# Patient Record
Sex: MALE | Race: BLACK OR AFRICAN AMERICAN | NOT HISPANIC OR LATINO | URBAN - METROPOLITAN AREA
[De-identification: names, ages, dates, MRNs, and addresses within clinical notes are randomized per-mention and may not be internally consistent; named-entity substitution may affect disease eponyms.]

---

## 2017-07-25 ENCOUNTER — EMERGENCY (EMERGENCY)
Facility: HOSPITAL | Age: 52
LOS: 1 days | Discharge: HOME | End: 2017-07-25
Attending: EMERGENCY MEDICINE | Admitting: EMERGENCY MEDICINE
Payer: COMMERCIAL

## 2017-07-25 VITALS
OXYGEN SATURATION: 100 % | WEIGHT: 235.01 LBS | HEART RATE: 80 BPM | DIASTOLIC BLOOD PRESSURE: 80 MMHG | RESPIRATION RATE: 18 BRPM | SYSTOLIC BLOOD PRESSURE: 141 MMHG | HEIGHT: 74 IN | TEMPERATURE: 97 F

## 2017-07-25 VITALS
DIASTOLIC BLOOD PRESSURE: 84 MMHG | OXYGEN SATURATION: 98 % | RESPIRATION RATE: 16 BRPM | SYSTOLIC BLOOD PRESSURE: 133 MMHG | HEART RATE: 75 BPM | TEMPERATURE: 98 F

## 2017-07-25 PROCEDURE — 99284 EMERGENCY DEPT VISIT MOD MDM: CPT | Mod: 25

## 2017-07-25 PROCEDURE — 70450 CT HEAD/BRAIN W/O DYE: CPT | Mod: 26

## 2017-07-25 PROCEDURE — 70450 CT HEAD/BRAIN W/O DYE: CPT

## 2017-07-25 PROCEDURE — 99284 EMERGENCY DEPT VISIT MOD MDM: CPT
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
## 2017-07-25 NOTE — ED STATDOCS - EYES, MLM
clear bilaterally.  Pupils equal, round, and reactive to light. Swelling over left lateral eye with abrasion. EOMI
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
## 2017-07-25 NOTE — ED ADULT NURSE NOTE - CHPI ED SYMPTOMS NEG
no crying/no decreased eating/drinking/no bruising/no back pain/no laceration/no difficulty bearing weight/no disorientation/no dizziness/no neck tenderness/no fussiness/no loss of consciousness

## 2017-07-25 NOTE — ED STATDOCS - DIAGNOSIS COUNSELING, MDM
conducted a detailed discussion... Patient with left supraorbital ridge lateral contusion, no LOC, neg CT, will d/c home.  Patient given detailed discharge and return instructions and verbalized understanding.  Patient will follow up without fail.  All questions answered.  \
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
## 2020-09-02 PROBLEM — I10 ESSENTIAL (PRIMARY) HYPERTENSION: Chronic | Status: ACTIVE | Noted: 2017-07-25

## 2020-09-02 PROBLEM — K21.9 GASTRO-ESOPHAGEAL REFLUX DISEASE WITHOUT ESOPHAGITIS: Chronic | Status: ACTIVE | Noted: 2017-07-25

## 2020-09-04 ENCOUNTER — OUTPATIENT (OUTPATIENT)
Dept: OUTPATIENT SERVICES | Facility: HOSPITAL | Age: 55
LOS: 1 days | Discharge: HOME | End: 2020-09-04
Payer: COMMERCIAL

## 2020-09-04 VITALS
SYSTOLIC BLOOD PRESSURE: 114 MMHG | RESPIRATION RATE: 18 BRPM | DIASTOLIC BLOOD PRESSURE: 62 MMHG | WEIGHT: 226.41 LBS | OXYGEN SATURATION: 100 % | HEART RATE: 69 BPM | HEIGHT: 74 IN | TEMPERATURE: 98 F

## 2020-09-04 DIAGNOSIS — I10 ESSENTIAL (PRIMARY) HYPERTENSION: ICD-10-CM

## 2020-09-04 DIAGNOSIS — M48.061 SPINAL STENOSIS, LUMBAR REGION WITHOUT NEUROGENIC CLAUDICATION: ICD-10-CM

## 2020-09-04 DIAGNOSIS — Z90.79 ACQUIRED ABSENCE OF OTHER GENITAL ORGAN(S): Chronic | ICD-10-CM

## 2020-09-04 DIAGNOSIS — Z01.818 ENCOUNTER FOR OTHER PREPROCEDURAL EXAMINATION: ICD-10-CM

## 2020-09-04 DIAGNOSIS — D17.9 BENIGN LIPOMATOUS NEOPLASM, UNSPECIFIED: Chronic | ICD-10-CM

## 2020-09-04 DIAGNOSIS — K21.9 GASTRO-ESOPHAGEAL REFLUX DISEASE WITHOUT ESOPHAGITIS: ICD-10-CM

## 2020-09-04 LAB
ANION GAP SERPL CALC-SCNC: 7 MMOL/L — SIGNIFICANT CHANGE UP (ref 5–17)
APTT BLD: 29.4 SEC — SIGNIFICANT CHANGE UP (ref 27.5–35.5)
BASOPHILS # BLD AUTO: 0.01 K/UL — SIGNIFICANT CHANGE UP (ref 0–0.2)
BASOPHILS NFR BLD AUTO: 0.2 % — SIGNIFICANT CHANGE UP (ref 0–2)
BLD GP AB SCN SERPL QL: SIGNIFICANT CHANGE UP
BUN SERPL-MCNC: 30 MG/DL — HIGH (ref 7–23)
CALCIUM SERPL-MCNC: 9 MG/DL — SIGNIFICANT CHANGE UP (ref 8.5–10.1)
CHLORIDE SERPL-SCNC: 103 MMOL/L — SIGNIFICANT CHANGE UP (ref 96–108)
CO2 SERPL-SCNC: 26 MMOL/L — SIGNIFICANT CHANGE UP (ref 22–31)
CREAT SERPL-MCNC: 1.15 MG/DL — SIGNIFICANT CHANGE UP (ref 0.5–1.3)
EOSINOPHIL # BLD AUTO: 0.17 K/UL — SIGNIFICANT CHANGE UP (ref 0–0.5)
EOSINOPHIL NFR BLD AUTO: 2.6 % — SIGNIFICANT CHANGE UP (ref 0–6)
GLUCOSE SERPL-MCNC: 85 MG/DL — SIGNIFICANT CHANGE UP (ref 70–99)
HCT VFR BLD CALC: 34.9 % — LOW (ref 39–50)
HGB BLD-MCNC: 11.9 G/DL — LOW (ref 13–17)
IMM GRANULOCYTES NFR BLD AUTO: 0.2 % — SIGNIFICANT CHANGE UP (ref 0–1.5)
INR BLD: 1.07 RATIO — SIGNIFICANT CHANGE UP (ref 0.88–1.16)
LYMPHOCYTES # BLD AUTO: 1.15 K/UL — SIGNIFICANT CHANGE UP (ref 1–3.3)
LYMPHOCYTES # BLD AUTO: 17.7 % — SIGNIFICANT CHANGE UP (ref 13–44)
MCHC RBC-ENTMCNC: 30.4 PG — SIGNIFICANT CHANGE UP (ref 27–34)
MCHC RBC-ENTMCNC: 34.1 GM/DL — SIGNIFICANT CHANGE UP (ref 32–36)
MCV RBC AUTO: 89.3 FL — SIGNIFICANT CHANGE UP (ref 80–100)
MONOCYTES # BLD AUTO: 0.47 K/UL — SIGNIFICANT CHANGE UP (ref 0–0.9)
MONOCYTES NFR BLD AUTO: 7.2 % — SIGNIFICANT CHANGE UP (ref 2–14)
NEUTROPHILS # BLD AUTO: 4.68 K/UL — SIGNIFICANT CHANGE UP (ref 1.8–7.4)
NEUTROPHILS NFR BLD AUTO: 72.1 % — SIGNIFICANT CHANGE UP (ref 43–77)
NRBC # BLD: 0 /100 WBCS — SIGNIFICANT CHANGE UP (ref 0–0)
PLATELET # BLD AUTO: 245 K/UL — SIGNIFICANT CHANGE UP (ref 150–400)
POTASSIUM SERPL-MCNC: 4.4 MMOL/L — SIGNIFICANT CHANGE UP (ref 3.5–5.3)
POTASSIUM SERPL-SCNC: 4.4 MMOL/L — SIGNIFICANT CHANGE UP (ref 3.5–5.3)
PROTHROM AB SERPL-ACNC: 12.4 SEC — SIGNIFICANT CHANGE UP (ref 10.6–13.6)
RBC # BLD: 3.91 M/UL — LOW (ref 4.2–5.8)
RBC # FLD: 12.4 % — SIGNIFICANT CHANGE UP (ref 10.3–14.5)
SODIUM SERPL-SCNC: 136 MMOL/L — SIGNIFICANT CHANGE UP (ref 135–145)
WBC # BLD: 6.49 K/UL — SIGNIFICANT CHANGE UP (ref 3.8–10.5)
WBC # FLD AUTO: 6.49 K/UL — SIGNIFICANT CHANGE UP (ref 3.8–10.5)

## 2020-09-04 PROCEDURE — 93010 ELECTROCARDIOGRAM REPORT: CPT

## 2020-09-04 RX ORDER — LISINOPRIL 2.5 MG/1
0 TABLET ORAL
Qty: 0 | Refills: 0 | DISCHARGE

## 2020-09-04 NOTE — H&P PST ADULT - NSICDXPASTMEDICALHX_GEN_ALL_CORE_FT
PAST MEDICAL HISTORY:  GERD (gastroesophageal reflux disease)     HTN (hypertension)     Lumbar stenosis     Prostate cancer

## 2020-09-04 NOTE — H&P PST ADULT - NEGATIVE GENERAL GENITOURINARY SYMPTOMS
no flank pain L/no incontinence/no hematuria/no flank pain R/no bladder infections/no nocturia/normal urinary frequency/no renal colic/no dysuria/no urinary hesitancy

## 2020-09-04 NOTE — H&P PST ADULT - NSICDXPROBLEM_GEN_ALL_CORE_FT
PROBLEM DIAGNOSES  Problem: Lumbar stenosis without neurogenic claudication  Assessment and Plan: Pre-op instructions given. Pt verbalized understanding  Chlorhexidine wash instructions given  Pending: M/C + Covid test/results     Problem: Hypertension  Assessment and Plan: ELIJAH precaution - stop bang 3  Pt instructed to take meds    Problem: GERD (gastroesophageal reflux disease)  Assessment and Plan: Pt instructed to take meds

## 2020-09-04 NOTE — H&P PST ADULT - ATTENDING COMMENTS
lumbar stenosis - failure of conservative tx - indicated for lami/fusion - prior radiation -plastics closure as well - r/b/e of the operation discussed and questions answered - well informed and would like to proceed

## 2020-09-04 NOTE — H&P PST ADULT - NEGATIVE MUSCULOSKELETAL SYMPTOMS
no leg pain L/no neck pain/no leg pain R/no myalgia/no stiffness/no arthritis/no muscle cramps/no arthralgia/no arm pain L/no joint swelling/no muscle weakness

## 2020-09-04 NOTE — H&P PST ADULT - NEGATIVE GASTROINTESTINAL SYMPTOMS
no nausea/no melena/no abdominal pain/no hematochezia/no hiccoughs/no vomiting/no steatorrhea/no diarrhea/no constipation/no change in bowel habits

## 2020-09-05 LAB
A1C WITH ESTIMATED AVERAGE GLUCOSE RESULT: 5.8 % — HIGH (ref 4–5.6)
ESTIMATED AVERAGE GLUCOSE: 120 MG/DL — HIGH (ref 68–114)
MRSA PCR RESULT.: DETECTED
S AUREUS DNA NOSE QL NAA+PROBE: DETECTED

## 2020-09-07 ENCOUNTER — OUTPATIENT (OUTPATIENT)
Dept: OUTPATIENT SERVICES | Facility: HOSPITAL | Age: 55
LOS: 1 days | Discharge: HOME | End: 2020-09-07

## 2020-09-07 DIAGNOSIS — D17.9 BENIGN LIPOMATOUS NEOPLASM, UNSPECIFIED: Chronic | ICD-10-CM

## 2020-09-07 DIAGNOSIS — U07.1 COVID-19: ICD-10-CM

## 2020-09-07 DIAGNOSIS — Z90.79 ACQUIRED ABSENCE OF OTHER GENITAL ORGAN(S): Chronic | ICD-10-CM

## 2020-09-07 PROBLEM — M48.061 SPINAL STENOSIS, LUMBAR REGION WITHOUT NEUROGENIC CLAUDICATION: Chronic | Status: ACTIVE | Noted: 2020-09-04

## 2020-09-07 PROBLEM — C61 MALIGNANT NEOPLASM OF PROSTATE: Chronic | Status: ACTIVE | Noted: 2020-09-04

## 2020-09-07 LAB — SARS-COV-2 RNA SPEC QL NAA+PROBE: SIGNIFICANT CHANGE UP

## 2020-09-08 RX ORDER — MUPIROCIN 20 MG/G
1 OINTMENT TOPICAL
Qty: 10 | Refills: 0
Start: 2020-09-08 | End: 2020-09-12

## 2020-09-09 ENCOUNTER — TRANSCRIPTION ENCOUNTER (OUTPATIENT)
Age: 55
End: 2020-09-09

## 2020-09-10 ENCOUNTER — TRANSCRIPTION ENCOUNTER (OUTPATIENT)
Age: 55
End: 2020-09-10

## 2020-09-10 ENCOUNTER — INPATIENT (INPATIENT)
Facility: HOSPITAL | Age: 55
LOS: 4 days | Discharge: HOME HEALTH | End: 2020-09-15
Attending: ORTHOPAEDIC SURGERY | Admitting: ORTHOPAEDIC SURGERY

## 2020-09-10 VITALS
HEIGHT: 74 IN | DIASTOLIC BLOOD PRESSURE: 65 MMHG | RESPIRATION RATE: 18 BRPM | WEIGHT: 229.06 LBS | OXYGEN SATURATION: 98 % | SYSTOLIC BLOOD PRESSURE: 131 MMHG | TEMPERATURE: 99 F | HEART RATE: 73 BPM

## 2020-09-10 DIAGNOSIS — D17.9 BENIGN LIPOMATOUS NEOPLASM, UNSPECIFIED: Chronic | ICD-10-CM

## 2020-09-10 DIAGNOSIS — Z90.79 ACQUIRED ABSENCE OF OTHER GENITAL ORGAN(S): Chronic | ICD-10-CM

## 2020-09-10 LAB
ANION GAP SERPL CALC-SCNC: 8 MMOL/L — SIGNIFICANT CHANGE UP (ref 5–17)
ANISOCYTOSIS BLD QL: SLIGHT — SIGNIFICANT CHANGE UP
BASOPHILS # BLD AUTO: 0 K/UL — SIGNIFICANT CHANGE UP (ref 0–0.2)
BASOPHILS NFR BLD AUTO: 0 % — SIGNIFICANT CHANGE UP (ref 0–2)
BUN SERPL-MCNC: 19 MG/DL — SIGNIFICANT CHANGE UP (ref 7–23)
CALCIUM SERPL-MCNC: 8.7 MG/DL — SIGNIFICANT CHANGE UP (ref 8.5–10.1)
CHLORIDE SERPL-SCNC: 107 MMOL/L — SIGNIFICANT CHANGE UP (ref 96–108)
CO2 SERPL-SCNC: 23 MMOL/L — SIGNIFICANT CHANGE UP (ref 22–31)
CREAT SERPL-MCNC: 1.03 MG/DL — SIGNIFICANT CHANGE UP (ref 0.5–1.3)
EOSINOPHIL # BLD AUTO: 0 K/UL — SIGNIFICANT CHANGE UP (ref 0–0.5)
EOSINOPHIL NFR BLD AUTO: 0 % — SIGNIFICANT CHANGE UP (ref 0–6)
GLUCOSE SERPL-MCNC: 130 MG/DL — HIGH (ref 70–99)
HCT VFR BLD CALC: 31.5 % — LOW (ref 39–50)
HGB BLD-MCNC: 10.7 G/DL — LOW (ref 13–17)
LYMPHOCYTES # BLD AUTO: 0.2 K/UL — LOW (ref 1–3.3)
LYMPHOCYTES # BLD AUTO: 2 % — LOW (ref 13–44)
MACROCYTES BLD QL: SLIGHT — SIGNIFICANT CHANGE UP
MANUAL SMEAR VERIFICATION: SIGNIFICANT CHANGE UP
MCHC RBC-ENTMCNC: 30.7 PG — SIGNIFICANT CHANGE UP (ref 27–34)
MCHC RBC-ENTMCNC: 34 GM/DL — SIGNIFICANT CHANGE UP (ref 32–36)
MCV RBC AUTO: 90.5 FL — SIGNIFICANT CHANGE UP (ref 80–100)
MONOCYTES # BLD AUTO: 0.1 K/UL — SIGNIFICANT CHANGE UP (ref 0–0.9)
MONOCYTES NFR BLD AUTO: 1 % — LOW (ref 2–14)
NEUTROPHILS # BLD AUTO: 9.28 K/UL — HIGH (ref 1.8–7.4)
NEUTROPHILS NFR BLD AUTO: 94 % — HIGH (ref 43–77)
NRBC # BLD: 0 /100 — SIGNIFICANT CHANGE UP (ref 0–0)
NRBC # BLD: SIGNIFICANT CHANGE UP /100 WBCS (ref 0–0)
PLAT MORPH BLD: NORMAL — SIGNIFICANT CHANGE UP
PLATELET # BLD AUTO: 183 K/UL — SIGNIFICANT CHANGE UP (ref 150–400)
PLATELET COUNT - ESTIMATE: NORMAL — SIGNIFICANT CHANGE UP
POTASSIUM SERPL-MCNC: 4.3 MMOL/L — SIGNIFICANT CHANGE UP (ref 3.5–5.3)
POTASSIUM SERPL-SCNC: 4.3 MMOL/L — SIGNIFICANT CHANGE UP (ref 3.5–5.3)
RBC # BLD: 3.48 M/UL — LOW (ref 4.2–5.8)
RBC # FLD: 12.3 % — SIGNIFICANT CHANGE UP (ref 10.3–14.5)
RBC BLD AUTO: SIGNIFICANT CHANGE UP
SODIUM SERPL-SCNC: 138 MMOL/L — SIGNIFICANT CHANGE UP (ref 135–145)
VARIANT LYMPHS # BLD: 3 % — SIGNIFICANT CHANGE UP (ref 0–6)
WBC # BLD: 9.87 K/UL — SIGNIFICANT CHANGE UP (ref 3.8–10.5)
WBC # FLD AUTO: 9.87 K/UL — SIGNIFICANT CHANGE UP (ref 3.8–10.5)

## 2020-09-10 RX ORDER — SODIUM CHLORIDE 9 MG/ML
1000 INJECTION, SOLUTION INTRAVENOUS
Refills: 0 | Status: DISCONTINUED | OUTPATIENT
Start: 2020-09-10 | End: 2020-09-10

## 2020-09-10 RX ORDER — SENNA PLUS 8.6 MG/1
2 TABLET ORAL AT BEDTIME
Refills: 0 | Status: DISCONTINUED | OUTPATIENT
Start: 2020-09-10 | End: 2020-09-15

## 2020-09-10 RX ORDER — HYDROMORPHONE HYDROCHLORIDE 2 MG/ML
0.5 INJECTION INTRAMUSCULAR; INTRAVENOUS; SUBCUTANEOUS
Refills: 0 | Status: DISCONTINUED | OUTPATIENT
Start: 2020-09-10 | End: 2020-09-10

## 2020-09-10 RX ORDER — PANTOPRAZOLE SODIUM 20 MG/1
40 TABLET, DELAYED RELEASE ORAL
Refills: 0 | Status: DISCONTINUED | OUTPATIENT
Start: 2020-09-10 | End: 2020-09-15

## 2020-09-10 RX ORDER — SODIUM CHLORIDE 9 MG/ML
3 INJECTION INTRAMUSCULAR; INTRAVENOUS; SUBCUTANEOUS EVERY 8 HOURS
Refills: 0 | Status: DISCONTINUED | OUTPATIENT
Start: 2020-09-10 | End: 2020-09-10

## 2020-09-10 RX ORDER — OXYCODONE HYDROCHLORIDE 5 MG/1
15 TABLET ORAL EVERY 4 HOURS
Refills: 0 | Status: DISCONTINUED | OUTPATIENT
Start: 2020-09-10 | End: 2020-09-11

## 2020-09-10 RX ORDER — SUCRALFATE 1 G
1 TABLET ORAL
Refills: 0 | Status: DISCONTINUED | OUTPATIENT
Start: 2020-09-10 | End: 2020-09-15

## 2020-09-10 RX ORDER — FOLIC ACID 0.8 MG
1 TABLET ORAL DAILY
Refills: 0 | Status: DISCONTINUED | OUTPATIENT
Start: 2020-09-10 | End: 2020-09-15

## 2020-09-10 RX ORDER — VANCOMYCIN HCL 1 G
1000 VIAL (EA) INTRAVENOUS ONCE
Refills: 0 | Status: COMPLETED | OUTPATIENT
Start: 2020-09-10 | End: 2020-09-10

## 2020-09-10 RX ORDER — LISINOPRIL/HYDROCHLOROTHIAZIDE 10-12.5 MG
1 TABLET ORAL
Qty: 0 | Refills: 0 | DISCHARGE

## 2020-09-10 RX ORDER — ACETAMINOPHEN 500 MG
1000 TABLET ORAL ONCE
Refills: 0 | Status: COMPLETED | OUTPATIENT
Start: 2020-09-10 | End: 2020-09-10

## 2020-09-10 RX ORDER — OXYCODONE HYDROCHLORIDE 5 MG/1
10 TABLET ORAL EVERY 4 HOURS
Refills: 0 | Status: DISCONTINUED | OUTPATIENT
Start: 2020-09-10 | End: 2020-09-11

## 2020-09-10 RX ORDER — ASCORBIC ACID 60 MG
500 TABLET,CHEWABLE ORAL
Refills: 0 | Status: DISCONTINUED | OUTPATIENT
Start: 2020-09-10 | End: 2020-09-15

## 2020-09-10 RX ORDER — GABAPENTIN 400 MG/1
300 CAPSULE ORAL
Refills: 0 | Status: DISCONTINUED | OUTPATIENT
Start: 2020-09-10 | End: 2020-09-15

## 2020-09-10 RX ORDER — SODIUM CHLORIDE 9 MG/ML
1000 INJECTION, SOLUTION INTRAVENOUS
Refills: 0 | Status: DISCONTINUED | OUTPATIENT
Start: 2020-09-10 | End: 2020-09-15

## 2020-09-10 RX ORDER — MORPHINE SULFATE 50 MG/1
3 CAPSULE, EXTENDED RELEASE ORAL EVERY 4 HOURS
Refills: 0 | Status: DISCONTINUED | OUTPATIENT
Start: 2020-09-10 | End: 2020-09-11

## 2020-09-10 RX ADMIN — Medication 1000 MILLIGRAM(S): at 17:15

## 2020-09-10 RX ADMIN — HYDROMORPHONE HYDROCHLORIDE 0.5 MILLIGRAM(S): 2 INJECTION INTRAMUSCULAR; INTRAVENOUS; SUBCUTANEOUS at 17:15

## 2020-09-10 RX ADMIN — OXYCODONE HYDROCHLORIDE 15 MILLIGRAM(S): 5 TABLET ORAL at 19:11

## 2020-09-10 RX ADMIN — HYDROMORPHONE HYDROCHLORIDE 0.5 MILLIGRAM(S): 2 INJECTION INTRAMUSCULAR; INTRAVENOUS; SUBCUTANEOUS at 16:59

## 2020-09-10 RX ADMIN — SENNA PLUS 2 TABLET(S): 8.6 TABLET ORAL at 21:49

## 2020-09-10 RX ADMIN — SODIUM CHLORIDE 75 MILLILITER(S): 9 INJECTION, SOLUTION INTRAVENOUS at 19:15

## 2020-09-10 RX ADMIN — SODIUM CHLORIDE 75 MILLILITER(S): 9 INJECTION, SOLUTION INTRAVENOUS at 17:00

## 2020-09-10 RX ADMIN — OXYCODONE HYDROCHLORIDE 15 MILLIGRAM(S): 5 TABLET ORAL at 20:10

## 2020-09-10 RX ADMIN — Medication 400 MILLIGRAM(S): at 17:00

## 2020-09-10 RX ADMIN — Medication 250 MILLIGRAM(S): at 23:42

## 2020-09-10 NOTE — CONSULT NOTE ADULT - SUBJECTIVE AND OBJECTIVE BOX
TANISHA PADILLA is a 55y Male s/p DECOMPRESSION LAMINECTOMY L2-5 POSTERIOR SPINAL FUSION L3-5  DECOMPRESSION LAMINECTOMY L2-5 POSTERIOR SPINAL FUSION    w/ h/o Lumbar stenosis  Prostate cancer  GERD (gastroesophageal reflux disease)  HTN (hypertension)  No pertinent past medical history    denies any chest pain shortness of breath palpitation dizziness lightheadedness nausea vomiting fever or chills    Lipoma  S/P prostatectomy  No significant past surgical history    FH: type 2 diabetes mellitus    SH: doesnot smoke or drink at this time    No Known Drug Allergies  shellfish (Other)    ascorbic acid 500 milliGRAM(s) Oral two times a day  folic acid 1 milliGRAM(s) Oral daily  gabapentin 300 milliGRAM(s) Oral two times a day  lactated ringers. 1000 milliLiter(s) IV Continuous <Continuous>  morphine  - Injectable 3 milliGRAM(s) IV Push every 4 hours PRN  multivitamin 1 Tablet(s) Oral daily  oxyCODONE    IR 10 milliGRAM(s) Oral every 4 hours PRN  oxyCODONE    IR 15 milliGRAM(s) Oral every 4 hours PRN  pantoprazole    Tablet 40 milliGRAM(s) Oral before breakfast  senna 2 Tablet(s) Oral at bedtime  sucralfate 1 Gram(s) Oral two times a day  vancomycin  IVPB 1000 milliGRAM(s) IV Intermittent once    T(C): 37.1 (09-10-20 @ 18:57), Max: 37.1 (09-10-20 @ 18:57)  HR: 72 (09-10-20 @ 18:57) (67 - 83)  BP: 102/64 (09-10-20 @ 18:57) (99/60 - 131/65)  RR: 17 (09-10-20 @ 18:57) (10 - 18)  SpO2: 99% (09-10-20 @ 18:57) (98% - 100%)  HEENT unremarkable  neck no JVD or bruit  heart normal S1 S2 RRR no gallops or rubs  chest clear to auscultation  abd sof nontender non distended +bs  ext no calf tenderness    A/P   DVT PX  pain control  bowel regimen   wound care as per ortho  GI PX  antiemetics prn  incentive spirometer

## 2020-09-10 NOTE — DISCHARGE NOTE PROVIDER - NSDCFUADDINST_GEN_ALL_CORE_FT
Keep Dressing Clean, Dry and Intact. May shower on POD#5 with Dressing on. Dressing may be removed on POD#7. Please do not scrub, soak, peel or pick at the dressing. No creams, lotions, or oils over dressing. May shower on POD#5 and let water run over dressing, no baths. Pat dry once out of shower.     If dressing is saturated from border to border. Remove dressing and cover with clean dry dressing. Keep Dressing Clean, Dry and Intact.     Monitor and record drain output daily.    No shower while drain is in.    Suture removal in plastic surgeon office 2 weeks post op.     If dressing is saturated from border to border. Remove dressing and cover with clean dry dressing.

## 2020-09-10 NOTE — BRIEF OPERATIVE NOTE - NSICDXBRIEFPOSTOP_GEN_ALL_CORE_FT
POST-OP DIAGNOSIS:  Lumbar radiculitis 10-Sep-2020 15:32:30  John Valdez  Spinal stenosis of lumbar region 10-Sep-2020 15:32:15  John Valdez

## 2020-09-10 NOTE — BRIEF OPERATIVE NOTE - NSICDXBRIEFPROCEDURE_GEN_ALL_CORE_FT
PROCEDURES:  Lumbar laminectomy 10-Sep-2020 15:34:24  John Valdez  Decompression of lumbar spine with fusion of posterior column, by posterior approach 10-Sep-2020 15:34:06  John Valdez

## 2020-09-10 NOTE — PROGRESS NOTE ADULT - SUBJECTIVE AND OBJECTIVE BOX
Post-op Check   POD#0 S/P Lami L2-L5/PSF L3-L5  55yMale Patient seen and examined, Pain controlled  Patient Denies SOB, CP, N/V/D       PE: L-Spine: Dressing C/D/I, AYSHA intact, HV intact, Sensation/motor intact   B/L UE: Skin intact. +ROM shoulder/elbow/wrist/fingers. +ok/thumbsup/fingercross signs.  strength: 5/5.  RP2+ NVI.   B/L LE: Skin intact. +ROM hip/knee/ankle/toes. Ankle Dorsi/plantarflexion: 5/5. Calf: soft, compressible and nontender. DP/PT 2+ NVI                          10.7   9.87  )-----------( 183      ( 10 Sep 2020 17:35 )             31.5       09-10    138  |  107  |  19  ----------------------------<  130<H>  4.3   |  23  |  1.03    Ca    8.7      10 Sep 2020 17:35          A: As above   P: Pain Control       DVT Prophylaxis      Incentive spirometry      Monitor HV/AYSHA Output      Continue Tate until Ambulating       PT WBAT       Isometric exercises      Discharge Planning      All the above discussed and understood by pt       Ortho to F/U

## 2020-09-10 NOTE — DISCHARGE NOTE PROVIDER - CARE PROVIDER_API CALL
Faisal Pierre  ORTHOPAEDIC SURGERY  [address] [place] [address]  Phone: [phone]  Fax: [phone]  Follow Up Time: Faisal Pierre  ORTHOPAEDIC SURGERY  [address] [place] [address]  Phone: [phone]  Fax: [phone]  Follow Up Time:     Raymond Valdes  Plastic Surgery  [address] [place] [id number]  Phone: [phone]  Fax: [phone]  Follow Up Time:

## 2020-09-10 NOTE — DISCHARGE NOTE PROVIDER - PROVIDER TOKENS
PROVIDER:[TOKEN:[[id number]]] PROVIDER:[TOKEN:[[id number]]],PROVIDER:[TOKEN:[[id number]]]

## 2020-09-10 NOTE — DISCHARGE NOTE PROVIDER - NSDCACTIVITY_GEN_ALL_CORE
Stairs allowed/Do not drive or operate machinery/Showering allowed/Walking - Indoors allowed/No heavy lifting/straining/Walking - Outdoors allowed Stairs allowed/Walking - Indoors allowed/Do not drive or operate machinery/No heavy lifting/straining/Walking - Outdoors allowed

## 2020-09-10 NOTE — BRIEF OPERATIVE NOTE - OPERATION/FINDINGS
Lumbar Laminectomy Decompression L2-L5; Posterior Spinal Fusion L3-L5 with complex plastics closure    see op note

## 2020-09-10 NOTE — DISCHARGE NOTE PROVIDER - NSDCFUADDAPPT_GEN_ALL_CORE_FT
Follow up with your surgeon in two weeks. Call for appointment.  If you need more pain medication, call your surgeon's office.  We Recommend a follow up appointment with your primary care physician for repeat blood work (CBC and BMP) for post hospital discharge follow-up care.  Call your surgeon if you have increased redness/pain/drainage or fever. Return to ER for shortness of breath/calf tenderness. Follow up with your surgeon in two weeks. Call for appointment.  Follow up with plastics for drain removal - call for appointment for thursday or friday this week. You can see an associate of Dr. Valdes if he is not in the office.     If you need more pain medication, call your surgeon's office.  We Recommend a follow up appointment with your primary care physician for repeat blood work (CBC and BMP) for post hospital discharge follow-up care.  Call your surgeon if you have increased redness/pain/drainage or fever. Return to ER for shortness of breath/calf tenderness.

## 2020-09-10 NOTE — BRIEF OPERATIVE NOTE - NSICDXBRIEFPREOP_GEN_ALL_CORE_FT
PRE-OP DIAGNOSIS:  Lumbar radiculitis 10-Sep-2020 15:34:48  John Valdez  Spinal stenosis of lumbar region 10-Sep-2020 15:34:48  John Valdez

## 2020-09-10 NOTE — DISCHARGE NOTE PROVIDER - NSDCMRMEDTOKEN_GEN_ALL_CORE_FT
diclofenac potassium 50 mg oral tablet: 1 tab(s) orally 3 times a day, As Needed  gabapentin 300 mg oral tablet: 1  orally 2 times a day  mupirocin 2% topical ointment: Apply topically to affected area 2 times a day MDD:2 nasally  oxybutynin 10 mg/24 hr oral tablet, extended release: 1 tab(s) orally once a day  Protonix 40 mg oral delayed release tablet: 1 tab(s) orally once a day  sucralfate 1 g oral tablet: 1  orally 2 times a day acetaminophen 325 mg oral tablet: 2 tab(s) orally every 6 hours, As needed, Temp greater or equal to 38C (100.4F)  ascorbic acid 500 mg oral tablet: 1 tab(s) orally 2 times a day  gabapentin 300 mg oral tablet: 1  orally 2 times a day  Keflex 500 mg oral capsule: 1 cap(s) orally every 6 hours MDD:4  lisinopril 20 mg oral tablet: 1 tab(s) orally once a day  MiraLax oral powder for reconstitution: 1packet  Multiple Vitamins oral tablet: 1 tab(s) orally once a day  oxybutynin 10 mg/24 hr oral tablet, extended release: 1 tab(s) orally once a day  oxyCODONE 15 mg oral tablet: 1 tab(s) orally every 4 hours, As needed, pain 6-10 MDD:6  Probiotic Formula oral capsule: 1 cap(s) orally once a day  Protonix 40 mg oral delayed release tablet: 1 tab(s) orally once a day  senna oral tablet: 2 tab(s) orally once a day (at bedtime)  sucralfate 1 g oral tablet: 1  orally 2 times a day

## 2020-09-11 ENCOUNTER — TRANSCRIPTION ENCOUNTER (OUTPATIENT)
Age: 55
End: 2020-09-11

## 2020-09-11 LAB
ANION GAP SERPL CALC-SCNC: 8 MMOL/L — SIGNIFICANT CHANGE UP (ref 5–17)
BASOPHILS # BLD AUTO: 0 K/UL — SIGNIFICANT CHANGE UP (ref 0–0.2)
BASOPHILS NFR BLD AUTO: 0 % — SIGNIFICANT CHANGE UP (ref 0–2)
BUN SERPL-MCNC: 18 MG/DL — SIGNIFICANT CHANGE UP (ref 7–23)
CALCIUM SERPL-MCNC: 8.1 MG/DL — LOW (ref 8.5–10.1)
CHLORIDE SERPL-SCNC: 103 MMOL/L — SIGNIFICANT CHANGE UP (ref 96–108)
CO2 SERPL-SCNC: 26 MMOL/L — SIGNIFICANT CHANGE UP (ref 22–31)
CREAT SERPL-MCNC: 0.97 MG/DL — SIGNIFICANT CHANGE UP (ref 0.5–1.3)
EOSINOPHIL # BLD AUTO: 0 K/UL — SIGNIFICANT CHANGE UP (ref 0–0.5)
EOSINOPHIL NFR BLD AUTO: 0 % — SIGNIFICANT CHANGE UP (ref 0–6)
GLUCOSE SERPL-MCNC: 115 MG/DL — HIGH (ref 70–99)
HCT VFR BLD CALC: 27.2 % — LOW (ref 39–50)
HGB BLD-MCNC: 9.1 G/DL — LOW (ref 13–17)
IMM GRANULOCYTES NFR BLD AUTO: 0.5 % — SIGNIFICANT CHANGE UP (ref 0–1.5)
LYMPHOCYTES # BLD AUTO: 0.73 K/UL — LOW (ref 1–3.3)
LYMPHOCYTES # BLD AUTO: 6.6 % — LOW (ref 13–44)
MCHC RBC-ENTMCNC: 30.4 PG — SIGNIFICANT CHANGE UP (ref 27–34)
MCHC RBC-ENTMCNC: 33.5 GM/DL — SIGNIFICANT CHANGE UP (ref 32–36)
MCV RBC AUTO: 91 FL — SIGNIFICANT CHANGE UP (ref 80–100)
MONOCYTES # BLD AUTO: 0.76 K/UL — SIGNIFICANT CHANGE UP (ref 0–0.9)
MONOCYTES NFR BLD AUTO: 6.8 % — SIGNIFICANT CHANGE UP (ref 2–14)
NEUTROPHILS # BLD AUTO: 9.55 K/UL — HIGH (ref 1.8–7.4)
NEUTROPHILS NFR BLD AUTO: 86.1 % — HIGH (ref 43–77)
NRBC # BLD: 0 /100 WBCS — SIGNIFICANT CHANGE UP (ref 0–0)
PLATELET # BLD AUTO: 184 K/UL — SIGNIFICANT CHANGE UP (ref 150–400)
POTASSIUM SERPL-MCNC: 4.4 MMOL/L — SIGNIFICANT CHANGE UP (ref 3.5–5.3)
POTASSIUM SERPL-SCNC: 4.4 MMOL/L — SIGNIFICANT CHANGE UP (ref 3.5–5.3)
RBC # BLD: 2.99 M/UL — LOW (ref 4.2–5.8)
RBC # FLD: 12.3 % — SIGNIFICANT CHANGE UP (ref 10.3–14.5)
SODIUM SERPL-SCNC: 137 MMOL/L — SIGNIFICANT CHANGE UP (ref 135–145)
WBC # BLD: 11.1 K/UL — HIGH (ref 3.8–10.5)
WBC # FLD AUTO: 11.1 K/UL — HIGH (ref 3.8–10.5)

## 2020-09-11 RX ORDER — OXYCODONE HYDROCHLORIDE 5 MG/1
15 TABLET ORAL EVERY 4 HOURS
Refills: 0 | Status: DISCONTINUED | OUTPATIENT
Start: 2020-09-11 | End: 2020-09-15

## 2020-09-11 RX ORDER — BENZOCAINE AND MENTHOL 5; 1 G/100ML; G/100ML
1 LIQUID ORAL
Refills: 0 | Status: DISCONTINUED | OUTPATIENT
Start: 2020-09-11 | End: 2020-09-15

## 2020-09-11 RX ORDER — OXYBUTYNIN CHLORIDE 5 MG
5 TABLET ORAL
Refills: 0 | Status: DISCONTINUED | OUTPATIENT
Start: 2020-09-11 | End: 2020-09-15

## 2020-09-11 RX ORDER — MORPHINE SULFATE 50 MG/1
3 CAPSULE, EXTENDED RELEASE ORAL EVERY 4 HOURS
Refills: 0 | Status: DISCONTINUED | OUTPATIENT
Start: 2020-09-11 | End: 2020-09-12

## 2020-09-11 RX ORDER — SIMETHICONE 80 MG/1
80 TABLET, CHEWABLE ORAL ONCE
Refills: 0 | Status: COMPLETED | OUTPATIENT
Start: 2020-09-11 | End: 2020-09-11

## 2020-09-11 RX ORDER — ONDANSETRON 8 MG/1
4 TABLET, FILM COATED ORAL EVERY 6 HOURS
Refills: 0 | Status: DISCONTINUED | OUTPATIENT
Start: 2020-09-11 | End: 2020-09-15

## 2020-09-11 RX ORDER — LISINOPRIL 2.5 MG/1
20 TABLET ORAL DAILY
Refills: 0 | Status: DISCONTINUED | OUTPATIENT
Start: 2020-09-11 | End: 2020-09-14

## 2020-09-11 RX ORDER — OXYCODONE HYDROCHLORIDE 5 MG/1
10 TABLET ORAL EVERY 4 HOURS
Refills: 0 | Status: DISCONTINUED | OUTPATIENT
Start: 2020-09-11 | End: 2020-09-15

## 2020-09-11 RX ORDER — SIMETHICONE 80 MG/1
80 TABLET, CHEWABLE ORAL EVERY 6 HOURS
Refills: 0 | Status: DISCONTINUED | OUTPATIENT
Start: 2020-09-11 | End: 2020-09-15

## 2020-09-11 RX ORDER — ACETAMINOPHEN 500 MG
1000 TABLET ORAL ONCE
Refills: 0 | Status: COMPLETED | OUTPATIENT
Start: 2020-09-11 | End: 2020-09-11

## 2020-09-11 RX ORDER — INFLUENZA VIRUS VACCINE 15; 15; 15; 15 UG/.5ML; UG/.5ML; UG/.5ML; UG/.5ML
0.5 SUSPENSION INTRAMUSCULAR ONCE
Refills: 0 | Status: DISCONTINUED | OUTPATIENT
Start: 2020-09-11 | End: 2020-09-15

## 2020-09-11 RX ORDER — VANCOMYCIN HCL 1 G
1000 VIAL (EA) INTRAVENOUS EVERY 12 HOURS
Refills: 0 | Status: DISCONTINUED | OUTPATIENT
Start: 2020-09-11 | End: 2020-09-11

## 2020-09-11 RX ORDER — VANCOMYCIN HCL 1 G
1250 VIAL (EA) INTRAVENOUS EVERY 12 HOURS
Refills: 0 | Status: DISCONTINUED | OUTPATIENT
Start: 2020-09-11 | End: 2020-09-15

## 2020-09-11 RX ADMIN — SENNA PLUS 2 TABLET(S): 8.6 TABLET ORAL at 21:51

## 2020-09-11 RX ADMIN — OXYCODONE HYDROCHLORIDE 10 MILLIGRAM(S): 5 TABLET ORAL at 10:00

## 2020-09-11 RX ADMIN — OXYCODONE HYDROCHLORIDE 15 MILLIGRAM(S): 5 TABLET ORAL at 20:53

## 2020-09-11 RX ADMIN — Medication 166.67 MILLIGRAM(S): at 12:09

## 2020-09-11 RX ADMIN — GABAPENTIN 300 MILLIGRAM(S): 400 CAPSULE ORAL at 18:31

## 2020-09-11 RX ADMIN — SODIUM CHLORIDE 75 MILLILITER(S): 9 INJECTION, SOLUTION INTRAVENOUS at 05:47

## 2020-09-11 RX ADMIN — PANTOPRAZOLE SODIUM 40 MILLIGRAM(S): 20 TABLET, DELAYED RELEASE ORAL at 05:50

## 2020-09-11 RX ADMIN — OXYCODONE HYDROCHLORIDE 10 MILLIGRAM(S): 5 TABLET ORAL at 00:26

## 2020-09-11 RX ADMIN — Medication 1 MILLIGRAM(S): at 12:09

## 2020-09-11 RX ADMIN — Medication 1 TABLET(S): at 12:09

## 2020-09-11 RX ADMIN — MORPHINE SULFATE 3 MILLIGRAM(S): 50 CAPSULE, EXTENDED RELEASE ORAL at 15:51

## 2020-09-11 RX ADMIN — MORPHINE SULFATE 3 MILLIGRAM(S): 50 CAPSULE, EXTENDED RELEASE ORAL at 05:48

## 2020-09-11 RX ADMIN — GABAPENTIN 300 MILLIGRAM(S): 400 CAPSULE ORAL at 05:47

## 2020-09-11 RX ADMIN — Medication 400 MILLIGRAM(S): at 21:54

## 2020-09-11 RX ADMIN — SIMETHICONE 80 MILLIGRAM(S): 80 TABLET, CHEWABLE ORAL at 05:47

## 2020-09-11 RX ADMIN — Medication 1000 MILLIGRAM(S): at 22:30

## 2020-09-11 RX ADMIN — Medication 5 MILLIGRAM(S): at 18:30

## 2020-09-11 RX ADMIN — OXYCODONE HYDROCHLORIDE 10 MILLIGRAM(S): 5 TABLET ORAL at 01:25

## 2020-09-11 RX ADMIN — OXYCODONE HYDROCHLORIDE 10 MILLIGRAM(S): 5 TABLET ORAL at 09:03

## 2020-09-11 RX ADMIN — Medication 500 MILLIGRAM(S): at 18:31

## 2020-09-11 RX ADMIN — Medication 1 GRAM(S): at 18:30

## 2020-09-11 RX ADMIN — Medication 1 GRAM(S): at 05:47

## 2020-09-11 RX ADMIN — SIMETHICONE 80 MILLIGRAM(S): 80 TABLET, CHEWABLE ORAL at 12:10

## 2020-09-11 RX ADMIN — MORPHINE SULFATE 3 MILLIGRAM(S): 50 CAPSULE, EXTENDED RELEASE ORAL at 16:05

## 2020-09-11 RX ADMIN — MORPHINE SULFATE 3 MILLIGRAM(S): 50 CAPSULE, EXTENDED RELEASE ORAL at 06:03

## 2020-09-11 RX ADMIN — SIMETHICONE 80 MILLIGRAM(S): 80 TABLET, CHEWABLE ORAL at 18:31

## 2020-09-11 RX ADMIN — Medication 500 MILLIGRAM(S): at 05:47

## 2020-09-11 RX ADMIN — OXYCODONE HYDROCHLORIDE 15 MILLIGRAM(S): 5 TABLET ORAL at 13:51

## 2020-09-11 RX ADMIN — OXYCODONE HYDROCHLORIDE 15 MILLIGRAM(S): 5 TABLET ORAL at 14:30

## 2020-09-11 NOTE — PHYSICAL THERAPY INITIAL EVALUATION ADULT - GENERAL OBSERVATIONS, REHAB EVAL
Pt was seen in supine c lumbar  dressing and Angel Funk and Hemovac drainage c/d/i, and mohr catheter donned, alert and Ox4. Pt was medicated by RNAsher prior to PT session. Pt was instructed spinal precaution prior to P.T. intervention and pt had verbal understanding.

## 2020-09-11 NOTE — PHYSICAL THERAPY INITIAL EVALUATION ADULT - BALANCE TRAINING, PT EVAL
Pt will increase static/dynamic sitting balance to good and static/dynamic standing balance, c a tall rolling walker,  to good to perform all functional mobility without LOB, by 2weeks.

## 2020-09-11 NOTE — PHYSICAL THERAPY INITIAL EVALUATION ADULT - TRANSFER TRAINING, PT EVAL
Pt will independently perform sit to stand to sit transfers without LOB using  tall rolling walker by 2-3 days

## 2020-09-11 NOTE — PHYSICAL THERAPY INITIAL EVALUATION ADULT - GAIT DEVIATIONS NOTED, PT EVAL
decreased step length/decreased mya/decreased velocity of limb motion/decreased stride length/decreased weight-shifting ability/increased time in double stance

## 2020-09-11 NOTE — PROGRESS NOTE ADULT - SUBJECTIVE AND OBJECTIVE BOX
55yMale s/p Lami L2-5, PSF L3-5. Pt seen and examined in NAD. Pain controlled. Pt is c/o gas pains. Pt denies any new complaints. Pt denies CP/SOB/N/V/D/numbness/tingling/bowel or bladder dysfunction. (+)Tate    PE:   Abdomen: Distended (+)BS in all 4 quadrants  Spine: Dressing c/d/i +HV +AYSHA   B/L UE: Skin intact. +ROM shoulder/elbow/wrist/fingers. +ok/thumbsup/fingercross signs.  strength: 5/5.  RP2+ NVI.   B/L LE: Skin intact. +ROM hip/knee/ankle/toes. Ankle Dorsi/plantarflexion: 5/5. Calf: soft, compressible and nontender. DP/PT 2+ NVI.                           9.1    11.10 )-----------( 184      ( 11 Sep 2020 07:26 )             27.2       09-11    137  |  103  |  18  ----------------------------<  115<H>  4.4   |  26  |  0.97    Ca    8.1<L>      11 Sep 2020 07:26          A/P: 55yMale s/p Lami L2-5, PSF L3-5 POD#1  D/C Tate, voiding trial  Monitor & record Drain outputs every 8 hours  Pain control Prn  PT: WBAT - spinal precautions   DVT ppx: SCDs and ambulation  Wound care, Isometric exercises, incentive spirometry   Discharge: planning Home once drains are out  All the above discussed and understood by pt

## 2020-09-11 NOTE — PROGRESS NOTE ADULT - SUBJECTIVE AND OBJECTIVE BOX
TANISHA PADILLA is a 55y Male s/p DECOMPRESSION LAMINECTOMY L2-5 POSTERIOR SPINAL FUSION L3-5  DECOMPRESSION LAMINECTOMY L2-5 POSTERIOR SPINAL FUSION by Dr. Pierre on 9/10/20.   patient tolerated surgery well; patient has minimal pain     ROS: no pulmonary, cardiovascular, gastrointestinal, urological or neurological symptoms.     PHYS: T(C): 37.1 (09-11-20 @ 05:16), Max: 37.1 (09-10-20 @ 18:57)  HR: 77 (09-11-20 @ 05:16) (67 - 87)  BP: 119/70 (09-11-20 @ 05:16) (95/56 - 131/65)  RR: 18 (09-11-20 @ 05:16) (10 - 18)  SpO2: 99% (09-11-20 @ 05:16) (98% - 100%)  vss; lungs, clear; heart, reg rhythm, no murmurs, rubs or gallops;   abd, soft, non tender, normal bowel sounds, no calf tenderness or edema                         10.7   9.87  )-----------( 183      ( 10 Sep 2020 17:35 )             31.5   09-10    138  |  107  |  19  ----------------------------<  130<H>  4.3   |  23  |  1.03    Ca    8.7      10 Sep 2020 17:35    Assessment and Plan: lumbar radiculopathy status post lumbar laminectomy L2-5  PSF L3-5; Hypertension; Gastroesophageal reflux disease; postop anemia (due to acute blood loss); pre diabetes mellitus (A1c=5.8); history of prostate cancer; pain control; deep vein thrombophlebitis prophylaxis; physical therapy; bowel regimen; nutrition support; follow up labs; will follow.

## 2020-09-11 NOTE — PHYSICAL THERAPY INITIAL EVALUATION ADULT - ADDITIONAL COMMENTS
There are 2 steps, w/o rail. at the entry of the house and no steps to negotiate at home. Wife will be available to assist pt in post -op care upon discharge home.

## 2020-09-11 NOTE — PHYSICAL THERAPY INITIAL EVALUATION ADULT - PHYSICAL ASSIST/NONPHYSICAL ASSIST: SIT/SUPINE, REHAB EVAL
1 person assist/nonverbal cues (demo/gestures)/verbal cues/spinal precaution and proper body mechanics

## 2020-09-11 NOTE — DISCHARGE NOTE NURSING/CASE MANAGEMENT/SOCIAL WORK - PATIENT PORTAL LINK FT
You can access the FollowMyHealth Patient Portal offered by [facility] by registering at the following website: [email or web address]. By joining [facility]’s FollowMyHealth portal, you will also be able to view your health information using other applications (apps) compatible with our system.

## 2020-09-11 NOTE — PHYSICAL THERAPY INITIAL EVALUATION ADULT - CRITERIA FOR SKILLED THERAPEUTIC INTERVENTIONS
predicted duration of therapy intervention/impairments found/rehab potential/anticipated discharge recommendation/functional limitations in following categories/therapy frequency/anticipated equipment needs at discharge/risk reduction/prevention

## 2020-09-12 LAB
ANION GAP SERPL CALC-SCNC: 5 MMOL/L — SIGNIFICANT CHANGE UP (ref 5–17)
BUN SERPL-MCNC: 14 MG/DL — SIGNIFICANT CHANGE UP (ref 7–23)
CALCIUM SERPL-MCNC: 8.3 MG/DL — LOW (ref 8.5–10.1)
CHLORIDE SERPL-SCNC: 102 MMOL/L — SIGNIFICANT CHANGE UP (ref 96–108)
CO2 SERPL-SCNC: 29 MMOL/L — SIGNIFICANT CHANGE UP (ref 22–31)
CREAT SERPL-MCNC: 1.11 MG/DL — SIGNIFICANT CHANGE UP (ref 0.5–1.3)
GLUCOSE SERPL-MCNC: 101 MG/DL — HIGH (ref 70–99)
HCT VFR BLD CALC: 27.6 % — LOW (ref 39–50)
HGB BLD-MCNC: 9.3 G/DL — LOW (ref 13–17)
MCHC RBC-ENTMCNC: 30.6 PG — SIGNIFICANT CHANGE UP (ref 27–34)
MCHC RBC-ENTMCNC: 33.7 GM/DL — SIGNIFICANT CHANGE UP (ref 32–36)
MCV RBC AUTO: 90.8 FL — SIGNIFICANT CHANGE UP (ref 80–100)
NRBC # BLD: 0 /100 WBCS — SIGNIFICANT CHANGE UP (ref 0–0)
PLATELET # BLD AUTO: 189 K/UL — SIGNIFICANT CHANGE UP (ref 150–400)
POTASSIUM SERPL-MCNC: 3.9 MMOL/L — SIGNIFICANT CHANGE UP (ref 3.5–5.3)
POTASSIUM SERPL-SCNC: 3.9 MMOL/L — SIGNIFICANT CHANGE UP (ref 3.5–5.3)
RBC # BLD: 3.04 M/UL — LOW (ref 4.2–5.8)
RBC # FLD: 12.2 % — SIGNIFICANT CHANGE UP (ref 10.3–14.5)
SODIUM SERPL-SCNC: 136 MMOL/L — SIGNIFICANT CHANGE UP (ref 135–145)
WBC # BLD: 10.18 K/UL — SIGNIFICANT CHANGE UP (ref 3.8–10.5)
WBC # FLD AUTO: 10.18 K/UL — SIGNIFICANT CHANGE UP (ref 3.8–10.5)

## 2020-09-12 RX ORDER — SODIUM CHLORIDE 9 MG/ML
1000 INJECTION INTRAMUSCULAR; INTRAVENOUS; SUBCUTANEOUS ONCE
Refills: 0 | Status: COMPLETED | OUTPATIENT
Start: 2020-09-12 | End: 2020-09-12

## 2020-09-12 RX ORDER — ACETAMINOPHEN 500 MG
1000 TABLET ORAL ONCE
Refills: 0 | Status: COMPLETED | OUTPATIENT
Start: 2020-09-12 | End: 2020-09-12

## 2020-09-12 RX ORDER — MAGNESIUM HYDROXIDE 400 MG/1
30 TABLET, CHEWABLE ORAL DAILY
Refills: 0 | Status: DISCONTINUED | OUTPATIENT
Start: 2020-09-12 | End: 2020-09-15

## 2020-09-12 RX ADMIN — OXYCODONE HYDROCHLORIDE 15 MILLIGRAM(S): 5 TABLET ORAL at 08:26

## 2020-09-12 RX ADMIN — SODIUM CHLORIDE 75 MILLILITER(S): 9 INJECTION, SOLUTION INTRAVENOUS at 23:42

## 2020-09-12 RX ADMIN — Medication 1 MILLIGRAM(S): at 12:30

## 2020-09-12 RX ADMIN — SENNA PLUS 2 TABLET(S): 8.6 TABLET ORAL at 21:26

## 2020-09-12 RX ADMIN — Medication 1 TABLET(S): at 12:30

## 2020-09-12 RX ADMIN — OXYCODONE HYDROCHLORIDE 15 MILLIGRAM(S): 5 TABLET ORAL at 14:17

## 2020-09-12 RX ADMIN — Medication 5 MILLIGRAM(S): at 06:00

## 2020-09-12 RX ADMIN — OXYCODONE HYDROCHLORIDE 15 MILLIGRAM(S): 5 TABLET ORAL at 04:00

## 2020-09-12 RX ADMIN — Medication 500 MILLIGRAM(S): at 18:55

## 2020-09-12 RX ADMIN — Medication 500 MILLIGRAM(S): at 06:00

## 2020-09-12 RX ADMIN — Medication 166.67 MILLIGRAM(S): at 00:00

## 2020-09-12 RX ADMIN — OXYCODONE HYDROCHLORIDE 15 MILLIGRAM(S): 5 TABLET ORAL at 05:00

## 2020-09-12 RX ADMIN — SIMETHICONE 80 MILLIGRAM(S): 80 TABLET, CHEWABLE ORAL at 20:32

## 2020-09-12 RX ADMIN — PANTOPRAZOLE SODIUM 40 MILLIGRAM(S): 20 TABLET, DELAYED RELEASE ORAL at 07:00

## 2020-09-12 RX ADMIN — OXYCODONE HYDROCHLORIDE 15 MILLIGRAM(S): 5 TABLET ORAL at 15:06

## 2020-09-12 RX ADMIN — Medication 5 MILLIGRAM(S): at 18:55

## 2020-09-12 RX ADMIN — SODIUM CHLORIDE 1000 MILLILITER(S): 9 INJECTION INTRAMUSCULAR; INTRAVENOUS; SUBCUTANEOUS at 21:26

## 2020-09-12 RX ADMIN — Medication 1000 MILLIGRAM(S): at 20:30

## 2020-09-12 RX ADMIN — OXYCODONE HYDROCHLORIDE 15 MILLIGRAM(S): 5 TABLET ORAL at 09:23

## 2020-09-12 RX ADMIN — GABAPENTIN 300 MILLIGRAM(S): 400 CAPSULE ORAL at 18:56

## 2020-09-12 RX ADMIN — Medication 1 GRAM(S): at 18:55

## 2020-09-12 RX ADMIN — Medication 1 GRAM(S): at 06:00

## 2020-09-12 RX ADMIN — Medication 166.67 MILLIGRAM(S): at 23:42

## 2020-09-12 RX ADMIN — Medication 10 MILLIGRAM(S): at 20:33

## 2020-09-12 RX ADMIN — Medication 400 MILLIGRAM(S): at 20:03

## 2020-09-12 RX ADMIN — LISINOPRIL 20 MILLIGRAM(S): 2.5 TABLET ORAL at 06:00

## 2020-09-12 RX ADMIN — Medication 166.67 MILLIGRAM(S): at 12:30

## 2020-09-12 RX ADMIN — GABAPENTIN 300 MILLIGRAM(S): 400 CAPSULE ORAL at 06:00

## 2020-09-12 NOTE — PROGRESS NOTE ADULT - SUBJECTIVE AND OBJECTIVE BOX
POD#2 S/P Lami L2-L5/PSF L3-L5  55yMale Patient seen and examined, Pain controlled  Patient Denies SOB, CP, N/V/D   Patient seen and examined by Plastics this AM    PE: L-Spine: Dressing C/D/I, HV intact 145/345, AYSHA intact 60/115, Sensation/motor intact  B/L UE: Skin intact. +ROM shoulder/elbow/wrist/fingers. +ok/thumbsup/fingercross signs.  strength: 5/5.  RP2+ NVI.   B/L LE: Skin intact. +ROM hip/knee/ankle/toes. Ankle Dorsi/plantarflexion: 5/5. Calf: soft, compressible and nontender. DP/PT 2+ NVI                          9.3    10.18 )-----------( 189      ( 12 Sep 2020 10:49 )             27.6       09-12    136  |  102  |  14  ----------------------------<  101<H>  3.9   |  29  |  1.11    Ca    8.3<L>      12 Sep 2020 10:49          A: As above   P: Pain Control       DVT Prophylaxis      Incentive spirometry      Monitor Drain Output      Continue care as per Plastics       PT WBAT      Isometric exercises      Discharge Planning      All the above discussed and understood by pt       Ortho to F/U

## 2020-09-12 NOTE — PROGRESS NOTE ADULT - SUBJECTIVE AND OBJECTIVE BOX
TANISHA PADILLA is a 55y Male s/p DECOMPRESSION LAMINECTOMY L2-5 POSTERIOR SPINAL FUSION L3-5    DECOMPRESSION LAMINECTOMY L2-5 POSTERIOR SPINAL FUSION        denies any chest pain shortness of breath palpitation dizziness lightheadedness nausea vomiting fever or chills    T(C): 37.6 (09-12-20 @ 17:55), Max: 37.6 (09-12-20 @ 17:55)  HR: 93 (09-12-20 @ 19:25) (92 - 99)  BP: 93/56 (09-12-20 @ 19:25) (87/56 - 124/61)  RR: 16 (09-12-20 @ 19:25) (16 - 18)  SpO2: 98% (09-12-20 @ 19:25) (94% - 99%)  no jvd/bruit  s1 s2 rrr  cta  s/nt/nd  no calf tend                        9.3    10.18 )-----------( 189      ( 12 Sep 2020 10:49 )             27.6   09-12    136  |  102  |  14  ----------------------------<  101<H>  3.9   |  29  |  1.11    Ca    8.3<L>      12 Sep 2020 10:49        cont dvt px  pain control  bowel regimen  antiemetics  incentive spirometer

## 2020-09-13 LAB
ANION GAP SERPL CALC-SCNC: 5 MMOL/L — SIGNIFICANT CHANGE UP (ref 5–17)
BASOPHILS # BLD AUTO: 0.01 K/UL — SIGNIFICANT CHANGE UP (ref 0–0.2)
BASOPHILS NFR BLD AUTO: 0.1 % — SIGNIFICANT CHANGE UP (ref 0–2)
BUN SERPL-MCNC: 19 MG/DL — SIGNIFICANT CHANGE UP (ref 7–23)
CALCIUM SERPL-MCNC: 8.3 MG/DL — LOW (ref 8.5–10.1)
CHLORIDE SERPL-SCNC: 103 MMOL/L — SIGNIFICANT CHANGE UP (ref 96–108)
CO2 SERPL-SCNC: 29 MMOL/L — SIGNIFICANT CHANGE UP (ref 22–31)
CREAT SERPL-MCNC: 1.61 MG/DL — HIGH (ref 0.5–1.3)
EOSINOPHIL # BLD AUTO: 0.08 K/UL — SIGNIFICANT CHANGE UP (ref 0–0.5)
EOSINOPHIL NFR BLD AUTO: 1 % — SIGNIFICANT CHANGE UP (ref 0–6)
GLUCOSE SERPL-MCNC: 113 MG/DL — HIGH (ref 70–99)
HCT VFR BLD CALC: 22.5 % — LOW (ref 39–50)
HCT VFR BLD CALC: 22.8 % — LOW (ref 39–50)
HGB BLD-MCNC: 7.9 G/DL — LOW (ref 13–17)
HGB BLD-MCNC: 7.9 G/DL — LOW (ref 13–17)
IMM GRANULOCYTES NFR BLD AUTO: 0.8 % — SIGNIFICANT CHANGE UP (ref 0–1.5)
LYMPHOCYTES # BLD AUTO: 0.78 K/UL — LOW (ref 1–3.3)
LYMPHOCYTES # BLD AUTO: 9.9 % — LOW (ref 13–44)
MCHC RBC-ENTMCNC: 30.5 PG — SIGNIFICANT CHANGE UP (ref 27–34)
MCHC RBC-ENTMCNC: 34.6 GM/DL — SIGNIFICANT CHANGE UP (ref 32–36)
MCV RBC AUTO: 88 FL — SIGNIFICANT CHANGE UP (ref 80–100)
MONOCYTES # BLD AUTO: 0.79 K/UL — SIGNIFICANT CHANGE UP (ref 0–0.9)
MONOCYTES NFR BLD AUTO: 10 % — SIGNIFICANT CHANGE UP (ref 2–14)
NEUTROPHILS # BLD AUTO: 6.19 K/UL — SIGNIFICANT CHANGE UP (ref 1.8–7.4)
NEUTROPHILS NFR BLD AUTO: 78.2 % — HIGH (ref 43–77)
NRBC # BLD: 0 /100 WBCS — SIGNIFICANT CHANGE UP (ref 0–0)
PLATELET # BLD AUTO: 153 K/UL — SIGNIFICANT CHANGE UP (ref 150–400)
POTASSIUM SERPL-MCNC: 3.9 MMOL/L — SIGNIFICANT CHANGE UP (ref 3.5–5.3)
POTASSIUM SERPL-SCNC: 3.9 MMOL/L — SIGNIFICANT CHANGE UP (ref 3.5–5.3)
RBC # BLD: 2.59 M/UL — LOW (ref 4.2–5.8)
RBC # FLD: 12.1 % — SIGNIFICANT CHANGE UP (ref 10.3–14.5)
SODIUM SERPL-SCNC: 137 MMOL/L — SIGNIFICANT CHANGE UP (ref 135–145)
VANCOMYCIN TROUGH SERPL-MCNC: 12.6 UG/ML — SIGNIFICANT CHANGE UP (ref 10–20)
WBC # BLD: 7.91 K/UL — SIGNIFICANT CHANGE UP (ref 3.8–10.5)
WBC # FLD AUTO: 7.91 K/UL — SIGNIFICANT CHANGE UP (ref 3.8–10.5)

## 2020-09-13 RX ORDER — ACETAMINOPHEN 500 MG
1000 TABLET ORAL ONCE
Refills: 0 | Status: COMPLETED | OUTPATIENT
Start: 2020-09-13 | End: 2020-09-13

## 2020-09-13 RX ORDER — ACETAMINOPHEN 500 MG
650 TABLET ORAL EVERY 6 HOURS
Refills: 0 | Status: DISCONTINUED | OUTPATIENT
Start: 2020-09-13 | End: 2020-09-15

## 2020-09-13 RX ORDER — POLYETHYLENE GLYCOL 3350 17 G/17G
17 POWDER, FOR SOLUTION ORAL DAILY
Refills: 0 | Status: DISCONTINUED | OUTPATIENT
Start: 2020-09-13 | End: 2020-09-15

## 2020-09-13 RX ORDER — LACTOBACILLUS ACIDOPHILUS 100MM CELL
1 CAPSULE ORAL
Refills: 0 | Status: DISCONTINUED | OUTPATIENT
Start: 2020-09-13 | End: 2020-09-15

## 2020-09-13 RX ADMIN — Medication 1 GRAM(S): at 18:29

## 2020-09-13 RX ADMIN — Medication 1 TABLET(S): at 13:29

## 2020-09-13 RX ADMIN — Medication 1 TABLET(S): at 18:29

## 2020-09-13 RX ADMIN — GABAPENTIN 300 MILLIGRAM(S): 400 CAPSULE ORAL at 06:22

## 2020-09-13 RX ADMIN — Medication 5 MILLIGRAM(S): at 18:29

## 2020-09-13 RX ADMIN — OXYCODONE HYDROCHLORIDE 15 MILLIGRAM(S): 5 TABLET ORAL at 09:07

## 2020-09-13 RX ADMIN — PANTOPRAZOLE SODIUM 40 MILLIGRAM(S): 20 TABLET, DELAYED RELEASE ORAL at 06:22

## 2020-09-13 RX ADMIN — Medication 400 MILLIGRAM(S): at 03:53

## 2020-09-13 RX ADMIN — OXYCODONE HYDROCHLORIDE 15 MILLIGRAM(S): 5 TABLET ORAL at 17:27

## 2020-09-13 RX ADMIN — OXYCODONE HYDROCHLORIDE 15 MILLIGRAM(S): 5 TABLET ORAL at 22:21

## 2020-09-13 RX ADMIN — Medication 1 GRAM(S): at 06:22

## 2020-09-13 RX ADMIN — Medication 500 MILLIGRAM(S): at 18:28

## 2020-09-13 RX ADMIN — SIMETHICONE 80 MILLIGRAM(S): 80 TABLET, CHEWABLE ORAL at 13:29

## 2020-09-13 RX ADMIN — Medication 166.67 MILLIGRAM(S): at 13:29

## 2020-09-13 RX ADMIN — SODIUM CHLORIDE 75 MILLILITER(S): 9 INJECTION, SOLUTION INTRAVENOUS at 08:08

## 2020-09-13 RX ADMIN — Medication 1000 MILLIGRAM(S): at 04:20

## 2020-09-13 RX ADMIN — Medication 500 MILLIGRAM(S): at 06:22

## 2020-09-13 RX ADMIN — MAGNESIUM HYDROXIDE 30 MILLILITER(S): 400 TABLET, CHEWABLE ORAL at 18:30

## 2020-09-13 RX ADMIN — OXYCODONE HYDROCHLORIDE 15 MILLIGRAM(S): 5 TABLET ORAL at 21:21

## 2020-09-13 RX ADMIN — SIMETHICONE 80 MILLIGRAM(S): 80 TABLET, CHEWABLE ORAL at 06:22

## 2020-09-13 RX ADMIN — SENNA PLUS 2 TABLET(S): 8.6 TABLET ORAL at 21:20

## 2020-09-13 RX ADMIN — POLYETHYLENE GLYCOL 3350 17 GRAM(S): 17 POWDER, FOR SOLUTION ORAL at 13:29

## 2020-09-13 RX ADMIN — Medication 1 MILLIGRAM(S): at 13:29

## 2020-09-13 RX ADMIN — Medication 650 MILLIGRAM(S): at 13:33

## 2020-09-13 RX ADMIN — SIMETHICONE 80 MILLIGRAM(S): 80 TABLET, CHEWABLE ORAL at 23:57

## 2020-09-13 RX ADMIN — Medication 650 MILLIGRAM(S): at 20:31

## 2020-09-13 RX ADMIN — Medication 5 MILLIGRAM(S): at 06:22

## 2020-09-13 RX ADMIN — Medication 650 MILLIGRAM(S): at 21:30

## 2020-09-13 RX ADMIN — SODIUM CHLORIDE 75 MILLILITER(S): 9 INJECTION, SOLUTION INTRAVENOUS at 06:25

## 2020-09-13 RX ADMIN — GABAPENTIN 300 MILLIGRAM(S): 400 CAPSULE ORAL at 18:28

## 2020-09-13 RX ADMIN — Medication 650 MILLIGRAM(S): at 14:33

## 2020-09-13 RX ADMIN — OXYCODONE HYDROCHLORIDE 15 MILLIGRAM(S): 5 TABLET ORAL at 13:28

## 2020-09-13 RX ADMIN — Medication 10 MILLIGRAM(S): at 23:57

## 2020-09-13 RX ADMIN — OXYCODONE HYDROCHLORIDE 15 MILLIGRAM(S): 5 TABLET ORAL at 14:28

## 2020-09-13 RX ADMIN — OXYCODONE HYDROCHLORIDE 15 MILLIGRAM(S): 5 TABLET ORAL at 08:07

## 2020-09-13 NOTE — PROGRESS NOTE ADULT - SUBJECTIVE AND OBJECTIVE BOX
55yMale s/p cLamiL3-5/PSF L3-5 POD#3.  Pt seen and examined in NAD. Pain moderately controlled but limited narcotics secondary to low blood pressures. Pt denies any new complaints. Pt denies CP/SOB/N/V/D/numbness/tingling/bowel or bladder dysfunction. +flatus. No BM yet. Has h/o anemia - gets infusion/injections and follows with heme/onc.     HV: 30/90  AYSHA: 15/135    PE:   Neuro: AAOX3  Abd: DIstended. Tympanic. No guarding .  Spine: Dressing stained sanguinous.  +HV +AYSHA with serosanguinous drainage. No fluctuance or collections.    B/L UE: Skin intact. +ROM shoulder/elbow/wrist/fingers. +ok/thumbsup/fingercross signs.  strength: 5/5.  RP2+ NVI.   B/L LE: Skin intact. +ROM hip/knee/ankle/toes. Ankle Dorsi/plantarflexion: 5/5. Calf: soft, compressible and nontender. DP/PT 2+ NVI.                             7.9    x     )-----------( x        ( 13 Sep 2020 09:27 )             22.5       09-13    137  |  103  |  19  ----------------------------<  113<H>  3.9   |  29  |  1.61<H>    Ca    8.3<L>      13 Sep 2020 06:22          A/P: 55yMale s/p LamiL3-5/PSF L3-5 POD#3.  Dressing changes per plastics.  Monitor drain outputs- vanco while drains indwelling   Pain control with oxy 15mg   PT: WBAT - spinal precautions   DVT ppx: SCDs   F/u BM: simethicone, MOM/Miralax. DUlcolax HS.   Acute on chronic anemia - symptomatic with acute kidney injury. Pt consented for blood transfusion. Will transfuse 2 units PRBC's today.    Wound care, Isometric exercises, incentive spirometry   Medical consult appreciated  Discharge: planning pending drain output.   All the above discussed and understood by pt

## 2020-09-13 NOTE — PROGRESS NOTE ADULT - SUBJECTIVE AND OBJECTIVE BOX
TANISHA PADILLA is a 55y Male s/p DECOMPRESSION LAMINECTOMY L2-5 POSTERIOR SPINAL FUSION L3-5  DECOMPRESSION LAMINECTOMY L2-5 POSTERIOR SPINAL FUSION by Dr. Pierre on 9/10/20.  still has pain; hemoglobin low; needs transfusion.    ROS: no pulmonary, cardiovascular, gastrointestinal or urological symptoms     PHYS: T(C): 37.3 (09-13-20 @ 11:40), Max: 37.6 (09-12-20 @ 17:55)  HR: 92 (09-13-20 @ 11:40) (84 - 99)  BP: 118/64 (09-13-20 @ 11:40) (79/44 - 132/68)  RR: 18 (09-13-20 @ 11:40) (16 - 18)  SpO2: 96% (09-13-20 @ 11:40) (94% - 99%)  vss; lungs, clear; heart, reg rhythm, no murmurs, rubs or gallops;   abd, soft, non tender, normal bowel sounds, no calf tenderness or edema                         7.9    x     )-----------( x        ( 13 Sep 2020 09:27 )             22.5     Assessment and Plan: lumbar radiculopathy status post lumbar laminectomy L2-5, PSF L3-5; Hypertension; Gastroesophageal reflux disease; postop anemia (due to acute blood loss); pre diabetes mellitus; pain control; deep vein thrombophlebitis prophylaxis; physical therapy; bowel regimen; nutrition support; follow up labs; will follow.

## 2020-09-14 LAB
ANION GAP SERPL CALC-SCNC: 6 MMOL/L — SIGNIFICANT CHANGE UP (ref 5–17)
BUN SERPL-MCNC: 21 MG/DL — SIGNIFICANT CHANGE UP (ref 7–23)
CALCIUM SERPL-MCNC: 8.6 MG/DL — SIGNIFICANT CHANGE UP (ref 8.5–10.1)
CHLORIDE SERPL-SCNC: 98 MMOL/L — SIGNIFICANT CHANGE UP (ref 96–108)
CO2 SERPL-SCNC: 29 MMOL/L — SIGNIFICANT CHANGE UP (ref 22–31)
CREAT SERPL-MCNC: 1.54 MG/DL — HIGH (ref 0.5–1.3)
GLUCOSE SERPL-MCNC: 109 MG/DL — HIGH (ref 70–99)
HCT VFR BLD CALC: 29.4 % — LOW (ref 39–50)
HGB BLD-MCNC: 10 G/DL — LOW (ref 13–17)
MCHC RBC-ENTMCNC: 29.7 PG — SIGNIFICANT CHANGE UP (ref 27–34)
MCHC RBC-ENTMCNC: 34 GM/DL — SIGNIFICANT CHANGE UP (ref 32–36)
MCV RBC AUTO: 87.2 FL — SIGNIFICANT CHANGE UP (ref 80–100)
NRBC # BLD: 0 /100 WBCS — SIGNIFICANT CHANGE UP (ref 0–0)
PLATELET # BLD AUTO: 214 K/UL — SIGNIFICANT CHANGE UP (ref 150–400)
POTASSIUM SERPL-MCNC: 3.4 MMOL/L — LOW (ref 3.5–5.3)
POTASSIUM SERPL-SCNC: 3.4 MMOL/L — LOW (ref 3.5–5.3)
RBC # BLD: 3.37 M/UL — LOW (ref 4.2–5.8)
RBC # FLD: 14.4 % — SIGNIFICANT CHANGE UP (ref 10.3–14.5)
SODIUM SERPL-SCNC: 133 MMOL/L — LOW (ref 135–145)
WBC # BLD: 9.09 K/UL — SIGNIFICANT CHANGE UP (ref 3.8–10.5)
WBC # FLD AUTO: 9.09 K/UL — SIGNIFICANT CHANGE UP (ref 3.8–10.5)

## 2020-09-14 RX ORDER — HYDROMORPHONE HYDROCHLORIDE 2 MG/ML
0.5 INJECTION INTRAMUSCULAR; INTRAVENOUS; SUBCUTANEOUS
Refills: 0 | Status: DISCONTINUED | OUTPATIENT
Start: 2020-09-14 | End: 2020-09-15

## 2020-09-14 RX ORDER — POTASSIUM CHLORIDE 20 MEQ
40 PACKET (EA) ORAL ONCE
Refills: 0 | Status: COMPLETED | OUTPATIENT
Start: 2020-09-14 | End: 2020-09-14

## 2020-09-14 RX ORDER — FLUTICASONE PROPIONATE 50 MCG
1 SPRAY, SUSPENSION NASAL
Refills: 0 | Status: DISCONTINUED | OUTPATIENT
Start: 2020-09-14 | End: 2020-09-15

## 2020-09-14 RX ORDER — ACETAMINOPHEN 500 MG
1000 TABLET ORAL ONCE
Refills: 0 | Status: COMPLETED | OUTPATIENT
Start: 2020-09-14 | End: 2020-09-14

## 2020-09-14 RX ORDER — LACTULOSE 10 G/15ML
10 SOLUTION ORAL
Refills: 0 | Status: DISCONTINUED | OUTPATIENT
Start: 2020-09-14 | End: 2020-09-15

## 2020-09-14 RX ORDER — LISINOPRIL 2.5 MG/1
20 TABLET ORAL DAILY
Refills: 0 | Status: DISCONTINUED | OUTPATIENT
Start: 2020-09-14 | End: 2020-09-15

## 2020-09-14 RX ADMIN — OXYCODONE HYDROCHLORIDE 15 MILLIGRAM(S): 5 TABLET ORAL at 15:01

## 2020-09-14 RX ADMIN — HYDROMORPHONE HYDROCHLORIDE 0.5 MILLIGRAM(S): 2 INJECTION INTRAMUSCULAR; INTRAVENOUS; SUBCUTANEOUS at 19:20

## 2020-09-14 RX ADMIN — Medication 1000 MILLIGRAM(S): at 09:55

## 2020-09-14 RX ADMIN — OXYCODONE HYDROCHLORIDE 15 MILLIGRAM(S): 5 TABLET ORAL at 15:54

## 2020-09-14 RX ADMIN — OXYCODONE HYDROCHLORIDE 15 MILLIGRAM(S): 5 TABLET ORAL at 07:07

## 2020-09-14 RX ADMIN — Medication 166.67 MILLIGRAM(S): at 11:12

## 2020-09-14 RX ADMIN — MAGNESIUM HYDROXIDE 30 MILLILITER(S): 400 TABLET, CHEWABLE ORAL at 11:11

## 2020-09-14 RX ADMIN — Medication 5 MILLIGRAM(S): at 05:48

## 2020-09-14 RX ADMIN — SENNA PLUS 2 TABLET(S): 8.6 TABLET ORAL at 22:00

## 2020-09-14 RX ADMIN — Medication 400 MILLIGRAM(S): at 09:25

## 2020-09-14 RX ADMIN — Medication 166.67 MILLIGRAM(S): at 00:42

## 2020-09-14 RX ADMIN — OXYCODONE HYDROCHLORIDE 15 MILLIGRAM(S): 5 TABLET ORAL at 02:53

## 2020-09-14 RX ADMIN — Medication 1 GRAM(S): at 05:49

## 2020-09-14 RX ADMIN — HYDROMORPHONE HYDROCHLORIDE 0.5 MILLIGRAM(S): 2 INJECTION INTRAMUSCULAR; INTRAVENOUS; SUBCUTANEOUS at 19:06

## 2020-09-14 RX ADMIN — OXYCODONE HYDROCHLORIDE 15 MILLIGRAM(S): 5 TABLET ORAL at 02:23

## 2020-09-14 RX ADMIN — Medication 650 MILLIGRAM(S): at 17:46

## 2020-09-14 RX ADMIN — Medication 400 MILLIGRAM(S): at 20:15

## 2020-09-14 RX ADMIN — OXYCODONE HYDROCHLORIDE 15 MILLIGRAM(S): 5 TABLET ORAL at 06:23

## 2020-09-14 RX ADMIN — Medication 40 MILLIEQUIVALENT(S): at 15:01

## 2020-09-14 RX ADMIN — Medication 1000 MILLIGRAM(S): at 20:45

## 2020-09-14 RX ADMIN — LISINOPRIL 20 MILLIGRAM(S): 2.5 TABLET ORAL at 05:48

## 2020-09-14 RX ADMIN — Medication 1 TABLET(S): at 10:57

## 2020-09-14 RX ADMIN — Medication 500 MILLIGRAM(S): at 05:47

## 2020-09-14 RX ADMIN — SODIUM CHLORIDE 75 MILLILITER(S): 9 INJECTION, SOLUTION INTRAVENOUS at 11:13

## 2020-09-14 RX ADMIN — OXYCODONE HYDROCHLORIDE 15 MILLIGRAM(S): 5 TABLET ORAL at 22:00

## 2020-09-14 RX ADMIN — Medication 1 TABLET(S): at 05:47

## 2020-09-14 RX ADMIN — OXYCODONE HYDROCHLORIDE 15 MILLIGRAM(S): 5 TABLET ORAL at 10:57

## 2020-09-14 RX ADMIN — Medication 1 ENEMA: at 15:01

## 2020-09-14 RX ADMIN — OXYCODONE HYDROCHLORIDE 15 MILLIGRAM(S): 5 TABLET ORAL at 11:45

## 2020-09-14 RX ADMIN — POLYETHYLENE GLYCOL 3350 17 GRAM(S): 17 POWDER, FOR SOLUTION ORAL at 10:56

## 2020-09-14 RX ADMIN — OXYCODONE HYDROCHLORIDE 15 MILLIGRAM(S): 5 TABLET ORAL at 23:00

## 2020-09-14 RX ADMIN — Medication 10 MILLIGRAM(S): at 20:14

## 2020-09-14 RX ADMIN — LACTULOSE 10 GRAM(S): 10 SOLUTION ORAL at 21:59

## 2020-09-14 RX ADMIN — Medication 5 MILLIGRAM(S): at 22:00

## 2020-09-14 RX ADMIN — Medication 1 SPRAY(S): at 22:00

## 2020-09-14 RX ADMIN — GABAPENTIN 300 MILLIGRAM(S): 400 CAPSULE ORAL at 05:48

## 2020-09-14 RX ADMIN — PANTOPRAZOLE SODIUM 40 MILLIGRAM(S): 20 TABLET, DELAYED RELEASE ORAL at 07:36

## 2020-09-14 RX ADMIN — Medication 1 MILLIGRAM(S): at 10:57

## 2020-09-14 RX ADMIN — GABAPENTIN 300 MILLIGRAM(S): 400 CAPSULE ORAL at 17:11

## 2020-09-14 NOTE — PROGRESS NOTE ADULT - SUBJECTIVE AND OBJECTIVE BOX
55yMale s/p [sx]+. POD #  Pt seen and examined in NAD.   Pain controlled.  Pt denies any new complaints.   Pt denies CP/SOB/N/V/D/numbness/tingling/bowel or bladder dysfunction.     Vital Signs Last 24 Hrs  T(C): 37.2 (14 Sep 2020 05:49), Max: 38.8 (13 Sep 2020 13:36)  T(F): 98.9 (14 Sep 2020 05:49), Max: 101.8 (13 Sep 2020 13:36)  HR: 98 (14 Sep 2020 05:49) (91 - 98)  BP: 119/59 (14 Sep 2020 05:49) (111/73 - 127/65)  BP(mean): --  RR: 18 (14 Sep 2020 05:49) (18 - 18)  SpO2: 95% (14 Sep 2020 05:49) (95% - 99%)    PE:   General: A&Oxs, NAD  Spine: Dressing c/d/i +HV +AYSHA   B/L UE: Skin intact. +ROM shoulder/elbow/wrist/fingers. +ok/thumbsup/fingercross signs.  strength: 5/5.  RP2+ NVI.   B/L LE: Skin intact. +ROM hip/knee/ankle/toes. Ankle Dorsi/plantarflexion: 5/5. Calf: soft, compressible and nontender. DP/PT 2+ NVI.                             10.0   9.09  )-----------( 214      ( 14 Sep 2020 06:35 )             29.4       09-14    133<L>  |  98  |  21  ----------------------------<  109<H>  3.4<L>   |  29  |  1.54<H>    Ca    8.6      14 Sep 2020 06:35          A/P: 55yMale s/p [sx] POD #  Wound care  Pain controlled  PT: WBAT: Spinal precautions  Isometric exercises  DVT ppx: SCDs  Incentive spirometry counseled   Discharge: planning   All the above discussed and understood by pt    55yMale s/p laminectomy/PSF L3-5 POD#4  Pt seen and examined in NAD.   Pain controlled.  Pt denies any new complaints.   Pt denies CP/SOB/N/V/D/numbness/tingling/bowel or bladder dysfunction.   +AYSHA +HV    Vital Signs Last 24 Hrs  T(C): 37.2 (14 Sep 2020 05:49), Max: 38.8 (13 Sep 2020 13:36)  T(F): 98.9 (14 Sep 2020 05:49), Max: 101.8 (13 Sep 2020 13:36)  HR: 98 (14 Sep 2020 05:49) (91 - 98)  BP: 119/59 (14 Sep 2020 05:49) (111/73 - 127/65)  BP(mean): --  RR: 18 (14 Sep 2020 05:49) (18 - 18)  SpO2: 95% (14 Sep 2020 05:49) (95% - 99%)    PE:   General: A&Ox3, NAD  Spine: New Aquacell Dressing CDI. Incision well approximated, healing well. +HV 60/60 +AYSHA 105/270  B/L UE: Skin intact. +ROM shoulder/elbow/wrist/fingers. +ok/thumbsup/fingercross signs.  strength: 5/5.  RP2+ NVI.   B/L LE: Skin intact. +ROM hip/knee/ankle/toes. Ankle Dorsi/plantarflexion: 5/5. Calf: soft, compressible and nontender. DP/PT 2+ NVI.                             10.0   9.09  )-----------( 214      ( 14 Sep 2020 06:35 )             29.4       09-14    133<L>  |  98  |  21  ----------------------------<  109<H>  3.4<L>   |  29  |  1.54<H>    Ca    8.6      14 Sep 2020 06:35          A/P: 55yMale s/p laminectomy/PSF L3-5 POD#4  Wound care: New Aquacell dressing placed  Monitor drains  ABX while drains are in  DC HV today as per Dr. Pierre  Pain controlled with OXY 15  Anemia improved with Transfusion  Cr. improved from yesterday  PT: WBAT: Spinal precautions  Isometric exercises  DVT ppx: SCDs  Incentive spirometry counseled   Discharge: planning when AYSHA drain removed  All the above discussed and understood by pt   Plan discussed with Dr. Pierre   55yMale s/p laminectomy/PSF L3-5 POD#4  Pt seen and examined in NAD.   Pain controlled.  Pt denies any new complaints.   Pt denies CP/SOB/N/V/D/numbness/tingling/bowel or bladder dysfunction.   +AYSHA +flatus no BM    Vital Signs Last 24 Hrs  T(C): 37.2 (14 Sep 2020 05:49), Max: 38.8 (13 Sep 2020 13:36)  T(F): 98.9 (14 Sep 2020 05:49), Max: 101.8 (13 Sep 2020 13:36)  HR: 98 (14 Sep 2020 05:49) (91 - 98)  BP: 119/59 (14 Sep 2020 05:49) (111/73 - 127/65)  BP(mean): --  RR: 18 (14 Sep 2020 05:49) (18 - 18)  SpO2: 95% (14 Sep 2020 05:49) (95% - 99%)    PE:   General: A&Ox3, NAD  Spine: New Aquacell Dressing CDI. Incision well approximated, healing well.  +AYSHA 105/270  B/L UE: Skin intact. +ROM shoulder/elbow/wrist/fingers. +ok/thumbsup/fingercross signs.  strength: 5/5.  RP2+ NVI.   B/L LE: Skin intact. +ROM hip/knee/ankle/toes. Ankle Dorsi/plantarflexion: 5/5. Calf: soft, compressible and nontender. DP/PT 2+ NVI.                             10.0   9.09  )-----------( 214      ( 14 Sep 2020 06:35 )             29.4       09-14    133<L>  |  98  |  21  ----------------------------<  109<H>  3.4<L>   |  29  |  1.54<H>    Ca    8.6      14 Sep 2020 06:35          A/P: 55yMale s/p laminectomy/PSF L3-5 POD#4  Wound care: New Aquacell dressing placed  Monitor drains  ABX while drains are in  HV DC'd as per Dr. Pierre  Pain controlled with OXY 15  Anemia improved with Transfusion  Cr. improved from yesterday  Bowel regiment  PT: WBAT: Spinal precautions  Isometric exercises  DVT ppx: SCDs  Incentive spirometry counseled   Discharge: planning when AYSHA drain removed  All the above discussed and understood by pt   Plan discussed with Dr. Pierre

## 2020-09-14 NOTE — PROGRESS NOTE ADULT - SUBJECTIVE AND OBJECTIVE BOX
TANISHA PADILLA is a 55y Male s/p DECOMPRESSION LAMINECTOMY L2-5 POSTERIOR SPINAL FUSION L3-5  DECOMPRESSION LAMINECTOMY L2-5 POSTERIOR SPINAL FUSION  by Dr. Pierre on 9/10/20. patient still complains of pain, although it is slowly getting better.    ROS: no pulmonary, cardiovascular, gastrointestinal, urological or neurological symptoms.     PHYS: T(C): 37.2 (09-14-20 @ 05:49), Max: 38.8 (09-13-20 @ 13:36)  HR: 98 (09-14-20 @ 05:49) (85 - 98)  BP: 119/59 (09-14-20 @ 05:49) (97/54 - 127/65)  RR: 18 (09-14-20 @ 05:49) (18 - 18)  SpO2: 95% (09-14-20 @ 05:49) (95% - 99%)  vss; lungs, clear; heart, reg rhythm, no murmurs, rubs or gallops;   abd, soft, non tender, normal bowel sounds, no calf tenderness or edema                         10.0   9.09  )-----------( 214      ( 14 Sep 2020 06:35 )             29.4   09-13    137  |  103  |  19  ----------------------------<  113<H>  3.9   |  29  |  1.61<H>    Ca    8.3<L>      13 Sep 2020 06:22      Assessment and Plan: lumbar radiculopathy, status post lumbar laminectomy L2-5, PSF L3-5; Hypertension; Gastroesophageal reflux disease; postop anemia (due to acute blood loss); pre diabetes mellitus; history of prostate cancer; pain control; deep vein thrombophlebitis prophylaxis; physical therapy; bowel regimen; nutrition support; follow up labs; will follow.

## 2020-09-15 VITALS
OXYGEN SATURATION: 100 % | SYSTOLIC BLOOD PRESSURE: 122 MMHG | TEMPERATURE: 98 F | RESPIRATION RATE: 18 BRPM | DIASTOLIC BLOOD PRESSURE: 63 MMHG | HEART RATE: 89 BPM

## 2020-09-15 LAB
ANION GAP SERPL CALC-SCNC: 6 MMOL/L — SIGNIFICANT CHANGE UP (ref 5–17)
BUN SERPL-MCNC: 18 MG/DL — SIGNIFICANT CHANGE UP (ref 7–23)
CALCIUM SERPL-MCNC: 8.7 MG/DL — SIGNIFICANT CHANGE UP (ref 8.5–10.1)
CHLORIDE SERPL-SCNC: 101 MMOL/L — SIGNIFICANT CHANGE UP (ref 96–108)
CO2 SERPL-SCNC: 28 MMOL/L — SIGNIFICANT CHANGE UP (ref 22–31)
CREAT SERPL-MCNC: 1.48 MG/DL — HIGH (ref 0.5–1.3)
GLUCOSE SERPL-MCNC: 98 MG/DL — SIGNIFICANT CHANGE UP (ref 70–99)
POTASSIUM SERPL-MCNC: 4.4 MMOL/L — SIGNIFICANT CHANGE UP (ref 3.5–5.3)
POTASSIUM SERPL-SCNC: 4.4 MMOL/L — SIGNIFICANT CHANGE UP (ref 3.5–5.3)
SODIUM SERPL-SCNC: 135 MMOL/L — SIGNIFICANT CHANGE UP (ref 135–145)

## 2020-09-15 RX ORDER — CEPHALEXIN 500 MG
1 CAPSULE ORAL
Qty: 20 | Refills: 0
Start: 2020-09-15 | End: 2020-09-19

## 2020-09-15 RX ORDER — OXYCODONE HYDROCHLORIDE 5 MG/1
1 TABLET ORAL
Qty: 30 | Refills: 0
Start: 2020-09-15 | End: 2020-09-19

## 2020-09-15 RX ORDER — ACETAMINOPHEN 500 MG
2 TABLET ORAL
Qty: 0 | Refills: 0 | DISCHARGE
Start: 2020-09-15

## 2020-09-15 RX ORDER — DICLOFENAC SODIUM 75 MG/1
1 TABLET, DELAYED RELEASE ORAL
Qty: 0 | Refills: 0 | DISCHARGE

## 2020-09-15 RX ORDER — SENNA PLUS 8.6 MG/1
2 TABLET ORAL
Qty: 0 | Refills: 0 | DISCHARGE
Start: 2020-09-15

## 2020-09-15 RX ORDER — ACETAMINOPHEN 500 MG
1000 TABLET ORAL ONCE
Refills: 0 | Status: COMPLETED | OUTPATIENT
Start: 2020-09-15 | End: 2020-09-15

## 2020-09-15 RX ORDER — ASCORBIC ACID 60 MG
1 TABLET,CHEWABLE ORAL
Qty: 0 | Refills: 0 | DISCHARGE
Start: 2020-09-15

## 2020-09-15 RX ADMIN — Medication 400 MILLIGRAM(S): at 09:28

## 2020-09-15 RX ADMIN — Medication 1 ENEMA: at 11:45

## 2020-09-15 RX ADMIN — Medication 1 TABLET(S): at 12:27

## 2020-09-15 RX ADMIN — Medication 166.67 MILLIGRAM(S): at 00:19

## 2020-09-15 RX ADMIN — Medication 1 TABLET(S): at 06:35

## 2020-09-15 RX ADMIN — GABAPENTIN 300 MILLIGRAM(S): 400 CAPSULE ORAL at 06:35

## 2020-09-15 RX ADMIN — Medication 1 GRAM(S): at 06:38

## 2020-09-15 RX ADMIN — OXYCODONE HYDROCHLORIDE 10 MILLIGRAM(S): 5 TABLET ORAL at 14:00

## 2020-09-15 RX ADMIN — LACTULOSE 10 GRAM(S): 10 SOLUTION ORAL at 06:38

## 2020-09-15 RX ADMIN — Medication 500 MILLIGRAM(S): at 06:35

## 2020-09-15 RX ADMIN — OXYCODONE HYDROCHLORIDE 10 MILLIGRAM(S): 5 TABLET ORAL at 13:10

## 2020-09-15 RX ADMIN — POLYETHYLENE GLYCOL 3350 17 GRAM(S): 17 POWDER, FOR SOLUTION ORAL at 12:30

## 2020-09-15 RX ADMIN — Medication 5 MILLIGRAM(S): at 06:35

## 2020-09-15 RX ADMIN — OXYCODONE HYDROCHLORIDE 15 MILLIGRAM(S): 5 TABLET ORAL at 03:57

## 2020-09-15 RX ADMIN — Medication 1000 MILLIGRAM(S): at 09:45

## 2020-09-15 RX ADMIN — Medication 1 MILLIGRAM(S): at 12:27

## 2020-09-15 RX ADMIN — Medication 1 SPRAY(S): at 06:39

## 2020-09-15 RX ADMIN — PANTOPRAZOLE SODIUM 40 MILLIGRAM(S): 20 TABLET, DELAYED RELEASE ORAL at 06:35

## 2020-09-15 RX ADMIN — OXYCODONE HYDROCHLORIDE 15 MILLIGRAM(S): 5 TABLET ORAL at 02:57

## 2020-09-15 RX ADMIN — Medication 166.67 MILLIGRAM(S): at 12:27

## 2020-09-15 NOTE — PROGRESS NOTE ADULT - SUBJECTIVE AND OBJECTIVE BOX
TANISHA PADILLA is a 55y Male s/p DECOMPRESSION LAMINECTOMY L2-5 POSTERIOR SPINAL FUSION L3-5  DECOMPRESSION LAMINECTOMY L2-5 POSTERIOR SPINAL FUSION by Dr. Pierre on 8/10/20. patient is improved. patient is medically stable.    ROS: no pulmonary, cardiovascular, gastrointestinal, urological or neurological symptoms.     PHYS: T(C): 37.6 (09-15-20 @ 05:00), Max: 38 (09-14-20 @ 17:36)  HR: 94 (09-15-20 @ 05:00) (79 - 97)  BP: 105/64 (09-15-20 @ 05:00) (91/48 - 111/58)  RR: 20 (09-15-20 @ 05:00) (18 - 20)  SpO2: 98% (09-15-20 @ 05:00) (94% - 99%)  vss; lungs, clear, heart, reg rhythm, abd, soft non tender, no calf tenderness                         10.0   9.09  )-----------( 214      ( 14 Sep 2020 06:35 )             29.4   09-14    133<L>  |  98  |  21  ----------------------------<  109<H>  3.4<L>   |  29  |  1.54<H>    Ca    8.6      14 Sep 2020 06:35    Assessment and Plan: lumbar radiculopathy, status post lumbar laminectomy L2-5 and PSF L3-5; Hypertension; Gastroesophageal reflux disease; postop anemia (due to acute blood loss); pre diabetes mellitus; history of prostate cancer; pain control; deep vein thrombophlebitis prophylaxis; physical therapy; bowel regimen; nutrition support; follow up labs; will follow. patient is medically stable.

## 2020-09-15 NOTE — PROGRESS NOTE ADULT - SUBJECTIVE AND OBJECTIVE BOX
55yMale s/p laminectomy L2-5/PSF L3-5 POD#5. Pt seen and examined in NAD. Pain controlled. Pt denies any new complaints. Pt denies CP/SOB/N/V/D/numbness/tingling/bowel or bladder dysfunction.   AYSHA: 50/205      PE:   Neuro: AAOX3  Spine: Dressing c/d/i   +AYSHA with serous drainage. Incjsion: Sutures C/D/I. No fluctuance or drainage.   B/L UE: Skin intact. +ROM shoulder/elbow/wrist/fingers. +ok/thumbsup/fingercross signs.  strength: 5/5.  RP2+ NVI.   B/L LE: Skin intact. +ROM hip/knee/ankle/toes. Ankle Dorsi/plantarflexion: 5/5. Calf: soft, compressible and nontender. DP/PT 2+ NVI.                             10.0   9.09  )-----------( 214      ( 14 Sep 2020 06:35 )             29.4       09-15    135  |  101  |  18  ----------------------------<  98  4.4   |  28  |  1.48<H>    Ca    8.7      15 Sep 2020 06:40          A/P: 55yMale s/p laminectomy L2-5/PSF L3-5 POD#5.   Dressing changed - new aquacell dressing applied   Pain controlled with oxycodone 15mg   PT: WBAT - spinal precautions   DVT ppx: SCDs   Monitor and record AYSHA drain output. Per D/W Dr. Valdes, pt can be DC'd home with AYSHA dressing and follow up in the office later this week for removal. This was explained to the pt.   Wound care, Isometric exercises, incentive spirometry   Medical follow up appreciated  Discharge: planning home today  Pt seen in am with DR Faisal Pierre   All the above discussed and understood by pt

## 2020-09-30 DIAGNOSIS — I95.9 HYPOTENSION, UNSPECIFIED: ICD-10-CM

## 2020-09-30 DIAGNOSIS — Z92.3 PERSONAL HISTORY OF IRRADIATION: ICD-10-CM

## 2020-09-30 DIAGNOSIS — R73.03 PREDIABETES: ICD-10-CM

## 2020-09-30 DIAGNOSIS — Z91.013 ALLERGY TO SEAFOOD: ICD-10-CM

## 2020-09-30 DIAGNOSIS — I10 ESSENTIAL (PRIMARY) HYPERTENSION: ICD-10-CM

## 2020-09-30 DIAGNOSIS — M47.26 OTHER SPONDYLOSIS WITH RADICULOPATHY, LUMBAR REGION: ICD-10-CM

## 2020-09-30 DIAGNOSIS — R26.0 ATAXIC GAIT: ICD-10-CM

## 2020-09-30 DIAGNOSIS — N17.9 ACUTE KIDNEY FAILURE, UNSPECIFIED: ICD-10-CM

## 2020-09-30 DIAGNOSIS — M48.061 SPINAL STENOSIS, LUMBAR REGION WITHOUT NEUROGENIC CLAUDICATION: ICD-10-CM

## 2020-09-30 DIAGNOSIS — M43.16 SPONDYLOLISTHESIS, LUMBAR REGION: ICD-10-CM

## 2020-09-30 DIAGNOSIS — K21.9 GASTRO-ESOPHAGEAL REFLUX DISEASE WITHOUT ESOPHAGITIS: ICD-10-CM

## 2020-09-30 DIAGNOSIS — Z85.46 PERSONAL HISTORY OF MALIGNANT NEOPLASM OF PROSTATE: ICD-10-CM

## 2020-09-30 DIAGNOSIS — D62 ACUTE POSTHEMORRHAGIC ANEMIA: ICD-10-CM

## 2020-12-29 NOTE — H&P PST ADULT - PRO PAIN LIFE ADAPT
Initiate Treatment: Wash face with dandruff shampoo Detail Level: Simple Continue Regimen: 1% hydrocortisone cream inability or reluctance to perform ADLs/decreased activity level

## 2021-01-25 ENCOUNTER — APPOINTMENT (OUTPATIENT)
Dept: DISASTER EMERGENCY | Facility: CLINIC | Age: 56
End: 2021-01-25

## 2021-01-26 LAB — SARS-COV-2 N GENE NPH QL NAA+PROBE: NOT DETECTED

## 2021-05-06 ENCOUNTER — APPOINTMENT (OUTPATIENT)
Dept: DISASTER EMERGENCY | Facility: CLINIC | Age: 56
End: 2021-05-06

## 2021-05-07 LAB — SARS-COV-2 N GENE NPH QL NAA+PROBE: NOT DETECTED

## 2021-09-22 ENCOUNTER — APPOINTMENT (OUTPATIENT)
Dept: DISASTER EMERGENCY | Facility: CLINIC | Age: 56
End: 2021-09-22

## 2021-09-23 LAB — SARS-COV-2 N GENE NPH QL NAA+PROBE: NOT DETECTED

## 2022-01-18 ENCOUNTER — APPOINTMENT (OUTPATIENT)
Dept: CARDIOLOGY | Facility: CLINIC | Age: 57
End: 2022-01-18
Payer: COMMERCIAL

## 2022-01-18 ENCOUNTER — NON-APPOINTMENT (OUTPATIENT)
Age: 57
End: 2022-01-18

## 2022-01-18 VITALS
WEIGHT: 211 LBS | RESPIRATION RATE: 16 BRPM | HEART RATE: 85 BPM | BODY MASS INDEX: 27.08 KG/M2 | HEIGHT: 74 IN | OXYGEN SATURATION: 98 % | DIASTOLIC BLOOD PRESSURE: 48 MMHG | SYSTOLIC BLOOD PRESSURE: 96 MMHG

## 2022-01-18 DIAGNOSIS — Z83.3 FAMILY HISTORY OF DIABETES MELLITUS: ICD-10-CM

## 2022-01-18 DIAGNOSIS — R42 DIZZINESS AND GIDDINESS: ICD-10-CM

## 2022-01-18 DIAGNOSIS — D64.9 ANEMIA, UNSPECIFIED: ICD-10-CM

## 2022-01-18 DIAGNOSIS — R53.83 OTHER FATIGUE: ICD-10-CM

## 2022-01-18 DIAGNOSIS — Z86.79 PERSONAL HISTORY OF OTHER DISEASES OF THE CIRCULATORY SYSTEM: ICD-10-CM

## 2022-01-18 DIAGNOSIS — Z85.46 PERSONAL HISTORY OF MALIGNANT NEOPLASM OF PROSTATE: ICD-10-CM

## 2022-01-18 DIAGNOSIS — N18.32 CHRONIC KIDNEY DISEASE, STAGE 3B: ICD-10-CM

## 2022-01-18 DIAGNOSIS — Z72.3 LACK OF PHYSICAL EXERCISE: ICD-10-CM

## 2022-01-18 DIAGNOSIS — Z01.818 ENCOUNTER FOR OTHER PREPROCEDURAL EXAMINATION: ICD-10-CM

## 2022-01-18 DIAGNOSIS — Z87.19 PERSONAL HISTORY OF OTHER DISEASES OF THE DIGESTIVE SYSTEM: ICD-10-CM

## 2022-01-18 DIAGNOSIS — Z86.39 PERSONAL HISTORY OF OTHER ENDOCRINE, NUTRITIONAL AND METABOLIC DISEASE: ICD-10-CM

## 2022-01-18 DIAGNOSIS — Z82.49 FAMILY HISTORY OF ISCHEMIC HEART DISEASE AND OTHER DISEASES OF THE CIRCULATORY SYSTEM: ICD-10-CM

## 2022-01-18 PROCEDURE — 93000 ELECTROCARDIOGRAM COMPLETE: CPT | Mod: NC

## 2022-01-18 PROCEDURE — 99204 OFFICE O/P NEW MOD 45 MIN: CPT

## 2022-01-18 RX ORDER — MONTELUKAST SODIUM 10 MG/1
10 TABLET, FILM COATED ORAL DAILY
Refills: 0 | Status: ACTIVE | COMMUNITY

## 2022-01-18 RX ORDER — OXYBUTYNIN CHLORIDE 10 MG/1
10 TABLET, EXTENDED RELEASE ORAL
Refills: 0 | Status: ACTIVE | COMMUNITY

## 2022-01-18 RX ORDER — FLUTICASONE PROPIONATE 50 UG/1
50 SPRAY, METERED NASAL TWICE DAILY
Refills: 0 | Status: ACTIVE | COMMUNITY

## 2022-01-18 RX ORDER — PANTOPRAZOLE 40 MG/1
40 TABLET, DELAYED RELEASE ORAL
Qty: 60 | Refills: 5 | Status: ACTIVE | COMMUNITY

## 2022-01-18 RX ORDER — SUCRALFATE 1 G/1
1 TABLET ORAL 3 TIMES DAILY
Refills: 0 | Status: ACTIVE | COMMUNITY

## 2022-01-18 RX ORDER — GABAPENTIN 100 MG/1
100 CAPSULE ORAL 3 TIMES DAILY
Refills: 0 | Status: ACTIVE | COMMUNITY

## 2022-01-18 RX ORDER — LISINOPRIL AND HYDROCHLOROTHIAZIDE TABLETS 20; 25 MG/1; MG/1
20-25 TABLET ORAL DAILY
Refills: 0 | Status: ACTIVE | COMMUNITY

## 2022-01-18 RX ORDER — FLUTICASONE FUROATE AND VILANTEROL TRIFENATATE 100; 25 UG/1; UG/1
100-25 POWDER RESPIRATORY (INHALATION) DAILY
Refills: 0 | Status: ACTIVE | COMMUNITY

## 2022-01-18 NOTE — HISTORY OF PRESENT ILLNESS
[FreeTextEntry1] : PAtient is a 55yo M with HTN, HLD, CKD, prostate cancer (s/p surgery, radiation and Lupron) here for cardiac evaluation and pre-op assessment prior to endoscopy. Limited chronically by back pain, has spinal fusion surgery 9/2020. States has relux in chest. Will get some tightness in chest as well, feels like burning in upper chest and in throat. No exertional CP. Denies dyspnea. Has been dizzy past few weeks, no syncope. Had to stop at work due to symptoms. Has come and gone since, worse when walks. Energy level has dropped as well. Has gotten 2 iron infusions with mild benefit in symptoms. No PND/orthopnea/palps/edema/claudication. Anemia chronic but worse recently. No blood in urine/stool. \par \par Works as  for liquor distributor. Involves significant lifting as well. Has 3  kids, has been  twice. \par \par ROS: GI negative, all others negative

## 2022-01-18 NOTE — DISCUSSION/SUMMARY
[FreeTextEntry1] : PAtient is a 55yo M with HTN, HLD, CKD, anemia, prostate cancer (s/p surgery, radiation and Lupron) here family h/o premature CAD (multiple uncles  in 40s) for cardiac evaluation and pre-op assessment prior to endoscopy\par -ECG unremarkable other than low voltage\par -BP controlled\par -Recent symptoms likely related to his anemia which is being evaluated by GI. \par \par \par 1. CV stable at low risk for low risk procedure (EGD)\par 2. Will arrange echo to evaluate above symptoms/ECG but can be done after his procedures\par 3. Continue current antihypertensives, blood pressure is well controlled \par 4. Will see him in follow up in a few months, consider further CV evaluation then due to risk factors and depending on results of anemia work up. Has been fairly chronic but progressive recently. \par 5. Regular PMD follow up\par

## 2022-01-18 NOTE — PHYSICAL EXAM
[Soft] : abdomen soft [Non Tender] : non-tender [Normal Gait] : normal gait [Normal] : no edema, no cyanosis, no clubbing, no varicosities [Moves all extremities] : moves all extremities [Alert and Oriented] : alert and oriented

## 2022-01-18 NOTE — ASSESSMENT
[FreeTextEntry1] : ECG: SR, no significant ST-T abnormalities and normal intervals , low voltage \par \par HgB 7.6\par Creat 2.23

## 2022-02-21 ENCOUNTER — APPOINTMENT (OUTPATIENT)
Dept: CARDIOLOGY | Facility: CLINIC | Age: 57
End: 2022-02-21
Payer: COMMERCIAL

## 2022-02-21 PROCEDURE — 93306 TTE W/DOPPLER COMPLETE: CPT

## 2022-03-14 ENCOUNTER — APPOINTMENT (OUTPATIENT)
Dept: GASTROENTEROLOGY | Facility: CLINIC | Age: 57
End: 2022-03-14

## 2022-04-11 ENCOUNTER — APPOINTMENT (OUTPATIENT)
Dept: PAIN MANAGEMENT | Facility: CLINIC | Age: 57
End: 2022-04-11
Payer: COMMERCIAL

## 2022-04-11 PROCEDURE — 64483 NJX AA&/STRD TFRM EPI L/S 1: CPT | Mod: RT

## 2022-04-11 NOTE — PROCEDURE
[FreeTextEntry3] : Date of Service: 04/11/2022 \par \par Account: [id number]\par \par Patient: TANISHA PADILLA \par \par YOB: 1965\par \par Age: 57 year\par   \par \par Surgeon:                               Ginny Leal M.D.\par \par Assistant:                                 None.\par  \par Pre-Operative Diagnosis:     Lumbosacral Radiculitis (M54.17)                \par  \par Post Operative Diagnosis:    Lumbosacral Radiculitis (M54.17)                \par  \par Procedure:                              Right L4-5 transforaminal epidural steroid injection\par \par                                                   Left L4-5 transforaminal epidural steroid injection under fluoroscopic guidance.\par \par Anesthesia:                             MAC\par \par \par This procedure was carried out using fluoroscopic guidance.  The risks and benefits of the procedure were discussed extensively with the patient.  The consent of the patient was obtained and the following procedure was performed. The patient was placed in the prone position on the fluoroscopic table and the lumbar area was prepped and draped in a sterile fashion.\par \par The left L4-5 neural foramen was then identified on left oblique  "ovidio dog" anatomical view at the 6 o' clock position using fluoroscopic guidance, and the area was marked. The overlying skin and subcutaneous structures were anesthetized using sterile technique with 1% Lidocaine.  A 22 gauge spinal needle was directed toward the inferior (6 o'clock) position of the pedicle, which formed the roof of the identified foramen.  Once in the epidural space, after negative aspiration for heme and CSF, 1cc of Omnipaque contrast was injected to confirm epidural location and assess filling defects and rule out intravascular needle placement.\par \par The following contrast flow observed: no intravascular or intrathecal flow pattern was noted.  No blood or CSF was aspirated. Omnipaque spread medially in epidural space. \par \par After this, an injectate of 3 cc preservative free normal saline plus 40 mg of Kenalog was injected in the epidural space.\par \par The right L4-5 neural foramen was then identified on right oblique "ovidio dog" anatomical view at the 6 o' clock position using fluoroscopic guidance, and the area was marked. The overlying skin and subcutaneous structures were anesthetized using sterile technique with 1% Lidocaine.  A 22 gauge spinal needle was directed toward the inferior (6 o'clock) position of the pedicle, which formed the roof of the identified foramen.  Once in the epidural space, after negative aspiration for heme and CSF, 1cc of Omnipaque contrast was injected to confirm epidural location and assess filling defects and rule out intravascular needle placement.\par \par The following contrast flow observed: no intravascular or intrathecal flow pattern was noted.  No blood or CSF was aspirated. Omnipaque spread medially in epidural space. \par \par After this, an injectate of 3 cc preservative free normal saline plus 40 mg of Kenalog was injected in the epidural space.\par \par The needle was subsequently removed.  Vital signs remained normal.  Pulse oximeter was used throughout the procedure and the patient's pulse and oxygen saturation remained within normal limits.  The patient tolerated the procedure well.  There were no complications.  The patient was instructed to apply ice over the injection sites for twenty minutes every two hours for the next 24 to 48 hours.  The patient was also instructed to contact me immediately if there were any problems.\par \par Ginny Leal M.D.\par

## 2022-04-25 ENCOUNTER — APPOINTMENT (OUTPATIENT)
Dept: CARDIOLOGY | Facility: CLINIC | Age: 57
End: 2022-04-25

## 2022-04-25 ENCOUNTER — APPOINTMENT (OUTPATIENT)
Dept: PAIN MANAGEMENT | Facility: CLINIC | Age: 57
End: 2022-04-25
Payer: COMMERCIAL

## 2022-04-25 VITALS — HEIGHT: 74 IN | WEIGHT: 211 LBS | BODY MASS INDEX: 27.08 KG/M2

## 2022-04-25 PROCEDURE — 99213 OFFICE O/P EST LOW 20 MIN: CPT

## 2022-04-25 NOTE — HISTORY OF PRESENT ILLNESS
[Lower back] : lower back [3] : 3 [0] : 0 [Dull/Aching] : dull/aching [Tightness] : tightness [Occasional] : occasional [Meds] : meds [Injection therapy] : injection therapy [FreeTextEntry1] : 04/25/2022: follow up today after b/l L4/5 TFESI on 3/11/22 he reports an overall 80% improvement. \par \par 3/28/22: follow up today. Has pain in the left hip and starting to get pain in the right hip. Pain in the left lateral leg as\par well.\par \par 10/18/21- F/u after left hip injection 9/27. Had 90% relief from hip injection. Had surgery with Dr. Pierre L3/4 L4/5 L5/S1 (2 years ago)\par \par 9/1/21: follow up today. Patient feels better. Would like repeat hip injection. Will give TPI to neck.\par \par 5/4/21- Patient feeling better after surgery. Still has left hip pain. 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 The last hip injection helped\par 60% so he would benefit from repeat hip injection.\par \par 2/9/21: follow up today after left hip injection on 1/29/21 pt reports near complete relief of his pain following. the\par pinching pain is now gone.\par \par 1/20/21- Patient had surgery in back in September. Doing better. Would like TPI in neck. also Dr. Pierre recommended right hip injection due to pain and arthritis in hip.\par \par 9/8/20 - follow up today after LESI 8/17/20. Patient had no relief and is now going for surgery on 9/10.\par \par 3/9/20 - Patient presents for FUV after L5-S1 LESI on 2/24/20. Reports 80% improvement.\par \par 2/3/20 - Patient complains of lower back pain that radiates down right and left leg. Patient had a LESI L5-S1 \par \par 11/11/19 with relief. Patient has been indicated for surgery by Dr. Pierre. Cannot proceed at this time given financial considerations. Still undergoing Lupron therapy. [] : no [FreeTextEntry9] : Stetching  [de-identified] : Fast sudden movements

## 2022-04-25 NOTE — PHYSICAL EXAM
[] : motor exam is non-focal throughout both lower extremities [TWNoteComboBox7] : forward flexion 75 degrees [de-identified] : extension 20 degrees

## 2022-04-25 NOTE — ASSESSMENT
[FreeTextEntry1] : After discussing various treatment options with the patient including but not limited to oral medications, physical therapy, exercise, modalities as well as interventional spinal injections, we have decided with the following plan:\par I personally reviewed the MRI/CT scan images and agree with the radiologist's report. The radiological findings were discussed with the patient.\par The risks, benefits, contents and alternatives to injection were explained in full to the patient. Risks outlined include but are not limited to infection,sepsis, bleeding, post-dural puncture headache, nerve damage, temporary increase in pain, syncopal episode, failure to resolve symptoms, allergic reaction, symptom recurrence, and elevation of blood sugar in diabetics. Cortisone may cause immunosuppression. Patient understands the risks. All questions were answered. After discussion of options, patient requested an injection. Information regarding the injection was given to the patient. Which medications to stop prior to the injection was explained to the patient as well.\par Follow up in 1-2 weeks post injection for re-evaluation. \par Continue Home exercises, stretching, activity modification, physical therapy, and conservative care.\par Patient is presenting with acute/sub-acute radicular pain with impairment in ADLs and functionality. The pain has not responded to conservative care including nsaid therapy and/or physical therapy. There is no bleeding tendency, unstable medical condition, or systemic infection.\par \par Patient is presenting with acute/sub-acute radicular pain with impairment in ADLs and functionality.  The pain has not responded to  conservative care including nsaid therapy and/or physical therapy.  There is no bleeding tendency, unstable medical condition, or systemic infection.

## 2022-04-25 NOTE — DISCUSSION/SUMMARY
[FreeTextEntry1] : PAtient is a 57yo M with HTN, HLD, CKD, anemia, prostate cancer (s/p surgery, radiation and Lupron) here family h/o premature CAD (multiple uncles  in 40s) for cardiac evaluation and pre-op assessment prior to endoscopy\par -ECG unremarkable other than low voltage\par -BP controlled\par -Recent symptoms likely related to his anemia which is being evaluated by GI. \par \par \par 1. CV stable at low risk for low risk procedure (EGD)\par 2. Will arrange echo to evaluate above symptoms/ECG but can be done after his procedures\par 3. Continue current antihypertensives, blood pressure is well controlled \par 4. Will see him in follow up in a few months, consider further CV evaluation then due to risk factors and depending on results of anemia work up. Has been fairly chronic but progressive recently. \par 5. Regular PMD follow up\par

## 2022-05-27 ENCOUNTER — APPOINTMENT (OUTPATIENT)
Dept: CARDIOLOGY | Facility: CLINIC | Age: 57
End: 2022-05-27

## 2022-05-31 ENCOUNTER — FORM ENCOUNTER (OUTPATIENT)
Age: 57
End: 2022-05-31

## 2022-06-08 ENCOUNTER — APPOINTMENT (OUTPATIENT)
Dept: ORTHOPEDIC SURGERY | Facility: CLINIC | Age: 57
End: 2022-06-08
Payer: COMMERCIAL

## 2022-06-08 PROCEDURE — 20552 NJX 1/MLT TRIGGER POINT 1/2: CPT

## 2022-06-08 PROCEDURE — 99214 OFFICE O/P EST MOD 30 MIN: CPT | Mod: 25

## 2022-06-08 PROCEDURE — J3490M: CUSTOM

## 2022-06-08 PROCEDURE — 72050 X-RAY EXAM NECK SPINE 4/5VWS: CPT

## 2022-06-08 RX ORDER — TRAMADOL HYDROCHLORIDE AND ACETAMINOPHEN 37.5; 325 MG/1; MG/1
37.5-325 TABLET, FILM COATED ORAL
Qty: 120 | Refills: 0 | Status: ACTIVE | COMMUNITY
Start: 2022-06-08 | End: 1900-01-01

## 2022-06-08 NOTE — DISCUSSION/SUMMARY
[de-identified] : Discussed the chronic opioid use. Reliant on medication at this point. They were referred to an outside pain management. A list of referrals outside the practice were given today. Tramadol was renewed today. He is unable to take NSAIDs due to the CKD. \par \par TPI to the right paracervical area given - tolerated well.\par indicated for MRi of the C spine - fu to review the MRi \par

## 2022-06-08 NOTE — HISTORY OF PRESENT ILLNESS
[Lower back] : lower back [Gradual] : gradual [Sudden] : sudden [7] : 7 [Burning] : burning [Shooting] : shooting [Stabbing] : stabbing [Intermittent] : intermittent [Bending forward] : bending forward [Extending back] : extending back [Exercising] : exercising [de-identified] : 4/19/21: Here to follow up. Plan at last visit was to continue PT and remain OOW.\par 6/21/21: Here for f/u. Plan at last "We discussed case. He is still disabled. We will refill all meds. Pain to neck\par progressing, good relief with triggers in past. 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 \par Pain medications were refilled" He is doing ok, he has returned to light duty. Taking pain meds as needed.\par 9/20/21: Plan last visit "TPI to left paracervical region with ken1/lido2/mar2 tolerated well-PT rx- back to work-fu 6-8\par weeks" still having tightness on the left side. medication does help, and is taking them without any issues. seeing \par yamile for L hip injection. working.\par XR 2V LSL implants in good position\par AP PELVIS BL hip OA\par 12/20/21: Here to follow up. Plan at last visit was "taking ultracet during the day when needed and hydromorphone at\par night - no adrs, no signs of misuse or abuse - will follow through with L hip IA inj with DR. Leal - continue PT/HEP -\par working - fu in 6 weeks - consider referral to joint surgeon - will monitor LS for adjacent changes" Overall pain at times.\par No changes.\par 3/16/22: Here for f/up. Plan at last "WIll continue with HEP and stretching. Refill meds with caution to use." recently\par diagnosed with anemia- Currently being worked up by his hematologist. His lower back is doing well today, but having\par increasing cervical pain today.\par \par 6/8/22: Here for fu; plan last visit was, "Refilled cyclobenzaprine today. Takes ultracet and hydromorphone prn. TPI today, tolerated well." Overall having worse pain to the right side of his neck that radiates underneath his armpit. The back still hurts, and he needs to wear the back brace and take the tramadol and hydromorphone as needed. He is limited to medications due to his CKD and cannot take NSAIDs. Was shooting down the arm to top - hands working okay \par \par xrays today\par c-spine 4v - spondylosis  [] : Post Surgical Visit: no [FreeTextEntry1] : L spine  [de-identified] : xrays mris  [de-identified] : None  [TWNoteComboBox1] : 60%

## 2022-06-08 NOTE — PROCEDURE
[Trigger point 1-2 muscle groups] : trigger point 1-2 muscle groups [Right] : of the right [Cervical paraspinal muscle] : cervical paraspinal muscle [Pain] : pain [Inflammation] : inflammation [Alcohol] : alcohol [Betadine] : betadine [Ethyl Chloride sprayed topically] : ethyl chloride sprayed topically [Sterile technique used] : sterile technique used [___ cc    1%] : Lidocaine ~Vcc of 1%  [___ cc    0.25%] : Bupivacaine (Marcaine) ~Vcc of 0.25%  [___ cc    40mg] : Triamcinolone (Kenalog) ~Vcc of 40 mg  [] : Patient tolerated procedure well [Call if redness, pain or fever occur] : call if redness, pain or fever occur [Apply ice for 15min out of every hour for the next 12-24 hours as tolerated] : apply ice for 15 minutes out of every hour for the next 12-24 hours as tolerated [Risks, benefits, alternatives discussed / Verbal consent obtained] : the risks benefits, and alternatives have been discussed, and verbal consent was obtained

## 2022-06-15 ENCOUNTER — APPOINTMENT (OUTPATIENT)
Dept: PAIN MANAGEMENT | Facility: CLINIC | Age: 57
End: 2022-06-15

## 2022-06-23 ENCOUNTER — FORM ENCOUNTER (OUTPATIENT)
Age: 57
End: 2022-06-23

## 2022-06-24 ENCOUNTER — APPOINTMENT (OUTPATIENT)
Dept: MRI IMAGING | Facility: CLINIC | Age: 57
End: 2022-06-24
Payer: COMMERCIAL

## 2022-06-24 PROCEDURE — 72141 MRI NECK SPINE W/O DYE: CPT

## 2022-06-26 NOTE — PHYSICAL THERAPY INITIAL EVALUATION ADULT - GAIT TRAINING, PT EVAL
No
Pt will independently ambulate 200 feet with tall rolling walker without loss of balance, by 2 weeks.

## 2022-07-06 ENCOUNTER — APPOINTMENT (OUTPATIENT)
Dept: ORTHOPEDIC SURGERY | Facility: CLINIC | Age: 57
End: 2022-07-06

## 2022-07-06 PROCEDURE — 72100 X-RAY EXAM L-S SPINE 2/3 VWS: CPT

## 2022-07-06 PROCEDURE — 99214 OFFICE O/P EST MOD 30 MIN: CPT

## 2022-07-06 PROCEDURE — 99215 OFFICE O/P EST HI 40 MIN: CPT

## 2022-07-06 PROCEDURE — 72170 X-RAY EXAM OF PELVIS: CPT

## 2022-07-06 NOTE — PROCEDURE
[Sterile technique used] : sterile technique used [] : Patient tolerated procedure well [Call if redness, pain or fever occur] : call if redness, pain or fever occur [Apply ice for 15min out of every hour for the next 12-24 hours as tolerated] : apply ice for 15 minutes out of every hour for the next 12-24 hours as tolerated [Risks, benefits, alternatives discussed / Verbal consent obtained] : the risks benefits, and alternatives have been discussed, and verbal consent was obtained

## 2022-07-07 NOTE — PHYSICAL EXAM
[Flexion] : flexion [Extension] : extension [] : positive Spurling [Left Radial Forearm] : left radial forearm [Left Ulnar Forearm] : left ulnar forearm

## 2022-07-07 NOTE — DISCUSSION/SUMMARY
[de-identified] : two separate issues:\par \par in the cervical spine reviewed the MRI with him - has central spinal cord stenosis and NEuroforminal stenosis at C6-7 and neuroforaminal narrowing at C5-6 \par \par The patient has tried conservative treatment for this condition including time/activity modification/various medications/therapy/injections without significant relief.  Do not suspect that further conservative treatment will lead to a resolution of symptoms.  Patient remains with persistent activity limited symptoms and is therefore indicated for surgical intervention \par \par indicated for acdf C5-7 in order to decompress and stabilize the spine \par \par I have indicated the patient for an Anterior Cervical Discectomy & Fusion.\par \par We've discussed the surgery details including instrumentation and grafting options (local, allograft, ICBG, and biologics) as well as potential for complications including but not limited to pain, scar, loss of function, permenant injury, death, need for additional surgery, need for blood transfuion, prolonged hospitilization, prolonged recovery, lack of recovery, blood clots, pulmonary embolism, heart attack, stroke, or  infection. There is also a possibility for hardware complication such as malposition of hardware,hardware loosening, pullout, failure or fracture of bone, adjacent segmen tdisease, pseudarthrosis, and need for future surgery. Finally, we discussed potential for injury to nerves, spinal cord or blood vessels, paralysis, blindness, need for transfusion, general anesthesia, allergic reaction, prolonged intubation,myocardial infarction, stroke, deep venous thrombosis, pulmonary embolus, and death. The patient understands these things and all questions are answered tohis/her satisfaction.  The surgery may need to be paused or staged or not completed due to intraoperative events or at the surgeon's discretion.  Any injury that occurs may be permenate.  \par \par Spinal fluid leak may occur and may require prolonged time in the hospital or further surgical procedures \par \par Patient has been instructed to stop all Aspirin and NSAIDs 10 days prior to surgery date. For Coumadin and other blood thinners, the patient is referred to the medical doctor\par \par We discussed we will use neuromonitioring for the surgery in order to possibly migitate risks of injury. \par \par The procedure does not come with a guarantee of success or of satisfaction on the patient's behalf.\par \par At the surgeon's discretion he may call for assistance during the surgery or in the perioperative period \par \par  ----------------------------------------------------------------------------------------------\par \par in the lumbar spine - the xrays show progressive adjacent segment disease and he may be a good candidate for a extension of the fusion up to L2 - preferaly via a lateral approach - however would rec he address the neck and the hips prior to further lumbar surgery

## 2022-07-07 NOTE — HISTORY OF PRESENT ILLNESS
[Lower back] : lower back [Gradual] : gradual [Sudden] : sudden [7] : 7 [Burning] : burning [Shooting] : shooting [Stabbing] : stabbing [Intermittent] : intermittent [Bending forward] : bending forward [Extending back] : extending back [Exercising] : exercising [de-identified] : 4/19/21: Here to follow up. Plan at last visit was to continue PT and remain OOW.\par 6/21/21: Here for f/u. Plan at last "We discussed case. He is still disabled. We will refill all meds. Pain to neck\par progressing, good relief with triggers in past. 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 \par Pain medications were refilled" He is doing ok, he has returned to light duty. Taking pain meds as needed.\par 9/20/21: Plan last visit "TPI to left paracervical region with ken1/lido2/mar2 tolerated well-PT rx- back to work-fu 6-8\par weeks" still having tightness on the left side. medication does help, and is taking them without any issues. seeing \par yamile for L hip injection. working.\par XR 2V LSL implants in good position\par AP PELVIS BL hip OA\par 12/20/21: Here to follow up. Plan at last visit was "taking ultracet during the day when needed and hydromorphone at\par night - no adrs, no signs of misuse or abuse - will follow through with L hip IA inj with DR. Leal - continue PT/HEP -\par working - fu in 6 weeks - consider referral to joint surgeon - will monitor LS for adjacent changes" Overall pain at times.\par No changes.\par 3/16/22: Here for f/up. Plan at last "WIll continue with HEP and stretching. Refill meds with caution to use." recently\par diagnosed with anemia- Currently being worked up by his hematologist. His lower back is doing well today, but having\par increasing cervical pain today.\par \par 6/8/22: Here for fu; plan last visit was, "Refilled cyclobenzaprine today. Takes ultracet and hydromorphone prn. TPI today, tolerated well." Overall having worse pain to the right side of his neck that radiates underneath his armpit. The back still hurts, and he needs to wear the back brace and take the tramadol and hydromorphone as needed. He is limited to medications due to his CKD and cannot take NSAIDs. Was shooting down the arm to top - hands working okay \par \par xrays today\par c-spine 4v - spondylosis \par \par 7/6/22: Here for fu - plan at last "Discussed the chronic opioid use. Reliant on medication at this point. They were referred to an outside pain management. A list of referrals outside the practice were given today. Tramadol was renewed today. He is unable to take NSAIDs due to the CKD. \par \par TPI to the right paracervical area given - tolerated well.\par indicated for MRi of the C spine - fu to review the MRi " - overall the pain remains in the neck and the lower back pain \par \par The lower back pain has gotten worse and into buttock bilataerlly \par \par neck feels like constantly tight in the back of the neck - having headaches - ROM is limited - shooting into the shoulders and down the arms \par \par MRi C spine - Suboptimal study secondary to patient motion.\par Mild degenerative disc disease from C4-5 through C6-7.\par Disc herniation at C5-6 without spinal cord or nerve root compression.\par Disc herniation and buckling of the ligamentum flavum at C6-7 with moderate compression of the spinal cord.\par \par xrays today:\par l spine - implants in good position - there is progressive deg change at the level above the fusion with kyphotic angulation of the L2-3 segment \par AP PELVIS - B hip OA  [] : Post Surgical Visit: no [FreeTextEntry1] : L spine  [de-identified] : xrays mris  [de-identified] : None  [TWNoteComboBox1] : 60%

## 2022-07-09 ENCOUNTER — APPOINTMENT (OUTPATIENT)
Dept: ORTHOPEDIC SURGERY | Facility: CLINIC | Age: 57
End: 2022-07-09

## 2022-07-09 VITALS — BODY MASS INDEX: 27.08 KG/M2 | WEIGHT: 211 LBS | HEIGHT: 74 IN

## 2022-07-09 PROCEDURE — 99214 OFFICE O/P EST MOD 30 MIN: CPT | Mod: 25

## 2022-07-09 PROCEDURE — 96372 THER/PROPH/DIAG INJ SC/IM: CPT

## 2022-07-09 NOTE — PROCEDURE
[Sterile technique used] : sterile technique used [Therapeutic Injection] : therapeutic injection [Pain] : pain [Alcohol] : alcohol [Betadine] : betadine [Ethyl Chloride sprayed topically] : ethyl chloride sprayed topically [___ cc    1%] : Lidocaine ~Vcc of 1%  [___ cc    30mg] : Ketorolac (Toradol) ~Vcc of 30 mg

## 2022-07-09 NOTE — PHYSICAL EXAM
[Left Radial Forearm] : left radial forearm [Left Ulnar Forearm] : left ulnar forearm [Flexion] : flexion [Extension] : extension [] : motor exam is 5/5 throughout both lower extremities with normal tone

## 2022-07-11 ENCOUNTER — APPOINTMENT (OUTPATIENT)
Dept: MRI IMAGING | Facility: CLINIC | Age: 57
End: 2022-07-11

## 2022-07-13 ENCOUNTER — APPOINTMENT (OUTPATIENT)
Dept: ORTHOPEDIC SURGERY | Facility: CLINIC | Age: 57
End: 2022-07-13

## 2022-07-17 ENCOUNTER — FORM ENCOUNTER (OUTPATIENT)
Age: 57
End: 2022-07-17

## 2022-07-18 ENCOUNTER — APPOINTMENT (OUTPATIENT)
Dept: MRI IMAGING | Facility: CLINIC | Age: 57
End: 2022-07-18

## 2022-07-18 PROCEDURE — 72148 MRI LUMBAR SPINE W/O DYE: CPT

## 2022-07-18 NOTE — DISCUSSION/SUMMARY
[de-identified] : \par in the lumbar spine - the xrays show progressive adjacent segment disease and he may be a good candidate for a extension of the fusion up to L2 - preferaly via a lateral approach - however would rec he address the neck and the hips prior to further lumbar surgery\par \par worsening pain in the lumbar - indicated for lumbar MRi \par \par therapuetic injection today with toradol/lido \par \par fu to review the MRi of the L spine \par \par for clarification:\par Patient has severe pain in the neck - it hurts when he tries to move it - due to this he cannot do PT - he has compression of the nerves and the spinal cord in the neck and further movement makes it hurt more - recommend surgical decompression not further attempts at physical therapy

## 2022-07-18 NOTE — HISTORY OF PRESENT ILLNESS
[Lower back] : lower back [Gradual] : gradual [Sudden] : sudden [7] : 7 [Burning] : burning [Shooting] : shooting [Stabbing] : stabbing [Intermittent] : intermittent [Bending forward] : bending forward [Extending back] : extending back [Exercising] : exercising [de-identified] : 4/19/21: Here to follow up. Plan at last visit was to continue PT and remain OOW.\par 6/21/21: Here for f/u. Plan at last "We discussed case. He is still disabled. We will refill all meds. Pain to neck\par progressing, good relief with triggers in past. 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 \par Pain medications were refilled" He is doing ok, he has returned to light duty. Taking pain meds as needed.\par 9/20/21: Plan last visit "TPI to left paracervical region with ken1/lido2/mar2 tolerated well-PT rx- back to work-fu 6-8\par weeks" still having tightness on the left side. medication does help, and is taking them without any issues. seeing \par yamile for L hip injection. working.\par XR 2V LSL implants in good position\par AP PELVIS BL hip OA\par 12/20/21: Here to follow up. Plan at last visit was "taking ultracet during the day when needed and hydromorphone at\par night - no adrs, no signs of misuse or abuse - will follow through with L hip IA inj with DR. Leal - continue PT/HEP -\par working - fu in 6 weeks - consider referral to joint surgeon - will monitor LS for adjacent changes" Overall pain at times.\par No changes.\par 3/16/22: Here for f/up. Plan at last "WIll continue with HEP and stretching. Refill meds with caution to use." recently\par diagnosed with anemia- Currently being worked up by his hematologist. His lower back is doing well today, but having\par increasing cervical pain today.\par \par 6/8/22: Here for fu; plan last visit was, "Refilled cyclobenzaprine today. Takes ultracet and hydromorphone prn. TPI today, tolerated well." Overall having worse pain to the right side of his neck that radiates underneath his armpit. The back still hurts, and he needs to wear the back brace and take the tramadol and hydromorphone as needed. He is limited to medications due to his CKD and cannot take NSAIDs. Was shooting down the arm to top - hands working okay \par \par xrays today\par c-spine 4v - spondylosis \par \par 7/6/22: Here for fu - plan at last "Discussed the chronic opioid use. Reliant on medication at this point. They were referred to an outside pain management. A list of referrals outside the practice were given today. Tramadol was renewed today. He is unable to take NSAIDs due to the CKD. \par \par TPI to the right paracervical area given - tolerated well.\par indicated for MRi of the C spine - fu to review the MRi " - overall the pain remains in the neck and the lower back pain \par \par The lower back pain has gotten worse and into buttock bilataerlly \par \par neck feels like constantly tight in the back of the neck - having headaches - ROM is limited - shooting into the shoulders and down the arms \par \par MRi C spine - Suboptimal study secondary to patient motion.\par Mild degenerative disc disease from C4-5 through C6-7.\par Disc herniation at C5-6 without spinal cord or nerve root compression.\par Disc herniation and buckling of the ligamentum flavum at C6-7 with moderate compression of the spinal cord.\par \par xrays today:\par l spine - implants in good position - there is progressive deg change at the level above the fusion with kyphotic angulation of the L2-3 segment \par AP PELVIS - B hip OA \par \par 7/9/22: discussed surgery 3 days ago. In increased pain. Having new groin pain. Denies further injury. Still working through pain. Continues HEP. Does not feel medication is helping. [] : Post Surgical Visit: no [FreeTextEntry1] : L spine  [FreeTextEntry5] : fu for back pain \par states he saw dr guerin for his back  [de-identified] : xrays mris  [de-identified] : None  [TWNoteComboBox1] : 60%

## 2022-07-19 ENCOUNTER — OUTPATIENT (OUTPATIENT)
Dept: OUTPATIENT SERVICES | Facility: HOSPITAL | Age: 57
LOS: 1 days | Discharge: HOME | End: 2022-07-19

## 2022-07-19 ENCOUNTER — APPOINTMENT (OUTPATIENT)
Dept: PAIN MANAGEMENT | Facility: CLINIC | Age: 57
End: 2022-07-19
Payer: COMMERCIAL

## 2022-07-19 VITALS
HEIGHT: 74 IN | OXYGEN SATURATION: 98 % | WEIGHT: 214.07 LBS | DIASTOLIC BLOOD PRESSURE: 71 MMHG | TEMPERATURE: 97 F | RESPIRATION RATE: 17 BRPM | SYSTOLIC BLOOD PRESSURE: 121 MMHG | HEART RATE: 85 BPM

## 2022-07-19 VITALS — BODY MASS INDEX: 27.08 KG/M2 | WEIGHT: 211 LBS | HEIGHT: 74 IN

## 2022-07-19 DIAGNOSIS — Z01.818 ENCOUNTER FOR OTHER PREPROCEDURAL EXAMINATION: ICD-10-CM

## 2022-07-19 DIAGNOSIS — M47.812 SPONDYLOSIS WITHOUT MYELOPATHY OR RADICULOPATHY, CERVICAL REGION: ICD-10-CM

## 2022-07-19 DIAGNOSIS — Z98.890 OTHER SPECIFIED POSTPROCEDURAL STATES: Chronic | ICD-10-CM

## 2022-07-19 DIAGNOSIS — D17.9 BENIGN LIPOMATOUS NEOPLASM, UNSPECIFIED: Chronic | ICD-10-CM

## 2022-07-19 DIAGNOSIS — Z90.79 ACQUIRED ABSENCE OF OTHER GENITAL ORGAN(S): Chronic | ICD-10-CM

## 2022-07-19 DIAGNOSIS — I10 ESSENTIAL (PRIMARY) HYPERTENSION: ICD-10-CM

## 2022-07-19 LAB
A1C WITH ESTIMATED AVERAGE GLUCOSE RESULT: 5.4 % — SIGNIFICANT CHANGE UP (ref 4–5.6)
ALBUMIN SERPL ELPH-MCNC: 3.6 G/DL — SIGNIFICANT CHANGE UP (ref 3.3–5)
ALP SERPL-CCNC: 56 U/L — SIGNIFICANT CHANGE UP (ref 40–120)
ALT FLD-CCNC: 20 U/L — SIGNIFICANT CHANGE UP (ref 12–78)
ANION GAP SERPL CALC-SCNC: 7 MMOL/L — SIGNIFICANT CHANGE UP (ref 5–17)
APTT BLD: 30.9 SEC — SIGNIFICANT CHANGE UP (ref 27.5–35.5)
AST SERPL-CCNC: 15 U/L — SIGNIFICANT CHANGE UP (ref 15–37)
BASOPHILS # BLD AUTO: 0.02 K/UL — SIGNIFICANT CHANGE UP (ref 0–0.2)
BASOPHILS NFR BLD AUTO: 0.3 % — SIGNIFICANT CHANGE UP (ref 0–2)
BILIRUB SERPL-MCNC: 0.3 MG/DL — SIGNIFICANT CHANGE UP (ref 0.2–1.2)
BLD GP AB SCN SERPL QL: SIGNIFICANT CHANGE UP
BUN SERPL-MCNC: 27 MG/DL — HIGH (ref 7–23)
CALCIUM SERPL-MCNC: 9.1 MG/DL — SIGNIFICANT CHANGE UP (ref 8.5–10.1)
CHLORIDE SERPL-SCNC: 107 MMOL/L — SIGNIFICANT CHANGE UP (ref 96–108)
CO2 SERPL-SCNC: 27 MMOL/L — SIGNIFICANT CHANGE UP (ref 22–31)
CREAT SERPL-MCNC: 1.61 MG/DL — HIGH (ref 0.5–1.3)
EGFR: 50 ML/MIN/1.73M2 — LOW
EOSINOPHIL # BLD AUTO: 1.5 K/UL — HIGH (ref 0–0.5)
EOSINOPHIL NFR BLD AUTO: 21.6 % — HIGH (ref 0–6)
ESTIMATED AVERAGE GLUCOSE: 108 MG/DL — SIGNIFICANT CHANGE UP (ref 68–114)
GLUCOSE SERPL-MCNC: 92 MG/DL — SIGNIFICANT CHANGE UP (ref 70–99)
HCT VFR BLD CALC: 38.7 % — LOW (ref 39–50)
HGB BLD-MCNC: 13.3 G/DL — SIGNIFICANT CHANGE UP (ref 13–17)
IMM GRANULOCYTES NFR BLD AUTO: 0.3 % — SIGNIFICANT CHANGE UP (ref 0–1.5)
INR BLD: 1.03 RATIO — SIGNIFICANT CHANGE UP (ref 0.88–1.16)
LYMPHOCYTES # BLD AUTO: 1.46 K/UL — SIGNIFICANT CHANGE UP (ref 1–3.3)
LYMPHOCYTES # BLD AUTO: 21 % — SIGNIFICANT CHANGE UP (ref 13–44)
MCHC RBC-ENTMCNC: 30.8 PG — SIGNIFICANT CHANGE UP (ref 27–34)
MCHC RBC-ENTMCNC: 34.4 G/DL — SIGNIFICANT CHANGE UP (ref 32–36)
MCV RBC AUTO: 89.6 FL — SIGNIFICANT CHANGE UP (ref 80–100)
MONOCYTES # BLD AUTO: 0.54 K/UL — SIGNIFICANT CHANGE UP (ref 0–0.9)
MONOCYTES NFR BLD AUTO: 7.8 % — SIGNIFICANT CHANGE UP (ref 2–14)
NEUTROPHILS # BLD AUTO: 3.41 K/UL — SIGNIFICANT CHANGE UP (ref 1.8–7.4)
NEUTROPHILS NFR BLD AUTO: 49 % — SIGNIFICANT CHANGE UP (ref 43–77)
NRBC # BLD: 0 /100 WBCS — SIGNIFICANT CHANGE UP (ref 0–0)
PLATELET # BLD AUTO: 274 K/UL — SIGNIFICANT CHANGE UP (ref 150–400)
POTASSIUM SERPL-MCNC: 4.2 MMOL/L — SIGNIFICANT CHANGE UP (ref 3.5–5.3)
POTASSIUM SERPL-SCNC: 4.2 MMOL/L — SIGNIFICANT CHANGE UP (ref 3.5–5.3)
PROT SERPL-MCNC: 7.3 GM/DL — SIGNIFICANT CHANGE UP (ref 6–8.3)
PROTHROM AB SERPL-ACNC: 12.4 SEC — SIGNIFICANT CHANGE UP (ref 10.5–13.4)
RBC # BLD: 4.32 M/UL — SIGNIFICANT CHANGE UP (ref 4.2–5.8)
RBC # FLD: 13 % — SIGNIFICANT CHANGE UP (ref 10.3–14.5)
SODIUM SERPL-SCNC: 141 MMOL/L — SIGNIFICANT CHANGE UP (ref 135–145)
WBC # BLD: 6.95 K/UL — SIGNIFICANT CHANGE UP (ref 3.8–10.5)
WBC # FLD AUTO: 6.95 K/UL — SIGNIFICANT CHANGE UP (ref 3.8–10.5)

## 2022-07-19 PROCEDURE — 93010 ELECTROCARDIOGRAM REPORT: CPT

## 2022-07-19 PROCEDURE — 20553 NJX 1/MLT TRIGGER POINTS 3/>: CPT

## 2022-07-19 PROCEDURE — 99214 OFFICE O/P EST MOD 30 MIN: CPT | Mod: 25

## 2022-07-19 PROCEDURE — J3490M: CUSTOM

## 2022-07-19 RX ORDER — POLYETHYLENE GLYCOL 3350 17 G/17G
1 POWDER, FOR SOLUTION ORAL
Qty: 0 | Refills: 0 | DISCHARGE

## 2022-07-19 RX ORDER — SODIUM CHLORIDE 9 MG/ML
3 INJECTION INTRAMUSCULAR; INTRAVENOUS; SUBCUTANEOUS EVERY 8 HOURS
Refills: 0 | Status: DISCONTINUED | OUTPATIENT
Start: 2022-08-09 | End: 2022-08-09

## 2022-07-19 NOTE — PHYSICAL EXAM
[] : no lumbar paraspinal tenderness [TWNoteComboBox7] : forward flexion 75 degrees [de-identified] : extension 20 degrees

## 2022-07-19 NOTE — H&P PST ADULT - ATTENDING COMMENTS
cervical stenosis - indicated for acdf - r/b/e of the procedure discussed and questions answered - he is well informed and would like to proceed

## 2022-07-19 NOTE — ASSESSMENT
[FreeTextEntry1] : After discussing various treatment options with the patient including but not limited to oral medications, physical therapy, exercise, modalities as well as interventional spinal injections, we have decided with the following plan:\par I personally reviewed the MRI/CT scan images and agree with the radiologist's report. The radiological findings were discussed with the patient.\par The risks, benefits, contents and alternatives to injection were explained in full to the patient. Risks outlined include but are not limited to infection,sepsis, bleeding, post-dural puncture headache, nerve damage, temporary increase in pain, syncopal episode, failure to resolve symptoms, allergic reaction, symptom recurrence, and elevation of blood sugar in diabetics. Cortisone may cause immunosuppression. Patient understands the risks. All questions were answered. After discussion of options, patient requested an injection. Information regarding the injection was given to the patient. Which medications to stop prior to the injection was explained to the patient as well.\par Follow up in 1-2 weeks post injection for re-evaluation. \par Continue Home exercises, stretching, activity modification, physical therapy, and conservative care.\par Patient is presenting with acute/sub-acute radicular pain with impairment in ADLs and functionality. The pain has not responded to conservative care including nsaid therapy and/or physical therapy. There is no bleeding tendency, unstable medical condition, or systemic infection.\par \par Patient is presenting with acute/sub-acute radicular pain with impairment in ADLs and functionality.  The pain has not responded to  conservative care including nsaid therapy and/or physical therapy.  There is no bleeding tendency, unstable medical condition, or systemic infection. \par \par The risks, benefits, contents and alternatives to injection were explained in full to the patient.  Risks outlined include but are not limited to infection, sepsis, bleeding, scarring, skin discoloration, temporary increase in pain, syncopal episode, failure to resolve symptoms, allergic reaction, flare reaction, permanent white skin discoloration, symptom recurrence, and elevation of blood sugar in diabetics.  Patient understood the risks.  All questions were answered.  After discussion of options, patient requested an injection.  Oral informed consent was obtained and sterile prep was done of the injection site.  Sterile technique was used to introduce the mixture. The mixture consisted of 3 cc 1% lidocaine, 3cc 0.25% marcaine, and 10mg of kenalog.  Patient tolerated the procedure well.  Patient advised to ice the injection site this evening.  Signs and symptoms of infection reviewed and patient advised to call immediately for redness, fevers, and/or chills.\par

## 2022-07-19 NOTE — HISTORY OF PRESENT ILLNESS
[Lower back] : lower back [7] : 7 [Dull/Aching] : dull/aching [Tightness] : tightness [Intermittent] : intermittent [Meds] : meds [Injection therapy] : injection therapy [FreeTextEntry1] : 07/19/2022: follow up today.  Has been having worsening pain in low back.  Would like TPI today. \par Will be having neck surgery on August 4\par \par 04/25/2022: follow up today after b/l L4/5 TFESI on 3/11/22 he reports an overall 80% improvement. \par \par 3/28/22: follow up today. Has pain in the left hip and starting to get pain in the right hip. Pain in the left lateral leg as\par well.\par \par 10/18/21- F/u after left hip injection 9/27. Had 90% relief from hip injection. Had surgery with Dr. Pierre L3/4 L4/5 L5/S1 (2 years ago)\par \par 9/1/21: follow up today. Patient feels better. Would like repeat hip injection. Will give TPI to neck.\par \par 5/4/21- Patient feeling better after surgery. Still has left hip pain. 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 The last hip injection helped\par 60% so he would benefit from repeat hip injection.\par \par 2/9/21: follow up today after left hip injection on 1/29/21 pt reports near complete relief of his pain following. the\par pinching pain is now gone.\par \par 1/20/21- Patient had surgery in back in September. Doing better. Would like TPI in neck. also Dr. Pierre recommended right hip injection due to pain and arthritis in hip.\par \par 9/8/20 - follow up today after LESI 8/17/20. Patient had no relief and is now going for surgery on 9/10.\par \par 3/9/20 - Patient presents for FUV after L5-S1 LESI on 2/24/20. Reports 80% improvement.\par \par 2/3/20 - Patient complains of lower back pain that radiates down right and left leg. Patient had a LESI L5-S1 \par \par 11/11/19 with relief. Patient has been indicated for surgery by Dr. Pierre. Cannot proceed at this time given financial considerations. Still undergoing Lupron therapy. [] : no [FreeTextEntry7] : both sides, buttocks and hamstrings  [FreeTextEntry9] : Stretching  [de-identified] : Fast sudden movements, arching back forward, Breathing

## 2022-07-19 NOTE — H&P PST ADULT - NEGATIVE GENERAL GENITOURINARY SYMPTOMS
no hematuria/no renal colic/no flank pain L/no flank pain R/no bladder infections/no incontinence/no dysuria/no urinary hesitancy/normal urinary frequency/no nocturia no hematuria/no dysuria

## 2022-07-19 NOTE — H&P PST ADULT - NEGATIVE GASTROINTESTINAL SYMPTOMS
no nausea/no vomiting/no diarrhea/no constipation/no change in bowel habits/no abdominal pain/no melena/no hematochezia/no steatorrhea/no hiccoughs

## 2022-07-19 NOTE — H&P PST ADULT - HISTORY OF PRESENT ILLNESS
..........54yo male with medical h/o HTN, GERD, nd Lumbar stenosis and presents tody for PST for Decompression Laminectomy L2-5, Posterior Spinal Fusion L3-5 scheduled for 9/10/2020 57M pmh prostate CA (s/p sx/RT/lupron), HTN, GERD, c/o neck pain radiating to shoulders and head found to have spondylosis here for PsT for scheduled anterior cervical discectomy fusion  This patient denies any fever, cough, sob, flu like symptoms or travel outside of the [place] in the past 30 days

## 2022-07-19 NOTE — H&P PST ADULT - PROBLEM SELECTOR PLAN 1
Anterior cervical discectomy fusin  labs - cbc,pt/ptt,bmp,t&s,nose cx,ekg  M/C required  preop 3 day hibiclens instruction reviewed and given .instructed on if  nose cx positive use mupuricin 5 days and checklist given  take routine meds DOS with sips of water. avoid NSAID and OTC supplements. verbalized understanding  information on proper nutrition , increase protein and better food choices provided in packet   Anesthesiologist to review PST labs, EKG, required clearances and optimization for surgery.

## 2022-07-19 NOTE — H&P PST ADULT - ASSESSMENT
57M pmh prostate CA (s/p sx/RT/lupron), HTN, GERD, c/o neck pain radiating to shoulders and head found to have spondylosis here for PsT for scheduled anterior cervical discectomy fusion  CAPRINI SCORE    AGE RELATED RISK FACTORS                                                       MOBILITY RELATED FACTORS  [ x] Age 41-60 years                                            (1 Point)                  [ ] Bed rest                                                        (1 Point)  [ ] Age: 61-74 years                                           (2 Points)                [ ] Plaster cast                                                   (2 Points)  [ ] Age= 75 years                                              (3 Points)                 [ ] Bed bound for more than 72 hours                   (2 Points)    DISEASE RELATED RISK FACTORS                                               GENDER SPECIFIC FACTORS  [x ] Edema in the lower extremities                       (1 Point)                  [ ] Pregnancy                                                     (1 Point)  [ ] Varicose veins                                               (1 Point)                  [ ] Post-partum < 6 weeks                                   (1 Point)             [ x] BMI > 25 Kg/m2                                            (1 Point)                  [ ] Hormonal therapy  or oral contraception            (1 Point)                 [ ] Sepsis (in the previous month)                        (1 Point)                  [ ] History of pregnancy complications  [ ] Pneumonia or serious lung disease                                               [ ] Unexplained or recurrent                       (1 Point)           (in the previous month)                               (1 Point)  [ ] Abnormal pulmonary function test                     (1 Point)                 SURGERY RELATED RISK FACTORS  [ ] Acute myocardial infarction                              (1 Point)                 [ ]  Section                                            (1 Point)  [ ] Congestive heart failure (in the previous month)  (1 Point)                 [ ] Minor surgery                                                 (1 Point)   [ ] Inflammatory bowel disease                             (1 Point)                 [ ] Arthroscopic surgery                                        (2 Points)  [ ] Central venous access                                    (2 Points)                [x ] General surgery lasting more than 45 minutes   (2 Points)       [ ] Stroke (in the previous month)                          (5 Points)               [ ] Elective arthroplasty                                        (5 Points)                                                                                                                                               HEMATOLOGY RELATED FACTORS                                                 TRAUMA RELATED RISK FACTORS  [ ] Prior episodes of VTE                                     (3 Points)                 [ ] Fracture of the hip, pelvis, or leg                       (5 Points)  [ ] Positive family history for VTE                         (3 Points)                 [ ] Acute spinal cord injury (in the previous month)  (5 Points)  [ ] Prothrombin 94093 A                                      (3 Points)                 [ ] Paralysis  (less than 1 month)                          (5 Points)  [ ] Factor V Leiden                                             (3 Points)                 [ ] Multiple Trauma within 1 month                         (5 Points)  [ ] Lupus anticoagulants                                     (3 Points)                                                           [ ] Anticardiolipin antibodies                                (3 Points)                                                       [ ] High homocysteine in the blood                      (3 Points)                                             [ ] Other congenital or acquired thrombophilia       (3 Points)                                                [ ] Heparin induced thrombocytopenia                  (3 Points)                                          Total Score [   5       ]

## 2022-07-20 LAB
MRSA PCR RESULT.: SIGNIFICANT CHANGE UP
S AUREUS DNA NOSE QL NAA+PROBE: SIGNIFICANT CHANGE UP

## 2022-07-24 ENCOUNTER — FORM ENCOUNTER (OUTPATIENT)
Age: 57
End: 2022-07-24

## 2022-07-27 ENCOUNTER — APPOINTMENT (OUTPATIENT)
Dept: ORTHOPEDIC SURGERY | Facility: CLINIC | Age: 57
End: 2022-07-27

## 2022-07-27 PROCEDURE — 99072 ADDL SUPL MATRL&STAF TM PHE: CPT

## 2022-07-27 PROCEDURE — 99214 OFFICE O/P EST MOD 30 MIN: CPT

## 2022-07-27 NOTE — HISTORY OF PRESENT ILLNESS
[Lower back] : lower back [Gradual] : gradual [Sudden] : sudden [7] : 7 [Burning] : burning [Shooting] : shooting [Stabbing] : stabbing [Intermittent] : intermittent [Bending forward] : bending forward [Extending back] : extending back [Exercising] : exercising [de-identified] : 4/19/21: Here to follow up. Plan at last visit was to continue PT and remain OOW.\par 6/21/21: Here for f/u. Plan at last "We discussed case. He is still disabled. We will refill all meds. Pain to neck\par progressing, good relief with triggers in past. 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 \par Pain medications were refilled" He is doing ok, he has returned to light duty. Taking pain meds as needed.\par 9/20/21: Plan last visit "TPI to left paracervical region with ken1/lido2/mar2 tolerated well-PT rx- back to work-fu 6-8\par weeks" still having tightness on the left side. medication does help, and is taking them without any issues. seeing \par yamile for L hip injection. working.\par XR 2V LSL implants in good position\par AP PELVIS BL hip OA\par 12/20/21: Here to follow up. Plan at last visit was "taking ultracet during the day when needed and hydromorphone at\par night - no adrs, no signs of misuse or abuse - will follow through with L hip IA inj with DR. Leal - continue PT/HEP -\par working - fu in 6 weeks - consider referral to joint surgeon - will monitor LS for adjacent changes" Overall pain at times.\par No changes.\par 3/16/22: Here for f/up. Plan at last "WIll continue with HEP and stretching. Refill meds with caution to use." recently\par diagnosed with anemia- Currently being worked up by his hematologist. His lower back is doing well today, but having\par increasing cervical pain today.\par \par 6/8/22: Here for fu; plan last visit was, "Refilled cyclobenzaprine today. Takes ultracet and hydromorphone prn. TPI today, tolerated well." Overall having worse pain to the right side of his neck that radiates underneath his armpit. The back still hurts, and he needs to wear the back brace and take the tramadol and hydromorphone as needed. He is limited to medications due to his CKD and cannot take NSAIDs. Was shooting down the arm to top - hands working okay \par \par xrays today\par c-spine 4v - spondylosis \par \par 7/6/22: Here for fu - plan at last "Discussed the chronic opioid use. Reliant on medication at this point. They were referred to an outside pain management. A list of referrals outside the practice were given today. Tramadol was renewed today. He is unable to take NSAIDs due to the CKD. \par \par TPI to the right paracervical area given - tolerated well.\par indicated for MRi of the C spine - fu to review the MRi " - overall the pain remains in the neck and the lower back pain \par \par The lower back pain has gotten worse and into buttock bilataerlly \par \par neck feels like constantly tight in the back of the neck - having headaches - ROM is limited - shooting into the shoulders and down the arms \par \par MRi C spine - Suboptimal study secondary to patient motion.\par Mild degenerative disc disease from C4-5 through C6-7.\par Disc herniation at C5-6 without spinal cord or nerve root compression.\par Disc herniation and buckling of the ligamentum flavum at C6-7 with moderate compression of the spinal cord.\par \par xrays today:\par l spine - implants in good position - there is progressive deg change at the level above the fusion with kyphotic angulation of the L2-3 segment \par AP PELVIS - B hip OA \par \par 7/9/22: discussed surgery 3 days ago. In increased pain. Having new groin pain. Denies further injury. Still working through pain. Continues HEP. Does not feel medication is helping.\par \par 07/27/2022 pt is here to review mri results - pain continues in the neck and in the lower back\par \par the lower back pain is bad as well \par \par MRi L spine - Postoperative study following decompression of the spinal canal from L2-3 through L4-5 and fusion without \par stenosis or nerve root compression at the operated levels.\par Multilevel lumbar degenerative disc disease.\par Adjacent segment disease at L1-2 with development of significant central stenosis as detailed above.\par Central and right-sided disc herniation at T11-12 that contacts but does not compress the conus medullaris and \par is unchanged in the preoperative MRI studies.\par Central left-sided disc herniation as well as endplate osteophyte at L5-S1 without stenosis, nerve root \par compression or change from the preoperative MRI.\par \par Is set up for neck surgery in the next couple of weeks  [] : Post Surgical Visit: no [FreeTextEntry1] : L spine  [FreeTextEntry5] : fu for back pain \par states he saw dr guerin for his back  [de-identified] : xrays mris  [de-identified] : None  [TWNoteComboBox1] : 60%

## 2022-07-27 NOTE — PROCEDURE
[Therapeutic Injection] : therapeutic injection [Pain] : pain [Alcohol] : alcohol [Betadine] : betadine [Ethyl Chloride sprayed topically] : ethyl chloride sprayed topically [Sterile technique used] : sterile technique used [___ cc    1%] : Lidocaine ~Vcc of 1%  [___ cc    30mg] : Ketorolac (Toradol) ~Vcc of 30 mg

## 2022-07-27 NOTE — DISCUSSION/SUMMARY
[de-identified] : tow issues today:\par in the lumbar spine - the xrays show progressive adjacent segment disease and he may be a good candidate for a extension of the fusion up to L2 - preferaly via a lateral approach - however would rec he address the neck and the hips prior to further lumbar surgery\par \par the lumbar MRi shows severe adjacent segment stenosis at L1-2 - likely going to need further surgery for this \par \par He is not able to return to work like this - he was working and hurt the lower back at work (he did have a prior injury that he had recovered from and was back working in full capacity until he hurt his back again on 7/2/22)\par \par The neck he is set up for surgery - we discussed this and he is well informed and going to proceed\par \par I have d/w the patient for an Anterior Cervical Discectomy & Fusion.\par \par We've discussed the surgery details including instrumentation and grafting options (local, allograft, ICBG, and biologics) as well as potential for complications including but not limited to pain, scar, loss of function, permenant injury, death, need for additional surgery, need for blood transfuion, prolonged hospitilization, prolonged recovery, lack of recovery, blood clots, pulmonary embolism, heart attack, stroke, or  infection. There is also a possibility for hardware complication such as malposition of hardware,hardware loosening, pullout, failure or fracture of bone, adjacent segmen tdisease, pseudarthrosis, and need for future surgery. Finally, we discussed potential for injury to nerves, spinal cord or blood vessels, paralysis, blindness, need for transfusion, general anesthesia, allergic reaction, prolonged intubation,myocardial infarction, stroke, deep venous thrombosis, pulmonary embolus, and death. The patient understands these things and all questions are answered tohis/her satisfaction.  The surgery may need to be paused or staged or not completed due to intraoperative events or at the surgeon's discretion.  Any injury that occurs may be permenate.  \par \par Spinal fluid leak may occur and may require prolonged time in the hospital or further surgical procedures \par \par Patient has been instructed to stop all Aspirin and NSAIDs 10 days prior to surgery date. For Coumadin and other blood thinners, the patient is referred to the medical doctor\par \par We discussed we will use neuromonitioring for the surgery in order to possibly migitate risks of injury. \par \par The procedure does not come with a guarantee of success or of satisfaction on the patient's behalf.\par \par At the surgeon's discretion he may call for assistance during the surgery or in the perioperative period \par \par he is well informed and ready to proceed

## 2022-07-28 ENCOUNTER — FORM ENCOUNTER (OUTPATIENT)
Age: 57
End: 2022-07-28

## 2022-07-29 RX ORDER — HYDROMORPHONE HYDROCHLORIDE 4 MG/1
4 TABLET ORAL 3 TIMES DAILY
Qty: 85 | Refills: 0 | Status: ACTIVE | COMMUNITY
Start: 2022-06-01 | End: 1900-01-01

## 2022-08-01 ENCOUNTER — APPOINTMENT (OUTPATIENT)
Dept: CARDIOLOGY | Facility: CLINIC | Age: 57
End: 2022-08-01

## 2022-08-08 ENCOUNTER — TRANSCRIPTION ENCOUNTER (OUTPATIENT)
Age: 57
End: 2022-08-08

## 2022-08-09 ENCOUNTER — APPOINTMENT (OUTPATIENT)
Dept: ORTHOPEDIC SURGERY | Facility: HOSPITAL | Age: 57
End: 2022-08-09
Payer: COMMERCIAL

## 2022-08-09 ENCOUNTER — INPATIENT (INPATIENT)
Facility: HOSPITAL | Age: 57
LOS: 0 days | Discharge: HOME | End: 2022-08-10
Attending: ORTHOPAEDIC SURGERY | Admitting: ORTHOPAEDIC SURGERY

## 2022-08-09 VITALS
SYSTOLIC BLOOD PRESSURE: 103 MMHG | RESPIRATION RATE: 18 BRPM | WEIGHT: 199.96 LBS | TEMPERATURE: 99 F | OXYGEN SATURATION: 100 % | HEART RATE: 60 BPM | DIASTOLIC BLOOD PRESSURE: 71 MMHG | HEIGHT: 74 IN

## 2022-08-09 DIAGNOSIS — Z98.890 OTHER SPECIFIED POSTPROCEDURAL STATES: Chronic | ICD-10-CM

## 2022-08-09 DIAGNOSIS — D17.9 BENIGN LIPOMATOUS NEOPLASM, UNSPECIFIED: Chronic | ICD-10-CM

## 2022-08-09 DIAGNOSIS — Z90.79 ACQUIRED ABSENCE OF OTHER GENITAL ORGAN(S): Chronic | ICD-10-CM

## 2022-08-09 LAB
ANION GAP SERPL CALC-SCNC: 6 MMOL/L — SIGNIFICANT CHANGE UP (ref 5–17)
BASOPHILS # BLD AUTO: 0 K/UL — SIGNIFICANT CHANGE UP (ref 0–0.2)
BASOPHILS NFR BLD AUTO: 0 % — SIGNIFICANT CHANGE UP (ref 0–2)
BUN SERPL-MCNC: 38 MG/DL — HIGH (ref 7–23)
CALCIUM SERPL-MCNC: 7.9 MG/DL — LOW (ref 8.5–10.1)
CHLORIDE SERPL-SCNC: 105 MMOL/L — SIGNIFICANT CHANGE UP (ref 96–108)
CO2 SERPL-SCNC: 25 MMOL/L — SIGNIFICANT CHANGE UP (ref 22–31)
CREAT SERPL-MCNC: 2.51 MG/DL — HIGH (ref 0.5–1.3)
EGFR: 29 ML/MIN/1.73M2 — LOW
EOSINOPHIL # BLD AUTO: 0.02 K/UL — SIGNIFICANT CHANGE UP (ref 0–0.5)
EOSINOPHIL NFR BLD AUTO: 0.3 % — SIGNIFICANT CHANGE UP (ref 0–6)
GLUCOSE SERPL-MCNC: 229 MG/DL — HIGH (ref 70–99)
HCT VFR BLD CALC: 31.8 % — LOW (ref 39–50)
HGB BLD-MCNC: 11.6 G/DL — LOW (ref 13–17)
IMM GRANULOCYTES NFR BLD AUTO: 0.5 % — SIGNIFICANT CHANGE UP (ref 0–1.5)
LYMPHOCYTES # BLD AUTO: 0.52 K/UL — LOW (ref 1–3.3)
LYMPHOCYTES # BLD AUTO: 7.9 % — LOW (ref 13–44)
MCHC RBC-ENTMCNC: 31 PG — SIGNIFICANT CHANGE UP (ref 27–34)
MCHC RBC-ENTMCNC: 36.5 G/DL — HIGH (ref 32–36)
MCV RBC AUTO: 85 FL — SIGNIFICANT CHANGE UP (ref 80–100)
MONOCYTES # BLD AUTO: 0.17 K/UL — SIGNIFICANT CHANGE UP (ref 0–0.9)
MONOCYTES NFR BLD AUTO: 2.6 % — SIGNIFICANT CHANGE UP (ref 2–14)
NEUTROPHILS # BLD AUTO: 5.85 K/UL — SIGNIFICANT CHANGE UP (ref 1.8–7.4)
NEUTROPHILS NFR BLD AUTO: 88.7 % — HIGH (ref 43–77)
NRBC # BLD: 0 /100 WBCS — SIGNIFICANT CHANGE UP (ref 0–0)
PLATELET # BLD AUTO: 202 K/UL — SIGNIFICANT CHANGE UP (ref 150–400)
POTASSIUM SERPL-MCNC: 3.7 MMOL/L — SIGNIFICANT CHANGE UP (ref 3.5–5.3)
POTASSIUM SERPL-SCNC: 3.7 MMOL/L — SIGNIFICANT CHANGE UP (ref 3.5–5.3)
RBC # BLD: 3.74 M/UL — LOW (ref 4.2–5.8)
RBC # FLD: 11.9 % — SIGNIFICANT CHANGE UP (ref 10.3–14.5)
SARS-COV-2 N GENE NPH QL NAA+PROBE: NOT DETECTED
SODIUM SERPL-SCNC: 136 MMOL/L — SIGNIFICANT CHANGE UP (ref 135–145)
WBC # BLD: 6.59 K/UL — SIGNIFICANT CHANGE UP (ref 3.8–10.5)
WBC # FLD AUTO: 6.59 K/UL — SIGNIFICANT CHANGE UP (ref 3.8–10.5)

## 2022-08-09 PROCEDURE — 20936 SP BONE AGRFT LOCAL ADD-ON: CPT | Mod: AS

## 2022-08-09 PROCEDURE — 22552 ARTHRD ANT NTRBD CERVICAL EA: CPT

## 2022-08-09 PROCEDURE — 22853 INSJ BIOMECHANICAL DEVICE: CPT | Mod: 59

## 2022-08-09 PROCEDURE — 20930 SP BONE ALGRFT MORSEL ADD-ON: CPT

## 2022-08-09 PROCEDURE — 22845 INSERT SPINE FIXATION DEVICE: CPT | Mod: AS,59

## 2022-08-09 PROCEDURE — 20930 SP BONE ALGRFT MORSEL ADD-ON: CPT | Mod: AS

## 2022-08-09 PROCEDURE — 22853 INSJ BIOMECHANICAL DEVICE: CPT | Mod: AS

## 2022-08-09 PROCEDURE — 22845 INSERT SPINE FIXATION DEVICE: CPT | Mod: 59

## 2022-08-09 PROCEDURE — 20936 SP BONE AGRFT LOCAL ADD-ON: CPT

## 2022-08-09 PROCEDURE — 22552 ARTHRD ANT NTRBD CERVICAL EA: CPT | Mod: AS

## 2022-08-09 PROCEDURE — 22551 ARTHRD ANT NTRBDY CERVICAL: CPT | Mod: AS

## 2022-08-09 PROCEDURE — 22551 ARTHRD ANT NTRBDY CERVICAL: CPT

## 2022-08-09 DEVICE — SURGIFLO MATRIX WITH THROMBIN KIT: Type: IMPLANTABLE DEVICE | Status: FUNCTIONAL

## 2022-08-09 DEVICE — SCREW SELF DRILL VAR 4.15MM: Type: IMPLANTABLE DEVICE | Status: FUNCTIONAL

## 2022-08-09 DEVICE — PLATE ARCHON 2 LVL 40MM: Type: IMPLANTABLE DEVICE | Status: FUNCTIONAL

## 2022-08-09 DEVICE — GRAFT BONE INFUSE KIT MED: Type: IMPLANTABLE DEVICE | Status: FUNCTIONAL

## 2022-08-09 DEVICE — IMPLANTABLE DEVICE: Type: IMPLANTABLE DEVICE | Status: FUNCTIONAL

## 2022-08-09 RX ORDER — ACETAMINOPHEN 500 MG
1000 TABLET ORAL ONCE
Refills: 0 | Status: COMPLETED | OUTPATIENT
Start: 2022-08-09

## 2022-08-09 RX ORDER — HYDROMORPHONE HYDROCHLORIDE 2 MG/ML
0 INJECTION INTRAMUSCULAR; INTRAVENOUS; SUBCUTANEOUS
Qty: 0 | Refills: 0 | DISCHARGE

## 2022-08-09 RX ORDER — BENZOCAINE 10 %
1 GEL (GRAM) MUCOUS MEMBRANE EVERY 4 HOURS
Refills: 0 | Status: DISCONTINUED | OUTPATIENT
Start: 2022-08-09 | End: 2022-08-10

## 2022-08-09 RX ORDER — DIPHENHYDRAMINE HCL 50 MG
12.5 CAPSULE ORAL EVERY 4 HOURS
Refills: 0 | Status: DISCONTINUED | OUTPATIENT
Start: 2022-08-09 | End: 2022-08-10

## 2022-08-09 RX ORDER — POLYETHYLENE GLYCOL 3350 17 G/17G
17 POWDER, FOR SOLUTION ORAL
Refills: 0 | Status: DISCONTINUED | OUTPATIENT
Start: 2022-08-09 | End: 2022-08-10

## 2022-08-09 RX ORDER — ASCORBIC ACID 60 MG
500 TABLET,CHEWABLE ORAL DAILY
Refills: 0 | Status: DISCONTINUED | OUTPATIENT
Start: 2022-08-09 | End: 2022-08-10

## 2022-08-09 RX ORDER — SENNA PLUS 8.6 MG/1
2 TABLET ORAL AT BEDTIME
Refills: 0 | Status: DISCONTINUED | OUTPATIENT
Start: 2022-08-09 | End: 2022-08-10

## 2022-08-09 RX ORDER — OXYCODONE HYDROCHLORIDE 5 MG/1
10 TABLET ORAL EVERY 4 HOURS
Refills: 0 | Status: DISCONTINUED | OUTPATIENT
Start: 2022-08-09 | End: 2022-08-09

## 2022-08-09 RX ORDER — SUCRALFATE 1 G
1 TABLET ORAL
Refills: 0 | Status: DISCONTINUED | OUTPATIENT
Start: 2022-08-09 | End: 2022-08-10

## 2022-08-09 RX ORDER — CYCLOBENZAPRINE HYDROCHLORIDE 10 MG/1
10 TABLET, FILM COATED ORAL EVERY 8 HOURS
Refills: 0 | Status: DISCONTINUED | OUTPATIENT
Start: 2022-08-09 | End: 2022-08-10

## 2022-08-09 RX ORDER — ONDANSETRON 8 MG/1
4 TABLET, FILM COATED ORAL EVERY 6 HOURS
Refills: 0 | Status: DISCONTINUED | OUTPATIENT
Start: 2022-08-09 | End: 2022-08-10

## 2022-08-09 RX ORDER — GABAPENTIN 400 MG/1
1 CAPSULE ORAL
Qty: 0 | Refills: 0 | DISCHARGE

## 2022-08-09 RX ORDER — PANTOPRAZOLE SODIUM 20 MG/1
40 TABLET, DELAYED RELEASE ORAL
Refills: 0 | Status: DISCONTINUED | OUTPATIENT
Start: 2022-08-09 | End: 2022-08-10

## 2022-08-09 RX ORDER — SODIUM CHLORIDE 9 MG/ML
1000 INJECTION, SOLUTION INTRAVENOUS
Refills: 0 | Status: DISCONTINUED | OUTPATIENT
Start: 2022-08-09 | End: 2022-08-09

## 2022-08-09 RX ORDER — TRAMADOL HYDROCHLORIDE 50 MG/1
75 TABLET ORAL EVERY 6 HOURS
Refills: 0 | Status: DISCONTINUED | OUTPATIENT
Start: 2022-08-09 | End: 2022-08-10

## 2022-08-09 RX ORDER — TRAMADOL HYDROCHLORIDE 50 MG/1
50 TABLET ORAL EVERY 6 HOURS
Refills: 0 | Status: DISCONTINUED | OUTPATIENT
Start: 2022-08-09 | End: 2022-08-10

## 2022-08-09 RX ORDER — OXYBUTYNIN CHLORIDE 5 MG
5 TABLET ORAL
Refills: 0 | Status: DISCONTINUED | OUTPATIENT
Start: 2022-08-10 | End: 2022-08-10

## 2022-08-09 RX ORDER — CEFAZOLIN SODIUM 1 G
2000 VIAL (EA) INJECTION EVERY 8 HOURS
Refills: 0 | Status: DISCONTINUED | OUTPATIENT
Start: 2022-08-09 | End: 2022-08-10

## 2022-08-09 RX ORDER — HYDROMORPHONE HYDROCHLORIDE 2 MG/ML
0.5 INJECTION INTRAMUSCULAR; INTRAVENOUS; SUBCUTANEOUS
Refills: 0 | Status: DISCONTINUED | OUTPATIENT
Start: 2022-08-09 | End: 2022-08-09

## 2022-08-09 RX ORDER — ACETAMINOPHEN 500 MG
975 TABLET ORAL EVERY 8 HOURS
Refills: 0 | Status: DISCONTINUED | OUTPATIENT
Start: 2022-08-09 | End: 2022-08-10

## 2022-08-09 RX ORDER — OXYCODONE HYDROCHLORIDE 5 MG/1
15 TABLET ORAL EVERY 4 HOURS
Refills: 0 | Status: DISCONTINUED | OUTPATIENT
Start: 2022-08-09 | End: 2022-08-09

## 2022-08-09 RX ORDER — SODIUM CHLORIDE 9 MG/ML
1000 INJECTION, SOLUTION INTRAVENOUS
Refills: 0 | Status: DISCONTINUED | OUTPATIENT
Start: 2022-08-09 | End: 2022-08-10

## 2022-08-09 RX ORDER — SODIUM CHLORIDE 9 MG/ML
500 INJECTION, SOLUTION INTRAVENOUS ONCE
Refills: 0 | Status: COMPLETED | OUTPATIENT
Start: 2022-08-09 | End: 2022-08-09

## 2022-08-09 RX ORDER — LISINOPRIL 2.5 MG/1
20 TABLET ORAL DAILY
Refills: 0 | Status: DISCONTINUED | OUTPATIENT
Start: 2022-08-10 | End: 2022-08-10

## 2022-08-09 RX ORDER — ACETAMINOPHEN 500 MG
1000 TABLET ORAL ONCE
Refills: 0 | Status: COMPLETED | OUTPATIENT
Start: 2022-08-09 | End: 2022-08-09

## 2022-08-09 RX ADMIN — Medication 1000 MILLIGRAM(S): at 23:00

## 2022-08-09 RX ADMIN — TRAMADOL HYDROCHLORIDE 50 MILLIGRAM(S): 50 TABLET ORAL at 23:31

## 2022-08-09 RX ADMIN — SODIUM CHLORIDE 100 MILLILITER(S): 9 INJECTION, SOLUTION INTRAVENOUS at 21:48

## 2022-08-09 RX ADMIN — SODIUM CHLORIDE 3 MILLILITER(S): 9 INJECTION INTRAMUSCULAR; INTRAVENOUS; SUBCUTANEOUS at 12:16

## 2022-08-09 RX ADMIN — Medication 100 MILLIGRAM(S): at 21:49

## 2022-08-09 RX ADMIN — Medication 1 SPRAY(S): at 21:49

## 2022-08-09 RX ADMIN — SENNA PLUS 2 TABLET(S): 8.6 TABLET ORAL at 21:48

## 2022-08-09 RX ADMIN — HYDROMORPHONE HYDROCHLORIDE 0.5 MILLIGRAM(S): 2 INJECTION INTRAMUSCULAR; INTRAVENOUS; SUBCUTANEOUS at 17:34

## 2022-08-09 RX ADMIN — HYDROMORPHONE HYDROCHLORIDE 0.5 MILLIGRAM(S): 2 INJECTION INTRAMUSCULAR; INTRAVENOUS; SUBCUTANEOUS at 17:57

## 2022-08-09 RX ADMIN — Medication 30 MILLILITER(S): at 21:53

## 2022-08-09 RX ADMIN — SODIUM CHLORIDE 500 MILLILITER(S): 9 INJECTION, SOLUTION INTRAVENOUS at 21:48

## 2022-08-09 RX ADMIN — SODIUM CHLORIDE 75 MILLILITER(S): 9 INJECTION, SOLUTION INTRAVENOUS at 17:35

## 2022-08-09 RX ADMIN — Medication 400 MILLIGRAM(S): at 22:31

## 2022-08-09 RX ADMIN — CYCLOBENZAPRINE HYDROCHLORIDE 10 MILLIGRAM(S): 10 TABLET, FILM COATED ORAL at 21:48

## 2022-08-09 RX ADMIN — Medication 1 GRAM(S): at 22:30

## 2022-08-09 RX ADMIN — TRAMADOL HYDROCHLORIDE 50 MILLIGRAM(S): 50 TABLET ORAL at 22:31

## 2022-08-09 NOTE — CONSULT NOTE ADULT - SUBJECTIVE AND OBJECTIVE BOX
TANISHA PADILLA is a 57y Male s/p ANTERIOR CERVICAL DISCECTOMY FUSION C5-7 WITH IMAGING      w/ h/o No pertinent past medical history    HTN (hypertension)    GERD (gastroesophageal reflux disease)    Prostate cancer    Lumbar stenosis      denies any chest pain shortness of breath palpitation dizziness lightheadedness nausea vomiting fever or chills    No significant past surgical history    S/P prostatectomy    Lipoma    History of lumbar laminectomy for spinal cord decompression      FH: type 2 diabetes mellitus      SH: doesnot smoke or drink at this time    No Known Drug Allergies  shellfish (Other)    acetaminophen     Tablet .. 975 milliGRAM(s) Oral every 8 hours  acetaminophen   IVPB .. 1000 milliGRAM(s) IV Intermittent once  aluminum hydroxide/magnesium hydroxide/simethicone Suspension 30 milliLiter(s) Oral every 6 hours PRN  ascorbic acid 500 milliGRAM(s) Oral daily  benzocaine 20% Spray 1 Spray(s) Topical every 4 hours PRN  ceFAZolin   IVPB 2000 milliGRAM(s) IV Intermittent every 8 hours  cyclobenzaprine 10 milliGRAM(s) Oral every 8 hours PRN  diphenhydrAMINE Injectable 12.5 milliGRAM(s) IV Push every 4 hours PRN  lactated ringers. 1000 milliLiter(s) IV Continuous <Continuous>  multivitamin 1 Tablet(s) Oral daily  ondansetron Injectable 4 milliGRAM(s) IV Push every 6 hours PRN  pantoprazole    Tablet 40 milliGRAM(s) Oral before breakfast  polyethylene glycol 3350 17 Gram(s) Oral two times a day  senna 2 Tablet(s) Oral at bedtime  sucralfate 1 Gram(s) Oral two times a day  traMADol 50 milliGRAM(s) Oral every 6 hours PRN  traMADol 75 milliGRAM(s) Oral every 6 hours PRN    T(C): 36.7 (08-09-22 @ 21:22), Max: 37.1 (08-09-22 @ 12:10)  HR: 81 (08-09-22 @ 21:22) (60 - 92)  BP: 111/73 (08-09-22 @ 21:22) (103/71 - 120/65)  RR: 18 (08-09-22 @ 21:22) (14 - 19)  SpO2: 99% (08-09-22 @ 21:22) (98% - 100%)  HEENT unremarkable  neck no JVD or bruit  heart normal S1 S2 RRR no gallops or rubs  chest clear to auscultation  abd sof nontender non distended +bs  ext no calf tenderness    A/P   DVT PX  pain control  bowel regimen   wound care as per ortho  GI PX  antiemetics prn  incentive spirometer

## 2022-08-09 NOTE — ASU PREOP CHECKLIST - SKIN PREP
Head, normocephalic, atraumatic, Face, Face within normal limits, Ears, External ears within normal limits, Nose/Nasopharynx, External nose  normal appearance, nares patent, no nasal discharge, Mouth and Throat, Oral cavity appearance normal, Breath odor normal, Lips, Appearance normal
done

## 2022-08-09 NOTE — PHYSICAL THERAPY INITIAL EVALUATION ADULT - CRITERIA FOR SKILLED THERAPEUTIC INTERVENTIONS
impairments found/functional limitations in following categories/risk reduction/prevention/rehab potential/therapy frequency/predicted duration of therapy intervention/anticipated equipment needs at discharge/anticipated discharge recommendation normal

## 2022-08-09 NOTE — BRIEF OPERATIVE NOTE - NSICDXBRIEFPROCEDURE_GEN_ALL_CORE_FT
PROCEDURES:  Anterior cervical discectomy and fusion (ACDF) at 2 levels 09-Aug-2022 17:10:29  Miriam Snow

## 2022-08-09 NOTE — PHYSICAL THERAPY INITIAL EVALUATION ADULT - ADDITIONAL COMMENTS
Lives in a  with back entrance consisting of 1 step to enter c no HR and then 2 steps with a vertical bar to grab . Pt has electric bed and recliner

## 2022-08-09 NOTE — PHYSICAL THERAPY INITIAL EVALUATION ADULT - GENERAL OBSERVATIONS, REHAB EVAL
Patient received supine NAD. A x O 4. Able to follow 2 step commands. +Neck collar+AYSHA drain c/o urinary incontinence , uses pull ups

## 2022-08-10 ENCOUNTER — TRANSCRIPTION ENCOUNTER (OUTPATIENT)
Age: 57
End: 2022-08-10

## 2022-08-10 VITALS
SYSTOLIC BLOOD PRESSURE: 113 MMHG | TEMPERATURE: 98 F | RESPIRATION RATE: 18 BRPM | OXYGEN SATURATION: 100 % | DIASTOLIC BLOOD PRESSURE: 78 MMHG | HEART RATE: 85 BPM

## 2022-08-10 LAB
ANION GAP SERPL CALC-SCNC: 6 MMOL/L — SIGNIFICANT CHANGE UP (ref 5–17)
BASOPHILS # BLD AUTO: 0.02 K/UL — SIGNIFICANT CHANGE UP (ref 0–0.2)
BASOPHILS NFR BLD AUTO: 0.2 % — SIGNIFICANT CHANGE UP (ref 0–2)
BUN SERPL-MCNC: 29 MG/DL — HIGH (ref 7–23)
CALCIUM SERPL-MCNC: 8.5 MG/DL — SIGNIFICANT CHANGE UP (ref 8.5–10.1)
CHLORIDE SERPL-SCNC: 101 MMOL/L — SIGNIFICANT CHANGE UP (ref 96–108)
CO2 SERPL-SCNC: 27 MMOL/L — SIGNIFICANT CHANGE UP (ref 22–31)
CREAT SERPL-MCNC: 1.79 MG/DL — HIGH (ref 0.5–1.3)
EGFR: 44 ML/MIN/1.73M2 — LOW
EOSINOPHIL # BLD AUTO: 0.03 K/UL — SIGNIFICANT CHANGE UP (ref 0–0.5)
EOSINOPHIL NFR BLD AUTO: 0.3 % — SIGNIFICANT CHANGE UP (ref 0–6)
GLUCOSE SERPL-MCNC: 101 MG/DL — HIGH (ref 70–99)
HCT VFR BLD CALC: 35.3 % — LOW (ref 39–50)
HGB BLD-MCNC: 12.6 G/DL — LOW (ref 13–17)
IMM GRANULOCYTES NFR BLD AUTO: 0.3 % — SIGNIFICANT CHANGE UP (ref 0–1.5)
LYMPHOCYTES # BLD AUTO: 1.12 K/UL — SIGNIFICANT CHANGE UP (ref 1–3.3)
LYMPHOCYTES # BLD AUTO: 9.7 % — LOW (ref 13–44)
MCHC RBC-ENTMCNC: 31 PG — SIGNIFICANT CHANGE UP (ref 27–34)
MCHC RBC-ENTMCNC: 35.7 G/DL — SIGNIFICANT CHANGE UP (ref 32–36)
MCV RBC AUTO: 86.7 FL — SIGNIFICANT CHANGE UP (ref 80–100)
MONOCYTES # BLD AUTO: 1.01 K/UL — HIGH (ref 0–0.9)
MONOCYTES NFR BLD AUTO: 8.8 % — SIGNIFICANT CHANGE UP (ref 2–14)
NEUTROPHILS # BLD AUTO: 9.29 K/UL — HIGH (ref 1.8–7.4)
NEUTROPHILS NFR BLD AUTO: 80.7 % — HIGH (ref 43–77)
NRBC # BLD: 0 /100 WBCS — SIGNIFICANT CHANGE UP (ref 0–0)
PLATELET # BLD AUTO: 240 K/UL — SIGNIFICANT CHANGE UP (ref 150–400)
POTASSIUM SERPL-MCNC: 3.6 MMOL/L — SIGNIFICANT CHANGE UP (ref 3.5–5.3)
POTASSIUM SERPL-SCNC: 3.6 MMOL/L — SIGNIFICANT CHANGE UP (ref 3.5–5.3)
RBC # BLD: 4.07 M/UL — LOW (ref 4.2–5.8)
RBC # FLD: 11.9 % — SIGNIFICANT CHANGE UP (ref 10.3–14.5)
SODIUM SERPL-SCNC: 134 MMOL/L — LOW (ref 135–145)
WBC # BLD: 11.51 K/UL — HIGH (ref 3.8–10.5)
WBC # FLD AUTO: 11.51 K/UL — HIGH (ref 3.8–10.5)

## 2022-08-10 RX ORDER — NALOXONE HYDROCHLORIDE 4 MG/.1ML
4 SPRAY NASAL
Qty: 1 | Refills: 0
Start: 2022-08-10 | End: 2022-08-10

## 2022-08-10 RX ORDER — NALOXEGOL OXALATE 12.5 MG/1
1 TABLET, FILM COATED ORAL
Qty: 10 | Refills: 0
Start: 2022-08-10 | End: 2022-08-19

## 2022-08-10 RX ORDER — SENNA PLUS 8.6 MG/1
2 TABLET ORAL
Qty: 0 | Refills: 0 | DISCHARGE
Start: 2022-08-10

## 2022-08-10 RX ORDER — ACETAMINOPHEN 500 MG
3 TABLET ORAL
Qty: 0 | Refills: 0 | DISCHARGE
Start: 2022-08-10

## 2022-08-10 RX ORDER — NALOXEGOL OXALATE 12.5 MG/1
25 TABLET, FILM COATED ORAL DAILY
Refills: 0 | Status: DISCONTINUED | OUTPATIENT
Start: 2022-08-10 | End: 2022-08-10

## 2022-08-10 RX ORDER — CYCLOBENZAPRINE HYDROCHLORIDE 10 MG/1
1 TABLET, FILM COATED ORAL
Qty: 21 | Refills: 0
Start: 2022-08-10 | End: 2022-08-16

## 2022-08-10 RX ORDER — TRAMADOL HYDROCHLORIDE 50 MG/1
1 TABLET ORAL
Qty: 40 | Refills: 0
Start: 2022-08-10 | End: 2022-08-14

## 2022-08-10 RX ADMIN — Medication 1 TABLET(S): at 11:55

## 2022-08-10 RX ADMIN — NALOXEGOL OXALATE 25 MILLIGRAM(S): 12.5 TABLET, FILM COATED ORAL at 14:57

## 2022-08-10 RX ADMIN — Medication 975 MILLIGRAM(S): at 07:17

## 2022-08-10 RX ADMIN — Medication 1 GRAM(S): at 17:40

## 2022-08-10 RX ADMIN — TRAMADOL HYDROCHLORIDE 75 MILLIGRAM(S): 50 TABLET ORAL at 05:39

## 2022-08-10 RX ADMIN — Medication 975 MILLIGRAM(S): at 06:14

## 2022-08-10 RX ADMIN — Medication 5 MILLIGRAM(S): at 06:59

## 2022-08-10 RX ADMIN — TRAMADOL HYDROCHLORIDE 75 MILLIGRAM(S): 50 TABLET ORAL at 11:55

## 2022-08-10 RX ADMIN — POLYETHYLENE GLYCOL 3350 17 GRAM(S): 17 POWDER, FOR SOLUTION ORAL at 17:35

## 2022-08-10 RX ADMIN — Medication 975 MILLIGRAM(S): at 16:20

## 2022-08-10 RX ADMIN — CYCLOBENZAPRINE HYDROCHLORIDE 10 MILLIGRAM(S): 10 TABLET, FILM COATED ORAL at 15:21

## 2022-08-10 RX ADMIN — Medication 5 MILLIGRAM(S): at 17:40

## 2022-08-10 RX ADMIN — Medication 500 MILLIGRAM(S): at 11:55

## 2022-08-10 RX ADMIN — Medication 100 MILLIGRAM(S): at 06:15

## 2022-08-10 RX ADMIN — Medication 975 MILLIGRAM(S): at 15:21

## 2022-08-10 RX ADMIN — POLYETHYLENE GLYCOL 3350 17 GRAM(S): 17 POWDER, FOR SOLUTION ORAL at 06:17

## 2022-08-10 RX ADMIN — TRAMADOL HYDROCHLORIDE 75 MILLIGRAM(S): 50 TABLET ORAL at 12:54

## 2022-08-10 RX ADMIN — LISINOPRIL 20 MILLIGRAM(S): 2.5 TABLET ORAL at 06:15

## 2022-08-10 RX ADMIN — Medication 1 GRAM(S): at 06:21

## 2022-08-10 RX ADMIN — TRAMADOL HYDROCHLORIDE 75 MILLIGRAM(S): 50 TABLET ORAL at 04:39

## 2022-08-10 RX ADMIN — PANTOPRAZOLE SODIUM 40 MILLIGRAM(S): 20 TABLET, DELAYED RELEASE ORAL at 06:15

## 2022-08-10 NOTE — DISCHARGE NOTE PROVIDER - NSDCFUADDINST_GEN_ALL_CORE_FT
No bending/lifting/twisting/pulling/pushing/carrying or driving. No blood thinners (not limited to but including) aspirin, motrin, alleve, naproxen, etc.  Cervical collar at all times: may remove briefly for comfort  May shower 5 days post op.  No direct water pressure over incision.  Change dressing daily as needed with a dry clean bandage.   Stitches to be removed at office follow up.

## 2022-08-10 NOTE — DISCHARGE NOTE NURSING/CASE MANAGEMENT/SOCIAL WORK - PATIENT PORTAL LINK FT
You can access the FollowMyHealth Patient Portal offered by [facility] by registering at the following website: [email or web address]. By joining [facility]’s FollowMyHealth portal, you will also be able to view your health information using other applications (apps) compatible with our system.

## 2022-08-10 NOTE — PROGRESS NOTE ADULT - SUBJECTIVE AND OBJECTIVE BOX
57yMale s/p ACDF C5-7 POD#0  Pt seen and examined in NAD.   Pain controlled.  Pt denies any new complaints.   Pt denies CP/SOB/N/V/D/numbness/tingling/bowel or bladder dysfunction.   +AYSHA    Vital Signs Last 24 Hrs  T(C): 36.5 (09 Aug 2022 18:33), Max: 37.1 (09 Aug 2022 12:10)  T(F): 97.7 (09 Aug 2022 18:33), Max: 98.7 (09 Aug 2022 12:10)  HR: 78 (09 Aug 2022 18:33) (60 - 89)  BP: 112/73 (09 Aug 2022 18:33) (103/71 - 120/65)  BP(mean): --  RR: 18 (09 Aug 2022 18:33) (14 - 19)  SpO2: 99% (09 Aug 2022 18:33) (99% - 100%)    Parameters below as of 09 Aug 2022 18:33  Patient On (Oxygen Delivery Method): room air        PE:   General: A&Ox3, NAD  Neck: Dressing c/d/i +AYSHA   B/L UE: Skin intact. +ROM shoulder/elbow/wrist/fingers. +ok/thumbsup/fingercross signs.  strength: 5/5.  RP2+ NVI.   B/L LE: Skin intact. +ROM hip/knee/ankle/toes. Ankle Dorsi/plantarflexion: 5/5. Calf: soft, compressible and nontender. DP/PT 2+ NVI.                             11.6   6.59  )-----------( 202      ( 09 Aug 2022 17:30 )             31.8       08-09    136  |  105  |  38<H>  ----------------------------<  229<H>  3.7   |  25  |  2.51<H>    Ca    7.9<L>      09 Aug 2022 17:30          A/P: 57yMale s/p ACDF C5-7 POD#0  Monitor drain  ABX while drain is in  Wound care  Pain controlled  PT: WBAT: Spinal precautions  Isometric exercises  DVT ppx: SCDs  Incentive spirometry counseled   Discharge: planning home p drain  All the above discussed and understood by pt   
57yMale s/p ACDF C5-7 POD#1. Pt seen and examined in NAD. Pain controlled. Pt denies any new complaints. Pt denies CP/SOB/N/V/D/numbness/tingling/bowel or bladder dysfunction. Tolerating soft diet. Reports recent hospitalization for constipation secondary to narcotics.   AYSHA 10/20    PE:   Neuro: AAOX3  Neck: Aspen collar in place. Dressing c/d/i +AYSHA with serous drainage. Incision: steri strips C/D/I. +soft swelling at lateral incision. No crepitus.   B/L UE: Skin intact. +ROM shoulder/elbow/wrist/fingers. +ok/thumbsup/fingercross signs.  strength: 5/5.  RP2+ NVI.   B/L LE: Skin intact. +ROM hip/knee/ankle/toes. Ankle Dorsi/plantarflexion: 5/5. Calf: soft, compressible and nontender. DP/PT 2+ NVI.                             12.6   11.51 )-----------( 240      ( 10 Aug 2022 05:35 )             35.3       08-10    134<L>  |  101  |  29<H>  ----------------------------<  101<H>  3.6   |  27  |  1.79<H>    Ca    8.5      10 Aug 2022 05:35          A/P: 57yMale s/p ACDF C5-7 POD#1.  AYSHA drain DC'd. Dressing changed. New dry clean dressing applied.   Pain controlled with tramadol.  H/O opiate induced constipation: movantik added  PT: WBAT - spinal precautions   DVT ppx: SCDs   Wound care, Isometric exercises, incentive spirometry   Medical consult appreciated  Discharge: planning for home today  All the above discussed and understood by pt   D/W Dr Pierre
TANISHA PADILLA is a 57y Male s/p ANTERIOR CERVICAL DISCECTOMY FUSION C5-7 WITH IMAGING        denies any chest pain shortness of breath palpitation dizziness lightheadedness nausea vomiting fever or chills    T(C): 36.9 (08-10-22 @ 16:32), Max: 37.1 (08-10-22 @ 05:30)  HR: 85 (08-10-22 @ 16:32) (78 - 95)  BP: 113/78 (08-10-22 @ 16:32) (111/73 - 135/77)  RR: 18 (08-10-22 @ 16:32) (16 - 19)  SpO2: 100% (08-10-22 @ 16:32) (98% - 100%)  no jvd/bruit  s1 s2 rrr  cta  s/nt/nd  no calf tend                        12.6   11.51 )-----------( 240      ( 10 Aug 2022 05:35 )             35.3   08-10    134<L>  |  101  |  29<H>  ----------------------------<  101<H>  3.6   |  27  |  1.79<H>    Ca    8.5      10 Aug 2022 05:35        cont dvt px  pain control  bowel regimen  antiemetics  incentive spirometer

## 2022-08-10 NOTE — DISCHARGE NOTE PROVIDER - CARE PROVIDER_API CALL
Faisal Pierre)  Orthopaedic Surgery  [address] [place] [address]  Phone: [phone]  Fax: [phone]  Follow Up Time:

## 2022-08-10 NOTE — DISCHARGE NOTE PROVIDER - NSDCCPTREATMENT_GEN_ALL_CORE_FT
PRINCIPAL PROCEDURE  Procedure: Anterior cervical discectomy and fusion (ACDF) at 2 levels  Findings and Treatment: C5-7

## 2022-08-10 NOTE — DISCHARGE NOTE PROVIDER - NSDCFUADDAPPT_GEN_ALL_CORE_FT
Follow up with your surgeon in two weeks. Call for appointment.  If you need more pain medication, call your surgeon's office. For medication refills or authorizations, please call [phone]  We recommend that you call and schedule a follow up appointment with your primary care physician for repeat blood work (CBC and BMP) for post hospital discharge follow-up care 2-4 weeks after your surgery.   Call your surgeon if you have increased redness/pain/drainage or fever. Return to ER for shortness of breath/calf tenderness.

## 2022-08-10 NOTE — DISCHARGE NOTE PROVIDER - NSDCMRMEDTOKEN_GEN_ALL_CORE_FT
acetaminophen 325 mg oral tablet: 3 tab(s) orally every 8 hours  ascorbic acid 500 mg oral tablet: 1 tab(s) orally 2 times a day  cyclobenzaprine 10 mg oral tablet: 1 tab(s) orally every 8 hours MDD:3  lisinopril 20 mg oral tablet: 1 tab(s) orally once a day  Multiple Vitamins oral tablet: 1 tab(s) orally once a day  naloxegol 25 mg oral tablet: 1 tab(s) orally once a day to prevent opiate induced constipation MDD:1  Narcan 4 mg/0.1 mL nasal spray: 4 milligram(s) intranasally once, repeat as necessary.   As needed. For suspected opiate overdose   Follow instructions on packet MDD:0.2 ml  oxybutynin 10 mg/24 hr oral tablet, extended release: 1 tab(s) orally once a day  Probiotic Formula oral capsule: 1 cap(s) orally once a day  Protonix 40 mg oral delayed release tablet: 1 tab(s) orally once a day  senna leaf extract oral tablet: 2 tab(s) orally once a day (at bedtime)  sucralfate 1 g oral tablet: 1  orally 2 times a day  traMADol 50 mg oral tablet: 1-2 tab(s) orally every 4 hours, As Needed for pain. May take 1/2 tab for lesser pain. MDD:8  DO NOT TAKE WITH OTHER NARCOTICS (i.e. hydromorphone)

## 2022-08-10 NOTE — DISCHARGE NOTE NURSING/CASE MANAGEMENT/SOCIAL WORK - NSDCPEFALRISK_GEN_ALL_CORE
For information on Fall & Injury Prevention, visit: [email or web address] OR  [email or web address] OR  https://www.cdc.gov/steadi/patient.html

## 2022-08-10 NOTE — DISCHARGE NOTE PROVIDER - HOSPITAL COURSE
57yMale with history of cervical radiculopathy presenting for ACDF C5-7  by Dr. Faisal Pierre on  8/10/2022. Risk and benefits of surgery were explained to the patient. The patient understood and agreed to proceed with surgery. Patient underwent the procedure with no intraoperative complications. Pt was brought in stable condition to the PACU. Once stable in PACU, pt was brought to the floor. During hospital stay pt was followed by Medicine,  during this admission. Pt had an uneventful hospital course. Pt is stable for discharge to home on POD# 1

## 2022-08-15 DIAGNOSIS — M54.12 RADICULOPATHY, CERVICAL REGION: ICD-10-CM

## 2022-08-15 DIAGNOSIS — Z90.79 ACQUIRED ABSENCE OF OTHER GENITAL ORGAN(S): ICD-10-CM

## 2022-08-15 DIAGNOSIS — Z91.013 ALLERGY TO SEAFOOD: ICD-10-CM

## 2022-08-15 DIAGNOSIS — Z85.46 PERSONAL HISTORY OF MALIGNANT NEOPLASM OF PROSTATE: ICD-10-CM

## 2022-08-15 DIAGNOSIS — M48.02 SPINAL STENOSIS, CERVICAL REGION: ICD-10-CM

## 2022-08-15 DIAGNOSIS — K21.9 GASTRO-ESOPHAGEAL REFLUX DISEASE WITHOUT ESOPHAGITIS: ICD-10-CM

## 2022-08-15 DIAGNOSIS — I10 ESSENTIAL (PRIMARY) HYPERTENSION: ICD-10-CM

## 2022-08-15 RX ORDER — TRAMADOL HYDROCHLORIDE 50 MG/1
50 TABLET, COATED ORAL
Qty: 40 | Refills: 0 | Status: ACTIVE | COMMUNITY
Start: 2022-08-15 | End: 1900-01-01

## 2022-08-22 ENCOUNTER — APPOINTMENT (OUTPATIENT)
Dept: ORTHOPEDIC SURGERY | Facility: CLINIC | Age: 57
End: 2022-08-22

## 2022-08-22 VITALS — WEIGHT: 211 LBS | BODY MASS INDEX: 27.08 KG/M2 | HEIGHT: 74 IN

## 2022-08-22 PROCEDURE — 72040 X-RAY EXAM NECK SPINE 2-3 VW: CPT

## 2022-08-22 PROCEDURE — 99024 POSTOP FOLLOW-UP VISIT: CPT

## 2022-08-22 NOTE — PHYSICAL EXAM
[Left Radial Forearm] : left radial forearm [Left Ulnar Forearm] : left ulnar forearm [] : sutures removed

## 2022-09-06 ENCOUNTER — APPOINTMENT (OUTPATIENT)
Dept: PAIN MANAGEMENT | Facility: CLINIC | Age: 57
End: 2022-09-06

## 2022-09-06 VITALS — HEIGHT: 74 IN | BODY MASS INDEX: 27.08 KG/M2 | WEIGHT: 211 LBS

## 2022-09-06 PROCEDURE — 20552 NJX 1/MLT TRIGGER POINT 1/2: CPT

## 2022-09-06 PROCEDURE — 99214 OFFICE O/P EST MOD 30 MIN: CPT | Mod: 25

## 2022-09-06 PROCEDURE — 99072 ADDL SUPL MATRL&STAF TM PHE: CPT

## 2022-09-06 NOTE — HISTORY OF PRESENT ILLNESS
[Lower back] : lower back [7] : 7 [Dull/Aching] : dull/aching [Tightness] : tightness [Meds] : meds [Injection therapy] : injection therapy [Work related] : work related [6] : 6 [Radiating] : radiating [Constant] : constant [Household chores] : household chores [Walking] : walking [Bending forward] : bending forward [FreeTextEntry1] : 09/06/2022: follow up today.  Has been having pain in low back.  Would like TPI.  Saw Dr. Pierre for low back pain that radiates to right hip.  He recommends fusion of L1-L2.  \par \par 07/19/2022: follow up today.  Has been having worsening pain in low back.  Would like TPI today. \par Will be having neck surgery on August 4\par \par 04/25/2022: follow up today after b/l L4/5 TFESI on 3/11/22 he reports an overall 80% improvement. \par \par 3/28/22: follow up today. Has pain in the left hip and starting to get pain in the right hip. Pain in the left lateral leg as\par well.\par \par 10/18/21- F/u after left hip injection 9/27. Had 90% relief from hip injection. Had surgery with Dr. Pierre L3/4 L4/5 L5/S1 (2 years ago)\par \par 9/1/21: follow up today. Patient feels better. Would like repeat hip injection. Will give TPI to neck.\par \par 5/4/21- Patient feeling better after surgery. Still has left hip pain. 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 The last hip injection helped\par 60% so he would benefit from repeat hip injection.\par \par 2/9/21: follow up today after left hip injection on 1/29/21 pt reports near complete relief of his pain following. the\par pinching pain is now gone.\par \par 1/20/21- Patient had surgery in back in September. Doing better. Would like TPI in neck. also Dr. Pierre recommended right hip injection due to pain and arthritis in hip.\par \par 9/8/20 - follow up today after LESI 8/17/20. Patient had no relief and is now going for surgery on 9/10.\par \par 3/9/20 - Patient presents for FUV after L5-S1 LESI on 2/24/20. Reports 80% improvement.\par \par 2/3/20 - Patient complains of lower back pain that radiates down right and left leg. Patient had a LESI L5-S1 \par \par 11/11/19 with relief. Patient has been indicated for surgery by Dr. Pierre. Cannot proceed at this time given financial considerations. Still undergoing Lupron therapy. [] : no [FreeTextEntry3] : 07/02/22 [FreeTextEntry6] : numbness [FreeTextEntry7] : both sides, buttocks and hamstrings  [FreeTextEntry9] : Stretching

## 2022-09-06 NOTE — PHYSICAL EXAM
[] : no lumbar paraspinal tenderness [TWNoteComboBox7] : forward flexion 75 degrees [de-identified] : extension 20 degrees

## 2022-09-28 ENCOUNTER — APPOINTMENT (OUTPATIENT)
Dept: ORTHOPEDIC SURGERY | Facility: CLINIC | Age: 57
End: 2022-09-28

## 2022-09-28 PROCEDURE — 99024 POSTOP FOLLOW-UP VISIT: CPT

## 2022-09-28 PROCEDURE — 72040 X-RAY EXAM NECK SPINE 2-3 VW: CPT

## 2022-09-28 RX ORDER — TRAMADOL HYDROCHLORIDE 50 MG/1
50 TABLET, COATED ORAL 3 TIMES DAILY
Qty: 90 | Refills: 0 | Status: ACTIVE | COMMUNITY
Start: 2022-08-22 | End: 1900-01-01

## 2022-10-04 ENCOUNTER — LABORATORY RESULT (OUTPATIENT)
Age: 57
End: 2022-10-04

## 2022-10-06 ENCOUNTER — APPOINTMENT (OUTPATIENT)
Age: 57
End: 2022-10-06

## 2022-10-06 PROCEDURE — 62323 NJX INTERLAMINAR LMBR/SAC: CPT

## 2022-10-18 ENCOUNTER — APPOINTMENT (OUTPATIENT)
Dept: PAIN MANAGEMENT | Facility: CLINIC | Age: 57
End: 2022-10-18

## 2022-10-18 VITALS — WEIGHT: 211 LBS | HEIGHT: 74 IN | BODY MASS INDEX: 27.08 KG/M2

## 2022-10-18 PROCEDURE — J3490M: CUSTOM

## 2022-10-18 PROCEDURE — 20553 NJX 1/MLT TRIGGER POINTS 3/>: CPT

## 2022-10-18 PROCEDURE — 99072 ADDL SUPL MATRL&STAF TM PHE: CPT

## 2022-10-18 PROCEDURE — 99214 OFFICE O/P EST MOD 30 MIN: CPT | Mod: 25

## 2022-10-18 NOTE — HISTORY OF PRESENT ILLNESS
[Lower back] : lower back [Work related] : work related [7] : 7 [6] : 6 [Dull/Aching] : dull/aching [Radiating] : radiating [Tightness] : tightness [Constant] : constant [Meds] : meds [Walking] : walking [Bending forward] : bending forward [Sharp] : sharp [Sleep] : sleep [Rest] : rest [Injection therapy] : injection therapy [Disabled] : Work status: disabled [FreeTextEntry1] : 10/18/2022: follow up today for caudal on 10/6.  Had 60% relief from injection for 3 days.  pain has returned.  Dr. Pierre recommends extension of fusion L1-L2.  \par \par 09/06/2022: follow up today.  Has been having pain in low back.  Would like TPI.  Saw Dr. Pierre for low back pain that radiates to right hip.  He recommends fusion of L1-L2.  \par \par 07/19/2022: follow up today.  Has been having worsening pain in low back.  Would like TPI today. \par Will be having neck surgery on August 4\par \par 04/25/2022: follow up today after b/l L4/5 TFESI on 3/11/22 he reports an overall 80% improvement. \par \par 3/28/22: follow up today. Has pain in the left hip and starting to get pain in the right hip. Pain in the left lateral leg as\par well.\par \par 10/18/21- F/u after left hip injection 9/27. Had 90% relief from hip injection. Had surgery with Dr. Pierre L3/4 L4/5 L5/S1 (2 years ago)\par \par 9/1/21: follow up today. Patient feels better. Would like repeat hip injection. Will give TPI to neck.\par \par 5/4/21- Patient feeling better after surgery. Still has left hip pain. 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 The last hip injection helped\par 60% so he would benefit from repeat hip injection.\par \par 2/9/21: follow up today after left hip injection on 1/29/21 pt reports near complete relief of his pain following. the\par pinching pain is now gone.\par \par 1/20/21- Patient had surgery in back in September. Doing better. Would like TPI in neck. also Dr. Pierre recommended right hip injection due to pain and arthritis in hip.\par \par 9/8/20 - follow up today after LESI 8/17/20. Patient had no relief and is now going for surgery on 9/10.\par \par 3/9/20 - Patient presents for FUV after L5-S1 LESI on 2/24/20. Reports 80% improvement.\par \par 2/3/20 - Patient complains of lower back pain that radiates down right and left leg. Patient had a LESI L5-S1 \par \par 11/11/19 with relief. Patient has been indicated for surgery by Dr. Pierre. Cannot proceed at this time given financial considerations. Still undergoing Lupron therapy. [] : no [FreeTextEntry3] : 07/02/22 [FreeTextEntry6] : numbness [FreeTextEntry7] : both sides, buttocks and hamstrings  [FreeTextEntry9] : Stretching

## 2022-10-18 NOTE — PHYSICAL EXAM
[] : no lumbar paraspinal tenderness [TWNoteComboBox7] : forward flexion 75 degrees [de-identified] : extension 20 degrees

## 2022-10-26 ENCOUNTER — APPOINTMENT (OUTPATIENT)
Dept: ORTHOPEDIC SURGERY | Facility: CLINIC | Age: 57
End: 2022-10-26

## 2022-10-26 PROCEDURE — 99024 POSTOP FOLLOW-UP VISIT: CPT

## 2022-10-26 PROCEDURE — 99072 ADDL SUPL MATRL&STAF TM PHE: CPT

## 2022-10-26 PROCEDURE — 99215 OFFICE O/P EST HI 40 MIN: CPT

## 2022-10-26 PROCEDURE — 72040 X-RAY EXAM NECK SPINE 2-3 VW: CPT

## 2022-10-26 NOTE — DISCUSSION/SUMMARY
[de-identified] : the neck is healing up at this point \par xrays looks good \par scar massage \par will switch to hydrocodone \par \par no heavy lifting \par fu for this in 3 months \par \par \par

## 2022-10-26 NOTE — PHYSICAL EXAM
[Flexion] : flexion [Extension] : extension [Bending to left] : bending to left [Bending to right] : bending to right [4___] : left hamstring 4[unfilled]/5 [5___] : right dorsiflexors 5[unfilled]/5 [] : positive straight leg raise [Left lower extremity below knee] : left lower extremity below knee [Left lower extremity above knee] : left lower extremity above knee [Right lower extremity below knee] : right lower extremity below knee [Right lower extremity above knee] : right lower extremity above knee

## 2022-10-26 NOTE — HISTORY OF PRESENT ILLNESS
[Neck] : neck [2] : 2 [1] : 2 [Localized] : localized [Tightness] : tightness [Intermittent] : intermittent [Household chores] : household chores [Rest] : rest [Walking] : walking [] : yes [de-identified] : 10/26/22: here for fu - the neck is healing up a bit - swallowing better - neck is stiff - out of medication\par \par xrays today:\par C spine - implants in good positions  [de-identified] : nothing

## 2022-11-02 ENCOUNTER — APPOINTMENT (OUTPATIENT)
Dept: ORTHOPEDIC SURGERY | Facility: CLINIC | Age: 57
End: 2022-11-02

## 2022-11-11 ENCOUNTER — APPOINTMENT (OUTPATIENT)
Dept: ORTHOPEDIC SURGERY | Facility: CLINIC | Age: 57
End: 2022-11-11

## 2022-11-11 PROCEDURE — 99214 OFFICE O/P EST MOD 30 MIN: CPT | Mod: ACP

## 2022-11-11 PROCEDURE — 99072 ADDL SUPL MATRL&STAF TM PHE: CPT | Mod: ACP

## 2022-11-11 RX ORDER — PREDNISONE 20 MG/1
20 TABLET ORAL
Qty: 18 | Refills: 0 | Status: ACTIVE | COMMUNITY
Start: 2022-11-11 | End: 1900-01-01

## 2022-11-11 NOTE — ASSESSMENT
[FreeTextEntry1] : Pt provided Prednisone taper pack x 9 days.\par RTO in 1 week for f/u care.\par Referred for repeat MRI of the cervical spine due to recent progressive symptoms. \par

## 2022-11-11 NOTE — DISCUSSION/SUMMARY
[de-identified] : \par \par Dr. Pierre note below: \par \par 10/26/2022: reviewed the case and the treatment optoins - has severe stenosis at L1-2 as an adjacent segment problem above the prior L3-5 fusion also with localized kyphosis at L2-3 above the prior fusion - this represents severe adjacent segment disease \par \par we discussed the options in detail - ultimately he is indicated for extension of the fusion up to T10 (can stop at L1 due to it being an adjacent segment with a high rate of early failure)\par \par Indicated the patient for laminecotomy and/or fusion up to T10 and with a revision of the prior hardware from L3-5 - so in total screws from T10 to L5 with rods - needs laminectomy at L1-2 which will be a revision extension of the older laminectomy \par \par has tried extensive conservative tx including time/activity modification/medicatoins/lesi/cane - don’t see much hope for continued conservative tx \par \par We've discussed the surgery details including instrumentation and grafting options (local, allograft, ICBG, and biologics) as well as potential for complications including but not limited to pain, scar and infection. There is also a possibility for hardware complication such as malposition of hardware,hardware loosening, pullout, failure or fracture of bone, adjacent segment disease,pseudarthrosis, and need for future surgery. We discussed potential for injury to nerves, spinal cord or blood vessels, paralysis, blindness, need for transfusion, general anesthesia, allergic reaction, prolonged intubation,myocardial infarction, stroke, deep venous thrombosis, pulmonary embolus, and death.  Spinal fluid leak may occur and may require prolonged time in the hospital and also further surgical procedures including drain placement.  The patient understands these things and all questions are answered to his/her satisfaction.\par \par Patient has been instructed to stop all  aspirin and NSAIDs 10 days prior to surgery date. For Coumadin and other blood thinners, the patient is referred to the medical doctor\par \par The patient will need a medical clearance and pre-admission testing prior to surgery\par We will use neuromonitoring in order to keep things as safe as possilble.\par The procedure does not come with a guarantee of success or of satisfaction on the patient's behalf \par At the surgeon's discretion he may call for assistance during the surgery or in the perioperative period \par \par Given the revision nature of this operation and the prior wound difficultlies of persistent seroma would indicated for plastics closure \par given the complexity of the revision operation would co-scrub this with another fellowship trained spinal surgery in order to get through the operatoin as efficiently and safely as possible as it is a larger operation and the evidence shows co-surgery is safer in these situations \par \par No change in disability \par \par medication he has \par \par \par \par \par \par

## 2022-11-11 NOTE — PHYSICAL EXAM
[Flexion] : flexion [Extension] : extension [Bending to left] : bending to left [Bending to right] : bending to right [4___] : left hamstring 4[unfilled]/5 [5___] : right dorsiflexors 5[unfilled]/5 [Left lower extremity below knee] : left lower extremity below knee [Left lower extremity above knee] : left lower extremity above knee [Right lower extremity below knee] : right lower extremity below knee [Right lower extremity above knee] : right lower extremity above knee [] : no lumbar paraspinal tenderness

## 2022-11-11 NOTE — IMAGING
[de-identified] : Cervical exam shows mildly limited right and left lateral motion.\par +Right Spurling Sign is noted.\par Hopkins signs are negative symmetrically. \par DTRs are wnls symmetrically.\par Strength is 5/5 symmetrically.\par Sensation is currently normal to the upper extremiities.\par Gait, including heel , toe and tandem gait is normal.

## 2022-11-11 NOTE — HISTORY OF PRESENT ILLNESS
[Lower back] : lower back [Work related] : work related [Gradual] : gradual [Sudden] : sudden [8] : 8 [7] : 7 [Burning] : burning [Shooting] : shooting [Stabbing] : stabbing [Constant] : constant [Work] : work [Rest] : rest [Sitting] : sitting [Walking] : walking [Exercising] : exercising [Not working due to injury] : Work status: not working due to injury [de-identified] : WC DOI 7/2/22\par \par \par 11/11/2022: Pt here with complaint of 9 days right upper extremity radiculitis. Pt denies recent hx of trauma.\par Pt states over the past 9 days he has noted progressive right arm pain to the region of the right hand. Pain seems to be alleviated with arm elevation.  There is no hx of associated weakness.\par Pt had recent C5-6-7 ACDF performed by Dr. Pierre this past August.\par Pt denies associated gait disturbance or bb dysfunction. \par \par Dr. Pierre note below:\par 10/26/2022: 58 yo M here for low back pain follow up related to workers comp injury. 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 Pain worsened on specific drop off where he had to walk a long distance to unload the cases. \par Had H/o back pain / surgery prior to injury but pain was stable/ minimal in the months leading up to the injury. Has been OOW since DOI.\par At this point pain is worsening with associated N/T in both lower extremities. Notes weakness in anterior/ posterior thighs. Ambulating with cane now due to the low back. Was ambulating without pain prior to DOI.  [] : Post Surgical Visit: no [FreeTextEntry3] : 07/02/2022 [FreeTextEntry5] : Patient complains of lower back pain since 10/26/22 Pt states pain goes into his  [de-identified] : none

## 2022-11-14 ENCOUNTER — FORM ENCOUNTER (OUTPATIENT)
Age: 57
End: 2022-11-14

## 2022-11-15 ENCOUNTER — APPOINTMENT (OUTPATIENT)
Dept: MRI IMAGING | Facility: CLINIC | Age: 57
End: 2022-11-15
Payer: COMMERCIAL

## 2022-11-15 PROCEDURE — 99072 ADDL SUPL MATRL&STAF TM PHE: CPT

## 2022-11-15 PROCEDURE — 72141 MRI NECK SPINE W/O DYE: CPT

## 2022-11-23 ENCOUNTER — APPOINTMENT (OUTPATIENT)
Dept: ORTHOPEDIC SURGERY | Facility: CLINIC | Age: 57
End: 2022-11-23

## 2022-11-23 PROCEDURE — 99072 ADDL SUPL MATRL&STAF TM PHE: CPT

## 2022-11-23 PROCEDURE — 99214 OFFICE O/P EST MOD 30 MIN: CPT

## 2022-11-29 ENCOUNTER — FORM ENCOUNTER (OUTPATIENT)
Age: 57
End: 2022-11-29

## 2022-12-07 ENCOUNTER — APPOINTMENT (OUTPATIENT)
Dept: ORTHOPEDIC SURGERY | Facility: CLINIC | Age: 57
End: 2022-12-07
Payer: COMMERCIAL

## 2022-12-07 PROCEDURE — 72040 X-RAY EXAM NECK SPINE 2-3 VW: CPT

## 2022-12-07 PROCEDURE — 20552 NJX 1/MLT TRIGGER POINT 1/2: CPT

## 2022-12-07 PROCEDURE — 99214 OFFICE O/P EST MOD 30 MIN: CPT | Mod: 25

## 2022-12-07 PROCEDURE — J3490M: CUSTOM

## 2022-12-07 NOTE — PROCEDURE
[Trigger point 1-2 muscle groups] : trigger point 1-2 muscle groups [Right] : of the right [Cervical paraspinal muscle] : cervical paraspinal muscle [Pain] : pain [Alcohol] : alcohol [Betadine] : betadine [Ethyl Chloride sprayed topically] : ethyl chloride sprayed topically [___ cc    1%] : Lidocaine ~Vcc of 1%  [___ cc    0.25%] : Bupivacaine (Marcaine) ~Vcc of 0.25%  [___ cc    10mg] : Triamcinolone (Kenalog) ~Vcc of 10 mg

## 2022-12-14 NOTE — HISTORY OF PRESENT ILLNESS
[Lower back] : lower back [Work related] : work related [Gradual] : gradual [Work] : work [Sitting] : sitting [Walking] : walking [Not working due to injury] : Work status: not working due to injury [Sudden] : sudden [8] : 8 [7] : 7 [Burning] : burning [Shooting] : shooting [Stabbing] : stabbing [Constant] : constant [Rest] : rest [Exercising] : exercising [de-identified] :  DOI 7/2/22\par \par 56 yo M here for low back pain follow up related to workers comp injury. 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 Pain worsened on specific drop off where he had to walk a long distance to unload the cases. \par Had H/o back pain / surgery prior to injury but pain was stable/ minimal in the months leading up to the injury. Has been OOW since DOI.\par At this point pain is worsening with associated N/T in both lower extremities. Notes weakness in anterior/ posterior thighs. Ambulating with cane now due to the low back. Was ambulating without pain prior to DOI.  [] : Post Surgical Visit: no [FreeTextEntry3] : 07/02/2022 [de-identified] : none

## 2022-12-14 NOTE — PHYSICAL EXAM
[Left Radial Forearm] : left radial forearm [Left Ulnar Forearm] : left ulnar forearm [Flexion] : flexion [Extension] : extension [Bending to left] : bending to left [Bending to right] : bending to right [4___] : left hamstring 4[unfilled]/5 [5___] : right dorsiflexors 5[unfilled]/5 [] : negative straight leg raise

## 2022-12-14 NOTE — DISCUSSION/SUMMARY
[de-identified] : 100 % disabled \par \par Toradol injection today - tolerated well\par \par Reviewed case. Based on recent Xray and MRI findings he may be a good candidate for a extension of the fusion due to progressive adjacent segment disease.

## 2022-12-14 NOTE — HISTORY OF PRESENT ILLNESS
[Bending forward] : bending forward [Extending back] : extending back [Work related] : work related [Gradual] : gradual [Sudden] : sudden [8] : 8 [7] : 7 [Burning] : burning [Shooting] : shooting [Stabbing] : stabbing [Constant] : constant [Rest] : rest [Exercising] : exercising [Lower back] : lower back [Neck] : neck [TWNoteComboBox1] : 0% [de-identified] :  DOI 7/2/22\par \par 56 yo M here for low back pain follow up related to workers comp injury. 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 Pain worsened on specific drop off where he had to walk a long distance to unload the cases. \par Had H/o back pain / surgery prior to injury but pain was stable/ minimal in the months leading up to the injury. Has been OOW since DOI.\par At this point pain is worsening with associated N/T in both lower extremities. Notes weakness in anterior/ posterior thighs. Ambulating with cane now due to the low back. Was ambulating without pain prior to DOI.  [] : Post Surgical Visit: no [de-identified] : none

## 2022-12-14 NOTE — HISTORY OF PRESENT ILLNESS
[Bending forward] : bending forward [Extending back] : extending back [Work related] : work related [Gradual] : gradual [Sudden] : sudden [8] : 8 [7] : 7 [Burning] : burning [Shooting] : shooting [Stabbing] : stabbing [Constant] : constant [Rest] : rest [Exercising] : exercising [Lower back] : lower back [Neck] : neck [TWNoteComboBox1] : 0% [de-identified] :  DOI 7/2/22\par \par 58 yo M here for low back pain follow up related to workers comp injury. 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 Pain worsened on specific drop off where he had to walk a long distance to unload the cases. \par Had H/o back pain / surgery prior to injury but pain was stable/ minimal in the months leading up to the injury. Has been OOW since DOI.\par At this point pain is worsening with associated N/T in both lower extremities. Notes weakness in anterior/ posterior thighs. Ambulating with cane now due to the low back. Was ambulating without pain prior to DOI.  [] : Post Surgical Visit: no [de-identified] : none

## 2022-12-14 NOTE — DISCUSSION/SUMMARY
[de-identified] : reviewed the case and the treatment optoins - has severe stenosis at L1-2 as an adjacent segment problem above the prior L3-5 fusion also with localized kyphosis at L2-3 above the prior fusion - this represents severe adjacent segment disease \par \par we discussed the options in detail - ultimately he is indicated for extension of the fusion up to T10 (can stop at L1 due to it being an adjacent segment with a high rate of early failure)\par \par Indicated the patient for laminecotomy and/or fusion up to T10 and with a revision of the prior hardware from L3-5 - so in total screws from T10 to L5 with rods - needs laminectomy at L1-2 which will be a revision extension of the older laminectomy \par \par has tried extensive conservative tx including time/activity modification/medicatoins/lesi/cane - don’t see much hope for continued conservative tx \par \par We've discussed the surgery details including instrumentation and grafting options (local, allograft, ICBG, and biologics) as well as potential for complications including but not limited to pain, scar and infection. There is also a possibility for hardware complication such as malposition of hardware,hardware loosening, pullout, failure or fracture of bone, adjacent segment disease,pseudarthrosis, and need for future surgery. We discussed potential for injury to nerves, spinal cord or blood vessels, paralysis, blindness, need for transfusion, general anesthesia, allergic reaction, prolonged intubation,myocardial infarction, stroke, deep venous thrombosis, pulmonary embolus, and death.  Spinal fluid leak may occur and may require prolonged time in the hospital and also further surgical procedures including drain placement.  The patient understands these things and all questions are answered to his/her satisfaction.\par \par Patient has been instructed to stop all  aspirin and NSAIDs 10 days prior to surgery date. For Coumadin and other blood thinners, the patient is referred to the medical doctor\par \par The patient will need a medical clearance and pre-admission testing prior to surgery\par We will use neuromonitoring in order to keep things as safe as possilble.\par The procedure does not come with a guarantee of success or of satisfaction on the patient's behalf \par At the surgeon's discretion he may call for assistance during the surgery or in the perioperative period \par \par Given the revision nature of this operation and the prior wound difficultlies of persistent seroma would indicated for plastics closure \par given the complexity of the revision operation would co-scrub this with another fellowship trained spinal surgery in order to get through the operatoin as efficiently and safely as possible as it is a larger operation and the evidence shows co-surgery is safer in these situations \par \par No change in disability \par \par medication he has \par \par \par \par \par \par

## 2022-12-14 NOTE — DISCUSSION/SUMMARY
[de-identified] : post op Cervical fusion\par no heavy lifting\par indicated for stim \par careful swalloing \par cont with brace full time for 1 more week then wean out of it\par fu 4 weeks for the neck\par tramadol refilled \par wound care discusse\par \par regarding his work status: I do not expect he is going to be able to return to work after this \par (he is pending further on his back as well - has already had a multiple level back fusion and is pending more)

## 2022-12-14 NOTE — HISTORY OF PRESENT ILLNESS
[Lower back] : lower back [Gradual] : gradual [Sudden] : sudden [7] : 7 [Burning] : burning [Shooting] : shooting [Stabbing] : stabbing [Intermittent] : intermittent [Bending forward] : bending forward [Extending back] : extending back [Exercising] : exercising [de-identified] : 8/22/22: Here for fu - now about 2 weeks post op acdf \par \par neck swelling/swallowing getting better slowly \par \par using collar \par \par has a WC case for his back as well and is pending further surgery for that - it is bothering him quite a bit \par \par xrays today:\par C spine - implants in good position  [] : Post Surgical Visit: no [FreeTextEntry1] : L spine  [FreeTextEntry5] : fu for back pain \par states he saw dr guerin for his back  [de-identified] : xrays mris  [de-identified] : None  [TWNoteComboBox1] : 60%

## 2022-12-14 NOTE — DISCUSSION/SUMMARY
[de-identified] : 100 % disabled \par \par Toradol injection today - tolerated well\par \par Reviewed case. Based on recent Xray and MRI findings he may be a good candidate for a extension of the fusion due to progressive adjacent segment disease.

## 2022-12-20 NOTE — HISTORY OF PRESENT ILLNESS
[Lower back] : lower back [Work related] : work related [Gradual] : gradual [Sudden] : sudden [8] : 8 [7] : 7 [Burning] : burning [Shooting] : shooting [Stabbing] : stabbing [Constant] : constant [Work] : work [Rest] : rest [Sitting] : sitting [Walking] : walking [Exercising] : exercising [Not working due to injury] : Work status: not working due to injury [de-identified] :  DOI 7/2/22\par \par \par 11/11/2022: Pt here with complaint of 9 days right upper extremity radiculitis. Pt denies recent hx of trauma.\par Pt states over the past 9 days he has noted progressive right arm pain to the region of the right hand. Pain seems to be alleviated with arm elevation. There is no hx of associated weakness.\par Pt had recent C5-6-7 ACDF performed by Dr. Pierre this past August.\par Pt denies associated gait disturbance or bb dysfunction. \par \par 11/23/22: here for fu of the neck and the lower back 2/2 the WC case from 7/2 and for review of the cervical MRI - overall doing better in regards to the neck with the steroids having some numbness in the right arm - the pain in the back and legs remains  the worst - using cane - medication necessary to move around \par \par MRi C spine -\par post op acdf C5-7  [] : Post Surgical Visit: no [FreeTextEntry3] : 07/02/2022 [de-identified] : none

## 2022-12-20 NOTE — DISCUSSION/SUMMARY
[de-identified] : reviewed the case and the xrays and case \par having nerve irritation after the cervical surgery \par TPI tolerated well \par fu 2 months

## 2022-12-20 NOTE — PHYSICAL EXAM
[Flexion] : flexion [Extension] : extension [Bending to left] : bending to left [Bending to right] : bending to right [4___] : left hamstring 4[unfilled]/5 [5___] : right dorsiflexors 5[unfilled]/5 [Left lower extremity below knee] : left lower extremity below knee [Left lower extremity above knee] : left lower extremity above knee [Right lower extremity below knee] : right lower extremity below knee [Right lower extremity above knee] : right lower extremity above knee [] : intact skin [de-identified] : paresthesias/numbness into the right arm

## 2022-12-20 NOTE — DISCUSSION/SUMMARY
[de-identified] : reviewed the MRi of the C spine - no indication for revision of the C spine \par \par pending lumbar revision surgery\par not able to rtw in any capacity \par medications refilled \par fu 6 weeks

## 2022-12-20 NOTE — HISTORY OF PRESENT ILLNESS
[Neck] : neck [Gradual] : gradual [7] : 7 [6] : 6 [Localized] : localized [Radiating] : radiating [Tightness] : tightness [Tingling] : tingling [Constant] : constant [Household chores] : household chores [Rest] : rest [Lying in bed] : lying in bed [de-identified] : 12/07/22 here for a follow up on the cervical spine,having tingling sensation on the right hand \par \par tingling down the right arm and into right arm \par \par neck pain\par \par xrays today:\par Cspine -healing acdf  [] : no [FreeTextEntry7] : right arm  [de-identified] : nothing

## 2022-12-21 ENCOUNTER — APPOINTMENT (OUTPATIENT)
Dept: PAIN MANAGEMENT | Facility: CLINIC | Age: 57
End: 2022-12-21

## 2022-12-21 VITALS — WEIGHT: 211 LBS | HEIGHT: 74 IN | BODY MASS INDEX: 27.08 KG/M2

## 2022-12-21 PROCEDURE — 99214 OFFICE O/P EST MOD 30 MIN: CPT

## 2022-12-21 PROCEDURE — 99072 ADDL SUPL MATRL&STAF TM PHE: CPT

## 2022-12-21 NOTE — HISTORY OF PRESENT ILLNESS
[Lower back] : lower back [Work related] : work related [Dull/Aching] : dull/aching [Radiating] : radiating [Sharp] : sharp [Tightness] : tightness [Constant] : constant [Sleep] : sleep [Rest] : rest [Meds] : meds [Injection therapy] : injection therapy [Walking] : walking [Bending forward] : bending forward [Disabled] : Work status: disabled [8] : 8 [7] : 7 [] : no [FreeTextEntry3] : 07/02/22 [FreeTextEntry6] : numbness [FreeTextEntry7] : both sides, buttocks and hamstrings  [FreeTextEntry9] : Stretching  [FreeTextEntry1] : 12/21/22- follow up today.  Pain is in low back and radiates down both legs.  Injections help but wear off quick. 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  Epidurals not giving him the duration of relief we need. having numbness in the lateral thighs. now left worse than right. \par \par 10/18/2022: follow up today for caudal on 10/6/22.  Had 60% relief from injection for 3 days.  pain has returned.  Dr. Pierre recommends extension of fusion L1-L2.  \par \par 09/06/2022: follow up today.  Has been having pain in low back.  Would like TPI.  Saw Dr. Pierre for low back pain that radiates to right hip.  He recommends fusion of L1-L2.  \par \par 07/19/2022: follow up today.  Has been having worsening pain in low back.  Would like TPI today. \par Will be having neck surgery on August 4\par \par 04/25/2022: follow up today after b/l L4/5 TFESI on 3/11/22 he reports an overall 80% improvement. \par \par 3/28/22: follow up today. Has pain in the left hip and starting to get pain in the right hip. Pain in the left lateral leg as\par well.\par \par 10/18/21- F/u after left hip injection 9/27. Had 90% relief from hip injection. Had surgery with Dr. Pierre L3/4 L4/5 L5/S1 (2 years ago)\par \par 9/1/21: follow up today. Patient feels better. Would like repeat hip injection. Will give TPI to neck.\par \par 5/4/21- Patient feeling better after surgery. Still has left hip pain. 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 The last hip injection helped\par 60% so he would benefit from repeat hip injection.\par \par 2/9/21: follow up today after left hip injection on 1/29/21 pt reports near complete relief of his pain following. the\par pinching pain is now gone.\par \par 1/20/21- Patient had surgery in back in September. Doing better. Would like TPI in neck. also Dr. Pierre recommended right hip injection due to pain and arthritis in hip.\par \par 9/8/20 - follow up today after LESI 8/17/20. Patient had no relief and is now going for surgery on 9/10.\par \par 3/9/20 - Patient presents for FUV after L5-S1 LESI on 2/24/20. Reports 80% improvement.\par \par 2/3/20 - Patient complains of lower back pain that radiates down right and left leg. Patient had a LESI L5-S1 \par \par 11/11/19 with relief. Patient has been indicated for surgery by Dr. Pierre. Cannot proceed at this time given financial considerations. Still undergoing Lupron therapy.

## 2022-12-21 NOTE — ASSESSMENT
[FreeTextEntry1] : After discussing various treatment options with the patient including but not limited to oral medications, physical therapy, exercise, modalities as well as interventional spinal injections, we have decided with the following plan:\par \par 1. I would recommend a continuation of neuropathic medication as patient presents with signs of nerve irritation. (ie burning, paresthesias etc) Goals of therapy would be to improve pain and overall QOL. Side effects reviewed with patient. Patient will call or stop medication if given side effects occur. \par \par 2. consider SCS trial.

## 2023-01-04 ENCOUNTER — APPOINTMENT (OUTPATIENT)
Dept: ORTHOPEDIC SURGERY | Facility: CLINIC | Age: 58
End: 2023-01-04
Payer: OTHER MISCELLANEOUS

## 2023-01-04 PROCEDURE — 99214 OFFICE O/P EST MOD 30 MIN: CPT | Mod: 25

## 2023-01-04 PROCEDURE — 96372 THER/PROPH/DIAG INJ SC/IM: CPT

## 2023-01-04 PROCEDURE — 99072 ADDL SUPL MATRL&STAF TM PHE: CPT

## 2023-01-04 PROCEDURE — 72170 X-RAY EXAM OF PELVIS: CPT

## 2023-01-04 PROCEDURE — 72110 X-RAY EXAM L-2 SPINE 4/>VWS: CPT

## 2023-01-04 NOTE — PHYSICAL EXAM
[Flexion] : flexion [Extension] : extension [Bending to left] : bending to left [Bending to right] : bending to right [4___] : left hamstring 4[unfilled]/5 [5___] : right dorsiflexors 5[unfilled]/5 [Left lower extremity below knee] : left lower extremity below knee [Left lower extremity above knee] : left lower extremity above knee [Right lower extremity below knee] : right lower extremity below knee [Right lower extremity above knee] : right lower extremity above knee [] : ambulation with cane

## 2023-01-04 NOTE — HISTORY OF PRESENT ILLNESS
[Lower back] : lower back [Work related] : work related [Gradual] : gradual [Localized] : localized [Radiating] : radiating [Sharp] : sharp [Tingling] : tingling [Constant] : constant [Household chores] : household chores [Work] : work [Rest] : rest [Meds] : meds [Standing] : standing [Walking] : walking [Stairs] : stairs [Lying in bed] : lying in bed [Not working due to injury] : Work status: not working due to injury [Tightness] : tightness [Neck] : neck [Sudden] : sudden [8] : 8 [7] : 7 [Burning] : burning [Shooting] : shooting [Stabbing] : stabbing [Sitting] : sitting [Exercising] : exercising [de-identified] :  DOI 7/2/22\par \par \par 11/11/2022: Pt here with complaint of 9 days right upper extremity radiculitis. Pt denies recent hx of trauma.\par Pt states over the past 9 days he has noted progressive right arm pain to the region of the right hand. Pain seems to be alleviated with arm elevation. There is no hx of associated weakness.\par Pt had recent C5-6-7 ACDF performed by Dr. Pierre this past August.\par Pt denies associated gait disturbance or bb dysfunction. \par \par 11/23/22: here for fu of the neck and the lower back 2/2 the WC case from 7/2 and for review of the cervical MRI - overall doing better in regards to the neck with the steroids having some numbness in the right arm - the pain in the back and legs remains  the worst - using cane - medication necessary to move around \par \par MRi C spine -\par post op acdf C5-7 \par \par 1/4/23: Here for fu on the low back - continued severe pain to the low back; radiating into the buttocks with tingling sensation into both legs; There is difficulty sleeping. He has been taking the hydrocodone for the pain which controls some of his symptoms. He also takes gabapentin - he saw pain management and is considering a SCS. \par \par xrays today:\par L spine - progressive kyphosis at L1-2 and L2-3 with lumbar kyphosis - old surgery at L3-5 is healed and fused - hardware in good position \par AP PELVIS - B hip severe hip OA  [] : Post Surgical Visit: no [FreeTextEntry3] : 07/02/2022 [FreeTextEntry7] : right arm  [de-identified] : none

## 2023-01-04 NOTE — DISCUSSION/SUMMARY
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 above a prior surgery - this delay in treatment leading to progressive kyphosis developing \par This has been very symptomatic for him since the 7/2/22 DOI \par he needs more surgery to correct this \par I would not rec an SCS at this point given he has clear anatomic findings that should get better with surgery\par \par remains unable to go to work in this condition\par \par don’t see much hope for continued conservative care in terms of fixing this- this delay on behalf of workers comp in providing authorization leads unfortunately to continued loss of function - risk of progressive kyphosis and continued deterioration is discussed \par \par medication sent \par toradol injection today tolerated well \par \par needs surgery sooner rather than lateral \par cont with cane

## 2023-01-08 ENCOUNTER — FORM ENCOUNTER (OUTPATIENT)
Age: 58
End: 2023-01-08

## 2023-01-17 LAB — SARS-COV-2 N GENE NPH QL NAA+PROBE: NOT DETECTED

## 2023-01-18 ENCOUNTER — APPOINTMENT (OUTPATIENT)
Dept: PAIN MANAGEMENT | Facility: CLINIC | Age: 58
End: 2023-01-18

## 2023-01-18 NOTE — H&P PST ADULT - FUNCTIONAL SCREEN CURRENT LEVEL: BATHING, MLM
Spoke with patient and he was in the dentist chair and said he will call back to schedule a [facility] [place] a week prior to his Medicare Wellness Exam.   0 = independent

## 2023-01-19 ENCOUNTER — APPOINTMENT (OUTPATIENT)
Age: 58
End: 2023-01-19
Payer: OTHER MISCELLANEOUS

## 2023-01-19 PROCEDURE — 62323 NJX INTERLAMINAR LMBR/SAC: CPT

## 2023-01-31 ENCOUNTER — EMERGENCY (EMERGENCY)
Facility: HOSPITAL | Age: 58
LOS: 1 days | Discharge: HOME | End: 2023-01-31
Attending: EMERGENCY MEDICINE
Payer: COMMERCIAL

## 2023-01-31 VITALS
DIASTOLIC BLOOD PRESSURE: 84 MMHG | TEMPERATURE: 98 F | OXYGEN SATURATION: 97 % | WEIGHT: 214.95 LBS | HEART RATE: 101 BPM | RESPIRATION RATE: 20 BRPM | SYSTOLIC BLOOD PRESSURE: 126 MMHG

## 2023-01-31 LAB
ALBUMIN SERPL ELPH-MCNC: 4.2 G/DL — SIGNIFICANT CHANGE UP (ref 3.3–5.2)
ALP SERPL-CCNC: 61 U/L — SIGNIFICANT CHANGE UP (ref 40–120)
ALT FLD-CCNC: 21 U/L — SIGNIFICANT CHANGE UP
ANION GAP SERPL CALC-SCNC: 13 MMOL/L — SIGNIFICANT CHANGE UP (ref 5–17)
AST SERPL-CCNC: 23 U/L — SIGNIFICANT CHANGE UP
BASOPHILS # BLD AUTO: 0.01 K/UL — SIGNIFICANT CHANGE UP (ref 0–0.2)
BASOPHILS NFR BLD AUTO: 0.1 % — SIGNIFICANT CHANGE UP (ref 0–2)
BILIRUB SERPL-MCNC: 0.8 MG/DL — SIGNIFICANT CHANGE UP (ref 0.4–2)
BUN SERPL-MCNC: 27.9 MG/DL — HIGH (ref 8–20)
CALCIUM SERPL-MCNC: 9.3 MG/DL — SIGNIFICANT CHANGE UP (ref 8.4–10.5)
CHLORIDE SERPL-SCNC: 102 MMOL/L — SIGNIFICANT CHANGE UP (ref 96–108)
CO2 SERPL-SCNC: 25 MMOL/L — SIGNIFICANT CHANGE UP (ref 22–29)
CREAT SERPL-MCNC: 1.55 MG/DL — HIGH (ref 0.5–1.3)
EGFR: 52 ML/MIN/1.73M2 — LOW
EOSINOPHIL # BLD AUTO: 0.03 K/UL — SIGNIFICANT CHANGE UP (ref 0–0.5)
EOSINOPHIL NFR BLD AUTO: 0.3 % — SIGNIFICANT CHANGE UP (ref 0–6)
GLUCOSE SERPL-MCNC: 120 MG/DL — HIGH (ref 70–99)
HCT VFR BLD CALC: 41.3 % — SIGNIFICANT CHANGE UP (ref 39–50)
HGB BLD-MCNC: 14.1 G/DL — SIGNIFICANT CHANGE UP (ref 13–17)
IMM GRANULOCYTES NFR BLD AUTO: 0.4 % — SIGNIFICANT CHANGE UP (ref 0–0.9)
LIDOCAIN IGE QN: 32 U/L — SIGNIFICANT CHANGE UP (ref 22–51)
LYMPHOCYTES # BLD AUTO: 0.99 K/UL — LOW (ref 1–3.3)
LYMPHOCYTES # BLD AUTO: 9.4 % — LOW (ref 13–44)
MCHC RBC-ENTMCNC: 30.7 PG — SIGNIFICANT CHANGE UP (ref 27–34)
MCHC RBC-ENTMCNC: 34.1 GM/DL — SIGNIFICANT CHANGE UP (ref 32–36)
MCV RBC AUTO: 89.8 FL — SIGNIFICANT CHANGE UP (ref 80–100)
MONOCYTES # BLD AUTO: 0.62 K/UL — SIGNIFICANT CHANGE UP (ref 0–0.9)
MONOCYTES NFR BLD AUTO: 5.9 % — SIGNIFICANT CHANGE UP (ref 2–14)
NEUTROPHILS # BLD AUTO: 8.89 K/UL — HIGH (ref 1.8–7.4)
NEUTROPHILS NFR BLD AUTO: 83.9 % — HIGH (ref 43–77)
PLATELET # BLD AUTO: 265 K/UL — SIGNIFICANT CHANGE UP (ref 150–400)
POTASSIUM SERPL-MCNC: 4.3 MMOL/L — SIGNIFICANT CHANGE UP (ref 3.5–5.3)
POTASSIUM SERPL-SCNC: 4.3 MMOL/L — SIGNIFICANT CHANGE UP (ref 3.5–5.3)
PROT SERPL-MCNC: 7.1 G/DL — SIGNIFICANT CHANGE UP (ref 6.6–8.7)
RBC # BLD: 4.6 M/UL — SIGNIFICANT CHANGE UP (ref 4.2–5.8)
RBC # FLD: 12.4 % — SIGNIFICANT CHANGE UP (ref 10.3–14.5)
SODIUM SERPL-SCNC: 140 MMOL/L — SIGNIFICANT CHANGE UP (ref 135–145)
WBC # BLD: 10.58 K/UL — HIGH (ref 3.8–10.5)
WBC # FLD AUTO: 10.58 K/UL — HIGH (ref 3.8–10.5)

## 2023-01-31 PROCEDURE — 99284 EMERGENCY DEPT VISIT MOD MDM: CPT

## 2023-01-31 RX ORDER — METOCLOPRAMIDE HCL 10 MG
10 TABLET ORAL ONCE
Refills: 0 | Status: COMPLETED | OUTPATIENT
Start: 2023-01-31 | End: 2023-01-31

## 2023-01-31 RX ORDER — ACETAMINOPHEN 500 MG
1000 TABLET ORAL ONCE
Refills: 0 | Status: COMPLETED | OUTPATIENT
Start: 2023-01-31 | End: 2023-01-31

## 2023-01-31 RX ORDER — FAMOTIDINE 10 MG/ML
20 INJECTION INTRAVENOUS ONCE
Refills: 0 | Status: COMPLETED | OUTPATIENT
Start: 2023-01-31 | End: 2023-01-31

## 2023-01-31 RX ORDER — SODIUM CHLORIDE 9 MG/ML
1000 INJECTION INTRAMUSCULAR; INTRAVENOUS; SUBCUTANEOUS ONCE
Refills: 0 | Status: COMPLETED | OUTPATIENT
Start: 2023-01-31 | End: 2023-01-31

## 2023-01-31 RX ADMIN — Medication 10 MILLIGRAM(S): at 22:13

## 2023-01-31 RX ADMIN — SODIUM CHLORIDE 1000 MILLILITER(S): 9 INJECTION INTRAMUSCULAR; INTRAVENOUS; SUBCUTANEOUS at 22:13

## 2023-01-31 RX ADMIN — Medication 400 MILLIGRAM(S): at 22:13

## 2023-01-31 RX ADMIN — Medication 1000 MILLIGRAM(S): at 23:13

## 2023-01-31 RX ADMIN — FAMOTIDINE 20 MILLIGRAM(S): 10 INJECTION INTRAVENOUS at 22:13

## 2023-01-31 NOTE — ED ADULT NURSE NOTE - CHIEF COMPLAINT QUOTE
pt recently discharged on Sunday from [facility] for epigastric pain and n/v . pt states he has been unable to have a BM or pass gas since sunday when discharged from the hospital. pt walking in with generalized weakness with bag full of vomit.

## 2023-01-31 NOTE — ED STATDOCS - NSFOLLOWUPINSTRUCTIONS_ED_ALL_ED_FT
Please take medications as prescribed  Please follow up with primary care doctor and gastroenterology in 2-3 days  Return to ER for any new or worsening symptoms    Nausea / Vomiting    Nausea is the feeling that you have to vomit. As nausea gets worse, it can lead to vomiting. Vomiting puts you at an increased risk for dehydration. Older adults and people with other diseases or a weak immune system are at higher risk for dehydration. Drink clear fluids in small but frequent amounts as tolerated. Eat bland, easy-to-digest foods in small amounts as tolerated.    SEEK IMMEDIATE MEDICAL CARE IF YOU HAVE ANY OF THE FOLLOWING SYMPTOMS: fever, inability to keep sufficient fluids down, black or bloody vomitus, black or bloody stools, lightheadedness/dizziness, chest pain, severe headache, rash, shortness of breath, cold or clammy skin, confusion, pain with urination, or any signs of dehydration.

## 2023-01-31 NOTE — ED ADULT TRIAGE NOTE - CHIEF COMPLAINT QUOTE
pt recently discharged on Sunday from [facility] for epigastric pain and n/v . pt states he has been unable to have a BM or pass gas since sunday when discharged from the hospital. pt walking in with generalized weakness with bag full of vomit.

## 2023-01-31 NOTE — ED STATDOCS - CLINICAL SUMMARY MEDICAL DECISION MAKING FREE TEXT BOX
58 y/o male on chronic opioids with N/V, generalized abdominal discomfort with negative CT at OSH 2 days ago with similar symptoms, non-toxic, non-tender not significantly distended, will obtain labs, treat symptoms, will withhold from imaging at this time given recent imaging and similar symptoms, reassess 56 y/o male on chronic opioids with N/V, generalized abdominal discomfort with negative CT at OSH 2 days ago with similar symptoms, non-toxic, non-tender not significantly distended, will obtain labs, treat symptoms, will withhold from imaging at this time given recent imaging and similar symptoms, reassess    [place] ERNST : Labs unremarkable, elev cr chronic pr chart review. Pt has copy of all labs/CT from [facility] with him, CT abdomen no acute findings. Abdomen soft, non-tender, non-distended. Passing flatus but last BM a few days ago states is not unusual for him. Pt is feeling much better after medications and  is tolerating PO and wants to go home. Will dc , advised to f/u with GI

## 2023-01-31 NOTE — ED STATDOCS - PATIENT PORTAL LINK FT
You can access the FollowMyHealth Patient Portal offered by [facility] by registering at the following website: [email or web address]. By joining [employer]’s FollowMyHealth portal, you will also be able to view your health information using other applications (apps) compatible with our system.

## 2023-01-31 NOTE — ED STATDOCS - PHYSICAL EXAMINATION
Gen: non toxic, mild discomfort   HEENT: Mucous membranes moist, pink conjunctivae, EOMI  CV: RRR, nl s1/s2.  Resp: CTAB, normal rate and effort  GI: Abdomen soft, NT, ND. No rebound, no guarding,  : No CVAT  Neuro: A&O x 3, moving all 4 extremities  MSK: No spine or joint tenderness to palpation  Skin: No rashes. intact and perfused.

## 2023-01-31 NOTE — ED STATDOCS - NSICDXPASTSURGICALHX_GEN_ALL_CORE_FT
PAST SURGICAL HISTORY:  History of lumbar laminectomy for spinal cord decompression     Lipoma neck and left arm    S/P prostatectomy with radiation

## 2023-01-31 NOTE — ED STATDOCS - ATTENDING APP SHARED VISIT CONTRIBUTION OF CARE
Alice: I performed a face to face bedside interview with patient regarding history of present illness, review of symptoms and past medical history. I completed an independent physical exam and ordered tests/medications as needed.  I have discussed patient's plan of care with advanced care provider. The advanced care provider assisted in  executing the discussed plan. I was available for any questions or issues that may have arose during the execution of the plan of care.

## 2023-01-31 NOTE — ED STATDOCS - OBJECTIVE STATEMENT
58 y/o male with PMHx of prostates CA in remission, GERD, HTN, chornic back pain with multiple surgeries on chronic opioids c/o 6 days of vomiting, generalized abdominal discomfort,  was seen at [facility] on 1/29 for same with negative CT, pt has results and unremarkable labs, notes  this has happened multiple times in the past, denies fevers, urinary symptoms, THC or other drugs, no abdominal surgeries, no other complaints.

## 2023-01-31 NOTE — ED ADULT NURSE NOTE - OBJECTIVE STATEMENT
AxOx4. Patient c/o epigastric pain accompanied with N/V. Patient states he was recently seen at [facility] for the same thing. Patient states he is unable to have a BM or pass gas since  last Sunday since discharged from the hospital. Abd soft, non tender, non distended.  IV placed, labs sent, medicated as per orders.

## 2023-02-01 ENCOUNTER — APPOINTMENT (OUTPATIENT)
Dept: ORTHOPEDIC SURGERY | Facility: CLINIC | Age: 58
End: 2023-02-01
Payer: OTHER MISCELLANEOUS

## 2023-02-01 PROCEDURE — 96375 TX/PRO/DX INJ NEW DRUG ADDON: CPT

## 2023-02-01 PROCEDURE — 85025 COMPLETE CBC W/AUTO DIFF WBC: CPT

## 2023-02-01 PROCEDURE — 99214 OFFICE O/P EST MOD 30 MIN: CPT

## 2023-02-01 PROCEDURE — 83690 ASSAY OF LIPASE: CPT

## 2023-02-01 PROCEDURE — 96374 THER/PROPH/DIAG INJ IV PUSH: CPT

## 2023-02-01 PROCEDURE — 80053 COMPREHEN METABOLIC PANEL: CPT

## 2023-02-01 PROCEDURE — 96361 HYDRATE IV INFUSION ADD-ON: CPT

## 2023-02-01 PROCEDURE — 99072 ADDL SUPL MATRL&STAF TM PHE: CPT

## 2023-02-01 PROCEDURE — 99284 EMERGENCY DEPT VISIT MOD MDM: CPT | Mod: 25

## 2023-02-01 PROCEDURE — 36415 COLL VENOUS BLD VENIPUNCTURE: CPT

## 2023-02-01 RX ORDER — POLYETHYLENE GLYCOL 3350 17 G/17G
17 POWDER, FOR SOLUTION ORAL ONCE
Refills: 0 | Status: COMPLETED | OUTPATIENT
Start: 2023-02-01 | End: 2023-02-01

## 2023-02-01 RX ORDER — METOCLOPRAMIDE HCL 10 MG
1 TABLET ORAL
Qty: 6 | Refills: 0
Start: 2023-02-01

## 2023-02-01 RX ORDER — ONDANSETRON 8 MG/1
4 TABLET, FILM COATED ORAL ONCE
Refills: 0 | Status: COMPLETED | OUTPATIENT
Start: 2023-02-01 | End: 2023-02-01

## 2023-02-01 RX ORDER — ACETAMINOPHEN AND CODEINE 300; 30 MG/1; MG/1
300-30 TABLET ORAL
Qty: 60 | Refills: 0 | Status: ACTIVE | COMMUNITY
Start: 2023-02-01 | End: 1900-01-01

## 2023-02-01 RX ORDER — POLYETHYLENE GLYCOL 3350 17 G/17G
17 POWDER, FOR SOLUTION ORAL
Qty: 119 | Refills: 0
Start: 2023-02-01 | End: 2023-02-07

## 2023-02-01 RX ADMIN — ONDANSETRON 4 MILLIGRAM(S): 8 TABLET, FILM COATED ORAL at 01:28

## 2023-02-01 RX ADMIN — POLYETHYLENE GLYCOL 3350 17 GRAM(S): 17 POWDER, FOR SOLUTION ORAL at 01:28

## 2023-02-01 NOTE — HISTORY OF PRESENT ILLNESS
[Neck] : neck [Lower back] : lower back [Work related] : work related [Gradual] : gradual [Sudden] : sudden [8] : 8 [7] : 7 [Burning] : burning [Localized] : localized [Radiating] : radiating [Sharp] : sharp [Shooting] : shooting [Stabbing] : stabbing [Tightness] : tightness [Tingling] : tingling [Constant] : constant [Household chores] : household chores [Work] : work [Rest] : rest [Meds] : meds [Sitting] : sitting [Standing] : standing [Walking] : walking [Exercising] : exercising [Stairs] : stairs [Lying in bed] : lying in bed [Not working due to injury] : Work status: not working due to injury [de-identified] :  DOI 7/2/22\par \par \par 11/11/2022: Pt here with complaint of 9 days right upper extremity radiculitis. Pt denies recent hx of trauma.\par Pt states over the past 9 days he has noted progressive right arm pain to the region of the right hand. Pain seems to be alleviated with arm elevation. There is no hx of associated weakness.\par Pt had recent C5-6-7 ACDF performed by Dr. Pierre this past August.\par Pt denies associated gait disturbance or bb dysfunction. \par \par 11/23/22: here for fu of the neck and the lower back 2/2 the WC case from 7/2 and for review of the cervical MRI - overall doing better in regards to the neck with the steroids having some numbness in the right arm - the pain in the back and legs remains  the worst - using cane - medication necessary to move around \par \par MRi C spine -\par post op acdf C5-7 \par \par 1/4/23: Here for fu on the low back - continued severe pain to the low back; radiating into the buttocks with tingling sensation into both legs; There is difficulty sleeping. He has been taking the hydrocodone for the pain which controls some of his symptoms. He also takes gabapentin - he saw pain management and is considering a SCS. \par \par xrays today:\par L spine - progressive kyphosis at L1-2 and L2-3 with lumbar kyphosis - old surgery at L3-5 is healed and fused - hardware in good position \par AP PELVIS - B hip severe hip OA \par \par 2/1/23: here for fu - plan at last was requesting surgery for the lumbar - he was in hosptial twice recently for abdominal pain - his surgery not approved at this point \par \par had temp relief with [facility]  [] : Post Surgical Visit: no [FreeTextEntry3] : 07/02/2022 [FreeTextEntry5] : Pt was recently hospitalized for his stomach pain due to his medication, pt would like to discuss surgery auth today   [FreeTextEntry7] : right arm  [de-identified] : none

## 2023-02-01 NOTE — PHYSICAL EXAM
[Flexion] : flexion [Extension] : extension [Bending to left] : bending to left [Bending to right] : bending to right [4___] : left hamstring 4[unfilled]/5 [5___] : right dorsiflexors 5[unfilled]/5 [Left lower extremity below knee] : left lower extremity below knee [Left lower extremity above knee] : left lower extremity above knee [Right lower extremity below knee] : right lower extremity below knee [Right lower extremity above knee] : right lower extremity above knee [] : ambulation with cane [FreeTextEntry3] : cant stand up straight at this point

## 2023-02-01 NOTE — DISCUSSION/SUMMARY
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 above a prior surgery - this delay in treatment leading to progressive kyphosis developing \par This has been very symptomatic for him since the 7/2/22 DOI \par he needs surgery to correct this \par I would not rec an SCS at this point given he has clear anatomic findings that should get better with surgery\par \par remains unable to go to work in this condition\par \par don’t see much hope for continued conservative care in terms of fixing this- this delay on behalf of workers comp in providing authorization leads unfortunately to continued loss of function - risk of progressive kyphosis and continued deterioration is discussed \par \par medication sent - will try some something else because the medicatoin was bothering his stomach \par \par continues to need surgery \par cont with cane

## 2023-02-03 ENCOUNTER — EMERGENCY (EMERGENCY)
Facility: HOSPITAL | Age: 58
LOS: 1 days | Discharge: HOME | End: 2023-02-03
Attending: STUDENT IN AN ORGANIZED HEALTH CARE EDUCATION/TRAINING PROGRAM
Payer: COMMERCIAL

## 2023-02-03 VITALS
TEMPERATURE: 100 F | SYSTOLIC BLOOD PRESSURE: 166 MMHG | WEIGHT: 179.9 LBS | RESPIRATION RATE: 18 BRPM | OXYGEN SATURATION: 98 % | HEART RATE: 92 BPM | DIASTOLIC BLOOD PRESSURE: 91 MMHG

## 2023-02-03 VITALS
RESPIRATION RATE: 18 BRPM | OXYGEN SATURATION: 98 % | HEART RATE: 97 BPM | DIASTOLIC BLOOD PRESSURE: 94 MMHG | TEMPERATURE: 98 F | SYSTOLIC BLOOD PRESSURE: 156 MMHG

## 2023-02-03 DIAGNOSIS — Z98.890 OTHER SPECIFIED POSTPROCEDURAL STATES: Chronic | ICD-10-CM

## 2023-02-03 DIAGNOSIS — Z90.79 ACQUIRED ABSENCE OF OTHER GENITAL ORGAN(S): Chronic | ICD-10-CM

## 2023-02-03 DIAGNOSIS — D17.9 BENIGN LIPOMATOUS NEOPLASM, UNSPECIFIED: Chronic | ICD-10-CM

## 2023-02-03 LAB
ALBUMIN SERPL ELPH-MCNC: 4.6 G/DL — SIGNIFICANT CHANGE UP (ref 3.3–5.2)
ALP SERPL-CCNC: 59 U/L — SIGNIFICANT CHANGE UP (ref 40–120)
ALT FLD-CCNC: 21 U/L — SIGNIFICANT CHANGE UP
ANION GAP SERPL CALC-SCNC: 14 MMOL/L — SIGNIFICANT CHANGE UP (ref 5–17)
APTT BLD: 26.7 SEC — LOW (ref 27.5–35.5)
AST SERPL-CCNC: 18 U/L — SIGNIFICANT CHANGE UP
BASOPHILS # BLD AUTO: 0.01 K/UL — SIGNIFICANT CHANGE UP (ref 0–0.2)
BASOPHILS NFR BLD AUTO: 0.1 % — SIGNIFICANT CHANGE UP (ref 0–2)
BILIRUB SERPL-MCNC: 1.1 MG/DL — SIGNIFICANT CHANGE UP (ref 0.4–2)
BLD GP AB SCN SERPL QL: SIGNIFICANT CHANGE UP
BUN SERPL-MCNC: 23.1 MG/DL — HIGH (ref 8–20)
CALCIUM SERPL-MCNC: 9.4 MG/DL — SIGNIFICANT CHANGE UP (ref 8.4–10.5)
CHLORIDE SERPL-SCNC: 99 MMOL/L — SIGNIFICANT CHANGE UP (ref 96–108)
CO2 SERPL-SCNC: 23 MMOL/L — SIGNIFICANT CHANGE UP (ref 22–29)
CREAT SERPL-MCNC: 1.42 MG/DL — HIGH (ref 0.5–1.3)
EGFR: 58 ML/MIN/1.73M2 — LOW
EOSINOPHIL # BLD AUTO: 0.08 K/UL — SIGNIFICANT CHANGE UP (ref 0–0.5)
EOSINOPHIL NFR BLD AUTO: 0.9 % — SIGNIFICANT CHANGE UP (ref 0–6)
GLUCOSE SERPL-MCNC: 117 MG/DL — HIGH (ref 70–99)
HCT VFR BLD CALC: 40 % — SIGNIFICANT CHANGE UP (ref 39–50)
HGB BLD-MCNC: 14.1 G/DL — SIGNIFICANT CHANGE UP (ref 13–17)
IMM GRANULOCYTES NFR BLD AUTO: 0.3 % — SIGNIFICANT CHANGE UP (ref 0–0.9)
INR BLD: 1.2 RATIO — HIGH (ref 0.88–1.16)
LACTATE BLDV-MCNC: 1.1 MMOL/L — SIGNIFICANT CHANGE UP (ref 0.5–2)
LIDOCAIN IGE QN: 32 U/L — SIGNIFICANT CHANGE UP (ref 22–51)
LYMPHOCYTES # BLD AUTO: 1.11 K/UL — SIGNIFICANT CHANGE UP (ref 1–3.3)
LYMPHOCYTES # BLD AUTO: 12.8 % — LOW (ref 13–44)
MCHC RBC-ENTMCNC: 30.3 PG — SIGNIFICANT CHANGE UP (ref 27–34)
MCHC RBC-ENTMCNC: 35.3 GM/DL — SIGNIFICANT CHANGE UP (ref 32–36)
MCV RBC AUTO: 86 FL — SIGNIFICANT CHANGE UP (ref 80–100)
MONOCYTES # BLD AUTO: 0.62 K/UL — SIGNIFICANT CHANGE UP (ref 0–0.9)
MONOCYTES NFR BLD AUTO: 7.2 % — SIGNIFICANT CHANGE UP (ref 2–14)
NEUTROPHILS # BLD AUTO: 6.8 K/UL — SIGNIFICANT CHANGE UP (ref 1.8–7.4)
NEUTROPHILS NFR BLD AUTO: 78.7 % — HIGH (ref 43–77)
PLATELET # BLD AUTO: 279 K/UL — SIGNIFICANT CHANGE UP (ref 150–400)
POTASSIUM SERPL-MCNC: 3.4 MMOL/L — LOW (ref 3.5–5.3)
POTASSIUM SERPL-SCNC: 3.4 MMOL/L — LOW (ref 3.5–5.3)
PROT SERPL-MCNC: 7.3 G/DL — SIGNIFICANT CHANGE UP (ref 6.6–8.7)
PROTHROM AB SERPL-ACNC: 13.9 SEC — HIGH (ref 10.5–13.4)
RBC # BLD: 4.65 M/UL — SIGNIFICANT CHANGE UP (ref 4.2–5.8)
RBC # FLD: 12.1 % — SIGNIFICANT CHANGE UP (ref 10.3–14.5)
SARS-COV-2 RNA SPEC QL NAA+PROBE: SIGNIFICANT CHANGE UP
SODIUM SERPL-SCNC: 136 MMOL/L — SIGNIFICANT CHANGE UP (ref 135–145)
TROPONIN T SERPL-MCNC: 0.03 NG/ML — SIGNIFICANT CHANGE UP (ref 0–0.06)
WBC # BLD: 8.65 K/UL — SIGNIFICANT CHANGE UP (ref 3.8–10.5)
WBC # FLD AUTO: 8.65 K/UL — SIGNIFICANT CHANGE UP (ref 3.8–10.5)

## 2023-02-03 PROCEDURE — 93010 ELECTROCARDIOGRAM REPORT: CPT

## 2023-02-03 PROCEDURE — 99284 EMERGENCY DEPT VISIT MOD MDM: CPT

## 2023-02-03 RX ORDER — ONDANSETRON 8 MG/1
4 TABLET, FILM COATED ORAL ONCE
Refills: 0 | Status: COMPLETED | OUTPATIENT
Start: 2023-02-03 | End: 2023-02-03

## 2023-02-03 RX ORDER — FAMOTIDINE 10 MG/ML
20 INJECTION INTRAVENOUS ONCE
Refills: 0 | Status: COMPLETED | OUTPATIENT
Start: 2023-02-03 | End: 2023-02-03

## 2023-02-03 RX ORDER — SODIUM CHLORIDE 9 MG/ML
1000 INJECTION INTRAMUSCULAR; INTRAVENOUS; SUBCUTANEOUS ONCE
Refills: 0 | Status: COMPLETED | OUTPATIENT
Start: 2023-02-03 | End: 2023-02-03

## 2023-02-03 RX ORDER — DIATRIZOATE MEGLUMINE 180 MG/ML
30 INJECTION, SOLUTION INTRAVESICAL ONCE
Refills: 0 | Status: COMPLETED | OUTPATIENT
Start: 2023-02-03 | End: 2023-02-03

## 2023-02-03 RX ADMIN — Medication 30 MILLILITER(S): at 21:26

## 2023-02-03 RX ADMIN — DIATRIZOATE MEGLUMINE 30 MILLILITER(S): 180 INJECTION, SOLUTION INTRAVESICAL at 21:25

## 2023-02-03 RX ADMIN — FAMOTIDINE 20 MILLIGRAM(S): 10 INJECTION INTRAVENOUS at 21:26

## 2023-02-03 RX ADMIN — ONDANSETRON 4 MILLIGRAM(S): 8 TABLET, FILM COATED ORAL at 21:25

## 2023-02-03 RX ADMIN — SODIUM CHLORIDE 1000 MILLILITER(S): 9 INJECTION INTRAMUSCULAR; INTRAVENOUS; SUBCUTANEOUS at 21:26

## 2023-02-03 NOTE — ED PROVIDER NOTE - NS ED MD DISPO DISCHARGE CCDA
PRE-OP DIAGNOSIS:  Appendicitis, acute 14-Aug-2021 19:33:54  Charlene Cortés  
Patient/Caregiver provided printed discharge information.

## 2023-02-03 NOTE — ED ADULT TRIAGE NOTE - CHIEF COMPLAINT QUOTE
Pt presents to ED c/o abdominal, N/V and conscription. States that he has been seen for this recently, and that the pain has not gotten worse. Reports last BM 2 days ago, active emesis noted in triage. C/o epigastric pain, PMHx GERD. Endorses that he has been feeling weak and lightheaded.

## 2023-02-03 NOTE — ED PROVIDER NOTE - OBJECTIVE STATEMENT
56 yo M HTN, GERD, prostate CA s/p RT p/w 2 days of epigastric abdominal pain, constant. No exacerbating or alleviating factors. +nausea and emesis (all day). Endorsing constipation x 3 days, not passing gas. No prior abdominal surgeries. No fevers. endorsing dark urine. no testicular pain or swelling. Has seen GI doctor at [facility] in the past, last endoscopy 1 year ago, reportedly normal. Endorsing lightheadedness. No tobacco, alcohol or illicit drug use    Takes Tylenol with codeine for back pain

## 2023-02-03 NOTE — ED PROVIDER NOTE - NS ED ROS FT
REVIEW OF SYSTEMS:  General:  no fever, no chills  HEENT: no headache, no vision changes  Cardiac: no chest pain, no palpitations  Respiratory: no cough, no shortness of breath  Gastrointestinal: + abdominal pain, + nausea, + vomiting, no diarrhea, no melena, no hematochezia, +constipation  Genitourinary: no hematuria, no dysuria, no urinary frequency, no urinary hesitancy, +dark urine  Neuro: no focal weakness, no numbness/tingling of the extremities, no decreased sensation  -Faviola Garcia MD Attending Physician

## 2023-02-03 NOTE — ED ADULT NURSE NOTE - OBJECTIVE STATEMENT
pt presents to ED due to epigastric pain with N/V and constipation x 3 days. pt with similar complaint here 3 days ago and similar complaints when presenting to [facility] 1/29. PIV placed, labs obtained. pt pending CT. Sleeping in stretcher at this time.

## 2023-02-03 NOTE — ED PROVIDER NOTE - NSFOLLOWUPINSTRUCTIONS_ED_ALL_ED_FT
Colace was sent to your pharmacy     Please take all medications as prescribed     Return to the ER for worsening abdominal pain, nausea, vomiting, fevers, if you are persistently without bowel movements or flatus, urinary symptoms or if you get worse in any way    Please followup with GI doctor, we will help you arrange. Someone will call you. If you do not hear from someone, please call the number provided to you below to make an appointment. You may need an endoscopy/colonoscopy     Please discuss all incidental findings that were discussed with you with your primary doctor who you should follow up with in 24-48 hours, or sooner if symptoms persist. You were given copies of all results.       Abdominal Pain    WHAT YOU NEED TO KNOW:    Abdominal pain can be dull, achy, or sharp. You may have pain in one area of your abdomen, or in your entire abdomen. Your pain may be caused by a condition such as constipation, food sensitivity or poisoning, infection, or a blockage. Abdominal pain can also be from a hernia, appendicitis, or an ulcer. Liver, gallbladder, or kidney conditions can also cause abdominal pain. The cause of your abdominal pain may not be known.  Abdominal Organs         DISCHARGE INSTRUCTIONS:    Call your local emergency number (911 in the [place]) if:   •You have chest pain or shortness of breath.          Return to the emergency department if:   •You have pulsing pain in your upper abdomen or lower back that suddenly becomes constant.      •Your pain is in the right lower abdominal area and worsens with movement.      •You have a fever over 100.4°F (38°C) or shaking chills.      •You are vomiting and cannot keep food or liquids down.      •Your pain does not improve or gets worse over the next 8 to 12 hours.      •You see blood in your vomit or bowel movements, or they look black and tarry.      •Your skin or the whites of your eyes turn yellow.      •You are a woman and have a large amount of vaginal bleeding that is not your monthly period.      Call your doctor if:   •You have pain in your lower back.      •You are a man and have pain in your testicles.      •You have pain when you urinate.      •You have questions or concerns about your condition or care.      Medicines: You may need any of the following:  •Medicines may be given to calm your stomach or prevent vomiting.      •Prescription pain medicine may be given. Ask your healthcare provider how to take this medicine safely. Some prescription pain medicines contain acetaminophen. Do not take other medicines that contain acetaminophen without talking to your healthcare provider. Too much acetaminophen may cause liver damage. Prescription pain medicine may cause constipation. Ask your healthcare provider how to prevent or treat constipation.       •Take your medicine as directed. Contact your healthcare provider if you think your medicine is not helping or if you have side effects. Tell your provider if you are allergic to any medicine. Keep a list of the medicines, vitamins, and herbs you take. Include the amounts, and when and why you take them. Bring the list or the pill bottles to follow-up visits. Carry your medicine list with you in case of an emergency.      Manage or prevent abdominal pain:   •Apply heat on your abdomen for 20 to 30 minutes every 2 hours for as many days as directed. Heat helps decrease pain and muscle spasms.      •Make changes to the foods you eat, if needed. Do not eat foods that cause abdominal pain or other symptoms. Eat small meals more often. The following changes may also help:?Eat more high-fiber foods if you are constipated. High-fiber foods include fruits, vegetables, whole-grain foods, and legumes such as garcia beans.             ?Do not eat foods that cause gas if you have bloating. Examples include broccoli, cabbage, beans, and carbonated drinks.      ?Do not eat foods or drinks that contain sorbitol or fructose if you have diarrhea and bloating. Some examples are fruit juices, candy, jelly, and sugar-free gum.      ?Do not eat high-fat foods. Examples include fried foods, cheeseburgers, hot dogs, and desserts.      •Make changes to the liquids you drink, if needed. Do not drink liquids that cause pain or make it worse, such as orange juice. Drink liquids throughout the day to stay hydrated. The following changes may also help:?Drink more liquids to prevent dehydration from diarrhea or vomiting. Ask your healthcare provider how much liquid to drink each day and which liquids are best for you.      ?Limit or do not have caffeine. Caffeine may make symptoms such as heartburn or nausea worse.      ?Limit or do not drink alcohol. Alcohol can make your abdominal pain worse. Ask your healthcare provider if it is okay for you to drink alcohol. Also ask how much is okay for you to drink. A drink of alcohol is 12 ounces of beer, ½ ounce of liquor, or 5 ounces of wine.      •Keep a diary of your abdominal pain. A diary may help your healthcare provider learn what is causing your pain. Include when the pain happens, how long it lasts, and what the pain feels like. Write down any other symptoms you have with abdominal pain. Also write down what you eat, and any symptoms you have after you eat.      •Manage stress. Stress may cause abdominal pain. Your healthcare provider may recommend relaxation techniques and deep breathing exercises to help decrease your stress. Your healthcare provider may recommend you talk to someone about your stress or anxiety, such as a counselor or a friend. Get plenty of sleep. Exercise regularly.   FAMILY WALKING FOR EXERCISE           •Do not smoke. Nicotine and other chemicals in cigarettes can damage your esophagus and stomach. Ask your healthcare provider for information if you currently smoke and need help to quit. E-cigarettes or smokeless tobacco still contain nicotine. Talk to your healthcare provider before you use these products.      Follow up with your doctor as directed: Write down your questions so you remember to ask them during your visits.

## 2023-02-03 NOTE — ED PROVIDER NOTE - CARE PROVIDER_API CALL
Elly Thorne)  Gastroenterology; Internal Medicine  [address] [place] [id number]  Phone: [phone]  Fax: [phone]  Follow Up Time: Routine

## 2023-02-03 NOTE — ED PROVIDER NOTE - CLINICAL SUMMARY MEDICAL DECISION MAKING FREE TEXT BOX
hx and physical as noted. possible gastroenteritis/gerd/PUD/colitis, also consider pancreatitis vs biliary colic. however given no BM/not passing gas, n/v/diffuse abdominal pain, will eval for sbo. labs, symptom control, CT, dispo and further interventions pending. hx and physical as noted. possible gastroenteritis/gerd/PUD/colitis, also consider pancreatitis vs biliary colic. however given no BM/not passing gas, n/v/diffuse abdominal pain, will eval for sbo. labs, symptom control, CT, dispo and further interventions pending.    CT without acute pathology, all incidental findings from labs/imaging discused with patient who espresses understanding asnd need for pcp followup. patient tolerated PO, abdomen soft, nontender. will d/c with colace (po contrast also stimulates bowel motility) GI followup and return precautions

## 2023-02-03 NOTE — ED PROVIDER NOTE - PHYSICAL EXAMINATION
PHYSICAL EXAM:   General: appears uncomfrotable  HEENT: NC/AT, airway patent  Cardiovascular: regular rate and rhythm, + S1/S2, no murmurs, rubs, gallops appreciated  Respiratory: clear to auscultation bilaterally, good aeration bilaterally, nonlabored respirations  Abdominal: soft, diffuse tenderness to palpation worse in epigastric region, nondistended, no rebound, guarding or rigidity  Back: no costovertebral tenderness,   Neuro: Alert and oriented x3. Moving all extremities.   Psychiatric: appropriate mood and affect.   Skin: appropriate color, warm, dry.   -Faviola Garcia MD Attending Physician

## 2023-02-03 NOTE — ED PROVIDER NOTE - PROGRESS NOTE DETAILS
CT without acute pathology, all incidental findings from labs/imaging discused with patient who espresses understanding asnd need for pcp followup. patient tolerated PO, abdomen soft, nontender. will d/c with colace (po contrast also stimulates bowel motility) GI followup and return precautions

## 2023-02-04 ENCOUNTER — INPATIENT (INPATIENT)
Facility: HOSPITAL | Age: 58
LOS: 1 days | Discharge: HOME | DRG: 384 | End: 2023-02-06
Attending: HOSPITALIST
Payer: COMMERCIAL

## 2023-02-04 VITALS
SYSTOLIC BLOOD PRESSURE: 155 MMHG | RESPIRATION RATE: 18 BRPM | HEIGHT: 74 IN | TEMPERATURE: 99 F | DIASTOLIC BLOOD PRESSURE: 90 MMHG | OXYGEN SATURATION: 97 % | HEART RATE: 89 BPM | WEIGHT: 199.96 LBS

## 2023-02-04 DIAGNOSIS — Z98.890 OTHER SPECIFIED POSTPROCEDURAL STATES: Chronic | ICD-10-CM

## 2023-02-04 DIAGNOSIS — D17.9 BENIGN LIPOMATOUS NEOPLASM, UNSPECIFIED: Chronic | ICD-10-CM

## 2023-02-04 DIAGNOSIS — Z90.79 ACQUIRED ABSENCE OF OTHER GENITAL ORGAN(S): Chronic | ICD-10-CM

## 2023-02-04 LAB
ALBUMIN SERPL ELPH-MCNC: 4.5 G/DL — SIGNIFICANT CHANGE UP (ref 3.3–5.2)
ALP SERPL-CCNC: 57 U/L — SIGNIFICANT CHANGE UP (ref 40–120)
ALT FLD-CCNC: 20 U/L — SIGNIFICANT CHANGE UP
ANION GAP SERPL CALC-SCNC: 15 MMOL/L — SIGNIFICANT CHANGE UP (ref 5–17)
APPEARANCE UR: CLEAR — SIGNIFICANT CHANGE UP
APTT BLD: 26.5 SEC — LOW (ref 27.5–35.5)
AST SERPL-CCNC: 19 U/L — SIGNIFICANT CHANGE UP
BACTERIA # UR AUTO: ABNORMAL
BASOPHILS # BLD AUTO: 0.02 K/UL — SIGNIFICANT CHANGE UP (ref 0–0.2)
BASOPHILS NFR BLD AUTO: 0.2 % — SIGNIFICANT CHANGE UP (ref 0–2)
BILIRUB SERPL-MCNC: 1 MG/DL — SIGNIFICANT CHANGE UP (ref 0.4–2)
BILIRUB UR-MCNC: NEGATIVE — SIGNIFICANT CHANGE UP
BUN SERPL-MCNC: 20.8 MG/DL — HIGH (ref 8–20)
CALCIUM SERPL-MCNC: 9.2 MG/DL — SIGNIFICANT CHANGE UP (ref 8.4–10.5)
CHLORIDE SERPL-SCNC: 100 MMOL/L — SIGNIFICANT CHANGE UP (ref 96–108)
CO2 SERPL-SCNC: 22 MMOL/L — SIGNIFICANT CHANGE UP (ref 22–29)
COLOR SPEC: YELLOW — SIGNIFICANT CHANGE UP
COMMENT - URINE: SIGNIFICANT CHANGE UP
CREAT SERPL-MCNC: 1.41 MG/DL — HIGH (ref 0.5–1.3)
DIFF PNL FLD: ABNORMAL
EGFR: 58 ML/MIN/1.73M2 — LOW
EOSINOPHIL # BLD AUTO: 0.09 K/UL — SIGNIFICANT CHANGE UP (ref 0–0.5)
EOSINOPHIL NFR BLD AUTO: 0.8 % — SIGNIFICANT CHANGE UP (ref 0–6)
EPI CELLS # UR: SIGNIFICANT CHANGE UP
GLUCOSE SERPL-MCNC: 104 MG/DL — HIGH (ref 70–99)
GLUCOSE UR QL: NEGATIVE MG/DL — SIGNIFICANT CHANGE UP
HCT VFR BLD CALC: 39.3 % — SIGNIFICANT CHANGE UP (ref 39–50)
HGB BLD-MCNC: 14 G/DL — SIGNIFICANT CHANGE UP (ref 13–17)
HYALINE CASTS # UR AUTO: ABNORMAL /LPF
IMM GRANULOCYTES NFR BLD AUTO: 0.4 % — SIGNIFICANT CHANGE UP (ref 0–0.9)
INR BLD: 1.22 RATIO — HIGH (ref 0.88–1.16)
KETONES UR-MCNC: ABNORMAL
LACTATE BLDV-MCNC: 1.4 MMOL/L — SIGNIFICANT CHANGE UP (ref 0.5–2)
LEUKOCYTE ESTERASE UR-ACNC: ABNORMAL
LIDOCAIN IGE QN: 37 U/L — SIGNIFICANT CHANGE UP (ref 22–51)
LYMPHOCYTES # BLD AUTO: 1.24 K/UL — SIGNIFICANT CHANGE UP (ref 1–3.3)
LYMPHOCYTES # BLD AUTO: 11.7 % — LOW (ref 13–44)
MCHC RBC-ENTMCNC: 30.8 PG — SIGNIFICANT CHANGE UP (ref 27–34)
MCHC RBC-ENTMCNC: 35.6 GM/DL — SIGNIFICANT CHANGE UP (ref 32–36)
MCV RBC AUTO: 86.6 FL — SIGNIFICANT CHANGE UP (ref 80–100)
MONOCYTES # BLD AUTO: 0.79 K/UL — SIGNIFICANT CHANGE UP (ref 0–0.9)
MONOCYTES NFR BLD AUTO: 7.4 % — SIGNIFICANT CHANGE UP (ref 2–14)
NEUTROPHILS # BLD AUTO: 8.43 K/UL — HIGH (ref 1.8–7.4)
NEUTROPHILS NFR BLD AUTO: 79.5 % — HIGH (ref 43–77)
NITRITE UR-MCNC: NEGATIVE — SIGNIFICANT CHANGE UP
PH UR: 6 — SIGNIFICANT CHANGE UP (ref 5–8)
PLATELET # BLD AUTO: 282 K/UL — SIGNIFICANT CHANGE UP (ref 150–400)
POTASSIUM SERPL-MCNC: 3.6 MMOL/L — SIGNIFICANT CHANGE UP (ref 3.5–5.3)
POTASSIUM SERPL-SCNC: 3.6 MMOL/L — SIGNIFICANT CHANGE UP (ref 3.5–5.3)
PROT SERPL-MCNC: 6.8 G/DL — SIGNIFICANT CHANGE UP (ref 6.6–8.7)
PROT UR-MCNC: 30 MG/DL
PROTHROM AB SERPL-ACNC: 14.2 SEC — HIGH (ref 10.5–13.4)
RBC # BLD: 4.54 M/UL — SIGNIFICANT CHANGE UP (ref 4.2–5.8)
RBC # FLD: 12 % — SIGNIFICANT CHANGE UP (ref 10.3–14.5)
RBC CASTS # UR COMP ASSIST: ABNORMAL /HPF (ref 0–4)
SODIUM SERPL-SCNC: 137 MMOL/L — SIGNIFICANT CHANGE UP (ref 135–145)
SP GR SPEC: 1.02 — SIGNIFICANT CHANGE UP (ref 1.01–1.02)
UROBILINOGEN FLD QL: NEGATIVE MG/DL — SIGNIFICANT CHANGE UP
WBC # BLD: 10.61 K/UL — HIGH (ref 3.8–10.5)
WBC # FLD AUTO: 10.61 K/UL — HIGH (ref 3.8–10.5)
WBC UR QL: SIGNIFICANT CHANGE UP /HPF (ref 0–5)

## 2023-02-04 PROCEDURE — 85610 PROTHROMBIN TIME: CPT

## 2023-02-04 PROCEDURE — 86850 RBC ANTIBODY SCREEN: CPT

## 2023-02-04 PROCEDURE — 83605 ASSAY OF LACTIC ACID: CPT

## 2023-02-04 PROCEDURE — 84484 ASSAY OF TROPONIN QUANT: CPT

## 2023-02-04 PROCEDURE — 96361 HYDRATE IV INFUSION ADD-ON: CPT

## 2023-02-04 PROCEDURE — 87086 URINE CULTURE/COLONY COUNT: CPT

## 2023-02-04 PROCEDURE — 81001 URINALYSIS AUTO W/SCOPE: CPT

## 2023-02-04 PROCEDURE — 83690 ASSAY OF LIPASE: CPT

## 2023-02-04 PROCEDURE — 86901 BLOOD TYPING SEROLOGIC RH(D): CPT

## 2023-02-04 PROCEDURE — 99285 EMERGENCY DEPT VISIT HI MDM: CPT | Mod: 25

## 2023-02-04 PROCEDURE — 96375 TX/PRO/DX INJ NEW DRUG ADDON: CPT

## 2023-02-04 PROCEDURE — 85025 COMPLETE CBC W/AUTO DIFF WBC: CPT

## 2023-02-04 PROCEDURE — U0005: CPT

## 2023-02-04 PROCEDURE — 99285 EMERGENCY DEPT VISIT HI MDM: CPT

## 2023-02-04 PROCEDURE — 85730 THROMBOPLASTIN TIME PARTIAL: CPT

## 2023-02-04 PROCEDURE — 36415 COLL VENOUS BLD VENIPUNCTURE: CPT

## 2023-02-04 PROCEDURE — 80053 COMPREHEN METABOLIC PANEL: CPT

## 2023-02-04 PROCEDURE — 74177 CT ABD & PELVIS W/CONTRAST: CPT | Mod: MA

## 2023-02-04 PROCEDURE — 93005 ELECTROCARDIOGRAM TRACING: CPT

## 2023-02-04 PROCEDURE — 96374 THER/PROPH/DIAG INJ IV PUSH: CPT | Mod: XU

## 2023-02-04 PROCEDURE — 74177 CT ABD & PELVIS W/CONTRAST: CPT | Mod: 26,MA

## 2023-02-04 PROCEDURE — U0003: CPT

## 2023-02-04 PROCEDURE — 86900 BLOOD TYPING SEROLOGIC ABO: CPT

## 2023-02-04 RX ORDER — DOCUSATE SODIUM 100 MG
1 CAPSULE ORAL
Qty: 5 | Refills: 0
Start: 2023-02-04 | End: 2023-02-08

## 2023-02-04 RX ORDER — SODIUM CHLORIDE 9 MG/ML
2000 INJECTION INTRAMUSCULAR; INTRAVENOUS; SUBCUTANEOUS ONCE
Refills: 0 | Status: COMPLETED | OUTPATIENT
Start: 2023-02-04 | End: 2023-02-04

## 2023-02-04 RX ORDER — KETOROLAC TROMETHAMINE 30 MG/ML
30 SYRINGE (ML) INJECTION ONCE
Refills: 0 | Status: DISCONTINUED | OUTPATIENT
Start: 2023-02-04 | End: 2023-02-04

## 2023-02-04 RX ORDER — METOCLOPRAMIDE HCL 10 MG
10 TABLET ORAL ONCE
Refills: 0 | Status: COMPLETED | OUTPATIENT
Start: 2023-02-04 | End: 2023-02-04

## 2023-02-04 RX ORDER — ACETAMINOPHEN 500 MG
1000 TABLET ORAL ONCE
Refills: 0 | Status: COMPLETED | OUTPATIENT
Start: 2023-02-04 | End: 2023-02-04

## 2023-02-04 RX ADMIN — Medication 30 MILLIGRAM(S): at 18:04

## 2023-02-04 RX ADMIN — Medication 30 MILLIGRAM(S): at 17:34

## 2023-02-04 RX ADMIN — Medication 10 MILLIGRAM(S): at 17:34

## 2023-02-04 RX ADMIN — Medication 400 MILLIGRAM(S): at 00:49

## 2023-02-04 RX ADMIN — Medication 1 MILLIGRAM(S): at 17:33

## 2023-02-04 RX ADMIN — SODIUM CHLORIDE 2000 MILLILITER(S): 9 INJECTION INTRAMUSCULAR; INTRAVENOUS; SUBCUTANEOUS at 17:35

## 2023-02-04 NOTE — ED PROVIDER NOTE - NS ED ROS FT
No fever/chills   No photophobia/eye pain/changes in visio,   No ear pain/sore throat/dysphagia   No chest pain/palpitations  No SOB/cough/wheeze/stridor   + abdominal pain, + N/V no Diarrhea  No dysuria/frequency/discharge   No neck/back pain,   No rash  No changes in neurological status/function.

## 2023-02-04 NOTE — ED ADULT NURSE NOTE - NSICDXPASTSURGICALHX_GEN_ALL_CORE_FT
PAST SURGICAL HISTORY:  History of lumbar laminectomy for spinal cord decompression     Lipoma neck and left arm    S/P prostatectomy with radiation    
27

## 2023-02-04 NOTE — ED PROVIDER NOTE - CLINICAL SUMMARY MEDICAL DECISION MAKING FREE TEXT BOX
labs reviewed.  CT from last night reviewed.  Pt still having nausea with drinking.  will put in obs overnight for iv hydration and gi meds. case d/w Ted Rahman.

## 2023-02-04 NOTE — ED ADULT TRIAGE NOTE - CHIEF COMPLAINT QUOTE
pt states he is vomiting up blood & white stuff, was discharge this morning for the same complaint  A&Ox3, resp wm;, holding abd

## 2023-02-04 NOTE — ED ADULT NURSE NOTE - OBJECTIVE STATEMENT
pt comes to ED for abd pain, nausea vomiting and fatigue, seen here yesterday with negative workup. pt reports he hasn't slept all night. pt is AOX3, afebrile, denies marijuana use. pt with no abd tenderness, no urinary complaints.

## 2023-02-04 NOTE — ED ADULT NURSE REASSESSMENT NOTE - NS ED NURSE REASSESS COMMENT FT1
pt complaining of continued abdominal pain, to be medicated as ordered. pt complaining of continued abdominal pain to CT transport. pt had to be awoken for pain med administration. NAD noted. pending urine/CT results.

## 2023-02-04 NOTE — ED PROVIDER NOTE - OBJECTIVE STATEMENT
Pt is a 58 yo M co abd pain.  PMHx significant for prostate CA in remission, htn, gerd. Pt states that for the past 1 wk he has had upper abd pain associated with n/v. Pt states that the pain is severe and nonradiating. Pt was seen here last night and given meds and had labs and CT.  Pt states that after going home the pain, n/v started again. Pt reports seeing small flecks of blood in the vomitus. no black stools. no fever/chills. no cp. no sob. no other complaints.

## 2023-02-05 ENCOUNTER — TRANSCRIPTION ENCOUNTER (OUTPATIENT)
Age: 58
End: 2023-02-05

## 2023-02-05 DIAGNOSIS — K27.9 PEPTIC ULCER, SITE UNSPECIFIED, UNSPECIFIED AS ACUTE OR CHRONIC, WITHOUT HEMORRHAGE OR PERFORATION: ICD-10-CM

## 2023-02-05 LAB
ALBUMIN SERPL ELPH-MCNC: 3.7 G/DL — SIGNIFICANT CHANGE UP (ref 3.3–5.2)
ALP SERPL-CCNC: 47 U/L — SIGNIFICANT CHANGE UP (ref 40–120)
ALT FLD-CCNC: 14 U/L — SIGNIFICANT CHANGE UP
ANION GAP SERPL CALC-SCNC: 10 MMOL/L — SIGNIFICANT CHANGE UP (ref 5–17)
AST SERPL-CCNC: 13 U/L — SIGNIFICANT CHANGE UP
BASOPHILS # BLD AUTO: 0.01 K/UL — SIGNIFICANT CHANGE UP (ref 0–0.2)
BASOPHILS NFR BLD AUTO: 0.2 % — SIGNIFICANT CHANGE UP (ref 0–2)
BILIRUB SERPL-MCNC: 1 MG/DL — SIGNIFICANT CHANGE UP (ref 0.4–2)
BUN SERPL-MCNC: 17.9 MG/DL — SIGNIFICANT CHANGE UP (ref 8–20)
CALCIUM SERPL-MCNC: 8.6 MG/DL — SIGNIFICANT CHANGE UP (ref 8.4–10.5)
CHLORIDE SERPL-SCNC: 102 MMOL/L — SIGNIFICANT CHANGE UP (ref 96–108)
CO2 SERPL-SCNC: 25 MMOL/L — SIGNIFICANT CHANGE UP (ref 22–29)
CREAT SERPL-MCNC: 1.21 MG/DL — SIGNIFICANT CHANGE UP (ref 0.5–1.3)
CULTURE RESULTS: SIGNIFICANT CHANGE UP
EGFR: 70 ML/MIN/1.73M2 — SIGNIFICANT CHANGE UP
EOSINOPHIL # BLD AUTO: 0.13 K/UL — SIGNIFICANT CHANGE UP (ref 0–0.5)
EOSINOPHIL NFR BLD AUTO: 2.3 % — SIGNIFICANT CHANGE UP (ref 0–6)
GLUCOSE SERPL-MCNC: 93 MG/DL — SIGNIFICANT CHANGE UP (ref 70–99)
HCT VFR BLD CALC: 35 % — LOW (ref 39–50)
HCT VFR BLD CALC: 36.8 % — LOW (ref 39–50)
HGB BLD-MCNC: 12 G/DL — LOW (ref 13–17)
HGB BLD-MCNC: 12.9 G/DL — LOW (ref 13–17)
IMM GRANULOCYTES NFR BLD AUTO: 0.3 % — SIGNIFICANT CHANGE UP (ref 0–0.9)
LYMPHOCYTES # BLD AUTO: 1.09 K/UL — SIGNIFICANT CHANGE UP (ref 1–3.3)
LYMPHOCYTES # BLD AUTO: 19 % — SIGNIFICANT CHANGE UP (ref 13–44)
MCHC RBC-ENTMCNC: 30.2 PG — SIGNIFICANT CHANGE UP (ref 27–34)
MCHC RBC-ENTMCNC: 30.9 PG — SIGNIFICANT CHANGE UP (ref 27–34)
MCHC RBC-ENTMCNC: 34.3 GM/DL — SIGNIFICANT CHANGE UP (ref 32–36)
MCHC RBC-ENTMCNC: 35.1 GM/DL — SIGNIFICANT CHANGE UP (ref 32–36)
MCV RBC AUTO: 88 FL — SIGNIFICANT CHANGE UP (ref 80–100)
MCV RBC AUTO: 88.2 FL — SIGNIFICANT CHANGE UP (ref 80–100)
MONOCYTES # BLD AUTO: 0.57 K/UL — SIGNIFICANT CHANGE UP (ref 0–0.9)
MONOCYTES NFR BLD AUTO: 9.9 % — SIGNIFICANT CHANGE UP (ref 2–14)
NEUTROPHILS # BLD AUTO: 3.91 K/UL — SIGNIFICANT CHANGE UP (ref 1.8–7.4)
NEUTROPHILS NFR BLD AUTO: 68.3 % — SIGNIFICANT CHANGE UP (ref 43–77)
OB PNL STL: NEGATIVE — SIGNIFICANT CHANGE UP
PLATELET # BLD AUTO: 205 K/UL — SIGNIFICANT CHANGE UP (ref 150–400)
PLATELET # BLD AUTO: 221 K/UL — SIGNIFICANT CHANGE UP (ref 150–400)
POTASSIUM SERPL-MCNC: 3.4 MMOL/L — LOW (ref 3.5–5.3)
POTASSIUM SERPL-SCNC: 3.4 MMOL/L — LOW (ref 3.5–5.3)
PROT SERPL-MCNC: 5.8 G/DL — LOW (ref 6.6–8.7)
RBC # BLD: 3.97 M/UL — LOW (ref 4.2–5.8)
RBC # BLD: 4.18 M/UL — LOW (ref 4.2–5.8)
RBC # FLD: 12 % — SIGNIFICANT CHANGE UP (ref 10.3–14.5)
RBC # FLD: 12.1 % — SIGNIFICANT CHANGE UP (ref 10.3–14.5)
SARS-COV-2 RNA SPEC QL NAA+PROBE: SIGNIFICANT CHANGE UP
SODIUM SERPL-SCNC: 137 MMOL/L — SIGNIFICANT CHANGE UP (ref 135–145)
SPECIMEN SOURCE: SIGNIFICANT CHANGE UP
TROPONIN T SERPL-MCNC: 0.04 NG/ML — SIGNIFICANT CHANGE UP (ref 0–0.06)
TROPONIN T SERPL-MCNC: 0.04 NG/ML — SIGNIFICANT CHANGE UP (ref 0–0.06)
WBC # BLD: 5.73 K/UL — SIGNIFICANT CHANGE UP (ref 3.8–10.5)
WBC # BLD: 7.37 K/UL — SIGNIFICANT CHANGE UP (ref 3.8–10.5)
WBC # FLD AUTO: 5.73 K/UL — SIGNIFICANT CHANGE UP (ref 3.8–10.5)
WBC # FLD AUTO: 7.37 K/UL — SIGNIFICANT CHANGE UP (ref 3.8–10.5)

## 2023-02-05 PROCEDURE — 99223 1ST HOSP IP/OBS HIGH 75: CPT

## 2023-02-05 PROCEDURE — 93010 ELECTROCARDIOGRAM REPORT: CPT | Mod: 76

## 2023-02-05 PROCEDURE — 99236 HOSP IP/OBS SAME DATE HI 85: CPT

## 2023-02-05 PROCEDURE — 71045 X-RAY EXAM CHEST 1 VIEW: CPT | Mod: 26

## 2023-02-05 RX ORDER — OXYBUTYNIN CHLORIDE 5 MG
10 TABLET ORAL DAILY
Refills: 0 | Status: DISCONTINUED | OUTPATIENT
Start: 2023-02-05 | End: 2023-02-05

## 2023-02-05 RX ORDER — LACTULOSE 10 G/15ML
20 SOLUTION ORAL THREE TIMES A DAY
Refills: 0 | Status: DISCONTINUED | OUTPATIENT
Start: 2023-02-05 | End: 2023-02-06

## 2023-02-05 RX ORDER — SUCRALFATE 1 G
1 TABLET ORAL EVERY 6 HOURS
Refills: 0 | Status: DISCONTINUED | OUTPATIENT
Start: 2023-02-05 | End: 2023-02-06

## 2023-02-05 RX ORDER — SUCRALFATE 1 G
1 TABLET ORAL
Qty: 0 | Refills: 0 | DISCHARGE

## 2023-02-05 RX ORDER — PANTOPRAZOLE SODIUM 20 MG/1
40 TABLET, DELAYED RELEASE ORAL
Refills: 0 | Status: DISCONTINUED | OUTPATIENT
Start: 2023-02-05 | End: 2023-02-05

## 2023-02-05 RX ORDER — SUCRALFATE 1 G
10 TABLET ORAL EVERY 6 HOURS
Refills: 0 | Status: DISCONTINUED | OUTPATIENT
Start: 2023-02-05 | End: 2023-02-05

## 2023-02-05 RX ORDER — POLYETHYLENE GLYCOL 3350 17 G/17G
17 POWDER, FOR SOLUTION ORAL
Refills: 0 | Status: DISCONTINUED | OUTPATIENT
Start: 2023-02-05 | End: 2023-02-06

## 2023-02-05 RX ORDER — L.ACIDOPH/B.ANIMALIS/B.LONGUM 15B CELL
1 CAPSULE ORAL
Qty: 0 | Refills: 0 | DISCHARGE

## 2023-02-05 RX ORDER — HEPARIN SODIUM 5000 [USP'U]/ML
5000 INJECTION INTRAVENOUS; SUBCUTANEOUS EVERY 12 HOURS
Refills: 0 | Status: DISCONTINUED | OUTPATIENT
Start: 2023-02-05 | End: 2023-02-06

## 2023-02-05 RX ORDER — SUCRALFATE 1 G
1 TABLET ORAL ONCE
Refills: 0 | Status: COMPLETED | OUTPATIENT
Start: 2023-02-05 | End: 2023-02-05

## 2023-02-05 RX ORDER — POLYETHYLENE GLYCOL 3350 17 G/17G
17 POWDER, FOR SOLUTION ORAL DAILY
Refills: 0 | Status: DISCONTINUED | OUTPATIENT
Start: 2023-02-05 | End: 2023-02-05

## 2023-02-05 RX ORDER — LANOLIN ALCOHOL/MO/W.PET/CERES
3 CREAM (GRAM) TOPICAL AT BEDTIME
Refills: 0 | Status: DISCONTINUED | OUTPATIENT
Start: 2023-02-05 | End: 2023-02-06

## 2023-02-05 RX ORDER — SODIUM CHLORIDE 9 MG/ML
1000 INJECTION INTRAMUSCULAR; INTRAVENOUS; SUBCUTANEOUS
Refills: 0 | Status: DISCONTINUED | OUTPATIENT
Start: 2023-02-05 | End: 2023-02-06

## 2023-02-05 RX ORDER — PANTOPRAZOLE SODIUM 20 MG/1
40 TABLET, DELAYED RELEASE ORAL
Refills: 0 | Status: DISCONTINUED | OUTPATIENT
Start: 2023-02-05 | End: 2023-02-06

## 2023-02-05 RX ORDER — ACETAMINOPHEN 500 MG
650 TABLET ORAL EVERY 6 HOURS
Refills: 0 | Status: DISCONTINUED | OUTPATIENT
Start: 2023-02-05 | End: 2023-02-06

## 2023-02-05 RX ORDER — FAMOTIDINE 10 MG/ML
20 INJECTION INTRAVENOUS DAILY
Refills: 0 | Status: DISCONTINUED | OUTPATIENT
Start: 2023-02-05 | End: 2023-02-06

## 2023-02-05 RX ORDER — SENNA PLUS 8.6 MG/1
2 TABLET ORAL AT BEDTIME
Refills: 0 | Status: DISCONTINUED | OUTPATIENT
Start: 2023-02-05 | End: 2023-02-06

## 2023-02-05 RX ORDER — LISINOPRIL 2.5 MG/1
20 TABLET ORAL DAILY
Refills: 0 | Status: DISCONTINUED | OUTPATIENT
Start: 2023-02-05 | End: 2023-02-06

## 2023-02-05 RX ORDER — ONDANSETRON 8 MG/1
4 TABLET, FILM COATED ORAL EVERY 6 HOURS
Refills: 0 | Status: DISCONTINUED | OUTPATIENT
Start: 2023-02-05 | End: 2023-02-06

## 2023-02-05 RX ORDER — OXYBUTYNIN CHLORIDE 5 MG
10 TABLET ORAL DAILY
Refills: 0 | Status: DISCONTINUED | OUTPATIENT
Start: 2023-02-05 | End: 2023-02-06

## 2023-02-05 RX ADMIN — HEPARIN SODIUM 5000 UNIT(S): 5000 INJECTION INTRAVENOUS; SUBCUTANEOUS at 17:20

## 2023-02-05 RX ADMIN — FAMOTIDINE 20 MILLIGRAM(S): 10 INJECTION INTRAVENOUS at 08:52

## 2023-02-05 RX ADMIN — Medication 10 MILLIGRAM(S): at 11:40

## 2023-02-05 RX ADMIN — LACTULOSE 20 GRAM(S): 10 SOLUTION ORAL at 22:38

## 2023-02-05 RX ADMIN — LACTULOSE 20 GRAM(S): 10 SOLUTION ORAL at 14:44

## 2023-02-05 RX ADMIN — LISINOPRIL 20 MILLIGRAM(S): 2.5 TABLET ORAL at 05:30

## 2023-02-05 RX ADMIN — Medication 1 GRAM(S): at 12:18

## 2023-02-05 RX ADMIN — Medication 1 GRAM(S): at 22:38

## 2023-02-05 RX ADMIN — PANTOPRAZOLE SODIUM 40 MILLIGRAM(S): 20 TABLET, DELAYED RELEASE ORAL at 05:30

## 2023-02-05 RX ADMIN — Medication 1 GRAM(S): at 00:42

## 2023-02-05 RX ADMIN — POLYETHYLENE GLYCOL 3350 17 GRAM(S): 17 POWDER, FOR SOLUTION ORAL at 17:20

## 2023-02-05 RX ADMIN — Medication 3 MILLIGRAM(S): at 22:55

## 2023-02-05 RX ADMIN — Medication 1 GRAM(S): at 17:20

## 2023-02-05 RX ADMIN — Medication 1 MILLIGRAM(S): at 03:12

## 2023-02-05 RX ADMIN — SODIUM CHLORIDE 150 MILLILITER(S): 9 INJECTION INTRAMUSCULAR; INTRAVENOUS; SUBCUTANEOUS at 03:12

## 2023-02-05 RX ADMIN — ONDANSETRON 4 MILLIGRAM(S): 8 TABLET, FILM COATED ORAL at 08:52

## 2023-02-05 RX ADMIN — SODIUM CHLORIDE 150 MILLILITER(S): 9 INJECTION INTRAMUSCULAR; INTRAVENOUS; SUBCUTANEOUS at 11:39

## 2023-02-05 RX ADMIN — PANTOPRAZOLE SODIUM 40 MILLIGRAM(S): 20 TABLET, DELAYED RELEASE ORAL at 17:20

## 2023-02-05 NOTE — ED ADULT NURSE REASSESSMENT NOTE - GENITOURINARY ASSESSMENT
1127: Handoff Report from Operating Room to PACU    Report received from Beatriz Shanks CRNA and Enedina Amaro RN regarding Flori Koo. Surgeon(s):  MD Asad House MD  And Procedure(s) (LRB):  LAPAROSCOPIC SIGMOID COLECTOMY AND INTRAOPERATIVE FLEXIBLE SIGMOIDOSCOPY, CYSTOSCOPY BILATERAL URETERAL STENT INSERTION   (N/A)  CYSTOSCOPY URETERAL STENT INSERTION (Bilateral)  confirmed   with allergies, drains and dressings discussed. Anesthesia type, drugs, patient history, complications, estimated blood loss, vital signs, intake and output, and last pain medication, lines, reversal medications and temperature were reviewed. 1200: report given to Santosh Aguiar RN to assume primary care of patient.
- - -
- - -

## 2023-02-05 NOTE — ED CDU PROVIDER INITIAL DAY NOTE - ATTENDING APP SHARED VISIT CONTRIBUTION OF CARE
Pt with recurrent n/v. second trip to ER. labs reviewed.  Pt still nauseated. will put in obs for iv hydration and antiemetics.

## 2023-02-05 NOTE — ED CDU PROVIDER DISPOSITION NOTE - ATTENDING CONTRIBUTION TO CARE
56yo M pmhx of prostate CA in remission, HTN, GERD presented to ED with persistent epigastric pain and nausea.  more persistent over past few weeks.  ct with no acute pathology.  seen by GI who will admit to have endoscopy.

## 2023-02-05 NOTE — ED CDU PROVIDER DISPOSITION NOTE - CLINICAL COURSE
58yo M pmhx of prostate CA in remission, HTN, GERD presented to ED with persistent epigastric pain and nausea since last Sunday. Pt reports he has been dealing with this same issue for >1 yr now, however, over the last wk the pain has become more persistent. Pt was recently seen here for similar symptoms. CT without acute pathology at that time. Pt placed into obs for IV hydration and control of nausea. H&H stable 12.9 after initial decrease from 14->12. guaiac negative. Evaluated by GI this AM who made medication regimen changes and recommend admission for EGD tomorrow.

## 2023-02-05 NOTE — ED CDU PROVIDER INITIAL DAY NOTE - OBJECTIVE STATEMENT
58 yo male with pmhx of prostate CA in remission, HTN, GERD presents with persistent epigastric pain and nausea since last Sunday. Pt reports he has been dealing with this same issue for >1 yr now, however, over the last wk the pain has become more persistent. States that the pain sometimes radiates up to the chest when he vomits. Denies CP at rest or with exertion, only associated after vomiting. States that the abd pain gets worse 3-4 hrs after eating and is usually assoc with nausea. Reports he has seen a GI specialist for this 1 yr ago that's assoc with [facility], had endoscopy performed at that time and states he was told he had "stomach polyps." Pt also endorsing constipation x3 days, last BM was 3 days ago and states it was "loose." Denies fever, chills, body aches, dizziness, LOC, vision changes, CP, palpitations, SOB, MERAZ, dysuria, hematuria, paresthesias in the extremities, rashes. Denies ETOH use, marijuana use, or illicit drug use. 58 yo male with pmhx of prostate CA in remission, HTN, GERD presents with persistent epigastric pain and nausea since last Sunday. Pt reports he has been dealing with this same issue for >1 yr now, however, over the last wk the pain has become more persistent. States that the pain sometimes radiates up to the chest when he vomits. Denies CP at rest or with exertion, only associated after vomiting. States that the abd pain gets worse 3-4 hrs after eating and is usually assoc with nausea. Reports he has seen a GI specialist for this 1 yr ago that's assoc with [facility], had endoscopy performed at that time and states he was told he had "stomach polyps." Pt also endorsing constipation x3 days, last BM was 3 days ago and states it was "loose." Denies fever, chills, body aches, dizziness, LOC, vision changes, CP, palpitations, SOB, MERAZ, dysuria, hematuria, paresthesias in the extremities, rashes. Denies ETOH use, marijuana use, or illicit drug use. Pt was recently seen here for similar symptoms. CT without acute pathology at that time. Pt placed into obs for IV hydration and control of nausea.

## 2023-02-05 NOTE — ED CDU PROVIDER INITIAL DAY NOTE - PHYSICAL EXAMINATION
Gen: No acute distress, non toxic. Appears uncomfortable   HEENT: Mucous membranes moist, pink conjunctivae, EOMI. PERRL.   CV: RRR, nl s1/s2.  Resp: CTAB, normal rate and effort. No wheezes, rhonchi or crackles.   GI: Abdomen soft, nondistended. +minimally tender in the epigastric area. no rebound or guarding. no ecchymosis.   : No CVAT b/l.   Neuro: A&O x4, MAEx4. 5/5 str ext x 4. Sensation intact, symmetric throughout. No FND's. Gait intact. CN 2-12 intact.   MSK: No spine or joint ttp. FROM UE and LE b/l.   Skin: No rashes. intact and perfused. cap refill <2sec  Vascular: Radial and dorsalis pedal pulses 2+ b/l. No LE edema.

## 2023-02-05 NOTE — CONSULT NOTE ADULT - SUBJECTIVE AND OBJECTIVE BOX
HISTORY OF PRESENT ILLNESS: This is a 57y old man with a past medical history significant for GERD, HTN and prostate cancer with spinal surgery on recent opioids who presents with GERD, epigastric pain and constipation. He was seen in the ED 2 other times for the same problems. He reports long term daily PPI use. He denies nsaids. His last EGD was 1.5 years prior and he was told he had gastric polyps and gastritis He feels the pantoprazole is no longer working for him. He received codeine for his back pain last Wednesday and took half a tablet, since then he reports constipation. Prior to that he reports loose stool. He reports flatus CT notable for moderate stool burden.    ROS: A 14-point review of systems was completed and was otherwise negative save what was reported in the HPI.    PAST MEDICAL/SURGICAL HISTORY:  HTN (hypertension)    GERD (gastroesophageal reflux disease)    Prostate cancer    Lumbar stenosis    S/P prostatectomy  with radiation    Lipoma  neck and left arm    History of lumbar laminectomy for spinal cord decompression      SOCIAL HISTORY:  - TOBACCO: Denies  - ALCOHOL: Denies  - ILLICIT DRUG USE: Denies    FAMILY HISTORY:  No known history of gastrointestinal or liver disease;  FH: type 2 diabetes mellitus  mother -         HOME MEDICATIONS:  acetaminophen 325 mg oral tablet: 3 tab(s) orally every 8 hours (10 Aug 2022 11:34)  ascorbic acid 500 mg oral tablet: 1 tab(s) orally 2 times a day (09 Aug 2022 12:27)  lisinopril 20 mg oral tablet: 1 tab(s) orally once a day (09 Aug 2022 12:27)  Multiple Vitamins oral tablet: 1 tab(s) orally once a day (09 Aug 2022 12:27)  oxybutynin 10 mg/24 hr oral tablet, extended release: 1 tab(s) orally once a day (09 Aug 2022 12:27)  Probiotic Formula oral capsule: 1 cap(s) orally once a day (09 Aug 2022 12:27)  Protonix 40 mg oral delayed release tablet: 1 tab(s) orally once a day (09 Aug 2022 12:27)  senna leaf extract oral tablet: 2 tab(s) orally once a day (at bedtime) (10 Aug 2022 11:34)  sucralfate 1 g oral tablet: 1  orally 2 times a day (09 Aug 2022 12:27)    INPATIENT MEDICATIONS:  MEDICATIONS  (STANDING):  famotidine    Tablet 20 milliGRAM(s) Oral daily  lisinopril 20 milliGRAM(s) Oral daily  oxybutynin XL 10 milliGRAM(s) Oral daily  pantoprazole    Tablet 40 milliGRAM(s) Oral before breakfast  polyethylene glycol 3350 17 Gram(s) Oral daily  senna 2 Tablet(s) Oral at bedtime  sodium chloride 0.9%. 1000 milliLiter(s) (150 mL/Hr) IV Continuous <Continuous>    MEDICATIONS  (PRN):  ondansetron Injectable 4 milliGRAM(s) IV Push every 6 hours PRN Nausea and/or Vomiting    ALLERGIES:  No Known Drug Allergies  shellfish (Other)    T(C): 37.1 (23 @ 08:18), Max: 37.1 (23 @ 08:18)  HR: 77 (23 @ 08:18) (77 - 100)  BP: 123/73 (23 @ 08:18) (108/69 - 155/90)  RR: 18 (23 @ 08:18) (17 - 20)  SpO2: 98% (23 @ 08:18) (96% - 99%)      PHYSICAL EXAM:  Constitutional: in no apparent distress  Eyes: Sclerae anicteric, conjunctivae normal  ENMT: Mucus membranes moist, no oropharyngeal thrush noted  Respiratory: Breathing nonlabored; clear to auscultation  Cardiovascular: Regular rate and rhythm  Gastrointestinal: Soft, nontender, nondistended, normoactive bowel sounds; no rebound tenderness or involuntary guarding  Extremities: No clubbing, cyanosis or edema  Neurological: Alert and oriented to person, place and time;   Skin: No jaundice  Musculoskeletal: No significant peripheral atrophy  Psychiatric: Affect and mood appropriate      LABS:             12.9   7.37  )-----------( 221      (  @ 07:00 )             36.8                12.0   5.73  )-----------( 205      (  @ 04:32 )             35.0                14.0   10.61 )-----------( 282      (  @ 17:29 )             39.3                14.1   8.65  )-----------( 279      (  @ 21:28 )             40.0       PT/INR - ( 2023 17:29 )   PT: 14.2 sec;   INR: 1.22 ratio         PTT - ( 2023 17:29 )  PTT:26.5 sec      137  |  102  |  17.9  ----------------------------<  93  3.4<L>   |  25.0  |  1.21    Ca    8.6      2023 04:32    TPro  5.8<L>  /  Alb  3.7  /  TBili  1.0  /  DBili  x   /  AST  13  /  ALT  14  /  AlkPhos  47  -05    LIVER FUNCTIONS - ( 2023 04:32 )  Alb: 3.7 g/dL / Pro: 5.8 g/dL / ALK PHOS: 47 U/L / ALT: 14 U/L / AST: 13 U/L / GGT: x             Lipase, Serum: 37 U/L (23 @ 17:29)  Lipase, Serum: 32 U/L (23 @ 21:28)    Urinalysis Basic - ( 2023 00:20 )    Color: Yellow / Appearance: Clear / S.020 / pH: x  Gluc: x / Ketone: Moderate  / Bili: Negative / Urobili: Negative mg/dL   Blood: x / Protein: 30 mg/dL / Nitrite: Negative   Leuk Esterase: Trace / RBC: 25-50 /HPF / WBC 0-2 /HPF   Sq Epi: x / Non Sq Epi: Occasional / Bacteria: Occasional        Culture - Urine (collected 2023 00:20)  Source: Clean Catch Clean Catch (Midstream)  Final Report (2023 02:55):    <10,000 CFU/mL Normal Urogenital Mirella      IMAGING: I personally reviewed the CTAP and I agree with the radiologist's interpretation as described below: constipation    HISTORY OF PRESENT ILLNESS: This is a 57y old man with a past medical history significant for GERD, HTN and prostate cancer with spinal surgery on recent opioids who presents with GERD, epigastric pain and constipation. He was seen in the ED 2 other times for the same problems. He reports long term daily PPI use. He denies nsaids. His last EGD was 1.5 years prior and he was told he had gastric polyps and gastritis He feels the pantoprazole is no longer working for him. He received codeine for his back pain last Wednesday and took half a tablet, since then he reports constipation. Prior to that he reports loose stool. He reports flatus CT notable for moderate stool burden.  He also reports urinary retention     ROS: A 14-point review of systems was completed and was otherwise negative save what was reported in the HPI.    PAST MEDICAL/SURGICAL HISTORY:  HTN (hypertension)    GERD (gastroesophageal reflux disease)    Prostate cancer    Lumbar stenosis    S/P prostatectomy  with radiation    Lipoma  neck and left arm    History of lumbar laminectomy for spinal cord decompression      SOCIAL HISTORY:  - TOBACCO: Denies  - ALCOHOL: Denies  - ILLICIT DRUG USE: Denies    FAMILY HISTORY:  No known history of gastrointestinal or liver disease;  FH: type 2 diabetes mellitus  mother -         HOME MEDICATIONS:  acetaminophen 325 mg oral tablet: 3 tab(s) orally every 8 hours (10 Aug 2022 11:34)  ascorbic acid 500 mg oral tablet: 1 tab(s) orally 2 times a day (09 Aug 2022 12:27)  lisinopril 20 mg oral tablet: 1 tab(s) orally once a day (09 Aug 2022 12:27)  Multiple Vitamins oral tablet: 1 tab(s) orally once a day (09 Aug 2022 12:27)  oxybutynin 10 mg/24 hr oral tablet, extended release: 1 tab(s) orally once a day (09 Aug 2022 12:27)  Probiotic Formula oral capsule: 1 cap(s) orally once a day (09 Aug 2022 12:27)  Protonix 40 mg oral delayed release tablet: 1 tab(s) orally once a day (09 Aug 2022 12:27)  senna leaf extract oral tablet: 2 tab(s) orally once a day (at bedtime) (10 Aug 2022 11:34)  sucralfate 1 g oral tablet: 1  orally 2 times a day (09 Aug 2022 12:27)    INPATIENT MEDICATIONS:  MEDICATIONS  (STANDING):  famotidine    Tablet 20 milliGRAM(s) Oral daily  lisinopril 20 milliGRAM(s) Oral daily  oxybutynin XL 10 milliGRAM(s) Oral daily  pantoprazole    Tablet 40 milliGRAM(s) Oral before breakfast  polyethylene glycol 3350 17 Gram(s) Oral daily  senna 2 Tablet(s) Oral at bedtime  sodium chloride 0.9%. 1000 milliLiter(s) (150 mL/Hr) IV Continuous <Continuous>    MEDICATIONS  (PRN):  ondansetron Injectable 4 milliGRAM(s) IV Push every 6 hours PRN Nausea and/or Vomiting    ALLERGIES:  No Known Drug Allergies  shellfish (Other)    T(C): 37.1 (23 @ 08:18), Max: 37.1 (23 @ 08:18)  HR: 77 (23 @ 08:18) (77 - 100)  BP: 123/73 (23 @ 08:18) (108/69 - 155/90)  RR: 18 (23 @ 08:18) (17 - 20)  SpO2: 98% (23 @ 08:18) (96% - 99%)      PHYSICAL EXAM:  Constitutional: in no apparent distress  Eyes: Sclerae anicteric, conjunctivae normal  ENMT: Mucus membranes moist, no oropharyngeal thrush noted  Respiratory: Breathing nonlabored; clear to auscultation  Cardiovascular: Regular rate and rhythm  Gastrointestinal: Soft, nontender, nondistended, normoactive bowel sounds; no rebound tenderness or involuntary guarding  Extremities: No clubbing, cyanosis or edema  Neurological: Alert and oriented to person, place and time;   Skin: No jaundice  Musculoskeletal: No significant peripheral atrophy  Psychiatric: Affect and mood appropriate      LABS:             12.9   7.37  )-----------( 221      (  @ 07:00 )             36.8                12.0   5.73  )-----------( 205      (  @ 04:32 )             35.0                14.0   10.61 )-----------( 282      (  @ 17:29 )             39.3                14.1   8.65  )-----------( 279      ( 02-03 @ 21:28 )             40.0       PT/INR - ( 2023 17:29 )   PT: 14.2 sec;   INR: 1.22 ratio         PTT - ( 2023 17:29 )  PTT:26.5 sec      137  |  102  |  17.9  ----------------------------<  93  3.4<L>   |  25.0  |  1.21    Ca    8.6      2023 04:32    TPro  5.8<L>  /  Alb  3.7  /  TBili  1.0  /  DBili  x   /  AST  13  /  ALT  14  /  AlkPhos  47  05    LIVER FUNCTIONS - ( 2023 04:32 )  Alb: 3.7 g/dL / Pro: 5.8 g/dL / ALK PHOS: 47 U/L / ALT: 14 U/L / AST: 13 U/L / GGT: x             Lipase, Serum: 37 U/L (23 @ 17:29)  Lipase, Serum: 32 U/L (23 @ 21:28)    Urinalysis Basic - ( 2023 00:20 )    Color: Yellow / Appearance: Clear / S.020 / pH: x  Gluc: x / Ketone: Moderate  / Bili: Negative / Urobili: Negative mg/dL   Blood: x / Protein: 30 mg/dL / Nitrite: Negative   Leuk Esterase: Trace / RBC: 25-50 /HPF / WBC 0-2 /HPF   Sq Epi: x / Non Sq Epi: Occasional / Bacteria: Occasional        Culture - Urine (collected 2023 00:20)  Source: Clean Catch Clean Catch (Midstream)  Final Report (2023 02:55):    <10,000 CFU/mL Normal Urogenital Mirella      IMAGING: I personally reviewed the CTAP and I agree with the radiologist's interpretation as described below: constipation

## 2023-02-05 NOTE — H&P ADULT - ASSESSMENT
57 yr old male with hypertension GERD, history of prostate cancer s/p surgery, Lupron and radiation, multiple spinal surgeries presents with progressively worsening nausea, vomiting and epigastric discomfort for 1 week. Labs and imaging noted. FOBT negative. CT abdomen done on prior ED visit revealed, No acute bowel inflammatory change or obstruction. Too small to characterize hepatic hypodensities. Incompletely characterize right hepatic lobe hypodensity measuring up to 1.6 cm in the right hepatic lobe.    1. GERD, dyspepsia:  Clear liquid diet today  GI eval noted  continue IV PPI and Carafate today  monitor labs  IVF for hydration  plan for EGD in am.    2. Hypertension:  Continue Lisinopril.    3. History of prostate ca:  in remission  CT abdomen findings discussed with patient, advised outpatient follow up for MRI    4. Constipation:  Likely secondary to narcotic use for chronic back pain  Lactulose and Miralax ordered.  if no BM, will order Relistor.     5. DVT ppx:  Heparin  hold am dose prior to EGD.    Discussed with patient plan of care.  Dispo pending EGD results.

## 2023-02-05 NOTE — ED CDU PROVIDER INITIAL DAY NOTE - PROGRESS NOTE DETAILS
H/H dropped by 2 pts. Most likely from fluids however will obtain FOBT. guaiac negative. pt re-assessed this morning. States he tried drinking water but nausea recurred. pain has subsided. requesting to see GI. GI consult placed

## 2023-02-05 NOTE — PATIENT PROFILE ADULT - FALL HARM RISK - ATTEMPT OOB
Prosper Delgado,    I fear some of our messages are getting crossed...    In any case:  Yes, based on her symptoms I do agree the patient should go to ER.  Yes, she can start warfarin 2.5 mg daily (see my previous msg) with first INR 4-5 days later.  Please STOP Eliquis after the third dose of warfarin is given.    Thx,    DI   No

## 2023-02-05 NOTE — H&P ADULT - HISTORY OF PRESENT ILLNESS
57 yr old male with hypertension GERD, history of prostate cancer s/p surgery, Lupron and radiation, multiple spinal surgeries presents with progressively worsening nausea, vomiting and epigastric discomfort for 1 week. States he has a long standing history of metallic taste in his mouth and heart burn. He is currently on PPI and Miralax without any relief. Last EGD was a year ago, was told he had polyps and gastritis. He has had multiple ED visits in the past week, 1 at [facility] and 2 at [facility] with complaints of nausea, epigastric pain and inability to tolerate PO. He has chronic back pain for which he is prescribed Tylenol #3. Last bowel movement 3 days ago. Occasional difficulty urinating No chest pain, shortness of breath, dizziness. No incontinence.

## 2023-02-05 NOTE — CONSULT NOTE ADULT - ASSESSMENT
57 year old male with GERD and OA on opioid who presents with GERD and constipation    GERD   Please change pantoprazole to 40mg IV BID  Add carafate suspension QID  Avoid nsaids (including ketorolac)   Clear liquid diet today   NPO after midnight will plan for EGD given duration of symptoms and multiple ED visits      Constipation in the setting of opioid use  Start lactulose 30mg TID, hold for diarrhea  Continue Miralax, increase to BID   If he does not response will consider naloxegol or methylnaltrexone  57 year old male with GERD and OA on opioid who presents with GERD and constipation    GERD   Please change pantoprazole to 40mg IV BID  Add carafate suspension QID  Avoid nsaids (including ketorolac)   Clear liquid diet today   NPO after midnight will plan for EGD given duration of symptoms and multiple ED visits      Constipation in the setting of opioid use  Start lactulose 30mg TID, hold for diarrhea  Continue Miralax, increase to BID   If he does not response will consider naloxegol or methylnaltrexone     He has constipation and urinary retention with l spine disease, consider ruling out neurologic pathology

## 2023-02-05 NOTE — ED ADULT NURSE REASSESSMENT NOTE - COMFORT CARE
meal provided/plan of care explained/po fluids offered/repositioned/side rails up/wait time explained/warm blanket provided

## 2023-02-05 NOTE — ED CDU PROVIDER DISPOSITION NOTE - NS ED ATTENDING STATEMENT MOD
[id number]  History and Physical    Date of Service:  11/28/2022  Primary Care Provider: None  Source of information: The patient and Chart review    Chief Complaint: Fever      History of Presenting Illness:   Kulwinder Goff is a 28 y.o. male with obesity, former cocaine and cannabis use, has not used in the past 6 months. He also has a history of depression/anxiety. He was previously in [place], recently moved back to [place]. He [facility] to [facility] with 4 to 5 days of dyspnea. He was found to be in DKA and further work-up in the emergency department revealed right upper lobe pneumonia as well. CT was consistent with right upper lobe pneumonia, blood sugar was 416 with anion gap of 24. Patient was started on treatment with antibiotics and insulin infusion and referred for admission on the hospitalist service at our facility    At the moment of the initial interview he endorsed cough however denied fever, chills. He denied sore throat however did complain of sinus and right ear discomfort. He has not had medical follow-up, he did not like his providers in [place] and has not had any follow-up since moving to [place] in September. He was previously on insulin, has not taken any in the past 2 years           REVIEW OF SYSTEMS:  Constitutional: With nausea, not feeling well  Eyes: negative  Ears, Nose, Mouth, Throat, and Face: negative for pressure in sinuses and right ear  Respiratory: positive for cough or dyspnea on exertion  Cardiovascular: negative  Gastrointestinal: negative except for nausea and vomiting  Genitourinary:negative  Integument/Breast: negative  Hematologic/Lymphatic: negative  Musculoskeletal:negative  Neurological: negative  Behavioral/Psychiatric: negative except for anxiety     History reviewed. No pertinent past medical history. No past surgical history on file.   Prior to Admission medications Medication Sig Start Date End Date Taking? Authorizing Provider   buPROPion [facility]) 75 mg tablet Take 75 mg by mouth two (2) times a day. Yes Kashif, MD Beatris   hydrOXYzine HCL (ATARAX) 50 mg tablet Take 25 mg by mouth two (2) times a day. Indications: anxious   Yes Other, MD Beatris     No Known Allergies   History reviewed. No pertinent family history. Social History:       Family and social history were personally reviewed, all pertinent and relevant details are outlined as above. Objective:   Visit Vitals  /78 (BP 1 Location: Left upper arm, BP Patient Position: Semi fowlers)   Pulse (!) 107   Temp 98.7 °F (37.1 °C) Comment: arrived to unit   Resp 18   Ht 6' 6\" (1.981 m)   Wt 158.8 kg (350 lb)   SpO2 96%   BMI 40.45 kg/m²      O2 Device: None (Room air)    PHYSICAL EXAM:   General: Alert x oriented x 3, awake, no acute distress, resting in bed, pleasant male, appears to be stated age  HEENT: PEERL, EOMI, moist mucus membranes, AD TM normal  Neck: Supple, no JVD,   Chest: Clear to auscultation bilaterally   CVS: RRR, S1 S2 heard, no murmurs/rubs/gallops  Abd: Soft, tender to epigastrium, non-distended, +bowel sounds   Ext: No clubbing, no cyanosis, no edema  Neuro/Psych: Pleasant mood and affect, CN 2-12 grossly intact, sensory grossly within normal limit, Strength 5/5 in all extremities,  Cap refill: Brisk, less than 3 seconds  Pulses: 2+, symmetric in all extremities  Skin: Warm, dry, without rashes or lesions    Data Review: All diagnostic labs and studies have been reviewed. Abnormal Labs Reviewed   CBC WITH AUTOMATED DIFF - Abnormal; Notable for the following components:       Result Value    WBC 25.1 (*)     NEUTROPHILS 87 (*)     LYMPHOCYTES 3 (*)     METAMYELOCYTES 1 (*)     ABS. NEUTROPHILS 22.1 (*)     ABS.  MONOCYTES 2.0 (*)     All other components within normal limits   METABOLIC PANEL, COMPREHENSIVE - Abnormal; Notable for the following components:    Sodium 125 (*)     Chloride 87 (*)     CO2 14 (*)     Anion gap 24 (*)     Glucose 416 (*)     Alk.  phosphatase 155 (*)     Protein, total 8.6 (*)     Albumin 2.8 (*)     Globulin 5.8 (*)     A-G Ratio 0.5 (*)     All other components within normal limits   BETA-HYDROXYBUTYRATE - Abnormal; Notable for the following components:    B-hydroxybutyrate >4.42 (*)     All other components within normal limits   LACTIC ACID - Abnormal; Notable for the following components:    Lactic acid 2.3 (*)     All other components within normal limits   METABOLIC PANEL, BASIC - Abnormal; Notable for the following components:    Potassium 2.7 (*)     Glucose 170 (*)     All other components within normal limits   GLUCOSE, POC - Abnormal; Notable for the following components:    Glucose (POC) 339 (*)     All other components within normal limits   GLUCOSE, POC - Abnormal; Notable for the following components:    Glucose (POC) 326 (*)     All other components within normal limits   GLUCOSE, POC - Abnormal; Notable for the following components:    Glucose (POC) 267 (*)     All other components within normal limits   GLUCOSE, POC - Abnormal; Notable for the following components:    Glucose (POC) 259 (*)     All other components within normal limits   GLUCOSE, POC - Abnormal; Notable for the following components:    Glucose (POC) 233 (*)     All other components within normal limits   GLUCOSE, POC - Abnormal; Notable for the following components:    Glucose (POC) 222 (*)     All other components within normal limits   GLUCOSE, POC - Abnormal; Notable for the following components:    Glucose (POC) 200 (*)     All other components within normal limits   GLUCOSE, POC - Abnormal; Notable for the following components:    Glucose (POC) 204 (*)     All other components within normal limits   GLUCOSE, POC - Abnormal; Notable for the following components:    Glucose (POC) 173 (*)     All other components within normal limits   GLUCOSE, POC - Abnormal; Notable for the following components:    Glucose (POC) 151 (*)     All other components within normal limits   GLUCOSE, POC - Abnormal; Notable for the following components:    Glucose (POC) 158 (*)     All other components within normal limits   GLUCOSE, POC - Abnormal; Notable for the following components:    Glucose (POC) 153 (*)     All other components within normal limits   GLUCOSE, POC - Abnormal; Notable for the following components:    Glucose (POC) 131 (*)     All other components within normal limits   GLUCOSE, POC - Abnormal; Notable for the following components:    Glucose (POC) 148 (*)     All other components within normal limits       All Micro Results       Procedure Component Value Units Date/Time    RESPIRATORY VIRUS PANEL W/COVID-19, PCR [[id number]]     Order Status: Sent Specimen: NASOPHARYNGEAL SWAB     CULTURE, BLOOD, PERIPHERAL [[id number]] Collected: 11/27/22 1515    Order Status: Completed Specimen: Blood Updated: 11/28/22 0621     Special Requests: NO SPECIAL REQUESTS        Culture result: NO GROWTH AFTER 6 HOURS       COVID-19 RAPID TEST [[id number]] Collected: 11/27/22 1523    Order Status: Completed Specimen: Nasopharyngeal Updated: 11/27/22 1547     Specimen source Nasopharyngeal        COVID-19 rapid test Not detected        Comment: Rapid Abbott ID Now       Rapid NAAT:  The specimen is NEGATIVE for SARS-CoV-2, the novel coronavirus associated with COVID-19. Negative results should be treated as presumptive and, if inconsistent with clinical signs and symptoms or necessary for patient management, should be tested with an alternative molecular assay. Negative results do not preclude SARS-CoV-2 infection and should not be used as the sole basis for patient management decisions. This test has been authorized by the FDA under an Emergency Use Authorization (EUA) for use by authorized laboratories.    Fact sheet for Healthcare Providers:  http://www.suellen.biz/  Fact sheet for Patients: DHgatethad       Methodology: Isothermal Nucleic Acid Amplification         URINE CULTURE HOLD SAMPLE [[id number]]     Order Status: Sent Specimen: Urine     LEGIONELLA PNEUMOPHILA AG, URINE [[id number]]     Order Status: Sent Specimen: Urine             IMAGING:   CT CHEST W CONT   Final Result   1. Right upper lobe airspace disease. No suspicious mass/nodule. 2. Calcified granulomas. XR CHEST PORT   Final Result   1. Dense airspace disease right upper lobe. Recommend follow-up to ensure   resolution               ECG/ECHO:    Results for orders placed or performed during the hospital encounter of 11/27/22   EKG, 12 LEAD, INITIAL   Result Value Ref Range    Ventricular Rate 106 BPM    Atrial Rate 106 BPM    P-R Interval 138 ms    QRS Duration 92 ms    Q-T Interval 378 ms    QTC Calculation (Bezet) 502 ms    Calculated P Axis 42 degrees    Calculated R Axis 85 degrees    Calculated T Axis 58 degrees    Diagnosis       Sinus tachycardia with occasional premature ventricular complexes  Nonspecific ST abnormality  When compared with ECG of 28-NOV-2022 05:29,  MANUAL COMPARISON REQUIRED, DATA IS UNCONFIRMED          Assessment:   Given the patient's current clinical presentation, there is a high level of concern for decompensation if discharged from the emergency department. Complex decision making was performed, which includes reviewing the patient's available past medical records, laboratory results, and imaging studies.     Active Problems:    CAP (community acquired pneumonia) (11/27/2022)      Plan:     DKA  Blood sugar better controlled on insulin infusion, he is receiving 4.5 units  The did not have long-acting insulin at [facility] emergency department, will give a dose here weight-based  Gap has closed, will continue IV fluids turning off insulin in 2 hours  Will consult with diabetes specialist  Need to be set up with a PCP and diabetes specialist for follow-up with endocrinology  Will check A1c    Electrolyte imbalance  Likely due to DKA  Potassium 2.7  Repleting  We will monitor closely    Community-acquired pneumonia  Sinusitis  Right upper lobe pneumonia as visualized on CT  He is not hypoxic  Continuing antibiotics in the form of ceftriaxone and azithromycin  Unclear if he has sinusitis, antibiotics for the pneumonia will cover that in any case  Also check viral panel    Depression/anxiety  Stable  Continuing bupropion and hydroxyzine        DIET: ADULT DIET Regular; 3 carb choices (45 gm/meal)   ISOLATION PRECAUTIONS: There are currently no Active Isolations  CODE STATUS: Full Code   DVT PROPHYLAXIS: LMWH  FUNCTIONAL STATUS PRIOR TO HOSPITALIZATION: Fully active and ambulatory; able to carry on all self-care without restriction. Ambulatory status/function: By self   EARLY MOBILITY ASSESSMENT: Recommend routine ambulation while hospitalized with the assistance of nursing staff  ANTICIPATED DISCHARGE: less than 24 hours. ANTICIPATED DISPOSITION: Home        Signed By: Lorene Gilmore NP     November 28, 2022         Please note that this dictation may have been completed with Abhijeet Cuellar, the computer voice recognition software. Quite often unanticipated grammatical, syntax, homophones, and other interpretive errors are inadvertently transcribed by the computer software. Please disregard these errors. Please excuse any errors that have escaped final proofreading. Attending with

## 2023-02-05 NOTE — ED ADULT NURSE REASSESSMENT NOTE - NS ED NURSE REASSESS COMMENT FT1
pt AOx3, vital signs stable, PO challenged and tolerated water without vomiting. pt reports he still has pain and is afraid to go home. AOX3, even and unlabored resps present. pt dry appearing but otherwise comfortable.
pt sleeping comfortably, easily arousable. intermittent abd pain present. no vomiting since arrival to ED. even and unlabored resps present, IV site patent with fluids infusing as ordered. will monitor
Assumed care of the patient @0730. Pt A&Ox4, VSS afebrile. Pt c/o burning in stomach and some nausea. Pt medicated as ordered. Pt tolerated clear liquids this morning, no vomiting noted. Patient in understanding of plan of care. Patient with no further questions for the RN. Resting in comfort. Call bell within reach and encouraged to use when assistance needed. Will continue to monitor.
Pt received from ED RN @ 0030. Pt resting in bed currently, c/o chronic stomach discomfort unrelieved by medications. pt VSS. pt oriented to call bell system and call bell within reach. Will con't with pt plan of care.

## 2023-02-05 NOTE — PATIENT PROFILE ADULT - FALL HARM RISK - UNIVERSAL INTERVENTIONS
Bed in lowest position, wheels locked, appropriate side rails in place/Call bell, personal items and telephone in reach/Instruct patient to call for assistance before getting out of bed or chair/Non-slip footwear when patient is out of bed/[place] to call system/Physically safe environment - no spills, clutter or unnecessary equipment/Purposeful Proactive Rounding/Room/bathroom lighting operational, light cord in reach

## 2023-02-05 NOTE — ED CDU PROVIDER INITIAL DAY NOTE - NS ED ROS FT
Gen: denies fever, chills  Skin: denies rashes  HEENT: denies visual changes, ear pain, nasal congestion, throat pain  Respiratory: denies MERAZ, SOB, cough  Cardiovascular: denies chest pain, palpitations, diaphoresis, LE edema  GI: +abd pain, N/V/C  : denies dysuria, frequency, hematuria  MSK: denies joint swelling/pain, back pain, neck pain  Neuro: denies headache, dizziness, LOC, weakness, numbness

## 2023-02-05 NOTE — ED CDU PROVIDER INITIAL DAY NOTE - CLINICAL SUMMARY MEDICAL DECISION MAKING FREE TEXT BOX
58 yo male with pmhx of prostate CA in remission, HTN, GERD presents with persistent epigastric pain and nausea since last Sunday. Mildly elevated white count likely secondary to vomiting. Pt placed into obs for IV rehydration and to control N/V.

## 2023-02-06 ENCOUNTER — FORM ENCOUNTER (OUTPATIENT)
Age: 58
End: 2023-02-06

## 2023-02-06 ENCOUNTER — TRANSCRIPTION ENCOUNTER (OUTPATIENT)
Age: 58
End: 2023-02-06

## 2023-02-06 ENCOUNTER — RESULT REVIEW (OUTPATIENT)
Age: 58
End: 2023-02-06

## 2023-02-06 ENCOUNTER — APPOINTMENT (OUTPATIENT)
Dept: PAIN MANAGEMENT | Facility: CLINIC | Age: 58
End: 2023-02-06
Payer: OTHER MISCELLANEOUS

## 2023-02-06 VITALS
TEMPERATURE: 99 F | HEART RATE: 74 BPM | OXYGEN SATURATION: 97 % | SYSTOLIC BLOOD PRESSURE: 139 MMHG | DIASTOLIC BLOOD PRESSURE: 87 MMHG | RESPIRATION RATE: 17 BRPM

## 2023-02-06 LAB
A1C WITH ESTIMATED AVERAGE GLUCOSE RESULT: 5.5 % — SIGNIFICANT CHANGE UP (ref 4–5.6)
ALBUMIN SERPL ELPH-MCNC: 3.8 G/DL — SIGNIFICANT CHANGE UP (ref 3.3–5.2)
ALP SERPL-CCNC: 48 U/L — SIGNIFICANT CHANGE UP (ref 40–120)
ALT FLD-CCNC: 16 U/L — SIGNIFICANT CHANGE UP
ANION GAP SERPL CALC-SCNC: 9 MMOL/L — SIGNIFICANT CHANGE UP (ref 5–17)
AST SERPL-CCNC: 14 U/L — SIGNIFICANT CHANGE UP
BILIRUB SERPL-MCNC: 0.7 MG/DL — SIGNIFICANT CHANGE UP (ref 0.4–2)
BUN SERPL-MCNC: 10.5 MG/DL — SIGNIFICANT CHANGE UP (ref 8–20)
CALCIUM SERPL-MCNC: 8.6 MG/DL — SIGNIFICANT CHANGE UP (ref 8.4–10.5)
CHLORIDE SERPL-SCNC: 109 MMOL/L — HIGH (ref 96–108)
CHOLEST SERPL-MCNC: 126 MG/DL — SIGNIFICANT CHANGE UP
CO2 SERPL-SCNC: 24 MMOL/L — SIGNIFICANT CHANGE UP (ref 22–29)
CREAT SERPL-MCNC: 1.12 MG/DL — SIGNIFICANT CHANGE UP (ref 0.5–1.3)
EGFR: 77 ML/MIN/1.73M2 — SIGNIFICANT CHANGE UP
ESTIMATED AVERAGE GLUCOSE: 111 MG/DL — SIGNIFICANT CHANGE UP (ref 68–114)
GLUCOSE SERPL-MCNC: 100 MG/DL — HIGH (ref 70–99)
HCT VFR BLD CALC: 36.3 % — LOW (ref 39–50)
HDLC SERPL-MCNC: 60 MG/DL — SIGNIFICANT CHANGE UP
HGB BLD-MCNC: 12.2 G/DL — LOW (ref 13–17)
INR BLD: 1.29 RATIO — HIGH (ref 0.88–1.16)
LIPID PNL WITH DIRECT LDL SERPL: 49 MG/DL — SIGNIFICANT CHANGE UP
MCHC RBC-ENTMCNC: 29.9 PG — SIGNIFICANT CHANGE UP (ref 27–34)
MCHC RBC-ENTMCNC: 33.6 GM/DL — SIGNIFICANT CHANGE UP (ref 32–36)
MCV RBC AUTO: 89 FL — SIGNIFICANT CHANGE UP (ref 80–100)
NON HDL CHOLESTEROL: 66 MG/DL — SIGNIFICANT CHANGE UP
PLATELET # BLD AUTO: 219 K/UL — SIGNIFICANT CHANGE UP (ref 150–400)
POTASSIUM SERPL-MCNC: 3.9 MMOL/L — SIGNIFICANT CHANGE UP (ref 3.5–5.3)
POTASSIUM SERPL-SCNC: 3.9 MMOL/L — SIGNIFICANT CHANGE UP (ref 3.5–5.3)
PROT SERPL-MCNC: 6 G/DL — LOW (ref 6.6–8.7)
PROTHROM AB SERPL-ACNC: 15 SEC — HIGH (ref 10.5–13.4)
RBC # BLD: 4.08 M/UL — LOW (ref 4.2–5.8)
RBC # FLD: 12 % — SIGNIFICANT CHANGE UP (ref 10.3–14.5)
SODIUM SERPL-SCNC: 142 MMOL/L — SIGNIFICANT CHANGE UP (ref 135–145)
TRIGL SERPL-MCNC: 86 MG/DL — SIGNIFICANT CHANGE UP
WBC # BLD: 5.44 K/UL — SIGNIFICANT CHANGE UP (ref 3.8–10.5)
WBC # FLD AUTO: 5.44 K/UL — SIGNIFICANT CHANGE UP (ref 3.8–10.5)

## 2023-02-06 PROCEDURE — 43239 EGD BIOPSY SINGLE/MULTIPLE: CPT

## 2023-02-06 PROCEDURE — 80053 COMPREHEN METABOLIC PANEL: CPT

## 2023-02-06 PROCEDURE — 96376 TX/PRO/DX INJ SAME DRUG ADON: CPT

## 2023-02-06 PROCEDURE — 93005 ELECTROCARDIOGRAM TRACING: CPT

## 2023-02-06 PROCEDURE — 85730 THROMBOPLASTIN TIME PARTIAL: CPT

## 2023-02-06 PROCEDURE — 83690 ASSAY OF LIPASE: CPT

## 2023-02-06 PROCEDURE — 96374 THER/PROPH/DIAG INJ IV PUSH: CPT

## 2023-02-06 PROCEDURE — 36415 COLL VENOUS BLD VENIPUNCTURE: CPT

## 2023-02-06 PROCEDURE — 83605 ASSAY OF LACTIC ACID: CPT

## 2023-02-06 PROCEDURE — 99239 HOSP IP/OBS DSCHRG MGMT >30: CPT

## 2023-02-06 PROCEDURE — 85025 COMPLETE CBC W/AUTO DIFF WBC: CPT

## 2023-02-06 PROCEDURE — U0003: CPT

## 2023-02-06 PROCEDURE — 88305 TISSUE EXAM BY PATHOLOGIST: CPT | Mod: 26

## 2023-02-06 PROCEDURE — 85027 COMPLETE CBC AUTOMATED: CPT

## 2023-02-06 PROCEDURE — 80061 LIPID PANEL: CPT

## 2023-02-06 PROCEDURE — 71045 X-RAY EXAM CHEST 1 VIEW: CPT

## 2023-02-06 PROCEDURE — 83036 HEMOGLOBIN GLYCOSYLATED A1C: CPT

## 2023-02-06 PROCEDURE — 88341 IMHCHEM/IMCYTCHM EA ADD ANTB: CPT

## 2023-02-06 PROCEDURE — 84484 ASSAY OF TROPONIN QUANT: CPT

## 2023-02-06 PROCEDURE — 82962 GLUCOSE BLOOD TEST: CPT

## 2023-02-06 PROCEDURE — G0378: CPT

## 2023-02-06 PROCEDURE — 96375 TX/PRO/DX INJ NEW DRUG ADDON: CPT

## 2023-02-06 PROCEDURE — U0005: CPT

## 2023-02-06 PROCEDURE — 88341 IMHCHEM/IMCYTCHM EA ADD ANTB: CPT | Mod: 26

## 2023-02-06 PROCEDURE — 88342 IMHCHEM/IMCYTCHM 1ST ANTB: CPT

## 2023-02-06 PROCEDURE — 99285 EMERGENCY DEPT VISIT HI MDM: CPT | Mod: 25

## 2023-02-06 PROCEDURE — 88342 IMHCHEM/IMCYTCHM 1ST ANTB: CPT | Mod: 26

## 2023-02-06 PROCEDURE — 85610 PROTHROMBIN TIME: CPT

## 2023-02-06 PROCEDURE — 82272 OCCULT BLD FECES 1-3 TESTS: CPT

## 2023-02-06 PROCEDURE — 88305 TISSUE EXAM BY PATHOLOGIST: CPT

## 2023-02-06 RX ORDER — LISINOPRIL 2.5 MG/1
1 TABLET ORAL
Qty: 0 | Refills: 0 | DISCHARGE

## 2023-02-06 RX ORDER — METOCLOPRAMIDE HCL 10 MG
1 TABLET ORAL
Qty: 40 | Refills: 0
Start: 2023-02-06 | End: 2023-02-15

## 2023-02-06 RX ORDER — SUCRALFATE 1 G
10 TABLET ORAL
Qty: 1200 | Refills: 0
Start: 2023-02-06 | End: 2023-03-07

## 2023-02-06 RX ORDER — LACTULOSE 10 G/15ML
30 SOLUTION ORAL
Qty: 270 | Refills: 0
Start: 2023-02-06 | End: 2023-02-08

## 2023-02-06 RX ADMIN — PANTOPRAZOLE SODIUM 40 MILLIGRAM(S): 20 TABLET, DELAYED RELEASE ORAL at 05:21

## 2023-02-06 RX ADMIN — LACTULOSE 20 GRAM(S): 10 SOLUTION ORAL at 12:31

## 2023-02-06 RX ADMIN — LISINOPRIL 20 MILLIGRAM(S): 2.5 TABLET ORAL at 05:21

## 2023-02-06 RX ADMIN — Medication 1 GRAM(S): at 12:31

## 2023-02-06 RX ADMIN — Medication 10 MILLIGRAM(S): at 12:31

## 2023-02-06 RX ADMIN — FAMOTIDINE 20 MILLIGRAM(S): 10 INJECTION INTRAVENOUS at 12:31

## 2023-02-06 NOTE — DISCHARGE NOTE PROVIDER - NSDCFUSCHEDAPPT_GEN_ALL_CORE_FT
Faisal Pierre  [facility]  [address]  Scheduled Appointment: 02/08/2023    Ginny Leal  [facility]  [address] [facility] [address]  Scheduled Appointment: 02/08/2023    Ginny Leal  [facility]  [address] [facility] [address]  Scheduled Appointment: 02/15/2023    Faisal Pierre  [facility]  [address] [facility] [address]  Scheduled Appointment: 03/15/2023    Faisal Pierre  [facility]  [address]  Scheduled Appointment: 04/05/2023

## 2023-02-06 NOTE — DISCHARGE NOTE NURSING/CASE MANAGEMENT/SOCIAL WORK - PATIENT PORTAL LINK FT
Detail Level: Detailed You can access the FollowMyHealth Patient Portal offered by [facility] by registering at the following website: [email or web address]. By joining [email or web address] FollowMyHealth portal, you will also be able to view your health information using other applications (apps) compatible with our system.

## 2023-02-06 NOTE — DISCHARGE NOTE PROVIDER - HOSPITAL COURSE
57y/oM PMH hypertension, GERD, history of prostate cancer s/p surgery, Lupron and radiation, multiple spinal surgeries presenting with progressively worsening nausea, vomiting and epigastric discomfort for 1 week. States he has a long standing history of metallic taste in his mouth and heartburn. He is currently on PPI and Miralax without any relief. Last EGD was a year ago, was told he had polyps and gastritis. He has had multiple ED visits in the past week, 1 at [facility] and 2 at [facility] with complaints of nausea, epigastric pain and inability to tolerate PO. He has chronic back pain for which he is prescribed Tylenol #3. Last bowel movement 3 days ago, follows with pain management. CT a/p: with no acute bowel inflammatory change or obstruction. additionally with too small to characterize hepatic hypodensities; incompletely characterized R hepatic lobe hypodensity measuring up to 1.6cm in R hepatic lobe; rec MR for better characterization. Findings d/w pt, aware to f/u GI outpt and pursue outpt MRI. Pt now s/p EGD 2/6: esophagus with irregular Z line, 2 bx sent; in stomach: several fundic gland polyps in cardia, 2 representative ones removed; 5 bx sent to eval for H. pylori; in duodenum: normal; celiac bx sent. no evidence inflammation (esophagitis, gastritis or duodenitis), no bleeding. Pt to f/u pathology. Pt tolerating diet. Voiding and had BM today. Epigastric and abd pain resolved. pt to [place] home

## 2023-02-06 NOTE — DISCHARGE NOTE NURSING/CASE MANAGEMENT/SOCIAL WORK - NSDCPEFALRISK_GEN_ALL_CORE
For information on Fall & Injury Prevention, visit: [email or web address] OR  [email or web address] OR  https://www.cdc.gov/steadi/patient.html

## 2023-02-06 NOTE — DISCHARGE NOTE PROVIDER - ATTENDING DISCHARGE PHYSICAL EXAMINATION:
Vital Signs Last 24 Hrs  T(C): 37.2 (06 Feb 2023 09:55), Max: 37.4 (05 Feb 2023 15:16)  T(F): 99 (06 Feb 2023 09:55), Max: 99.4 (05 Feb 2023 15:16)  HR: 74 (06 Feb 2023 09:55) (74 - 85)  BP: 139/87 (06 Feb 2023 09:55) (110/68 - 144/80)  BP(mean): 83 (05 Feb 2023 15:16) (83 - 83)  RR: 17 (06 Feb 2023 09:55) (17 - 19)  SpO2: 97% (06 Feb 2023 09:55) (97% - 98%)    Parameters below as of 06 Feb 2023 09:55  Patient On (Oxygen Delivery Method): room air    CONSTITUTIONAL: NAD  EYES: +EOMI  CARDIAC: Regular rate, regular rhythm.  normal +S1, S2.  RESPIRATORY: Clear to auscultation bilaterally  GASTROINTESTINAL: Abdomen soft, non-tender, no guarding; bowel sounds present  NEUROLOGICAL: Alert and oriented, grossly non-focal  SKIN: warm, dry, no rashes noted  PSYCHIATRIC: calm, cooperative

## 2023-02-06 NOTE — PROGRESS NOTE ADULT - SUBJECTIVE AND OBJECTIVE BOX
TANISHA PADILLA    [id number]    57y      Male    CC: n/v    INTERVAL HPI/OVERNIGHT EVENTS: pt seen and examined. s/p EGD today. reports voiding without issue and has had BM today. epigastric/abd pain resolved. tolerating PO     REVIEW OF SYSTEMS:    CONSTITUTIONAL: No fever, weight loss, or fatigue  RESPIRATORY: No cough, wheezing, hemoptysis; No shortness of breath  CARDIOVASCULAR: No chest pain, palpitations  GASTROINTESTINAL: No abdominal or epigastric pain. No nausea, vomiting  NEUROLOGICAL: No headaches, memory loss    Vital Signs Last 24 Hrs  T(C): 37.2 (06 Feb 2023 09:55), Max: 37.4 (05 Feb 2023 15:16)  T(F): 99 (06 Feb 2023 09:55), Max: 99.4 (05 Feb 2023 15:16)  HR: 74 (06 Feb 2023 09:55) (74 - 85)  BP: 139/87 (06 Feb 2023 09:55) (110/68 - 144/80)  BP(mean): 83 (05 Feb 2023 15:16) (83 - 83)  RR: 17 (06 Feb 2023 09:55) (17 - 19)  SpO2: 97% (06 Feb 2023 09:55) (97% - 98%)    Parameters below as of 06 Feb 2023 09:55  Patient On (Oxygen Delivery Method): room air        PHYSICAL EXAM:    CONSTITUTIONAL: NAD  EYES: +EOMI  CARDIAC: Regular rate, regular rhythm.  normal +S1, S2.  RESPIRATORY: Clear to auscultation bilaterally  GASTROINTESTINAL: Abdomen soft, non-tender, no guarding; bowel sounds present  NEUROLOGICAL: Alert and oriented, grossly non-focal  SKIN: warm, dry, no rashes noted  PSYCHIATRIC: calm, cooperative     LABS:                        12.2   5.44  )-----------( 219      ( 06 Feb 2023 05:26 )             36.3     02-06    142  |  109<H>  |  10.5  ----------------------------<  100<H>  3.9   |  24.0  |  1.12    Ca    8.6      06 Feb 2023 05:26    TPro  6.0<L>  /  Alb  3.8  /  TBili  0.7  /  DBili  x   /  AST  14  /  ALT  16  /  AlkPhos  48  02-06    PT/INR - ( 06 Feb 2023 05:26 )   PT: 15.0 sec;   INR: 1.29 ratio         PTT - ( 04 Feb 2023 17:29 )  PTT:26.5 sec        MEDICATIONS  (STANDING):  famotidine    Tablet 20 milliGRAM(s) Oral daily  heparin   Injectable 5000 Unit(s) SubCutaneous every 12 hours  lactulose Syrup 20 Gram(s) Oral three times a day  lisinopril 20 milliGRAM(s) Oral daily  oxybutynin XL 10 milliGRAM(s) Oral daily  pantoprazole  Injectable 40 milliGRAM(s) IV Push two times a day  polyethylene glycol 3350 17 Gram(s) Oral two times a day  senna 2 Tablet(s) Oral at bedtime  sodium chloride 0.9%. 1000 milliLiter(s) (150 mL/Hr) IV Continuous <Continuous>  sucralfate suspension 1 Gram(s) Oral every 6 hours    MEDICATIONS  (PRN):  acetaminophen     Tablet .. 650 milliGRAM(s) Oral every 6 hours PRN Temp greater or equal to 38C (100.4F), Mild Pain (1 - 3)  aluminum hydroxide/magnesium hydroxide/simethicone Suspension 30 milliLiter(s) Oral every 4 hours PRN Dyspepsia  melatonin 3 milliGRAM(s) Oral at bedtime PRN Insomnia  ondansetron Injectable 4 milliGRAM(s) IV Push every 6 hours PRN Nausea and/or Vomiting      RADIOLOGY & ADDITIONAL TESTS:

## 2023-02-06 NOTE — DISCHARGE NOTE PROVIDER - CARE PROVIDER_API CALL
Swetha Kendrick)  Gastroenterology  [address] [place] [address] [id number]  Phone: [phone]  Fax: [phone]  Follow Up Time:     PMD,   Phone: (   )    -  Fax: (   )    -  Follow Up Time:

## 2023-02-06 NOTE — DISCHARGE NOTE PROVIDER - NSDCMRMEDTOKEN_GEN_ALL_CORE_FT
cyclobenzaprine 10 mg oral tablet: 1 tab(s) orally every 8 hours MDD:3  lactulose 10 g/15 mL oral syrup: 30 milliliter(s) orally 3 times a day, As Needed -for constipation   lisinopril 20 mg oral tablet: 1 tab(s) orally once a day  metoclopramide 5 mg oral tablet: 1 tab(s) orally every 6 hours, As Needed -for nausea   MiraLax oral powder for reconstitution: 17 gram(s) orally once a day (at bedtime)  Multiple Vitamins oral tablet: 1 tab(s) orally once a day  Narcan 4 mg/0.1 mL nasal spray: 4 milligram(s) intranasally once, repeat as necessary.   As needed. For suspected opiate overdose   Follow instructions on packet MDD:0.2 ml  oxybutynin 10 mg/24 hr oral tablet, extended release: 1 tab(s) orally once a day  Protonix 40 mg oral delayed release tablet: 1 tab(s) orally once a day  senna leaf extract oral tablet: 2 tab(s) orally once a day (at bedtime)  sucralfate 1 g/10 mL oral suspension: 10 milliliter(s) orally every 6 hours  Tylenol with Codeine #3 oral tablet: 1 tab(s) orally every 8 hours, As Needed

## 2023-02-06 NOTE — PROGRESS NOTE ADULT - ASSESSMENT
57y/oM PMH hypertension, GERD, history of prostate cancer s/p surgery, Lupron and radiation, multiple spinal surgeries presenting with progressively worsening nausea, vomiting and epigastric discomfort for 1 week. States he has a long standing history of metallic taste in his mouth and heartburn. He is currently on PPI and Miralax without any relief. Last EGD was a year ago, was told he had polyps and gastritis. He has had multiple ED visits in the past week, 1 at [facility] and 2 at [facility] with complaints of nausea, epigastric pain and inability to tolerate PO. He has chronic back pain for which he is prescribed Tylenol #3. Last bowel movement 3 days ago, follows with pain management. CT a/p: with no acute bowel inflammatory change or obstruction. additionally with too small to characterize hepatic hypodensities; incompletely characterized R hepatic lobe hypodensity measuring up to 1.6cm in R hepatic lobe; rec MR for better characterization.     GERD  -GI recs appreciated   -continue IV PPI and Carafate  -s/p IVF  -s/p EGD today: esophagus with irregular Z line, 2 bx sent; in stomach: several fundic gland polyps in cardia, 2 representative ones removed; 5 bx sent to eval for H. pylori; in duodenum: normal; celiac bx sent. no evidence inflammation (esophagitis, gastritis or duodenitis), no bleeding. Pt to f/u pathology    Hypertension  -Continue Lisinopril    History of prostate ca  -in remission  -CT abdomen findings discussed with patient, advised outpatient follow up for MRI    Constipation, reoslved   -Likely secondary to narcotic use for chronic back pain  -cont bowel regimen     DVT ppx: heparin sq     Dispo: home

## 2023-02-06 NOTE — DISCHARGE NOTE PROVIDER - NSDCCPCAREPLAN_GEN_ALL_CORE_FT
PRINCIPAL DISCHARGE DIAGNOSIS  Diagnosis: GERD (gastroesophageal reflux disease)  Assessment and Plan of Treatment:       SECONDARY DISCHARGE DIAGNOSES  Diagnosis: Constipation  Assessment and Plan of Treatment:     Diagnosis: Chronic pain  Assessment and Plan of Treatment:     Diagnosis: Hepatic lesion  Assessment and Plan of Treatment: hypodensities seen in liver, too small to characterize. incompletely characterized R hepatic lobe hypodensity measuring up to 1.6cm. Recommend MRI

## 2023-02-08 ENCOUNTER — APPOINTMENT (OUTPATIENT)
Dept: PAIN MANAGEMENT | Facility: CLINIC | Age: 58
End: 2023-02-08
Payer: COMMERCIAL

## 2023-02-08 ENCOUNTER — APPOINTMENT (OUTPATIENT)
Dept: ORTHOPEDIC SURGERY | Facility: CLINIC | Age: 58
End: 2023-02-08

## 2023-02-08 VITALS — HEIGHT: 74 IN | WEIGHT: 211 LBS | BODY MASS INDEX: 27.08 KG/M2

## 2023-02-08 PROCEDURE — 20610 DRAIN/INJ JOINT/BURSA W/O US: CPT | Mod: 50

## 2023-02-08 PROCEDURE — 99214 OFFICE O/P EST MOD 30 MIN: CPT | Mod: 25

## 2023-02-08 PROCEDURE — J3490M: CUSTOM

## 2023-02-08 NOTE — PROCEDURE
[Large Joint Injection] : Large joint injection [Bilateral] : bilaterally of the [Greater Trochanteric Bursa] : greater trochanteric bursa [Pain] : pain [Inflammation] : inflammation [Alcohol] : alcohol [Ethyl Chloride sprayed topically] : ethyl chloride sprayed topically [___ cc    0.25%] : Bupivacaine (Marcaine) ~Vcc of 0.25%  [___ cc    40mg] : Triamcinolone (Kenalog) ~Vcc of 40 mg  [Risks, benefits, alternatives discussed / Verbal consent obtained] : the risks benefits, and alternatives have been discussed, and verbal consent was obtained

## 2023-02-08 NOTE — PHYSICAL EXAM
[Extension] : extension [TWNoteComboBox7] : forward flexion 75 degrees [de-identified] : extension 20 degrees [Bilateral] : hip bilaterally [5___] : adduction 5[unfilled]/5 [] : positive RAMIREZ test

## 2023-02-08 NOTE — HISTORY OF PRESENT ILLNESS
[Lower back] : lower back [8] : 8 [7] : 7 [Dull/Aching] : dull/aching [Radiating] : radiating [Sharp] : sharp [Tightness] : tightness [Constant] : constant [Sleep] : sleep [Rest] : rest [Meds] : meds [Injection therapy] : injection therapy [Walking] : walking [Bending forward] : bending forward [Disabled] : Work status: disabled [FreeTextEntry1] : 2/8/23: follow up today> he has now been authorized for lumbar surgery. The radicular pain has improved with the injections. Most of his pain is in the hips. He has numerous cases (WC, NF etc)  Pain over the bilateral trochanteric bursa.  Pain intermittent to the b/l groins. had xrays in January which were reviewed. Had severe OA in both hips. \par \par 12/21/22- follow up today.  Pain is in low back and radiates down both legs.  Injections help but wear off quick. 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  Epidurals not giving him the duration of relief we need. having numbness in the lateral thighs. now left worse than right. \par \par 10/18/2022: follow up today for caudal on 10/6/22.  Had 60% relief from injection for 3 days.  pain has returned.  Dr. Pierre recommends extension of fusion L1-L2.  \par \par 09/06/2022: follow up today.  Has been having pain in low back.  Would like TPI.  Saw Dr. Pierre for low back pain that radiates to right hip.  He recommends fusion of L1-L2.  \par \par 07/19/2022: follow up today.  Has been having worsening pain in low back.  Would like TPI today. \par Will be having neck surgery on August 4\par \par 04/25/2022: follow up today after b/l L4/5 TFESI on 3/11/22 he reports an overall 80% improvement. \par \par 3/28/22: follow up today. Has pain in the left hip and starting to get pain in the right hip. Pain in the left lateral leg as\par well.\par \par 10/18/21- F/u after left hip injection 9/27. Had 90% relief from hip injection. Had surgery with Dr. Pierre L3/4 L4/5 L5/S1 (2 years ago)\par \par 9/1/21: follow up today. Patient feels better. Would like repeat hip injection. Will give TPI to neck.\par \par 5/4/21- Patient feeling better after surgery. Still has left hip pain. 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 The last hip injection helped\par 60% so he would benefit from repeat hip injection.\par \par 2/9/21: follow up today after left hip injection on 1/29/21 pt reports near complete relief of his pain following. the\par pinching pain is now gone.\par \par 1/20/21- Patient had surgery in back in September. Doing better. Would like TPI in neck. also Dr. Pierre recommended right hip injection due to pain and arthritis in hip.\par \par 9/8/20 - follow up today after LESI 8/17/20. Patient had no relief and is now going for surgery on 9/10.\par \par 3/9/20 - Patient presents for FUV after L5-S1 LESI on 2/24/20. Reports 80% improvement.\par \par 2/3/20 - Patient complains of lower back pain that radiates down right and left leg. Patient had a LESI L5-S1 \par \par 11/11/19 with relief. Patient has been indicated for surgery by Dr. Pierre. Cannot proceed at this time given financial considerations. Still undergoing Lupron therapy. [] : no [FreeTextEntry6] : numbness [FreeTextEntry7] : both sides, buttocks and hamstrings  [FreeTextEntry9] : Stretching

## 2023-02-08 NOTE — ASSESSMENT
[FreeTextEntry1] : After discussing various treatment options with the patient including but not limited to oral medications, physical therapy, exercise, modalities as well as interventional spinal injections, we have decided with the following plan:\par \par 1) Intervention Injection Therapy:\par I personally reviewed the MRI/CT scan images and agree with the radiologist's report. The radiological findings were discussed with the patient.\par The risks, benefits, contents and alternatives to injection were explained in full to the patient. Risks outlined include but are not limited to infection,sepsis, bleeding, post-dural puncture headache, nerve damage, temporary increase in pain, syncopal episode, failure to resolve symptoms, allergic reaction, symptom recurrence, and elevation of blood sugar in diabetics. Cortisone may cause immunosuppression. Patient understands the risks. All questions were answered. After discussion of options, patient requested an injection. Information regarding the injection was given to the patient. Which medications to stop prior to the injection was explained to the patient as well.\par \par Follow up in 1-2 weeks post injection for re-evaluation. \par Continue Home exercises, stretching, activity modification, physical therapy, and conservative care.\par \par b/l hip injection \par

## 2023-02-15 ENCOUNTER — APPOINTMENT (OUTPATIENT)
Dept: PAIN MANAGEMENT | Facility: CLINIC | Age: 58
End: 2023-02-15

## 2023-02-15 LAB — SURGICAL PATHOLOGY STUDY: SIGNIFICANT CHANGE UP

## 2023-02-21 NOTE — PHYSICAL EXAM
[Extension] : extension [Bilateral] : hip bilaterally [5___] : adduction 5[unfilled]/5 [] : positive RAMIREZ test

## 2023-02-22 ENCOUNTER — APPOINTMENT (OUTPATIENT)
Dept: PAIN MANAGEMENT | Facility: CLINIC | Age: 58
End: 2023-02-22
Payer: OTHER MISCELLANEOUS

## 2023-02-22 VITALS — WEIGHT: 211 LBS | BODY MASS INDEX: 27.08 KG/M2 | HEIGHT: 74 IN

## 2023-02-22 PROCEDURE — 99072 ADDL SUPL MATRL&STAF TM PHE: CPT

## 2023-02-22 PROCEDURE — 99214 OFFICE O/P EST MOD 30 MIN: CPT

## 2023-02-26 ENCOUNTER — FORM ENCOUNTER (OUTPATIENT)
Age: 58
End: 2023-02-26

## 2023-02-27 ENCOUNTER — APPOINTMENT (OUTPATIENT)
Dept: PAIN MANAGEMENT | Facility: CLINIC | Age: 58
End: 2023-02-27

## 2023-03-01 ENCOUNTER — NON-APPOINTMENT (OUTPATIENT)
Age: 58
End: 2023-03-01

## 2023-03-15 ENCOUNTER — APPOINTMENT (OUTPATIENT)
Dept: ORTHOPEDIC SURGERY | Facility: CLINIC | Age: 58
End: 2023-03-15
Payer: OTHER MISCELLANEOUS

## 2023-03-15 PROCEDURE — 99214 OFFICE O/P EST MOD 30 MIN: CPT

## 2023-03-15 PROCEDURE — 99072 ADDL SUPL MATRL&STAF TM PHE: CPT

## 2023-03-15 NOTE — HISTORY OF PRESENT ILLNESS
[Neck] : neck [Lower back] : lower back [Work related] : work related [Gradual] : gradual [Sudden] : sudden [8] : 8 [7] : 7 [Burning] : burning [Localized] : localized [Radiating] : radiating [Sharp] : sharp [Shooting] : shooting [Stabbing] : stabbing [Tightness] : tightness [Tingling] : tingling [Constant] : constant [Household chores] : household chores [Work] : work [Rest] : rest [Meds] : meds [Sitting] : sitting [Standing] : standing [Walking] : walking [Exercising] : exercising [Stairs] : stairs [Lying in bed] : lying in bed [Not working due to injury] : Work status: not working due to injury [de-identified] :  DOI 7/2/22\par \par \par 11/11/2022: Pt here with complaint of 9 days right upper extremity radiculitis. Pt denies recent hx of trauma.\par Pt states over the past 9 days he has noted progressive right arm pain to the region of the right hand. Pain seems to be alleviated with arm elevation. There is no hx of associated weakness.\par Pt had recent C5-6-7 ACDF performed by Dr. Pierre this past August.\par Pt denies associated gait disturbance or bb dysfunction. \par \par 11/23/22: here for fu of the neck and the lower back 2/2 the WC case from 7/2 and for review of the cervical MRI - overall doing better in regards to the neck with the steroids having some numbness in the right arm - the pain in the back and legs remains  the worst - using cane - medication necessary to move around \par \par MRi C spine -\par post op acdf C5-7 \par \par 1/4/23: Here for fu on the low back - continued severe pain to the low back; radiating into the buttocks with tingling sensation into both legs; There is difficulty sleeping. He has been taking the hydrocodone for the pain which controls some of his symptoms. He also takes gabapentin - he saw pain management and is considering a SCS. \par \par xrays today:\par L spine - progressive kyphosis at L1-2 and L2-3 with lumbar kyphosis - old surgery at L3-5 is healed and fused - hardware in good position \par AP PELVIS - B hip severe hip OA \par \par 2/1/23: here for fu - plan at last was requesting surgery for the lumbar - he was in hosptial twice recently for abdominal pain - his surgery not approved at this point \par \par had temp relief with LESI \par \par 3/15/23: Here for fu - plan at last was surgery \par is set up for surgery at the end of next month \par Has seen Dr Leal \par  [] : Post Surgical Visit: no [FreeTextEntry3] : 07/02/2022 [FreeTextEntry5] : Pt was recently hospitalized for his stomach pain due to his medication, pt would like to discuss surgery auth today   [FreeTextEntry7] : right arm  [de-identified] : none

## 2023-03-15 NOTE — REVIEW OF SYSTEMS
Informed pt that he is schedule on 10/31/2018 and will be called the day before with the time and instructions. Pt verbalized understanding.   [Joint Pain] : joint pain [Joint Stiffness] : joint stiffness [Negative] : Heme/Lymph

## 2023-03-15 NOTE — DISCUSSION/SUMMARY
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 above a prior surgery - this delay in treatment leading to progressive kyphosis developing \par This has been very symptomatic for him since the 7/2/22 DOI \par he needs surgery to correct this \par I would not rec an SCS at this point given he has clear anatomic findings that should get better with surgery\par \par remains unable to go to work in this condition\par \par don’t see much hope for continued conservative care in terms of fixing this- this delay on behalf of workers comp in providing authorization leads unfortunately to continued loss of function - risk of progressive kyphosis and continued deterioration is discussed \par \par medication sent - will try some something else because the medication was bothering his stomach \par \par continues to need surgery \par cont with cane

## 2023-03-29 ENCOUNTER — OUTPATIENT (OUTPATIENT)
Dept: OUTPATIENT SERVICES | Facility: HOSPITAL | Age: 58
LOS: 1 days | Discharge: HOME | End: 2023-03-29
Payer: OTHER MISCELLANEOUS

## 2023-03-29 VITALS
OXYGEN SATURATION: 99 % | HEIGHT: 74 IN | HEART RATE: 78 BPM | WEIGHT: 219.36 LBS | DIASTOLIC BLOOD PRESSURE: 77 MMHG | SYSTOLIC BLOOD PRESSURE: 123 MMHG | TEMPERATURE: 98 F | RESPIRATION RATE: 17 BRPM

## 2023-03-29 DIAGNOSIS — Z01.812 ENCOUNTER FOR PREPROCEDURAL LABORATORY EXAMINATION: ICD-10-CM

## 2023-03-29 DIAGNOSIS — D17.9 BENIGN LIPOMATOUS NEOPLASM, UNSPECIFIED: Chronic | ICD-10-CM

## 2023-03-29 DIAGNOSIS — Z98.890 OTHER SPECIFIED POSTPROCEDURAL STATES: Chronic | ICD-10-CM

## 2023-03-29 DIAGNOSIS — Z01.818 ENCOUNTER FOR OTHER PREPROCEDURAL EXAMINATION: ICD-10-CM

## 2023-03-29 DIAGNOSIS — Z98.1 ARTHRODESIS STATUS: ICD-10-CM

## 2023-03-29 DIAGNOSIS — Z90.79 ACQUIRED ABSENCE OF OTHER GENITAL ORGAN(S): Chronic | ICD-10-CM

## 2023-03-29 DIAGNOSIS — I10 ESSENTIAL (PRIMARY) HYPERTENSION: ICD-10-CM

## 2023-03-29 DIAGNOSIS — E78.5 HYPERLIPIDEMIA, UNSPECIFIED: ICD-10-CM

## 2023-03-29 LAB
ALBUMIN SERPL ELPH-MCNC: 4 G/DL — SIGNIFICANT CHANGE UP (ref 3.3–5)
ALP SERPL-CCNC: 61 U/L — SIGNIFICANT CHANGE UP (ref 40–120)
ALT FLD-CCNC: 33 U/L — SIGNIFICANT CHANGE UP (ref 12–78)
ANION GAP SERPL CALC-SCNC: 4 MMOL/L — LOW (ref 5–17)
APTT BLD: 31.5 SEC — SIGNIFICANT CHANGE UP (ref 27.5–35.5)
AST SERPL-CCNC: 13 U/L — LOW (ref 15–37)
BASOPHILS # BLD AUTO: 0.02 K/UL — SIGNIFICANT CHANGE UP (ref 0–0.2)
BASOPHILS NFR BLD AUTO: 0.3 % — SIGNIFICANT CHANGE UP (ref 0–2)
BILIRUB SERPL-MCNC: 0.4 MG/DL — SIGNIFICANT CHANGE UP (ref 0.2–1.2)
BLD GP AB SCN SERPL QL: SIGNIFICANT CHANGE UP
BUN SERPL-MCNC: 19 MG/DL — SIGNIFICANT CHANGE UP (ref 7–23)
CALCIUM SERPL-MCNC: 9.4 MG/DL — SIGNIFICANT CHANGE UP (ref 8.5–10.1)
CHLORIDE SERPL-SCNC: 103 MMOL/L — SIGNIFICANT CHANGE UP (ref 96–108)
CO2 SERPL-SCNC: 30 MMOL/L — SIGNIFICANT CHANGE UP (ref 22–31)
CREAT SERPL-MCNC: 1.42 MG/DL — HIGH (ref 0.5–1.3)
EGFR: 58 ML/MIN/1.73M2 — LOW
EOSINOPHIL # BLD AUTO: 0.24 K/UL — SIGNIFICANT CHANGE UP (ref 0–0.5)
EOSINOPHIL NFR BLD AUTO: 3.4 % — SIGNIFICANT CHANGE UP (ref 0–6)
GLUCOSE SERPL-MCNC: 84 MG/DL — SIGNIFICANT CHANGE UP (ref 70–99)
HCT VFR BLD CALC: 42.7 % — SIGNIFICANT CHANGE UP (ref 39–50)
HGB BLD-MCNC: 14.4 G/DL — SIGNIFICANT CHANGE UP (ref 13–17)
IMM GRANULOCYTES NFR BLD AUTO: 0.4 % — SIGNIFICANT CHANGE UP (ref 0–0.9)
INR BLD: 0.97 RATIO — SIGNIFICANT CHANGE UP (ref 0.88–1.16)
LYMPHOCYTES # BLD AUTO: 1.28 K/UL — SIGNIFICANT CHANGE UP (ref 1–3.3)
LYMPHOCYTES # BLD AUTO: 18.2 % — SIGNIFICANT CHANGE UP (ref 13–44)
MCHC RBC-ENTMCNC: 30.3 PG — SIGNIFICANT CHANGE UP (ref 27–34)
MCHC RBC-ENTMCNC: 33.7 G/DL — SIGNIFICANT CHANGE UP (ref 32–36)
MCV RBC AUTO: 89.9 FL — SIGNIFICANT CHANGE UP (ref 80–100)
MONOCYTES # BLD AUTO: 0.55 K/UL — SIGNIFICANT CHANGE UP (ref 0–0.9)
MONOCYTES NFR BLD AUTO: 7.8 % — SIGNIFICANT CHANGE UP (ref 2–14)
NEUTROPHILS # BLD AUTO: 4.93 K/UL — SIGNIFICANT CHANGE UP (ref 1.8–7.4)
NEUTROPHILS NFR BLD AUTO: 69.9 % — SIGNIFICANT CHANGE UP (ref 43–77)
NRBC # BLD: 0 /100 WBCS — SIGNIFICANT CHANGE UP (ref 0–0)
PLATELET # BLD AUTO: 259 K/UL — SIGNIFICANT CHANGE UP (ref 150–400)
POTASSIUM SERPL-MCNC: 4.6 MMOL/L — SIGNIFICANT CHANGE UP (ref 3.5–5.3)
POTASSIUM SERPL-SCNC: 4.6 MMOL/L — SIGNIFICANT CHANGE UP (ref 3.5–5.3)
PROT SERPL-MCNC: 7.8 GM/DL — SIGNIFICANT CHANGE UP (ref 6–8.3)
PROTHROM AB SERPL-ACNC: 11.6 SEC — SIGNIFICANT CHANGE UP (ref 10.5–13.4)
RBC # BLD: 4.75 M/UL — SIGNIFICANT CHANGE UP (ref 4.2–5.8)
RBC # FLD: 12.8 % — SIGNIFICANT CHANGE UP (ref 10.3–14.5)
SODIUM SERPL-SCNC: 137 MMOL/L — SIGNIFICANT CHANGE UP (ref 135–145)
WBC # BLD: 7.05 K/UL — SIGNIFICANT CHANGE UP (ref 3.8–10.5)
WBC # FLD AUTO: 7.05 K/UL — SIGNIFICANT CHANGE UP (ref 3.8–10.5)

## 2023-03-29 PROCEDURE — 93010 ELECTROCARDIOGRAM REPORT: CPT

## 2023-03-29 RX ORDER — LISINOPRIL 2.5 MG/1
1 TABLET ORAL
Qty: 0 | Refills: 0 | DISCHARGE

## 2023-03-29 RX ORDER — ACETAMINOPHEN WITH CODEINE 300MG-30MG
1 TABLET ORAL
Qty: 0 | Refills: 0 | DISCHARGE

## 2023-03-29 NOTE — H&P PST ADULT - ATTENDING COMMENTS
prior fusion with adjacent segment stenosis - indicated for extension of decompression and fusion   high risk surgery with high chance of complication   r/b/e of the procedure discussed   questions answered   well informed and would like to proceed

## 2023-03-29 NOTE — H&P PST ADULT - PROBLEM SELECTOR PLAN 1
Posterior spinal fusion T10-L5, decompression laminectomy L1-2, revision and reinsertion of hardware L3-5  labs - cbc,pt/ptt,bmp,t&s,nose cx,ekg  M/C required  preop 3 day hibiclens instruction reviewed and given .instructed on if  nose cx positive use mupuricin 5 days and checklist given  take routine meds DOS with sips of water. avoid NSAID and OTC supplements. verbalized understanding  information on proper nutrition , increase protein and better food choices provided in packet   Anesthesiologist to review PST labs, EKG, required clearances and optimization for surgery.

## 2023-03-29 NOTE — H&P PST ADULT - HISTORY OF PRESENT ILLNESS
58M pmh prostate CA (s/p sx/RT/lupron 2018), HTN, GERD, c/o lower back pain after injury while at work on 7-2-2022 found to have arthrodesis here for PST for scheduled posterior spinal fusion, decompression laminectomy L1-L2 revision and reinsertion of hardware L3-5  This patient denies any fever, cough, sob, flu like symptoms or travel outside of the [place] in the past 30 days

## 2023-03-29 NOTE — H&P PST ADULT - ASSESSMENT
58M pmh prostate CA (s/p sx/RT/lupron 2018), HTN, GERD, c/o lower back pain after injury while at work on 2022 found to have arthrodesis here for PST for scheduled posterior spinal fusion, decompression laminectomy L1-L2 revision and reinsertion of hardware L3-5  CAPRINI SCORE [CLOT]    AGE RELATED RISK FACTORS                                                       MOBILITY RELATED FACTORS  [xc ] Age 41-60 years                                            (1 Point)                  [ ] Bed rest                                                        (1 Point)  [ ] Age: 61-74 years                                           (2 Points)                 [ ] Plaster cast                                                   (2 Points)  [ ] Age= 75 years                                              (3 Points)                 [ ] Bed bound for more than 72 hours                 (2 Points)    DISEASE RELATED RISK FACTORS                                               GENDER SPECIFIC FACTORS  [ ] Edema in the lower extremities                       (1 Point)                  [ ] Pregnancy                                                     (1 Point)  [ ] Varicose veins                                               (1 Point)                  [ ] Post-partum < 6 weeks                                   (1 Point)             [ x] BMI > 25 Kg/m2                                            (1 Point)                  [ ] Hormonal therapy  or oral contraception          (1 Point)                 [ ] Sepsis (in the previous month)                        (1 Point)                  [ ] History of pregnancy complications                 (1 point)  [ ] Pneumonia or serious lung disease                                               [ ] Unexplained or recurrent                     (1 Point)           (in the previous month)                               (1 Point)  [ ] Abnormal pulmonary function test                     (1 Point)                 SURGERY RELATED RISK FACTORS  [ ] Acute myocardial infarction                              (1 Point)                 [ ]  Section                                             (1 Point)  [ ] Congestive heart failure (in the previous month)  (1 Point)               [ ] Minor surgery                                                  (1 Point)   [ ] Inflammatory bowel disease                             (1 Point)                 [ ] Arthroscopic surgery                                        (2 Points)  [ ] Central venous access                                      (2 Points)                [x ] General surgery lasting more than 45 minutes   (2 Points)       [ ] Stroke (in the previous month)                          (5 Points)               [ ] Elective arthroplasty                                         (5 Points)                                                                                                                                               HEMATOLOGY RELATED FACTORS                                                 TRAUMA RELATED RISK FACTORS  [ ] Prior episodes of VTE                                     (3 Points)                [ ] Fracture of the hip, pelvis, or leg                       (5 Points)  [ ] Positive family history for VTE                         (3 Points)                 [ ] Acute spinal cord injury (in the previous month)  (5 Points)  [ ] Prothrombin 71655 A                                     (3 Points)                 [ ] Paralysis  (less than 1 month)                             (5 Points)  [ ] Factor V Leiden                                             (3 Points)                  [ ] Multiple Trauma within 1 month                        (5 Points)  [ ] Lupus anticoagulants                                     (3 Points)                                                           [ ] Anticardiolipin antibodies                               (3 Points)                                                       [ ] High homocysteine in the blood                      (3 Points)                                             [ ] Other congenital or acquired thrombophilia      (3 Points)                                                [ ] Heparin induced thrombocytopenia                  (3 Points)                                          Total Score [      4    ]    Caprini Score 0 - 2:  Low Risk, No VTE Prophylaxis required for most patients, encourage ambulation  Caprini Score 3 - 6:  At Risk, pharmacologic VTE prophylaxis is indicated for most patients (in the absence of a contraindication)  Caprini Score Greater than or = 7:  High Risk, pharmacologic VTE prophylaxis is indicated for most patients (in the absence of a contraindication)

## 2023-03-30 LAB
A1C WITH ESTIMATED AVERAGE GLUCOSE RESULT: 5.4 % — SIGNIFICANT CHANGE UP (ref 4–5.6)
ESTIMATED AVERAGE GLUCOSE: 108 MG/DL — SIGNIFICANT CHANGE UP (ref 68–114)
MRSA PCR RESULT.: SIGNIFICANT CHANGE UP
S AUREUS DNA NOSE QL NAA+PROBE: DETECTED
VIT D25+D1,25 OH+D1,25 PNL SERPL-MCNC: 40.2 PG/ML — SIGNIFICANT CHANGE UP (ref 19.9–79.3)

## 2023-03-30 RX ORDER — MUPIROCIN 20 MG/G
1 OINTMENT TOPICAL
Qty: 22 | Refills: 0
Start: 2023-03-30 | End: 2023-04-03

## 2023-04-03 ENCOUNTER — APPOINTMENT (OUTPATIENT)
Dept: PAIN MANAGEMENT | Facility: CLINIC | Age: 58
End: 2023-04-03
Payer: OTHER MISCELLANEOUS

## 2023-04-03 VITALS — HEIGHT: 74 IN | BODY MASS INDEX: 27.72 KG/M2 | WEIGHT: 216 LBS

## 2023-04-03 PROCEDURE — 99213 OFFICE O/P EST LOW 20 MIN: CPT

## 2023-04-03 NOTE — HISTORY OF PRESENT ILLNESS
[Lower back] : lower back [Work related] : work related [Dull/Aching] : dull/aching [Radiating] : radiating [Sharp] : sharp [Constant] : constant [Sleep] : sleep [Rest] : rest [Meds] : meds [Injection therapy] : injection therapy [Walking] : walking [Bending forward] : bending forward [Disabled] : Work status: disabled [7] : 7 [5] : 5 [FreeTextEntry1] : 04/3/23: follow up today. Has surgery scheduled for 4/18/23 with Dr. Pierre.  Would wait at least 2 weeks to do hip injections. Taking Tizanidine PRN. \par \par 02/22/23: follow up today after caudal injection 1/19/23 with 50% relief.  Will give TPI \par \par 2/8/23: follow up today> he has now been authorized for lumbar surgery. The radicular pain has improved with the injections. Most of his pain is in the hips. He has numerous cases (WC, NF etc)  Pain over the bilateral trochanteric bursa.  Pain intermittent to the b/l groins. had xrays in January which were reviewed. Had severe OA in both hips. \par \par 12/21/22- follow up today.  Pain is in low back and radiates down both legs.  Injections help but wear off quick. 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  Epidurals not giving him the duration of relief we need. having numbness in the lateral thighs. now left worse than right. \par \par 10/18/2022: follow up today for caudal on 10/6/22.  Had 60% relief from injection for 3 days.  pain has returned.  Dr. Pierre recommends extension of fusion L1-L2.  \par \par 09/06/2022: follow up today.  Has been having pain in low back.  Would like TPI.  Saw Dr. Pierre for low back pain that radiates to right hip.  He recommends fusion of L1-L2.  \par \par 07/19/2022: follow up today.  Has been having worsening pain in low back.  Would like TPI today. \par Will be having neck surgery on August 4\par \par 04/25/2022: follow up today after b/l L4/5 TFESI on 3/11/22 he reports an overall 80% improvement. \par \par 3/28/22: follow up today. Has pain in the left hip and starting to get pain in the right hip. Pain in the left lateral leg as\par well.\par \par 10/18/21- F/u after left hip injection 9/27. Had 90% relief from hip injection. Had surgery with Dr. Pierre L3/4 L4/5 L5/S1 (2 years ago)\par \par 9/1/21: follow up today. Patient feels better. Would like repeat hip injection. Will give TPI to neck.\par \par 5/4/21- Patient feeling better after surgery. Still has left hip pain. 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 The last hip injection helped\par 60% so he would benefit from repeat hip injection.\par \par 2/9/21: follow up today after left hip injection on 1/29/21 pt reports near complete relief of his pain following. the\par pinching pain is now gone.\par \par 1/20/21- Patient had surgery in back in September. Doing better. Would like TPI in neck. also Dr. Pierre recommended right hip injection due to pain and arthritis in hip.\par \par 9/8/20 - follow up today after LESI 8/17/20. Patient had no relief and is now going for surgery on 9/10.\par \par 3/9/20 - Patient presents for FUV after L5-S1 LESI on 2/24/20. Reports 80% improvement.\par \par 2/3/20 - Patient complains of lower back pain that radiates down right and left leg. Patient had a LESI L5-S1 \par \par 11/11/19 with relief. Patient has been indicated for surgery by Dr. Pierre. Cannot proceed at this time given financial considerations. Still undergoing Lupron therapy. [] : no [FreeTextEntry3] : 7/2/22 [FreeTextEntry6] : numbness [FreeTextEntry7] : both sides, buttocks and hamstrings  [FreeTextEntry9] : Stretching

## 2023-04-03 NOTE — ASSESSMENT
[FreeTextEntry1] : After discussing various treatment options with the patient including but not limited to oral medications, physical therapy, exercise, modalities as well as interventional spinal injections, we have decided with the following plan:\par \par 1) Intervention Injection Therapy:\par I personally reviewed the MRI/CT scan images and agree with the radiologist's report. The radiological findings were discussed with the patient.\par The risks, benefits, contents and alternatives to injection were explained in full to the patient. Risks outlined include but are not limited to infection,sepsis, bleeding, post-dural puncture headache, nerve damage, temporary increase in pain, syncopal episode, failure to resolve symptoms, allergic reaction, symptom recurrence, and elevation of blood sugar in diabetics. Cortisone may cause immunosuppression. Patient understands the risks. All questions were answered. After discussion of options, patient requested an injection. Information regarding the injection was given to the patient. Which medications to stop prior to the injection was explained to the patient as well.\par \par Follow up in 1-2 weeks post injection for re-evaluation. \par Continue Home exercises, stretching, activity modification, physical therapy, and conservative care.\par \par b/l hip injection - order placed previously. will f/u after surgery. \par \par 2) Patient is stable on current regimen of medication. Denies any side of effects. Risks and benefits discussed. Functional improvement noted. At least >30% improvement of pain. Will renew patient medication.

## 2023-04-05 ENCOUNTER — APPOINTMENT (OUTPATIENT)
Dept: ORTHOPEDIC SURGERY | Facility: CLINIC | Age: 58
End: 2023-04-05
Payer: OTHER MISCELLANEOUS

## 2023-04-05 VITALS — BODY MASS INDEX: 28.11 KG/M2 | WEIGHT: 219 LBS | HEIGHT: 74 IN

## 2023-04-05 PROCEDURE — 99214 OFFICE O/P EST MOD 30 MIN: CPT

## 2023-04-05 NOTE — HISTORY OF PRESENT ILLNESS
[Neck] : neck [Lower back] : lower back [Work related] : work related [Gradual] : gradual [Sudden] : sudden [8] : 8 [7] : 7 [Burning] : burning [Localized] : localized [Radiating] : radiating [Sharp] : sharp [Shooting] : shooting [Stabbing] : stabbing [Tightness] : tightness [Tingling] : tingling [Constant] : constant [Household chores] : household chores [Work] : work [Rest] : rest [Meds] : meds [Sitting] : sitting [Standing] : standing [Walking] : walking [Exercising] : exercising [Stairs] : stairs [Lying in bed] : lying in bed [Not working due to injury] : Work status: not working due to injury [Mid-back] : mid-back [de-identified] :  DOI 7/2/22\par \par \par 11/11/2022: Pt here with complaint of 9 days right upper extremity radiculitis. Pt denies recent hx of trauma.\par Pt states over the past 9 days he has noted progressive right arm pain to the region of the right hand. Pain seems to be alleviated with arm elevation. There is no hx of associated weakness.\par Pt had recent C5-6-7 ACDF performed by Dr. Pierre this past August.\par Pt denies associated gait disturbance or bb dysfunction. \par \par 11/23/22: here for fu of the neck and the lower back 2/2 the WC case from 7/2 and for review of the cervical MRI - overall doing better in regards to the neck with the steroids having some numbness in the right arm - the pain in the back and legs remains  the worst - using cane - medication necessary to move around \par \par MRi C spine -\par post op acdf C5-7 \par \par 1/4/23: Here for fu on the low back - continued severe pain to the low back; radiating into the buttocks with tingling sensation into both legs; There is difficulty sleeping. He has been taking the hydrocodone for the pain which controls some of his symptoms. He also takes gabapentin - he saw pain management and is considering a SCS. \par \par xrays today:\par L spine - progressive kyphosis at L1-2 and L2-3 with lumbar kyphosis - old surgery at L3-5 is healed and fused - hardware in good position \par AP PELVIS - B hip severe hip OA \par \par 2/1/23: here for fu - plan at last was requesting surgery for the lumbar - he was in hosptial twice recently for abdominal pain - his surgery not approved at this point \par \par had temp relief with [facility] \par \par 3/15/23: Here for fu - plan at last was surgery and he is scheduled for the fusion and decompression. \par is set up for surgery at the end of next month \par Has seen Dr Leal \par \par \par 4/05/2023: Here for fu on the low back; \par is set up for surgery\par \par symptoms remains in the lower back \par is working at this point  \par  [] : Post Surgical Visit: no [FreeTextEntry3] : 07/02/2022 [FreeTextEntry5] : Pt was recently hospitalized for his stomach pain due to his medication, pt would like to discuss surgery auth today   [FreeTextEntry7] : right arm  [de-identified] : meds

## 2023-04-05 NOTE — DISCUSSION/SUMMARY
[Surgical risks reviewed] : Surgical risks reviewed [de-identified] : reviewed the case/imaging with him today - now with progressive kyphosis developed above the prior surgery - he continues to deteriorate - cant stand up straight at this point \par \par questions answered regarding upcoming surgery - he is on the schedule for the next few weeks \par \par cont with cane

## 2023-04-17 ENCOUNTER — TRANSCRIPTION ENCOUNTER (OUTPATIENT)
Age: 58
End: 2023-04-17

## 2023-04-18 ENCOUNTER — APPOINTMENT (OUTPATIENT)
Dept: ORTHOPEDIC SURGERY | Facility: HOSPITAL | Age: 58
End: 2023-04-18
Payer: OTHER MISCELLANEOUS

## 2023-04-18 ENCOUNTER — INPATIENT (INPATIENT)
Facility: HOSPITAL | Age: 58
LOS: 7 days | Discharge: HOME | End: 2023-04-26
Attending: ORTHOPAEDIC SURGERY | Admitting: ORTHOPAEDIC SURGERY
Payer: OTHER MISCELLANEOUS

## 2023-04-18 ENCOUNTER — TRANSCRIPTION ENCOUNTER (OUTPATIENT)
Age: 58
End: 2023-04-18

## 2023-04-18 VITALS
RESPIRATION RATE: 12 BRPM | HEART RATE: 88 BPM | TEMPERATURE: 98 F | HEIGHT: 74 IN | OXYGEN SATURATION: 99 % | DIASTOLIC BLOOD PRESSURE: 81 MMHG | WEIGHT: 219.36 LBS | SYSTOLIC BLOOD PRESSURE: 122 MMHG

## 2023-04-18 DIAGNOSIS — Z98.890 OTHER SPECIFIED POSTPROCEDURAL STATES: Chronic | ICD-10-CM

## 2023-04-18 DIAGNOSIS — Z90.79 ACQUIRED ABSENCE OF OTHER GENITAL ORGAN(S): Chronic | ICD-10-CM

## 2023-04-18 DIAGNOSIS — D17.9 BENIGN LIPOMATOUS NEOPLASM, UNSPECIFIED: Chronic | ICD-10-CM

## 2023-04-18 LAB
ANION GAP SERPL CALC-SCNC: 5 MMOL/L — SIGNIFICANT CHANGE UP (ref 5–17)
BASOPHILS # BLD AUTO: 0.01 K/UL — SIGNIFICANT CHANGE UP (ref 0–0.2)
BASOPHILS NFR BLD AUTO: 0.1 % — SIGNIFICANT CHANGE UP (ref 0–2)
BUN SERPL-MCNC: 20 MG/DL — SIGNIFICANT CHANGE UP (ref 7–23)
CALCIUM SERPL-MCNC: 8.2 MG/DL — LOW (ref 8.5–10.1)
CHLORIDE SERPL-SCNC: 107 MMOL/L — SIGNIFICANT CHANGE UP (ref 96–108)
CO2 SERPL-SCNC: 22 MMOL/L — SIGNIFICANT CHANGE UP (ref 22–31)
CREAT SERPL-MCNC: 1.64 MG/DL — HIGH (ref 0.5–1.3)
EGFR: 48 ML/MIN/1.73M2 — LOW
EOSINOPHIL # BLD AUTO: 0 K/UL — SIGNIFICANT CHANGE UP (ref 0–0.5)
EOSINOPHIL NFR BLD AUTO: 0 % — SIGNIFICANT CHANGE UP (ref 0–6)
GLUCOSE SERPL-MCNC: 165 MG/DL — HIGH (ref 70–99)
HCT VFR BLD CALC: 37.2 % — LOW (ref 39–50)
HGB BLD-MCNC: 12.7 G/DL — LOW (ref 13–17)
IMM GRANULOCYTES NFR BLD AUTO: 0.5 % — SIGNIFICANT CHANGE UP (ref 0–0.9)
LYMPHOCYTES # BLD AUTO: 0.42 K/UL — LOW (ref 1–3.3)
LYMPHOCYTES # BLD AUTO: 3.1 % — LOW (ref 13–44)
MCHC RBC-ENTMCNC: 30.7 PG — SIGNIFICANT CHANGE UP (ref 27–34)
MCHC RBC-ENTMCNC: 34.1 G/DL — SIGNIFICANT CHANGE UP (ref 32–36)
MCV RBC AUTO: 89.9 FL — SIGNIFICANT CHANGE UP (ref 80–100)
MONOCYTES # BLD AUTO: 0.28 K/UL — SIGNIFICANT CHANGE UP (ref 0–0.9)
MONOCYTES NFR BLD AUTO: 2.1 % — SIGNIFICANT CHANGE UP (ref 2–14)
NEUTROPHILS # BLD AUTO: 12.8 K/UL — HIGH (ref 1.8–7.4)
NEUTROPHILS NFR BLD AUTO: 94.2 % — HIGH (ref 43–77)
NRBC # BLD: 0 /100 WBCS — SIGNIFICANT CHANGE UP (ref 0–0)
PLATELET # BLD AUTO: 226 K/UL — SIGNIFICANT CHANGE UP (ref 150–400)
POTASSIUM SERPL-MCNC: 6.1 MMOL/L — HIGH (ref 3.5–5.3)
POTASSIUM SERPL-SCNC: 6.1 MMOL/L — HIGH (ref 3.5–5.3)
RBC # BLD: 4.14 M/UL — LOW (ref 4.2–5.8)
RBC # FLD: 12.6 % — SIGNIFICANT CHANGE UP (ref 10.3–14.5)
SODIUM SERPL-SCNC: 134 MMOL/L — LOW (ref 135–145)
WBC # BLD: 13.58 K/UL — HIGH (ref 3.8–10.5)
WBC # FLD AUTO: 13.58 K/UL — HIGH (ref 3.8–10.5)

## 2023-04-18 PROCEDURE — 22216 INCIS ADDL SPINE SEGMENT: CPT | Mod: 62

## 2023-04-18 PROCEDURE — 22844 INSERT SPINE FIXATION DEVICE: CPT | Mod: 62

## 2023-04-18 PROCEDURE — 22849 REINSERT SPINAL FIXATION: CPT | Mod: 62

## 2023-04-18 PROCEDURE — 22612 ARTHRD PST TQ 1NTRSPC LUMBAR: CPT | Mod: 62

## 2023-04-18 PROCEDURE — 63047 LAM FACETEC & FORAMOT LUMBAR: CPT | Mod: 62

## 2023-04-18 PROCEDURE — 22614 ARTHRD PST TQ 1NTRSPC EA ADD: CPT | Mod: 62

## 2023-04-18 PROCEDURE — 20930 SP BONE ALGRFT MORSEL ADD-ON: CPT

## 2023-04-18 PROCEDURE — 22214 INCIS 1 VERTEBRAL SEG LUMBAR: CPT | Mod: 62

## 2023-04-18 PROCEDURE — 63048 LAM FACETEC &FORAMOT EA ADDL: CPT | Mod: 62

## 2023-04-18 PROCEDURE — 22830 EXPLORATION OF SPINAL FUSION: CPT | Mod: 62

## 2023-04-18 PROCEDURE — 20936 SP BONE AGRFT LOCAL ADD-ON: CPT

## 2023-04-18 DEVICE — GRAFT ATTRAX PUTTY 10CC: Type: IMPLANTABLE DEVICE | Status: FUNCTIONAL

## 2023-04-18 DEVICE — BONE WAX 2.5GM: Type: IMPLANTABLE DEVICE | Status: FUNCTIONAL

## 2023-04-18 DEVICE — SURGIFLO MATRIX WITH THROMBIN KIT: Type: IMPLANTABLE DEVICE | Status: FUNCTIONAL

## 2023-04-18 DEVICE — SCREW LOCKG OPEN TULIP RELINE 5.5MM: Type: IMPLANTABLE DEVICE | Status: FUNCTIONAL

## 2023-04-18 DEVICE — GRAFT BONE INFUSE KIT LG: Type: IMPLANTABLE DEVICE | Status: FUNCTIONAL

## 2023-04-18 DEVICE — ROD BENDING DIGITIZER: Type: IMPLANTABLE DEVICE | Status: FUNCTIONAL

## 2023-04-18 DEVICE — SCREW RELINEO POLY 6.5X45MM: Type: IMPLANTABLE DEVICE | Status: FUNCTIONAL

## 2023-04-18 DEVICE — IMPLANTABLE DEVICE: Type: IMPLANTABLE DEVICE | Status: FUNCTIONAL

## 2023-04-18 RX ORDER — SODIUM CHLORIDE 9 MG/ML
1000 INJECTION, SOLUTION INTRAVENOUS
Refills: 0 | Status: DISCONTINUED | OUTPATIENT
Start: 2023-04-18 | End: 2023-04-23

## 2023-04-18 RX ORDER — CEFAZOLIN SODIUM 1 G
2000 VIAL (EA) INJECTION EVERY 8 HOURS
Refills: 0 | Status: COMPLETED | OUTPATIENT
Start: 2023-04-18 | End: 2023-04-19

## 2023-04-18 RX ORDER — LISINOPRIL 2.5 MG/1
10 TABLET ORAL DAILY
Refills: 0 | Status: DISCONTINUED | OUTPATIENT
Start: 2023-04-18 | End: 2023-04-26

## 2023-04-18 RX ORDER — OXYCODONE HYDROCHLORIDE 5 MG/1
10 TABLET ORAL EVERY 4 HOURS
Refills: 0 | Status: DISCONTINUED | OUTPATIENT
Start: 2023-04-18 | End: 2023-04-19

## 2023-04-18 RX ORDER — FOLIC ACID 0.8 MG
1 TABLET ORAL DAILY
Refills: 0 | Status: DISCONTINUED | OUTPATIENT
Start: 2023-04-18 | End: 2023-04-26

## 2023-04-18 RX ORDER — SODIUM CHLORIDE 9 MG/ML
1000 INJECTION INTRAMUSCULAR; INTRAVENOUS; SUBCUTANEOUS ONCE
Refills: 0 | Status: COMPLETED | OUTPATIENT
Start: 2023-04-18 | End: 2023-04-18

## 2023-04-18 RX ORDER — ASCORBIC ACID 60 MG
500 TABLET,CHEWABLE ORAL
Refills: 0 | Status: DISCONTINUED | OUTPATIENT
Start: 2023-04-18 | End: 2023-04-26

## 2023-04-18 RX ORDER — MONTELUKAST 4 MG/1
10 TABLET, CHEWABLE ORAL DAILY
Refills: 0 | Status: DISCONTINUED | OUTPATIENT
Start: 2023-04-18 | End: 2023-04-26

## 2023-04-18 RX ORDER — CYCLOBENZAPRINE HYDROCHLORIDE 10 MG/1
10 TABLET, FILM COATED ORAL THREE TIMES A DAY
Refills: 0 | Status: DISCONTINUED | OUTPATIENT
Start: 2023-04-18 | End: 2023-04-26

## 2023-04-18 RX ORDER — DIAZEPAM 5 MG
5 TABLET ORAL EVERY 12 HOURS
Refills: 0 | Status: DISCONTINUED | OUTPATIENT
Start: 2023-04-18 | End: 2023-04-25

## 2023-04-18 RX ORDER — PANTOPRAZOLE SODIUM 20 MG/1
40 TABLET, DELAYED RELEASE ORAL
Refills: 0 | Status: DISCONTINUED | OUTPATIENT
Start: 2023-04-18 | End: 2023-04-26

## 2023-04-18 RX ORDER — SENNA PLUS 8.6 MG/1
2 TABLET ORAL AT BEDTIME
Refills: 0 | Status: DISCONTINUED | OUTPATIENT
Start: 2023-04-18 | End: 2023-04-26

## 2023-04-18 RX ORDER — SODIUM CHLORIDE 9 MG/ML
1000 INJECTION, SOLUTION INTRAVENOUS
Refills: 0 | Status: DISCONTINUED | OUTPATIENT
Start: 2023-04-18 | End: 2023-04-18

## 2023-04-18 RX ORDER — LISINOPRIL 2.5 MG/1
20 TABLET ORAL DAILY
Refills: 0 | Status: DISCONTINUED | OUTPATIENT
Start: 2023-04-18 | End: 2023-04-18

## 2023-04-18 RX ORDER — OXYCODONE HYDROCHLORIDE 5 MG/1
15 TABLET ORAL EVERY 4 HOURS
Refills: 0 | Status: DISCONTINUED | OUTPATIENT
Start: 2023-04-18 | End: 2023-04-19

## 2023-04-18 RX ORDER — MAGNESIUM HYDROXIDE 400 MG/1
30 TABLET, CHEWABLE ORAL EVERY 12 HOURS
Refills: 0 | Status: DISCONTINUED | OUTPATIENT
Start: 2023-04-18 | End: 2023-04-26

## 2023-04-18 RX ORDER — OXYBUTYNIN CHLORIDE 5 MG
10 TABLET ORAL DAILY
Refills: 0 | Status: DISCONTINUED | OUTPATIENT
Start: 2023-04-18 | End: 2023-04-26

## 2023-04-18 RX ORDER — HYDROMORPHONE HYDROCHLORIDE 2 MG/ML
1 INJECTION INTRAMUSCULAR; INTRAVENOUS; SUBCUTANEOUS
Refills: 0 | Status: DISCONTINUED | OUTPATIENT
Start: 2023-04-18 | End: 2023-04-18

## 2023-04-18 RX ORDER — ATORVASTATIN CALCIUM 80 MG/1
40 TABLET, FILM COATED ORAL AT BEDTIME
Refills: 0 | Status: DISCONTINUED | OUTPATIENT
Start: 2023-04-18 | End: 2023-04-26

## 2023-04-18 RX ORDER — HYDROMORPHONE HYDROCHLORIDE 2 MG/ML
1 INJECTION INTRAMUSCULAR; INTRAVENOUS; SUBCUTANEOUS EVERY 6 HOURS
Refills: 0 | Status: DISCONTINUED | OUTPATIENT
Start: 2023-04-18 | End: 2023-04-19

## 2023-04-18 RX ADMIN — ATORVASTATIN CALCIUM 40 MILLIGRAM(S): 80 TABLET, FILM COATED ORAL at 21:35

## 2023-04-18 RX ADMIN — Medication 100 MILLIGRAM(S): at 20:03

## 2023-04-18 RX ADMIN — SODIUM CHLORIDE 75 MILLILITER(S): 9 INJECTION, SOLUTION INTRAVENOUS at 16:46

## 2023-04-18 RX ADMIN — Medication 1 TABLET(S): at 21:35

## 2023-04-18 RX ADMIN — OXYCODONE HYDROCHLORIDE 15 MILLIGRAM(S): 5 TABLET ORAL at 20:39

## 2023-04-18 RX ADMIN — HYDROMORPHONE HYDROCHLORIDE 1 MILLIGRAM(S): 2 INJECTION INTRAMUSCULAR; INTRAVENOUS; SUBCUTANEOUS at 18:45

## 2023-04-18 RX ADMIN — HYDROMORPHONE HYDROCHLORIDE 1 MILLIGRAM(S): 2 INJECTION INTRAMUSCULAR; INTRAVENOUS; SUBCUTANEOUS at 16:34

## 2023-04-18 RX ADMIN — OXYCODONE HYDROCHLORIDE 15 MILLIGRAM(S): 5 TABLET ORAL at 21:35

## 2023-04-18 RX ADMIN — MAGNESIUM HYDROXIDE 30 MILLILITER(S): 400 TABLET, CHEWABLE ORAL at 21:35

## 2023-04-18 RX ADMIN — CYCLOBENZAPRINE HYDROCHLORIDE 10 MILLIGRAM(S): 10 TABLET, FILM COATED ORAL at 20:39

## 2023-04-18 RX ADMIN — HYDROMORPHONE HYDROCHLORIDE 1 MILLIGRAM(S): 2 INJECTION INTRAMUSCULAR; INTRAVENOUS; SUBCUTANEOUS at 16:54

## 2023-04-18 RX ADMIN — HYDROMORPHONE HYDROCHLORIDE 1 MILLIGRAM(S): 2 INJECTION INTRAMUSCULAR; INTRAVENOUS; SUBCUTANEOUS at 18:30

## 2023-04-18 RX ADMIN — HYDROMORPHONE HYDROCHLORIDE 1 MILLIGRAM(S): 2 INJECTION INTRAMUSCULAR; INTRAVENOUS; SUBCUTANEOUS at 16:19

## 2023-04-18 RX ADMIN — SODIUM CHLORIDE 75 MILLILITER(S): 9 INJECTION, SOLUTION INTRAVENOUS at 18:26

## 2023-04-18 RX ADMIN — SODIUM CHLORIDE 500 MILLILITER(S): 9 INJECTION INTRAMUSCULAR; INTRAVENOUS; SUBCUTANEOUS at 21:33

## 2023-04-18 RX ADMIN — HYDROMORPHONE HYDROCHLORIDE 1 MILLIGRAM(S): 2 INJECTION INTRAMUSCULAR; INTRAVENOUS; SUBCUTANEOUS at 16:46

## 2023-04-18 RX ADMIN — SENNA PLUS 2 TABLET(S): 8.6 TABLET ORAL at 21:43

## 2023-04-18 NOTE — DISCHARGE NOTE PROVIDER - NSDCMRMEDTOKEN_GEN_ALL_CORE_FT
cyclobenzaprine 10 mg oral tablet: 1 tab(s) orally every 8 hours MDD:3  lisinopril-hydrochlorothiazide 20 mg-25 mg oral tablet: 1 orally once a day  MiraLax oral powder for reconstitution: 17 gram(s) orally once a day (at bedtime)  montelukast 10 mg oral tablet: 1 orally once a day  Multiple Vitamins oral tablet: 1 tab(s) orally once a day  mupirocin 2% topical ointment: Apply topically to affected area 2 times a day  oxybutynin 10 mg/24 hr oral tablet, extended release: 1 tab(s) orally once a day  Protonix 40 mg oral delayed release tablet: 1 tab(s) orally once a day  rosuvastatin 10 mg oral capsule: 1 orally once a day  senna leaf extract oral tablet: 2 tab(s) orally once a day (at bedtime)   acetaminophen 325 mg oral tablet: 2 tab(s) orally every 6 hours  ascorbic acid 500 mg oral tablet: 1 tab(s) orally 2 times a day  cyclobenzaprine 10 mg oral tablet: 1 tab(s) orally every 8 hours MDD: 3  lisinopril-hydrochlorothiazide 20 mg-25 mg oral tablet: 1 orally once a day  MiraLax oral powder for reconstitution: 17 gram(s) orally once a day (at bedtime)  montelukast 10 mg oral tablet: 1 orally once a day  Multiple Vitamins oral tablet: 1 tab(s) orally once a day  naloxegol 25 mg oral tablet: 1 tab(s) orally once a day (at bedtime) opiate induced constipation MDD: 1  naloxegol 25 mg oral tablet: 1 tab(s) orally once a day MDD: 1  Narcan 4 mg/0.1 mL nasal spray: 4 milligram(s) intranasally once , repeat as necessary.   As needed. For suspected opiate overdose   Follow instructions on packet MDD: 0.2 ml  oxybutynin 10 mg/24 hr oral tablet, extended release: 1 tab(s) orally once a day  oxyCODONE 15 mg oral tablet: 1 tab(s) orally every 4 hours as needed for pain may take every 3-4 hrs as needed for pain MDD: 6  Protonix 40 mg oral delayed release tablet: 1 tab(s) orally once a day  rosuvastatin 10 mg oral capsule: 1 orally once a day  senna leaf extract oral tablet: 2 tab(s) orally once a day (at bedtime)

## 2023-04-18 NOTE — PHYSICAL THERAPY INITIAL EVALUATION ADULT - RANGE OF MOTION EXAMINATION, REHAB EVAL
spinal precautions: no bending, no lifting, no twisting/bilateral upper extremity ROM was WFL (within functional limits)/bilateral lower extremity ROM was WFL (within functional limits)/deficits as listed below

## 2023-04-18 NOTE — PHYSICAL THERAPY INITIAL EVALUATION ADULT - CRITERIA FOR SKILLED THERAPEUTIC INTERVENTIONS
pending further assessment/impairments found/functional limitations in following categories/risk reduction/prevention/rehab potential/therapy frequency/predicted duration of therapy intervention/anticipated discharge recommendation

## 2023-04-18 NOTE — DISCHARGE NOTE PROVIDER - NSDCFUADDINST_GEN_ALL_CORE_FT
Keep Dressing Clean, Dry and Intact. May shower on POD#3 with dressing on let water run over dressing, no baths. Pat dry once out of shower.  Please do not scrub, soak, peel or pick at the dressing. No creams, lotions, or oils over dressing.  If dressing is saturated from border to border. Remove dressing and cover with clean dry dressing  No bending/lifting/twisting/pulling/pushing/carrying or driving. No blood thinners (not limited to but including) aspirin, motrin, alleve, naproxen, etc.

## 2023-04-18 NOTE — CONSULT NOTE ADULT - SUBJECTIVE AND OBJECTIVE BOX
TANISHA PADILLA is a 58y Male s/p POSTERIOR SPINAL FUSION L5 DECOMPRESSION LAMINECTOMY L2 REVI    POSTERIOR SPINAL FUSION T10-L5 DECOMPRESSION LAMINECTOMY L1      w/ h/o No pertinent past medical history    HTN (hypertension)    GERD (gastroesophageal reflux disease)    Prostate cancer    Lumbar stenosis      denies any chest pain shortness of breath palpitation dizziness lightheadedness nausea vomiting fever or chills    No significant past surgical history    S/P prostatectomy    Lipoma    History of lumbar laminectomy for spinal cord decompression      FH: type 2 diabetes mellitus      SH: doesnot smoke or drink at this time    No Known Drug Allergies  shellfish (Other)    ascorbic acid 500 milliGRAM(s) Oral two times a day  atorvastatin 40 milliGRAM(s) Oral at bedtime  calcium carbonate 1250 mG  + Vitamin D (OsCal 500 + D) 1 Tablet(s) Oral three times a day  ceFAZolin   IVPB 2000 milliGRAM(s) IV Intermittent every 8 hours  cyclobenzaprine 10 milliGRAM(s) Oral three times a day PRN  diazepam    Tablet 5 milliGRAM(s) Oral every 12 hours PRN  folic acid 1 milliGRAM(s) Oral daily  HYDROmorphone  Injectable 1 milliGRAM(s) IV Push every 6 hours PRN  lactated ringers. 1000 milliLiter(s) IV Continuous <Continuous>  lisinopril 10 milliGRAM(s) Oral daily  magnesium hydroxide Suspension 30 milliLiter(s) Oral every 12 hours PRN  montelukast 10 milliGRAM(s) Oral daily  multivitamin 1 Tablet(s) Oral daily  oxybutynin 10 milliGRAM(s) Oral daily  oxyCODONE    IR 15 milliGRAM(s) Oral every 4 hours PRN  oxyCODONE    IR 10 milliGRAM(s) Oral every 4 hours PRN  pantoprazole    Tablet 40 milliGRAM(s) Oral before breakfast  senna 2 Tablet(s) Oral at bedtime    T(C): 36.8 (04-18-23 @ 21:28), Max: 36.8 (04-18-23 @ 06:41)  HR: 90 (04-18-23 @ 21:28) (87 - 100)  BP: 127/66 (04-18-23 @ 21:28) (95/68 - 134/92)  RR: 18 (04-18-23 @ 21:28) (12 - 22)  SpO2: 98% (04-18-23 @ 21:28) (95% - 100%)  HEENT unremarkable  neck no JVD or bruit  heart normal S1 S2 RRR no gallops or rubs  chest clear to auscultation  abd sof nontender non distended +bs  ext no calf tenderness    A/P   DVT PX  pain control  bowel regimen   wound care as per ortho  GI PX  antiemetics prn  incentive spirometer

## 2023-04-18 NOTE — BRIEF OPERATIVE NOTE - NSICDXBRIEFPOSTOP_GEN_ALL_CORE_FT
POST-OP DIAGNOSIS:  History of spinal fusion 18-Apr-2023 11:32:49  Courtney, John  Lumbar herniated disc 18-Apr-2023 11:32:52  Courtney, John  Lumbar radiculitis 18-Apr-2023 11:32:54  Courtney, John  Spinal stenosis of lumbar region 18-Apr-2023 11:32:57  Jm Valdezic

## 2023-04-18 NOTE — BRIEF OPERATIVE NOTE - NSICDXBRIEFPREOP_GEN_ALL_CORE_FT
PRE-OP DIAGNOSIS:  Spinal stenosis of lumbar region 18-Apr-2023 11:33:02  John Valdez  Lumbar radiculitis 18-Apr-2023 11:33:04  John Valdez  Lumbar herniated disc 18-Apr-2023 11:33:10  John Valdez  History of spinal fusion 18-Apr-2023 11:33:15  John Valdez

## 2023-04-18 NOTE — BRIEF OPERATIVE NOTE - NSICDXBRIEFPROCEDURE_GEN_ALL_CORE_FT
PROCEDURES:  Decompression of lumbar spine with fusion of posterior column, by posterior approach 18-Apr-2023 11:33:34  John Valdez  Decompression of lumbar spine 18-Apr-2023 11:33:45  John Valdez  Removal of segmental instrumentation from posterior column of lumbar spine 18-Apr-2023 11:34:51  John Valdez

## 2023-04-18 NOTE — DISCHARGE NOTE PROVIDER - NSDCFUADDAPPT_GEN_ALL_CORE_FT
Follow up with your surgeon in two weeks. Call for appointment.  If you need more pain medication, call your surgeon's office. For medication refills or authorizations, please call [phone]  We recommend that you call and schedule a follow up appointment within 2-4 weeks with your primary care physician for repeat blood work (CBC and BMP) for post hospital discharge follow-up care.  Call your surgeon if you have increased redness/pain/drainage or fever. Return to ER for shortness of breath/calf tenderness.

## 2023-04-18 NOTE — DISCHARGE NOTE PROVIDER - NSDCFUSCHEDAPPT_GEN_ALL_CORE_FT
Faisal Pierre  [facility]  ONCORTHO [address]  Scheduled Appointment: 05/03/2023    Ginny Leal  [facility]  ONCPAINMGT [address]  Scheduled Appointment: 05/16/2023    Ginny Leal  [facility]  ONCPAINMGT [address]  Scheduled Appointment: 05/17/2023    Ginny Lael  [facility]  ON[address]PAINMGT [address]  Scheduled Appointment: 05/31/2023

## 2023-04-18 NOTE — PHYSICAL THERAPY INITIAL EVALUATION ADULT - GENERAL OBSERVATIONS, REHAB EVAL
chart reviewed, pt encountered, AxOx4, cooperative, + (2) Hemovac , + (2) AYSHA drain, + catheter intact.

## 2023-04-18 NOTE — DISCHARGE NOTE PROVIDER - NSDCCPCAREPLAN_GEN_ALL_CORE_FT
PRINCIPAL DISCHARGE DIAGNOSIS  Diagnosis: Lumbar radiculopathy  Assessment and Plan of Treatment:

## 2023-04-18 NOTE — PHYSICAL THERAPY INITIAL EVALUATION ADULT - ACTIVE RANGE OF MOTION EXAMINATION, REHAB EVAL
spinal precautions: no bending, no lifting, no twisting/bilateral upper extremity Active ROM was WFL (within functional limits)/bilateral  lower extremity Active ROM was WFL (within functional limits)/deficits as listed below

## 2023-04-18 NOTE — PATIENT PROFILE ADULT - FALL HARM RISK - HARM RISK INTERVENTIONS

## 2023-04-18 NOTE — DISCHARGE NOTE PROVIDER - NSDCCPTREATMENT_GEN_ALL_CORE_FT
PRINCIPAL PROCEDURE  Procedure: Decompression of lumbar spine with fusion of posterior column, by posterior approach  Findings and Treatment:

## 2023-04-18 NOTE — PHYSICAL THERAPY INITIAL EVALUATION ADULT - IMPAIRMENTS FOUND, PT EVAL
Detail Level: Zone
Detail Level: Simple
Detail Level: Generalized
aerobic capacity/endurance/gait, locomotion, and balance/integumentary integrity/joint integrity and mobility/muscle strength/posture/ROM

## 2023-04-18 NOTE — DISCHARGE NOTE PROVIDER - CARE PROVIDER_API CALL
Faisal Pierre)  Orthopaedic Surgery  [address] [place] [address]  Phone: [phone]  Fax: [phone]  Follow Up Time:

## 2023-04-18 NOTE — PHYSICAL THERAPY INITIAL EVALUATION ADULT - ADDITIONAL COMMENTS
Lives in a  with back entrance consisting of 1 step to enter c no HR and then 2 steps with a vertical bar to grab . Pt has electric bed and recliner. as per patient someone will help him to get inside the house.

## 2023-04-18 NOTE — PHYSICAL THERAPY INITIAL EVALUATION ADULT - PERTINENT HX OF CURRENT PROBLEM, REHAB EVAL
s/p Removal Hardware L3-L5, Re instrumentation L3-L5, Laminectomy, PSF T11-L5  Complex Plastics Closure

## 2023-04-18 NOTE — BRIEF OPERATIVE NOTE - OPERATION/FINDINGS
Removal Hardware L3-L5, Reinstrumentation L3-L5, Laminectomy, PSF T11-L5  Complex Plastics Closure    see op note

## 2023-04-18 NOTE — DISCHARGE NOTE PROVIDER - HOSPITAL COURSE
58yMale with history of Lumbar radiculopathy presenting for T10-L5 PSF with plastics closure by Dr. Pierre on 4/18/23. Risk and benefits of surgery were explained to the patient. The patient understood and agreed to proceed with surgery. Patient underwent the procedure with no intraoperative complications. Pt was brought in stable condition to the PACU. Once stable in PACU, pt was brought to the floor. During hospital stay pt was followed by Medicine, physical therapy, Home Care during this admission. Pt is stable for discharge

## 2023-04-19 LAB
ANION GAP SERPL CALC-SCNC: 5 MMOL/L — SIGNIFICANT CHANGE UP (ref 5–17)
BASOPHILS # BLD AUTO: 0.02 K/UL — SIGNIFICANT CHANGE UP (ref 0–0.2)
BASOPHILS NFR BLD AUTO: 0.1 % — SIGNIFICANT CHANGE UP (ref 0–2)
BUN SERPL-MCNC: 20 MG/DL — SIGNIFICANT CHANGE UP (ref 7–23)
CALCIUM SERPL-MCNC: 8.2 MG/DL — LOW (ref 8.5–10.1)
CHLORIDE SERPL-SCNC: 101 MMOL/L — SIGNIFICANT CHANGE UP (ref 96–108)
CO2 SERPL-SCNC: 27 MMOL/L — SIGNIFICANT CHANGE UP (ref 22–31)
CREAT SERPL-MCNC: 1.37 MG/DL — HIGH (ref 0.5–1.3)
EGFR: 60 ML/MIN/1.73M2 — SIGNIFICANT CHANGE UP
EOSINOPHIL # BLD AUTO: 0.03 K/UL — SIGNIFICANT CHANGE UP (ref 0–0.5)
EOSINOPHIL NFR BLD AUTO: 0.2 % — SIGNIFICANT CHANGE UP (ref 0–6)
GLUCOSE SERPL-MCNC: 126 MG/DL — HIGH (ref 70–99)
HCT VFR BLD CALC: 35 % — LOW (ref 39–50)
HGB BLD-MCNC: 11.7 G/DL — LOW (ref 13–17)
IMM GRANULOCYTES NFR BLD AUTO: 0.6 % — SIGNIFICANT CHANGE UP (ref 0–0.9)
LYMPHOCYTES # BLD AUTO: 1.09 K/UL — SIGNIFICANT CHANGE UP (ref 1–3.3)
LYMPHOCYTES # BLD AUTO: 7.6 % — LOW (ref 13–44)
MCHC RBC-ENTMCNC: 30.5 PG — SIGNIFICANT CHANGE UP (ref 27–34)
MCHC RBC-ENTMCNC: 33.4 G/DL — SIGNIFICANT CHANGE UP (ref 32–36)
MCV RBC AUTO: 91.1 FL — SIGNIFICANT CHANGE UP (ref 80–100)
MONOCYTES # BLD AUTO: 1.25 K/UL — HIGH (ref 0–0.9)
MONOCYTES NFR BLD AUTO: 8.7 % — SIGNIFICANT CHANGE UP (ref 2–14)
NEUTROPHILS # BLD AUTO: 11.95 K/UL — HIGH (ref 1.8–7.4)
NEUTROPHILS NFR BLD AUTO: 82.8 % — HIGH (ref 43–77)
NRBC # BLD: 0 /100 WBCS — SIGNIFICANT CHANGE UP (ref 0–0)
PLATELET # BLD AUTO: 204 K/UL — SIGNIFICANT CHANGE UP (ref 150–400)
POTASSIUM SERPL-MCNC: 4.1 MMOL/L — SIGNIFICANT CHANGE UP (ref 3.5–5.3)
POTASSIUM SERPL-SCNC: 4.1 MMOL/L — SIGNIFICANT CHANGE UP (ref 3.5–5.3)
RBC # BLD: 3.84 M/UL — LOW (ref 4.2–5.8)
RBC # FLD: 12.5 % — SIGNIFICANT CHANGE UP (ref 10.3–14.5)
SODIUM SERPL-SCNC: 133 MMOL/L — LOW (ref 135–145)
WBC # BLD: 14.42 K/UL — HIGH (ref 3.8–10.5)
WBC # FLD AUTO: 14.42 K/UL — HIGH (ref 3.8–10.5)

## 2023-04-19 RX ORDER — BENZOCAINE AND MENTHOL 5; 1 G/100ML; G/100ML
1 LIQUID ORAL THREE TIMES A DAY
Refills: 0 | Status: DISCONTINUED | OUTPATIENT
Start: 2023-04-19 | End: 2023-04-26

## 2023-04-19 RX ORDER — ACETAMINOPHEN 500 MG
1000 TABLET ORAL ONCE
Refills: 0 | Status: COMPLETED | OUTPATIENT
Start: 2023-04-19 | End: 2023-04-19

## 2023-04-19 RX ORDER — LANOLIN ALCOHOL/MO/W.PET/CERES
3 CREAM (GRAM) TOPICAL AT BEDTIME
Refills: 0 | Status: DISCONTINUED | OUTPATIENT
Start: 2023-04-19 | End: 2023-04-26

## 2023-04-19 RX ORDER — NALOXEGOL OXALATE 12.5 MG/1
25 TABLET, FILM COATED ORAL DAILY
Refills: 0 | Status: DISCONTINUED | OUTPATIENT
Start: 2023-04-19 | End: 2023-04-26

## 2023-04-19 RX ORDER — ACETAMINOPHEN 500 MG
650 TABLET ORAL EVERY 6 HOURS
Refills: 0 | Status: DISCONTINUED | OUTPATIENT
Start: 2023-04-19 | End: 2023-04-26

## 2023-04-19 RX ORDER — SIMETHICONE 80 MG/1
80 TABLET, CHEWABLE ORAL EVERY 6 HOURS
Refills: 0 | Status: COMPLETED | OUTPATIENT
Start: 2023-04-19 | End: 2023-04-21

## 2023-04-19 RX ORDER — OXYCODONE HYDROCHLORIDE 5 MG/1
10 TABLET ORAL
Refills: 0 | Status: DISCONTINUED | OUTPATIENT
Start: 2023-04-19 | End: 2023-04-19

## 2023-04-19 RX ORDER — METOCLOPRAMIDE HCL 10 MG
10 TABLET ORAL EVERY 8 HOURS
Refills: 0 | Status: COMPLETED | OUTPATIENT
Start: 2023-04-19 | End: 2023-04-20

## 2023-04-19 RX ORDER — HYDROMORPHONE HYDROCHLORIDE 2 MG/ML
1 INJECTION INTRAMUSCULAR; INTRAVENOUS; SUBCUTANEOUS EVERY 4 HOURS
Refills: 0 | Status: DISCONTINUED | OUTPATIENT
Start: 2023-04-19 | End: 2023-04-20

## 2023-04-19 RX ORDER — CEFAZOLIN SODIUM 1 G
2000 VIAL (EA) INJECTION EVERY 8 HOURS
Refills: 0 | Status: DISCONTINUED | OUTPATIENT
Start: 2023-04-19 | End: 2023-04-25

## 2023-04-19 RX ORDER — ACETAMINOPHEN 500 MG
1000 TABLET ORAL ONCE
Refills: 0 | Status: DISCONTINUED | OUTPATIENT
Start: 2023-04-19 | End: 2023-04-26

## 2023-04-19 RX ORDER — OXYCODONE HYDROCHLORIDE 5 MG/1
15 TABLET ORAL
Refills: 0 | Status: DISCONTINUED | OUTPATIENT
Start: 2023-04-19 | End: 2023-04-26

## 2023-04-19 RX ADMIN — OXYCODONE HYDROCHLORIDE 15 MILLIGRAM(S): 5 TABLET ORAL at 09:00

## 2023-04-19 RX ADMIN — MONTELUKAST 10 MILLIGRAM(S): 4 TABLET, CHEWABLE ORAL at 12:29

## 2023-04-19 RX ADMIN — ATORVASTATIN CALCIUM 40 MILLIGRAM(S): 80 TABLET, FILM COATED ORAL at 21:31

## 2023-04-19 RX ADMIN — Medication 100 MILLIGRAM(S): at 04:08

## 2023-04-19 RX ADMIN — PANTOPRAZOLE SODIUM 40 MILLIGRAM(S): 20 TABLET, DELAYED RELEASE ORAL at 06:26

## 2023-04-19 RX ADMIN — HYDROMORPHONE HYDROCHLORIDE 1 MILLIGRAM(S): 2 INJECTION INTRAMUSCULAR; INTRAVENOUS; SUBCUTANEOUS at 06:43

## 2023-04-19 RX ADMIN — Medication 10 MILLIGRAM(S): at 10:35

## 2023-04-19 RX ADMIN — SIMETHICONE 80 MILLIGRAM(S): 80 TABLET, CHEWABLE ORAL at 23:53

## 2023-04-19 RX ADMIN — LISINOPRIL 10 MILLIGRAM(S): 2.5 TABLET ORAL at 06:25

## 2023-04-19 RX ADMIN — Medication 3 MILLIGRAM(S): at 21:32

## 2023-04-19 RX ADMIN — Medication 1 TABLET(S): at 12:29

## 2023-04-19 RX ADMIN — HYDROMORPHONE HYDROCHLORIDE 1 MILLIGRAM(S): 2 INJECTION INTRAMUSCULAR; INTRAVENOUS; SUBCUTANEOUS at 19:10

## 2023-04-19 RX ADMIN — CYCLOBENZAPRINE HYDROCHLORIDE 10 MILLIGRAM(S): 10 TABLET, FILM COATED ORAL at 21:31

## 2023-04-19 RX ADMIN — Medication 10 MILLIGRAM(S): at 18:52

## 2023-04-19 RX ADMIN — Medication 500 MILLIGRAM(S): at 06:24

## 2023-04-19 RX ADMIN — HYDROMORPHONE HYDROCHLORIDE 1 MILLIGRAM(S): 2 INJECTION INTRAMUSCULAR; INTRAVENOUS; SUBCUTANEOUS at 12:46

## 2023-04-19 RX ADMIN — OXYCODONE HYDROCHLORIDE 15 MILLIGRAM(S): 5 TABLET ORAL at 16:59

## 2023-04-19 RX ADMIN — SIMETHICONE 80 MILLIGRAM(S): 80 TABLET, CHEWABLE ORAL at 10:35

## 2023-04-19 RX ADMIN — Medication 1 TABLET(S): at 06:24

## 2023-04-19 RX ADMIN — NALOXEGOL OXALATE 25 MILLIGRAM(S): 12.5 TABLET, FILM COATED ORAL at 11:24

## 2023-04-19 RX ADMIN — SIMETHICONE 80 MILLIGRAM(S): 80 TABLET, CHEWABLE ORAL at 18:52

## 2023-04-19 RX ADMIN — HYDROMORPHONE HYDROCHLORIDE 1 MILLIGRAM(S): 2 INJECTION INTRAMUSCULAR; INTRAVENOUS; SUBCUTANEOUS at 00:54

## 2023-04-19 RX ADMIN — HYDROMORPHONE HYDROCHLORIDE 1 MILLIGRAM(S): 2 INJECTION INTRAMUSCULAR; INTRAVENOUS; SUBCUTANEOUS at 12:31

## 2023-04-19 RX ADMIN — Medication 100 MILLIGRAM(S): at 19:49

## 2023-04-19 RX ADMIN — Medication 1 TABLET(S): at 21:31

## 2023-04-19 RX ADMIN — SODIUM CHLORIDE 75 MILLILITER(S): 9 INJECTION, SOLUTION INTRAVENOUS at 00:40

## 2023-04-19 RX ADMIN — Medication 400 MILLIGRAM(S): at 21:31

## 2023-04-19 RX ADMIN — OXYCODONE HYDROCHLORIDE 15 MILLIGRAM(S): 5 TABLET ORAL at 10:00

## 2023-04-19 RX ADMIN — Medication 1 MILLIGRAM(S): at 12:29

## 2023-04-19 RX ADMIN — SENNA PLUS 2 TABLET(S): 8.6 TABLET ORAL at 21:31

## 2023-04-19 RX ADMIN — Medication 1000 MILLIGRAM(S): at 22:00

## 2023-04-19 RX ADMIN — Medication 1 TABLET(S): at 13:56

## 2023-04-19 RX ADMIN — Medication 100 MILLIGRAM(S): at 12:29

## 2023-04-19 RX ADMIN — HYDROMORPHONE HYDROCHLORIDE 1 MILLIGRAM(S): 2 INJECTION INTRAMUSCULAR; INTRAVENOUS; SUBCUTANEOUS at 00:39

## 2023-04-19 RX ADMIN — OXYCODONE HYDROCHLORIDE 15 MILLIGRAM(S): 5 TABLET ORAL at 17:44

## 2023-04-19 RX ADMIN — HYDROMORPHONE HYDROCHLORIDE 1 MILLIGRAM(S): 2 INJECTION INTRAMUSCULAR; INTRAVENOUS; SUBCUTANEOUS at 06:58

## 2023-04-19 RX ADMIN — HYDROMORPHONE HYDROCHLORIDE 1 MILLIGRAM(S): 2 INJECTION INTRAMUSCULAR; INTRAVENOUS; SUBCUTANEOUS at 18:52

## 2023-04-19 RX ADMIN — Medication 500 MILLIGRAM(S): at 18:52

## 2023-04-19 RX ADMIN — Medication 10 MILLIGRAM(S): at 12:29

## 2023-04-19 RX ADMIN — SIMETHICONE 80 MILLIGRAM(S): 80 TABLET, CHEWABLE ORAL at 13:59

## 2023-04-20 LAB
ANION GAP SERPL CALC-SCNC: 4 MMOL/L — LOW (ref 5–17)
BASOPHILS # BLD AUTO: 0.02 K/UL — SIGNIFICANT CHANGE UP (ref 0–0.2)
BASOPHILS NFR BLD AUTO: 0.2 % — SIGNIFICANT CHANGE UP (ref 0–2)
BUN SERPL-MCNC: 16 MG/DL — SIGNIFICANT CHANGE UP (ref 7–23)
CALCIUM SERPL-MCNC: 8.3 MG/DL — LOW (ref 8.5–10.1)
CHLORIDE SERPL-SCNC: 99 MMOL/L — SIGNIFICANT CHANGE UP (ref 96–108)
CO2 SERPL-SCNC: 30 MMOL/L — SIGNIFICANT CHANGE UP (ref 22–31)
CREAT SERPL-MCNC: 1.13 MG/DL — SIGNIFICANT CHANGE UP (ref 0.5–1.3)
EGFR: 76 ML/MIN/1.73M2 — SIGNIFICANT CHANGE UP
EOSINOPHIL # BLD AUTO: 0.07 K/UL — SIGNIFICANT CHANGE UP (ref 0–0.5)
EOSINOPHIL NFR BLD AUTO: 0.7 % — SIGNIFICANT CHANGE UP (ref 0–6)
GLUCOSE SERPL-MCNC: 110 MG/DL — HIGH (ref 70–99)
HCT VFR BLD CALC: 31.8 % — LOW (ref 39–50)
HGB BLD-MCNC: 10.8 G/DL — LOW (ref 13–17)
IMM GRANULOCYTES NFR BLD AUTO: 0.9 % — SIGNIFICANT CHANGE UP (ref 0–0.9)
LYMPHOCYTES # BLD AUTO: 0.92 K/UL — LOW (ref 1–3.3)
LYMPHOCYTES # BLD AUTO: 8.6 % — LOW (ref 13–44)
MCHC RBC-ENTMCNC: 30.6 PG — SIGNIFICANT CHANGE UP (ref 27–34)
MCHC RBC-ENTMCNC: 34 G/DL — SIGNIFICANT CHANGE UP (ref 32–36)
MCV RBC AUTO: 90.1 FL — SIGNIFICANT CHANGE UP (ref 80–100)
MONOCYTES # BLD AUTO: 1.25 K/UL — HIGH (ref 0–0.9)
MONOCYTES NFR BLD AUTO: 11.6 % — SIGNIFICANT CHANGE UP (ref 2–14)
NEUTROPHILS # BLD AUTO: 8.38 K/UL — HIGH (ref 1.8–7.4)
NEUTROPHILS NFR BLD AUTO: 78 % — HIGH (ref 43–77)
NRBC # BLD: 0 /100 WBCS — SIGNIFICANT CHANGE UP (ref 0–0)
PLATELET # BLD AUTO: 175 K/UL — SIGNIFICANT CHANGE UP (ref 150–400)
POTASSIUM SERPL-MCNC: 4.3 MMOL/L — SIGNIFICANT CHANGE UP (ref 3.5–5.3)
POTASSIUM SERPL-SCNC: 4.3 MMOL/L — SIGNIFICANT CHANGE UP (ref 3.5–5.3)
RBC # BLD: 3.53 M/UL — LOW (ref 4.2–5.8)
RBC # FLD: 12.4 % — SIGNIFICANT CHANGE UP (ref 10.3–14.5)
SODIUM SERPL-SCNC: 133 MMOL/L — LOW (ref 135–145)
WBC # BLD: 10.74 K/UL — HIGH (ref 3.8–10.5)
WBC # FLD AUTO: 10.74 K/UL — HIGH (ref 3.8–10.5)

## 2023-04-20 RX ORDER — HYDROMORPHONE HYDROCHLORIDE 2 MG/ML
1 INJECTION INTRAMUSCULAR; INTRAVENOUS; SUBCUTANEOUS EVERY 4 HOURS
Refills: 0 | Status: DISCONTINUED | OUTPATIENT
Start: 2023-04-20 | End: 2023-04-21

## 2023-04-20 RX ORDER — DIPHENHYDRAMINE HCL 50 MG
25 CAPSULE ORAL EVERY 4 HOURS
Refills: 0 | Status: DISCONTINUED | OUTPATIENT
Start: 2023-04-20 | End: 2023-04-26

## 2023-04-20 RX ADMIN — Medication 1 MILLIGRAM(S): at 11:04

## 2023-04-20 RX ADMIN — OXYCODONE HYDROCHLORIDE 15 MILLIGRAM(S): 5 TABLET ORAL at 11:05

## 2023-04-20 RX ADMIN — Medication 1 TABLET(S): at 13:46

## 2023-04-20 RX ADMIN — HYDROMORPHONE HYDROCHLORIDE 1 MILLIGRAM(S): 2 INJECTION INTRAMUSCULAR; INTRAVENOUS; SUBCUTANEOUS at 17:47

## 2023-04-20 RX ADMIN — OXYCODONE HYDROCHLORIDE 15 MILLIGRAM(S): 5 TABLET ORAL at 06:13

## 2023-04-20 RX ADMIN — OXYCODONE HYDROCHLORIDE 15 MILLIGRAM(S): 5 TABLET ORAL at 12:05

## 2023-04-20 RX ADMIN — Medication 650 MILLIGRAM(S): at 06:12

## 2023-04-20 RX ADMIN — Medication 1 TABLET(S): at 06:13

## 2023-04-20 RX ADMIN — SIMETHICONE 80 MILLIGRAM(S): 80 TABLET, CHEWABLE ORAL at 13:47

## 2023-04-20 RX ADMIN — Medication 650 MILLIGRAM(S): at 12:03

## 2023-04-20 RX ADMIN — Medication 10 MILLIGRAM(S): at 11:04

## 2023-04-20 RX ADMIN — SODIUM CHLORIDE 75 MILLILITER(S): 9 INJECTION, SOLUTION INTRAVENOUS at 11:03

## 2023-04-20 RX ADMIN — SIMETHICONE 80 MILLIGRAM(S): 80 TABLET, CHEWABLE ORAL at 17:48

## 2023-04-20 RX ADMIN — Medication 650 MILLIGRAM(S): at 11:03

## 2023-04-20 RX ADMIN — HYDROMORPHONE HYDROCHLORIDE 1 MILLIGRAM(S): 2 INJECTION INTRAMUSCULAR; INTRAVENOUS; SUBCUTANEOUS at 03:55

## 2023-04-20 RX ADMIN — Medication 3 MILLIGRAM(S): at 22:03

## 2023-04-20 RX ADMIN — HYDROMORPHONE HYDROCHLORIDE 1 MILLIGRAM(S): 2 INJECTION INTRAMUSCULAR; INTRAVENOUS; SUBCUTANEOUS at 08:16

## 2023-04-20 RX ADMIN — NALOXEGOL OXALATE 25 MILLIGRAM(S): 12.5 TABLET, FILM COATED ORAL at 11:04

## 2023-04-20 RX ADMIN — Medication 500 MILLIGRAM(S): at 17:48

## 2023-04-20 RX ADMIN — Medication 500 MILLIGRAM(S): at 06:12

## 2023-04-20 RX ADMIN — Medication 100 MILLIGRAM(S): at 20:02

## 2023-04-20 RX ADMIN — Medication 650 MILLIGRAM(S): at 07:15

## 2023-04-20 RX ADMIN — HYDROMORPHONE HYDROCHLORIDE 1 MILLIGRAM(S): 2 INJECTION INTRAMUSCULAR; INTRAVENOUS; SUBCUTANEOUS at 18:03

## 2023-04-20 RX ADMIN — CYCLOBENZAPRINE HYDROCHLORIDE 10 MILLIGRAM(S): 10 TABLET, FILM COATED ORAL at 11:05

## 2023-04-20 RX ADMIN — HYDROMORPHONE HYDROCHLORIDE 1 MILLIGRAM(S): 2 INJECTION INTRAMUSCULAR; INTRAVENOUS; SUBCUTANEOUS at 23:15

## 2023-04-20 RX ADMIN — SENNA PLUS 2 TABLET(S): 8.6 TABLET ORAL at 22:03

## 2023-04-20 RX ADMIN — Medication 650 MILLIGRAM(S): at 18:48

## 2023-04-20 RX ADMIN — HYDROMORPHONE HYDROCHLORIDE 1 MILLIGRAM(S): 2 INJECTION INTRAMUSCULAR; INTRAVENOUS; SUBCUTANEOUS at 12:26

## 2023-04-20 RX ADMIN — HYDROMORPHONE HYDROCHLORIDE 1 MILLIGRAM(S): 2 INJECTION INTRAMUSCULAR; INTRAVENOUS; SUBCUTANEOUS at 03:36

## 2023-04-20 RX ADMIN — OXYCODONE HYDROCHLORIDE 15 MILLIGRAM(S): 5 TABLET ORAL at 20:02

## 2023-04-20 RX ADMIN — Medication 650 MILLIGRAM(S): at 17:48

## 2023-04-20 RX ADMIN — OXYCODONE HYDROCHLORIDE 15 MILLIGRAM(S): 5 TABLET ORAL at 07:15

## 2023-04-20 RX ADMIN — HYDROMORPHONE HYDROCHLORIDE 1 MILLIGRAM(S): 2 INJECTION INTRAMUSCULAR; INTRAVENOUS; SUBCUTANEOUS at 12:41

## 2023-04-20 RX ADMIN — HYDROMORPHONE HYDROCHLORIDE 1 MILLIGRAM(S): 2 INJECTION INTRAMUSCULAR; INTRAVENOUS; SUBCUTANEOUS at 08:31

## 2023-04-20 RX ADMIN — SIMETHICONE 80 MILLIGRAM(S): 80 TABLET, CHEWABLE ORAL at 06:43

## 2023-04-20 RX ADMIN — HYDROMORPHONE HYDROCHLORIDE 1 MILLIGRAM(S): 2 INJECTION INTRAMUSCULAR; INTRAVENOUS; SUBCUTANEOUS at 22:15

## 2023-04-20 RX ADMIN — Medication 100 MILLIGRAM(S): at 11:03

## 2023-04-20 RX ADMIN — Medication 1 TABLET(S): at 22:03

## 2023-04-20 RX ADMIN — Medication 1 TABLET(S): at 11:04

## 2023-04-20 RX ADMIN — OXYCODONE HYDROCHLORIDE 15 MILLIGRAM(S): 5 TABLET ORAL at 21:00

## 2023-04-20 RX ADMIN — Medication 100 MILLIGRAM(S): at 03:44

## 2023-04-20 RX ADMIN — Medication 25 MILLIGRAM(S): at 15:26

## 2023-04-20 RX ADMIN — ATORVASTATIN CALCIUM 40 MILLIGRAM(S): 80 TABLET, FILM COATED ORAL at 22:03

## 2023-04-20 RX ADMIN — MONTELUKAST 10 MILLIGRAM(S): 4 TABLET, CHEWABLE ORAL at 11:05

## 2023-04-20 RX ADMIN — PANTOPRAZOLE SODIUM 40 MILLIGRAM(S): 20 TABLET, DELAYED RELEASE ORAL at 06:13

## 2023-04-20 RX ADMIN — Medication 10 MILLIGRAM(S): at 03:36

## 2023-04-21 LAB
ANION GAP SERPL CALC-SCNC: 5 MMOL/L — SIGNIFICANT CHANGE UP (ref 5–17)
BASOPHILS # BLD AUTO: 0.02 K/UL — SIGNIFICANT CHANGE UP (ref 0–0.2)
BASOPHILS NFR BLD AUTO: 0.2 % — SIGNIFICANT CHANGE UP (ref 0–2)
BLD GP AB SCN SERPL QL: SIGNIFICANT CHANGE UP
BUN SERPL-MCNC: 13 MG/DL — SIGNIFICANT CHANGE UP (ref 7–23)
CALCIUM SERPL-MCNC: 8.3 MG/DL — LOW (ref 8.5–10.1)
CHLORIDE SERPL-SCNC: 99 MMOL/L — SIGNIFICANT CHANGE UP (ref 96–108)
CO2 SERPL-SCNC: 30 MMOL/L — SIGNIFICANT CHANGE UP (ref 22–31)
CREAT SERPL-MCNC: 1.08 MG/DL — SIGNIFICANT CHANGE UP (ref 0.5–1.3)
EGFR: 80 ML/MIN/1.73M2 — SIGNIFICANT CHANGE UP
EOSINOPHIL # BLD AUTO: 0.29 K/UL — SIGNIFICANT CHANGE UP (ref 0–0.5)
EOSINOPHIL NFR BLD AUTO: 3.2 % — SIGNIFICANT CHANGE UP (ref 0–6)
GLUCOSE SERPL-MCNC: 123 MG/DL — HIGH (ref 70–99)
HCT VFR BLD CALC: 26.8 % — LOW (ref 39–50)
HGB BLD-MCNC: 9.3 G/DL — LOW (ref 13–17)
IMM GRANULOCYTES NFR BLD AUTO: 0.9 % — SIGNIFICANT CHANGE UP (ref 0–0.9)
LYMPHOCYTES # BLD AUTO: 0.94 K/UL — LOW (ref 1–3.3)
LYMPHOCYTES # BLD AUTO: 10.4 % — LOW (ref 13–44)
MCHC RBC-ENTMCNC: 31.2 PG — SIGNIFICANT CHANGE UP (ref 27–34)
MCHC RBC-ENTMCNC: 34.7 G/DL — SIGNIFICANT CHANGE UP (ref 32–36)
MCV RBC AUTO: 89.9 FL — SIGNIFICANT CHANGE UP (ref 80–100)
MONOCYTES # BLD AUTO: 1.06 K/UL — HIGH (ref 0–0.9)
MONOCYTES NFR BLD AUTO: 11.7 % — SIGNIFICANT CHANGE UP (ref 2–14)
NEUTROPHILS # BLD AUTO: 6.68 K/UL — SIGNIFICANT CHANGE UP (ref 1.8–7.4)
NEUTROPHILS NFR BLD AUTO: 73.6 % — SIGNIFICANT CHANGE UP (ref 43–77)
NRBC # BLD: 0 /100 WBCS — SIGNIFICANT CHANGE UP (ref 0–0)
PLATELET # BLD AUTO: 164 K/UL — SIGNIFICANT CHANGE UP (ref 150–400)
POTASSIUM SERPL-MCNC: 3.8 MMOL/L — SIGNIFICANT CHANGE UP (ref 3.5–5.3)
POTASSIUM SERPL-SCNC: 3.8 MMOL/L — SIGNIFICANT CHANGE UP (ref 3.5–5.3)
RBC # BLD: 2.98 M/UL — LOW (ref 4.2–5.8)
RBC # FLD: 12.2 % — SIGNIFICANT CHANGE UP (ref 10.3–14.5)
SODIUM SERPL-SCNC: 134 MMOL/L — LOW (ref 135–145)
WBC # BLD: 9.07 K/UL — SIGNIFICANT CHANGE UP (ref 3.8–10.5)
WBC # FLD AUTO: 9.07 K/UL — SIGNIFICANT CHANGE UP (ref 3.8–10.5)

## 2023-04-21 PROCEDURE — 93970 EXTREMITY STUDY: CPT | Mod: 26

## 2023-04-21 RX ORDER — ACETAMINOPHEN 500 MG
1000 TABLET ORAL ONCE
Refills: 0 | Status: DISCONTINUED | OUTPATIENT
Start: 2023-04-21 | End: 2023-04-26

## 2023-04-21 RX ORDER — NALOXEGOL OXALATE 12.5 MG/1
1 TABLET, FILM COATED ORAL
Qty: 14 | Refills: 0
Start: 2023-04-21 | End: 2023-05-04

## 2023-04-21 RX ORDER — HYDROMORPHONE HYDROCHLORIDE 2 MG/ML
1 INJECTION INTRAMUSCULAR; INTRAVENOUS; SUBCUTANEOUS ONCE
Refills: 0 | Status: DISCONTINUED | OUTPATIENT
Start: 2023-04-21 | End: 2023-04-21

## 2023-04-21 RX ADMIN — Medication 1 MILLIGRAM(S): at 11:55

## 2023-04-21 RX ADMIN — Medication 650 MILLIGRAM(S): at 18:00

## 2023-04-21 RX ADMIN — OXYCODONE HYDROCHLORIDE 15 MILLIGRAM(S): 5 TABLET ORAL at 18:59

## 2023-04-21 RX ADMIN — Medication 1 TABLET(S): at 13:53

## 2023-04-21 RX ADMIN — Medication 100 MILLIGRAM(S): at 11:54

## 2023-04-21 RX ADMIN — Medication 500 MILLIGRAM(S): at 18:05

## 2023-04-21 RX ADMIN — Medication 500 MILLIGRAM(S): at 05:48

## 2023-04-21 RX ADMIN — Medication 650 MILLIGRAM(S): at 19:00

## 2023-04-21 RX ADMIN — ATORVASTATIN CALCIUM 40 MILLIGRAM(S): 80 TABLET, FILM COATED ORAL at 21:22

## 2023-04-21 RX ADMIN — OXYCODONE HYDROCHLORIDE 15 MILLIGRAM(S): 5 TABLET ORAL at 17:59

## 2023-04-21 RX ADMIN — LISINOPRIL 10 MILLIGRAM(S): 2.5 TABLET ORAL at 06:21

## 2023-04-21 RX ADMIN — OXYCODONE HYDROCHLORIDE 15 MILLIGRAM(S): 5 TABLET ORAL at 01:09

## 2023-04-21 RX ADMIN — Medication 100 MILLIGRAM(S): at 21:20

## 2023-04-21 RX ADMIN — Medication 650 MILLIGRAM(S): at 11:55

## 2023-04-21 RX ADMIN — MONTELUKAST 10 MILLIGRAM(S): 4 TABLET, CHEWABLE ORAL at 11:55

## 2023-04-21 RX ADMIN — OXYCODONE HYDROCHLORIDE 15 MILLIGRAM(S): 5 TABLET ORAL at 22:02

## 2023-04-21 RX ADMIN — OXYCODONE HYDROCHLORIDE 15 MILLIGRAM(S): 5 TABLET ORAL at 10:26

## 2023-04-21 RX ADMIN — CYCLOBENZAPRINE HYDROCHLORIDE 10 MILLIGRAM(S): 10 TABLET, FILM COATED ORAL at 09:25

## 2023-04-21 RX ADMIN — Medication 10 MILLIGRAM(S): at 11:56

## 2023-04-21 RX ADMIN — Medication 25 MILLIGRAM(S): at 00:09

## 2023-04-21 RX ADMIN — OXYCODONE HYDROCHLORIDE 15 MILLIGRAM(S): 5 TABLET ORAL at 14:53

## 2023-04-21 RX ADMIN — HYDROMORPHONE HYDROCHLORIDE 1 MILLIGRAM(S): 2 INJECTION INTRAMUSCULAR; INTRAVENOUS; SUBCUTANEOUS at 06:20

## 2023-04-21 RX ADMIN — Medication 650 MILLIGRAM(S): at 05:48

## 2023-04-21 RX ADMIN — Medication 1 TABLET(S): at 05:49

## 2023-04-21 RX ADMIN — Medication 650 MILLIGRAM(S): at 06:48

## 2023-04-21 RX ADMIN — OXYCODONE HYDROCHLORIDE 15 MILLIGRAM(S): 5 TABLET ORAL at 00:09

## 2023-04-21 RX ADMIN — Medication 650 MILLIGRAM(S): at 12:55

## 2023-04-21 RX ADMIN — SIMETHICONE 80 MILLIGRAM(S): 80 TABLET, CHEWABLE ORAL at 05:55

## 2023-04-21 RX ADMIN — Medication 1 TABLET(S): at 11:55

## 2023-04-21 RX ADMIN — Medication 650 MILLIGRAM(S): at 00:09

## 2023-04-21 RX ADMIN — Medication 1 TABLET(S): at 21:22

## 2023-04-21 RX ADMIN — OXYCODONE HYDROCHLORIDE 15 MILLIGRAM(S): 5 TABLET ORAL at 09:26

## 2023-04-21 RX ADMIN — PANTOPRAZOLE SODIUM 40 MILLIGRAM(S): 20 TABLET, DELAYED RELEASE ORAL at 05:49

## 2023-04-21 RX ADMIN — Medication 100 MILLIGRAM(S): at 04:04

## 2023-04-21 RX ADMIN — HYDROMORPHONE HYDROCHLORIDE 1 MILLIGRAM(S): 2 INJECTION INTRAMUSCULAR; INTRAVENOUS; SUBCUTANEOUS at 11:56

## 2023-04-21 RX ADMIN — OXYCODONE HYDROCHLORIDE 15 MILLIGRAM(S): 5 TABLET ORAL at 21:21

## 2023-04-21 RX ADMIN — Medication 3 MILLIGRAM(S): at 21:22

## 2023-04-21 RX ADMIN — OXYCODONE HYDROCHLORIDE 15 MILLIGRAM(S): 5 TABLET ORAL at 13:53

## 2023-04-21 RX ADMIN — HYDROMORPHONE HYDROCHLORIDE 1 MILLIGRAM(S): 2 INJECTION INTRAMUSCULAR; INTRAVENOUS; SUBCUTANEOUS at 05:50

## 2023-04-21 RX ADMIN — Medication 650 MILLIGRAM(S): at 01:09

## 2023-04-21 RX ADMIN — SENNA PLUS 2 TABLET(S): 8.6 TABLET ORAL at 21:22

## 2023-04-21 RX ADMIN — HYDROMORPHONE HYDROCHLORIDE 1 MILLIGRAM(S): 2 INJECTION INTRAMUSCULAR; INTRAVENOUS; SUBCUTANEOUS at 12:10

## 2023-04-21 RX ADMIN — NALOXEGOL OXALATE 25 MILLIGRAM(S): 12.5 TABLET, FILM COATED ORAL at 11:55

## 2023-04-21 RX ADMIN — SIMETHICONE 80 MILLIGRAM(S): 80 TABLET, CHEWABLE ORAL at 00:09

## 2023-04-21 NOTE — CHART NOTE - NSCHARTNOTEFT_GEN_A_CORE
Blood Transfusion Consent  D/W Dr Pierre: Pt with tachycardia post op and extensive intraop blood loss as well as post op blood loss via indwelling drains. Will transfuse 1 unit PRBC's to maintain hemodynamic status.     Explained to patient regarding the need for blood transfusion. The risk and benefits were discussed, the risk including fever, chills, lower back pain, allergic reaction, and even death were explained. Verbalizes the understanding and consent was obtained.

## 2023-04-22 LAB
ANION GAP SERPL CALC-SCNC: 4 MMOL/L — LOW (ref 5–17)
BUN SERPL-MCNC: 12 MG/DL — SIGNIFICANT CHANGE UP (ref 7–23)
CALCIUM SERPL-MCNC: 8.6 MG/DL — SIGNIFICANT CHANGE UP (ref 8.5–10.1)
CHLORIDE SERPL-SCNC: 96 MMOL/L — SIGNIFICANT CHANGE UP (ref 96–108)
CO2 SERPL-SCNC: 31 MMOL/L — SIGNIFICANT CHANGE UP (ref 22–31)
CREAT SERPL-MCNC: 1.02 MG/DL — SIGNIFICANT CHANGE UP (ref 0.5–1.3)
EGFR: 86 ML/MIN/1.73M2 — SIGNIFICANT CHANGE UP
GLUCOSE SERPL-MCNC: 105 MG/DL — HIGH (ref 70–99)
HCT VFR BLD CALC: 29.6 % — LOW (ref 39–50)
HGB BLD-MCNC: 10.2 G/DL — LOW (ref 13–17)
MCHC RBC-ENTMCNC: 30.7 PG — SIGNIFICANT CHANGE UP (ref 27–34)
MCHC RBC-ENTMCNC: 34.5 G/DL — SIGNIFICANT CHANGE UP (ref 32–36)
MCV RBC AUTO: 89.2 FL — SIGNIFICANT CHANGE UP (ref 80–100)
NRBC # BLD: 0 /100 WBCS — SIGNIFICANT CHANGE UP (ref 0–0)
PLATELET # BLD AUTO: 204 K/UL — SIGNIFICANT CHANGE UP (ref 150–400)
POTASSIUM SERPL-MCNC: 4.1 MMOL/L — SIGNIFICANT CHANGE UP (ref 3.5–5.3)
POTASSIUM SERPL-SCNC: 4.1 MMOL/L — SIGNIFICANT CHANGE UP (ref 3.5–5.3)
RBC # BLD: 3.32 M/UL — LOW (ref 4.2–5.8)
RBC # FLD: 12.2 % — SIGNIFICANT CHANGE UP (ref 10.3–14.5)
SODIUM SERPL-SCNC: 131 MMOL/L — LOW (ref 135–145)
WBC # BLD: 9.45 K/UL — SIGNIFICANT CHANGE UP (ref 3.8–10.5)
WBC # FLD AUTO: 9.45 K/UL — SIGNIFICANT CHANGE UP (ref 3.8–10.5)

## 2023-04-22 RX ORDER — ENOXAPARIN SODIUM 100 MG/ML
40 INJECTION SUBCUTANEOUS EVERY 24 HOURS
Refills: 0 | Status: DISCONTINUED | OUTPATIENT
Start: 2023-04-22 | End: 2023-04-26

## 2023-04-22 RX ORDER — LACTULOSE 10 G/15ML
30 SOLUTION ORAL ONCE
Refills: 0 | Status: COMPLETED | OUTPATIENT
Start: 2023-04-22 | End: 2023-04-22

## 2023-04-22 RX ADMIN — Medication 650 MILLIGRAM(S): at 11:38

## 2023-04-22 RX ADMIN — Medication 1 MILLIGRAM(S): at 11:38

## 2023-04-22 RX ADMIN — OXYCODONE HYDROCHLORIDE 15 MILLIGRAM(S): 5 TABLET ORAL at 15:41

## 2023-04-22 RX ADMIN — Medication 650 MILLIGRAM(S): at 12:38

## 2023-04-22 RX ADMIN — Medication 100 MILLIGRAM(S): at 04:28

## 2023-04-22 RX ADMIN — Medication 100 MILLIGRAM(S): at 11:37

## 2023-04-22 RX ADMIN — Medication 500 MILLIGRAM(S): at 18:29

## 2023-04-22 RX ADMIN — OXYCODONE HYDROCHLORIDE 15 MILLIGRAM(S): 5 TABLET ORAL at 22:20

## 2023-04-22 RX ADMIN — Medication 100 MILLIGRAM(S): at 19:59

## 2023-04-22 RX ADMIN — OXYCODONE HYDROCHLORIDE 15 MILLIGRAM(S): 5 TABLET ORAL at 10:56

## 2023-04-22 RX ADMIN — ATORVASTATIN CALCIUM 40 MILLIGRAM(S): 80 TABLET, FILM COATED ORAL at 21:20

## 2023-04-22 RX ADMIN — OXYCODONE HYDROCHLORIDE 15 MILLIGRAM(S): 5 TABLET ORAL at 05:20

## 2023-04-22 RX ADMIN — Medication 1 TABLET(S): at 06:09

## 2023-04-22 RX ADMIN — MONTELUKAST 10 MILLIGRAM(S): 4 TABLET, CHEWABLE ORAL at 11:38

## 2023-04-22 RX ADMIN — Medication 650 MILLIGRAM(S): at 06:32

## 2023-04-22 RX ADMIN — OXYCODONE HYDROCHLORIDE 15 MILLIGRAM(S): 5 TABLET ORAL at 21:20

## 2023-04-22 RX ADMIN — Medication 1 TABLET(S): at 11:38

## 2023-04-22 RX ADMIN — Medication 650 MILLIGRAM(S): at 19:01

## 2023-04-22 RX ADMIN — OXYCODONE HYDROCHLORIDE 15 MILLIGRAM(S): 5 TABLET ORAL at 14:41

## 2023-04-22 RX ADMIN — SODIUM CHLORIDE 75 MILLILITER(S): 9 INJECTION, SOLUTION INTRAVENOUS at 11:37

## 2023-04-22 RX ADMIN — Medication 3 MILLIGRAM(S): at 21:20

## 2023-04-22 RX ADMIN — OXYCODONE HYDROCHLORIDE 15 MILLIGRAM(S): 5 TABLET ORAL at 04:28

## 2023-04-22 RX ADMIN — OXYCODONE HYDROCHLORIDE 15 MILLIGRAM(S): 5 TABLET ORAL at 09:56

## 2023-04-22 RX ADMIN — Medication 1 TABLET(S): at 14:41

## 2023-04-22 RX ADMIN — Medication 1 TABLET(S): at 21:20

## 2023-04-22 RX ADMIN — OXYCODONE HYDROCHLORIDE 15 MILLIGRAM(S): 5 TABLET ORAL at 19:01

## 2023-04-22 RX ADMIN — ENOXAPARIN SODIUM 40 MILLIGRAM(S): 100 INJECTION SUBCUTANEOUS at 11:38

## 2023-04-22 RX ADMIN — SENNA PLUS 2 TABLET(S): 8.6 TABLET ORAL at 21:20

## 2023-04-22 RX ADMIN — LACTULOSE 30 GRAM(S): 10 SOLUTION ORAL at 21:39

## 2023-04-22 RX ADMIN — Medication 500 MILLIGRAM(S): at 06:09

## 2023-04-22 RX ADMIN — NALOXEGOL OXALATE 25 MILLIGRAM(S): 12.5 TABLET, FILM COATED ORAL at 11:37

## 2023-04-22 RX ADMIN — Medication 10 MILLIGRAM(S): at 11:37

## 2023-04-22 RX ADMIN — PANTOPRAZOLE SODIUM 40 MILLIGRAM(S): 20 TABLET, DELAYED RELEASE ORAL at 06:09

## 2023-04-22 RX ADMIN — OXYCODONE HYDROCHLORIDE 15 MILLIGRAM(S): 5 TABLET ORAL at 18:05

## 2023-04-22 RX ADMIN — Medication 650 MILLIGRAM(S): at 06:09

## 2023-04-22 RX ADMIN — Medication 650 MILLIGRAM(S): at 18:05

## 2023-04-23 LAB
ANION GAP SERPL CALC-SCNC: 3 MMOL/L — LOW (ref 5–17)
BUN SERPL-MCNC: 14 MG/DL — SIGNIFICANT CHANGE UP (ref 7–23)
CALCIUM SERPL-MCNC: 8.6 MG/DL — SIGNIFICANT CHANGE UP (ref 8.5–10.1)
CHLORIDE SERPL-SCNC: 96 MMOL/L — SIGNIFICANT CHANGE UP (ref 96–108)
CO2 SERPL-SCNC: 33 MMOL/L — HIGH (ref 22–31)
CREAT SERPL-MCNC: 1.04 MG/DL — SIGNIFICANT CHANGE UP (ref 0.5–1.3)
EGFR: 84 ML/MIN/1.73M2 — SIGNIFICANT CHANGE UP
GLUCOSE SERPL-MCNC: 101 MG/DL — HIGH (ref 70–99)
HCT VFR BLD CALC: 32.7 % — LOW (ref 39–50)
HGB BLD-MCNC: 11.2 G/DL — LOW (ref 13–17)
MCHC RBC-ENTMCNC: 30.3 PG — SIGNIFICANT CHANGE UP (ref 27–34)
MCHC RBC-ENTMCNC: 34.3 G/DL — SIGNIFICANT CHANGE UP (ref 32–36)
MCV RBC AUTO: 88.4 FL — SIGNIFICANT CHANGE UP (ref 80–100)
NRBC # BLD: 0 /100 WBCS — SIGNIFICANT CHANGE UP (ref 0–0)
PLATELET # BLD AUTO: 284 K/UL — SIGNIFICANT CHANGE UP (ref 150–400)
POTASSIUM SERPL-MCNC: 3.7 MMOL/L — SIGNIFICANT CHANGE UP (ref 3.5–5.3)
POTASSIUM SERPL-SCNC: 3.7 MMOL/L — SIGNIFICANT CHANGE UP (ref 3.5–5.3)
RBC # BLD: 3.7 M/UL — LOW (ref 4.2–5.8)
RBC # FLD: 12 % — SIGNIFICANT CHANGE UP (ref 10.3–14.5)
SODIUM SERPL-SCNC: 132 MMOL/L — LOW (ref 135–145)
WBC # BLD: 7.72 K/UL — SIGNIFICANT CHANGE UP (ref 3.8–10.5)
WBC # FLD AUTO: 7.72 K/UL — SIGNIFICANT CHANGE UP (ref 3.8–10.5)

## 2023-04-23 RX ORDER — PETROLATUM,WHITE
1 JELLY (GRAM) TOPICAL THREE TIMES A DAY
Refills: 0 | Status: DISCONTINUED | OUTPATIENT
Start: 2023-04-23 | End: 2023-04-26

## 2023-04-23 RX ADMIN — Medication 1 TABLET(S): at 11:13

## 2023-04-23 RX ADMIN — Medication 1 APPLICATION(S): at 17:31

## 2023-04-23 RX ADMIN — Medication 500 MILLIGRAM(S): at 17:32

## 2023-04-23 RX ADMIN — Medication 100 MILLIGRAM(S): at 19:36

## 2023-04-23 RX ADMIN — OXYCODONE HYDROCHLORIDE 15 MILLIGRAM(S): 5 TABLET ORAL at 13:41

## 2023-04-23 RX ADMIN — Medication 100 MILLIGRAM(S): at 03:56

## 2023-04-23 RX ADMIN — SENNA PLUS 2 TABLET(S): 8.6 TABLET ORAL at 21:11

## 2023-04-23 RX ADMIN — Medication 10 MILLIGRAM(S): at 11:13

## 2023-04-23 RX ADMIN — OXYCODONE HYDROCHLORIDE 15 MILLIGRAM(S): 5 TABLET ORAL at 07:30

## 2023-04-23 RX ADMIN — OXYCODONE HYDROCHLORIDE 15 MILLIGRAM(S): 5 TABLET ORAL at 08:30

## 2023-04-23 RX ADMIN — Medication 650 MILLIGRAM(S): at 06:45

## 2023-04-23 RX ADMIN — OXYCODONE HYDROCHLORIDE 15 MILLIGRAM(S): 5 TABLET ORAL at 20:35

## 2023-04-23 RX ADMIN — OXYCODONE HYDROCHLORIDE 15 MILLIGRAM(S): 5 TABLET ORAL at 03:56

## 2023-04-23 RX ADMIN — Medication 650 MILLIGRAM(S): at 01:25

## 2023-04-23 RX ADMIN — OXYCODONE HYDROCHLORIDE 15 MILLIGRAM(S): 5 TABLET ORAL at 21:22

## 2023-04-23 RX ADMIN — CYCLOBENZAPRINE HYDROCHLORIDE 10 MILLIGRAM(S): 10 TABLET, FILM COATED ORAL at 19:36

## 2023-04-23 RX ADMIN — OXYCODONE HYDROCHLORIDE 15 MILLIGRAM(S): 5 TABLET ORAL at 00:27

## 2023-04-23 RX ADMIN — Medication 650 MILLIGRAM(S): at 00:27

## 2023-04-23 RX ADMIN — OXYCODONE HYDROCHLORIDE 15 MILLIGRAM(S): 5 TABLET ORAL at 14:40

## 2023-04-23 RX ADMIN — Medication 650 MILLIGRAM(S): at 17:31

## 2023-04-23 RX ADMIN — OXYCODONE HYDROCHLORIDE 15 MILLIGRAM(S): 5 TABLET ORAL at 18:04

## 2023-04-23 RX ADMIN — Medication 1 TABLET(S): at 05:45

## 2023-04-23 RX ADMIN — Medication 100 MILLIGRAM(S): at 11:14

## 2023-04-23 RX ADMIN — Medication 1 TABLET(S): at 13:41

## 2023-04-23 RX ADMIN — ATORVASTATIN CALCIUM 40 MILLIGRAM(S): 80 TABLET, FILM COATED ORAL at 21:11

## 2023-04-23 RX ADMIN — ENOXAPARIN SODIUM 40 MILLIGRAM(S): 100 INJECTION SUBCUTANEOUS at 10:40

## 2023-04-23 RX ADMIN — LISINOPRIL 10 MILLIGRAM(S): 2.5 TABLET ORAL at 05:45

## 2023-04-23 RX ADMIN — Medication 25 MILLIGRAM(S): at 10:39

## 2023-04-23 RX ADMIN — Medication 650 MILLIGRAM(S): at 05:45

## 2023-04-23 RX ADMIN — MONTELUKAST 10 MILLIGRAM(S): 4 TABLET, CHEWABLE ORAL at 21:11

## 2023-04-23 RX ADMIN — Medication 5 MILLIGRAM(S): at 10:09

## 2023-04-23 RX ADMIN — Medication 650 MILLIGRAM(S): at 12:00

## 2023-04-23 RX ADMIN — OXYCODONE HYDROCHLORIDE 15 MILLIGRAM(S): 5 TABLET ORAL at 01:25

## 2023-04-23 RX ADMIN — NALOXEGOL OXALATE 25 MILLIGRAM(S): 12.5 TABLET, FILM COATED ORAL at 11:13

## 2023-04-23 RX ADMIN — Medication 650 MILLIGRAM(S): at 18:04

## 2023-04-23 RX ADMIN — PANTOPRAZOLE SODIUM 40 MILLIGRAM(S): 20 TABLET, DELAYED RELEASE ORAL at 05:46

## 2023-04-23 RX ADMIN — OXYCODONE HYDROCHLORIDE 15 MILLIGRAM(S): 5 TABLET ORAL at 17:32

## 2023-04-23 RX ADMIN — Medication 3 MILLIGRAM(S): at 21:11

## 2023-04-23 RX ADMIN — Medication 500 MILLIGRAM(S): at 05:45

## 2023-04-23 RX ADMIN — Medication 1 MILLIGRAM(S): at 11:13

## 2023-04-23 RX ADMIN — Medication 650 MILLIGRAM(S): at 11:13

## 2023-04-23 RX ADMIN — Medication 1 TABLET(S): at 21:11

## 2023-04-23 RX ADMIN — OXYCODONE HYDROCHLORIDE 15 MILLIGRAM(S): 5 TABLET ORAL at 10:37

## 2023-04-23 RX ADMIN — OXYCODONE HYDROCHLORIDE 15 MILLIGRAM(S): 5 TABLET ORAL at 04:50

## 2023-04-23 RX ADMIN — Medication 1 APPLICATION(S): at 09:01

## 2023-04-23 RX ADMIN — OXYCODONE HYDROCHLORIDE 15 MILLIGRAM(S): 5 TABLET ORAL at 11:20

## 2023-04-24 LAB
ALBUMIN SERPL ELPH-MCNC: 2.3 G/DL — LOW (ref 3.3–5)
ALP SERPL-CCNC: 53 U/L — SIGNIFICANT CHANGE UP (ref 40–120)
ALT FLD-CCNC: 30 U/L — SIGNIFICANT CHANGE UP (ref 12–78)
ANION GAP SERPL CALC-SCNC: 9 MMOL/L — SIGNIFICANT CHANGE UP (ref 5–17)
AST SERPL-CCNC: 37 U/L — SIGNIFICANT CHANGE UP (ref 15–37)
BILIRUB SERPL-MCNC: 0.4 MG/DL — SIGNIFICANT CHANGE UP (ref 0.2–1.2)
BUN SERPL-MCNC: 12 MG/DL — SIGNIFICANT CHANGE UP (ref 7–23)
CALCIUM SERPL-MCNC: 8.4 MG/DL — LOW (ref 8.5–10.1)
CHLORIDE SERPL-SCNC: 93 MMOL/L — LOW (ref 96–108)
CK MB BLD-MCNC: 0.4 % — SIGNIFICANT CHANGE UP (ref 0–3.5)
CK MB CFR SERPL CALC: 2.3 NG/ML — SIGNIFICANT CHANGE UP (ref 0.5–3.6)
CK SERPL-CCNC: 525 U/L — HIGH (ref 26–308)
CO2 SERPL-SCNC: 28 MMOL/L — SIGNIFICANT CHANGE UP (ref 22–31)
CREAT SERPL-MCNC: 1.16 MG/DL — SIGNIFICANT CHANGE UP (ref 0.5–1.3)
EGFR: 73 ML/MIN/1.73M2 — SIGNIFICANT CHANGE UP
GLUCOSE BLDC GLUCOMTR-MCNC: 95 MG/DL — SIGNIFICANT CHANGE UP (ref 70–99)
GLUCOSE SERPL-MCNC: 117 MG/DL — HIGH (ref 70–99)
HCT VFR BLD CALC: 31.7 % — LOW (ref 39–50)
HGB BLD-MCNC: 11.2 G/DL — LOW (ref 13–17)
MAGNESIUM SERPL-MCNC: 1.7 MG/DL — SIGNIFICANT CHANGE UP (ref 1.6–2.6)
MCHC RBC-ENTMCNC: 30.7 PG — SIGNIFICANT CHANGE UP (ref 27–34)
MCHC RBC-ENTMCNC: 35.3 G/DL — SIGNIFICANT CHANGE UP (ref 32–36)
MCV RBC AUTO: 86.8 FL — SIGNIFICANT CHANGE UP (ref 80–100)
NRBC # BLD: 0 /100 WBCS — SIGNIFICANT CHANGE UP (ref 0–0)
PHOSPHATE SERPL-MCNC: 2.7 MG/DL — SIGNIFICANT CHANGE UP (ref 2.5–4.5)
PLATELET # BLD AUTO: 328 K/UL — SIGNIFICANT CHANGE UP (ref 150–400)
POTASSIUM SERPL-MCNC: 3.5 MMOL/L — SIGNIFICANT CHANGE UP (ref 3.5–5.3)
POTASSIUM SERPL-SCNC: 3.5 MMOL/L — SIGNIFICANT CHANGE UP (ref 3.5–5.3)
PROT SERPL-MCNC: 6.4 GM/DL — SIGNIFICANT CHANGE UP (ref 6–8.3)
RBC # BLD: 3.65 M/UL — LOW (ref 4.2–5.8)
RBC # FLD: 11.9 % — SIGNIFICANT CHANGE UP (ref 10.3–14.5)
SODIUM SERPL-SCNC: 130 MMOL/L — LOW (ref 135–145)
TROPONIN I, HIGH SENSITIVITY RESULT: 4.7 NG/L — SIGNIFICANT CHANGE UP
WBC # BLD: 8.65 K/UL — SIGNIFICANT CHANGE UP (ref 3.8–10.5)
WBC # FLD AUTO: 8.65 K/UL — SIGNIFICANT CHANGE UP (ref 3.8–10.5)

## 2023-04-24 PROCEDURE — 93010 ELECTROCARDIOGRAM REPORT: CPT

## 2023-04-24 RX ORDER — ALBUTEROL 90 UG/1
2.5 AEROSOL, METERED ORAL ONCE
Refills: 0 | Status: COMPLETED | OUTPATIENT
Start: 2023-04-24 | End: 2023-04-24

## 2023-04-24 RX ORDER — LACTULOSE 10 G/15ML
20 SOLUTION ORAL
Refills: 0 | Status: DISCONTINUED | OUTPATIENT
Start: 2023-04-24 | End: 2023-04-26

## 2023-04-24 RX ADMIN — Medication 500 MILLIGRAM(S): at 17:08

## 2023-04-24 RX ADMIN — Medication 1 TABLET(S): at 11:35

## 2023-04-24 RX ADMIN — Medication 100 MILLIGRAM(S): at 20:19

## 2023-04-24 RX ADMIN — Medication 500 MILLIGRAM(S): at 05:10

## 2023-04-24 RX ADMIN — Medication 650 MILLIGRAM(S): at 11:35

## 2023-04-24 RX ADMIN — LISINOPRIL 10 MILLIGRAM(S): 2.5 TABLET ORAL at 05:10

## 2023-04-24 RX ADMIN — Medication 10 MILLIGRAM(S): at 11:36

## 2023-04-24 RX ADMIN — Medication 650 MILLIGRAM(S): at 01:09

## 2023-04-24 RX ADMIN — Medication 5 MILLIGRAM(S): at 12:15

## 2023-04-24 RX ADMIN — Medication 1 TABLET(S): at 05:09

## 2023-04-24 RX ADMIN — Medication 1 TABLET(S): at 22:28

## 2023-04-24 RX ADMIN — Medication 650 MILLIGRAM(S): at 12:15

## 2023-04-24 RX ADMIN — Medication 1 APPLICATION(S): at 09:14

## 2023-04-24 RX ADMIN — OXYCODONE HYDROCHLORIDE 15 MILLIGRAM(S): 5 TABLET ORAL at 18:25

## 2023-04-24 RX ADMIN — Medication 100 MILLIGRAM(S): at 05:08

## 2023-04-24 RX ADMIN — Medication 650 MILLIGRAM(S): at 06:00

## 2023-04-24 RX ADMIN — Medication 3 MILLIGRAM(S): at 21:32

## 2023-04-24 RX ADMIN — CYCLOBENZAPRINE HYDROCHLORIDE 10 MILLIGRAM(S): 10 TABLET, FILM COATED ORAL at 17:08

## 2023-04-24 RX ADMIN — MONTELUKAST 10 MILLIGRAM(S): 4 TABLET, CHEWABLE ORAL at 21:33

## 2023-04-24 RX ADMIN — Medication 100 MILLIGRAM(S): at 11:36

## 2023-04-24 RX ADMIN — SENNA PLUS 2 TABLET(S): 8.6 TABLET ORAL at 21:34

## 2023-04-24 RX ADMIN — Medication 650 MILLIGRAM(S): at 05:10

## 2023-04-24 RX ADMIN — OXYCODONE HYDROCHLORIDE 15 MILLIGRAM(S): 5 TABLET ORAL at 21:31

## 2023-04-24 RX ADMIN — PANTOPRAZOLE SODIUM 40 MILLIGRAM(S): 20 TABLET, DELAYED RELEASE ORAL at 05:10

## 2023-04-24 RX ADMIN — OXYCODONE HYDROCHLORIDE 15 MILLIGRAM(S): 5 TABLET ORAL at 12:15

## 2023-04-24 RX ADMIN — Medication 30 MILLILITER(S): at 06:33

## 2023-04-24 RX ADMIN — ATORVASTATIN CALCIUM 40 MILLIGRAM(S): 80 TABLET, FILM COATED ORAL at 22:27

## 2023-04-24 RX ADMIN — OXYCODONE HYDROCHLORIDE 15 MILLIGRAM(S): 5 TABLET ORAL at 16:00

## 2023-04-24 RX ADMIN — ALBUTEROL 2.5 MILLIGRAM(S): 90 AEROSOL, METERED ORAL at 12:50

## 2023-04-24 RX ADMIN — OXYCODONE HYDROCHLORIDE 15 MILLIGRAM(S): 5 TABLET ORAL at 10:02

## 2023-04-24 RX ADMIN — OXYCODONE HYDROCHLORIDE 15 MILLIGRAM(S): 5 TABLET ORAL at 01:10

## 2023-04-24 RX ADMIN — Medication 1 MILLIGRAM(S): at 11:35

## 2023-04-24 RX ADMIN — ENOXAPARIN SODIUM 40 MILLIGRAM(S): 100 INJECTION SUBCUTANEOUS at 11:35

## 2023-04-24 RX ADMIN — OXYCODONE HYDROCHLORIDE 15 MILLIGRAM(S): 5 TABLET ORAL at 05:09

## 2023-04-24 RX ADMIN — OXYCODONE HYDROCHLORIDE 15 MILLIGRAM(S): 5 TABLET ORAL at 15:21

## 2023-04-24 RX ADMIN — Medication 25 MILLIGRAM(S): at 13:02

## 2023-04-24 RX ADMIN — OXYCODONE HYDROCHLORIDE 15 MILLIGRAM(S): 5 TABLET ORAL at 06:00

## 2023-04-24 RX ADMIN — Medication 650 MILLIGRAM(S): at 18:08

## 2023-04-24 RX ADMIN — Medication 650 MILLIGRAM(S): at 17:08

## 2023-04-24 RX ADMIN — Medication 650 MILLIGRAM(S): at 02:09

## 2023-04-24 RX ADMIN — LACTULOSE 20 GRAM(S): 10 SOLUTION ORAL at 10:47

## 2023-04-24 RX ADMIN — OXYCODONE HYDROCHLORIDE 15 MILLIGRAM(S): 5 TABLET ORAL at 13:15

## 2023-04-24 RX ADMIN — OXYCODONE HYDROCHLORIDE 15 MILLIGRAM(S): 5 TABLET ORAL at 19:12

## 2023-04-24 RX ADMIN — OXYCODONE HYDROCHLORIDE 15 MILLIGRAM(S): 5 TABLET ORAL at 22:28

## 2023-04-24 RX ADMIN — OXYCODONE HYDROCHLORIDE 15 MILLIGRAM(S): 5 TABLET ORAL at 09:15

## 2023-04-24 RX ADMIN — NALOXEGOL OXALATE 25 MILLIGRAM(S): 12.5 TABLET, FILM COATED ORAL at 11:35

## 2023-04-24 RX ADMIN — OXYCODONE HYDROCHLORIDE 15 MILLIGRAM(S): 5 TABLET ORAL at 02:09

## 2023-04-24 NOTE — RAPID RESPONSE TEAM SUMMARY - NSOTHERSPECIFYRRT4_GEN_ALL_CORE
Remain on unit for continued management. Manage pain adequately and escalate to provider if pain not controlled with current regimen

## 2023-04-24 NOTE — RAPID RESPONSE TEAM SUMMARY - NSSITUATIONBACKGROUNDRRT_GEN_ALL_CORE
RRT called by RN for concerning patient symptoms. Patient was lying in bed in pain, then started complaining of dizziness/L finger numbness/tingling. Patient assessed at the bedside with RRT team. Neuro exam grossly benign at the time. EKG obtained without ND/Qtc abnormalities nor ST segment changes suggestive of ischemia. Patients symptoms soon resolved and is currently at baseline at this time.

## 2023-04-25 PROCEDURE — 72082 X-RAY EXAM ENTIRE SPI 2/3 VW: CPT | Mod: 26

## 2023-04-25 RX ORDER — POLYETHYLENE GLYCOL 3350 17 G/17G
17 POWDER, FOR SOLUTION ORAL DAILY
Refills: 0 | Status: DISCONTINUED | OUTPATIENT
Start: 2023-04-25 | End: 2023-04-26

## 2023-04-25 RX ORDER — CYCLOBENZAPRINE HYDROCHLORIDE 10 MG/1
1 TABLET, FILM COATED ORAL
Qty: 21 | Refills: 0
Start: 2023-04-25 | End: 2023-05-01

## 2023-04-25 RX ORDER — HYDROMORPHONE HYDROCHLORIDE 2 MG/ML
0.5 INJECTION INTRAMUSCULAR; INTRAVENOUS; SUBCUTANEOUS
Refills: 0 | Status: DISCONTINUED | OUTPATIENT
Start: 2023-04-25 | End: 2023-04-26

## 2023-04-25 RX ORDER — ACETAMINOPHEN 500 MG
2 TABLET ORAL
Qty: 0 | Refills: 0 | DISCHARGE
Start: 2023-04-25

## 2023-04-25 RX ORDER — OXYCODONE HYDROCHLORIDE 5 MG/1
1 TABLET ORAL
Qty: 42 | Refills: 0
Start: 2023-04-25 | End: 2023-05-01

## 2023-04-25 RX ORDER — NALOXONE HYDROCHLORIDE 4 MG/.1ML
4 SPRAY NASAL
Qty: 1 | Refills: 0
Start: 2023-04-25 | End: 2023-04-25

## 2023-04-25 RX ORDER — ASCORBIC ACID 60 MG
1 TABLET,CHEWABLE ORAL
Qty: 0 | Refills: 0 | DISCHARGE
Start: 2023-04-25

## 2023-04-25 RX ORDER — NALOXEGOL OXALATE 12.5 MG/1
1 TABLET, FILM COATED ORAL
Qty: 14 | Refills: 0
Start: 2023-04-25 | End: 2023-05-08

## 2023-04-25 RX ADMIN — CYCLOBENZAPRINE HYDROCHLORIDE 10 MILLIGRAM(S): 10 TABLET, FILM COATED ORAL at 11:03

## 2023-04-25 RX ADMIN — CYCLOBENZAPRINE HYDROCHLORIDE 10 MILLIGRAM(S): 10 TABLET, FILM COATED ORAL at 22:01

## 2023-04-25 RX ADMIN — Medication 1 TABLET(S): at 14:16

## 2023-04-25 RX ADMIN — NALOXEGOL OXALATE 25 MILLIGRAM(S): 12.5 TABLET, FILM COATED ORAL at 12:10

## 2023-04-25 RX ADMIN — OXYCODONE HYDROCHLORIDE 15 MILLIGRAM(S): 5 TABLET ORAL at 18:57

## 2023-04-25 RX ADMIN — OXYCODONE HYDROCHLORIDE 15 MILLIGRAM(S): 5 TABLET ORAL at 05:40

## 2023-04-25 RX ADMIN — Medication 100 MILLIGRAM(S): at 04:25

## 2023-04-25 RX ADMIN — Medication 3 MILLIGRAM(S): at 22:01

## 2023-04-25 RX ADMIN — Medication 650 MILLIGRAM(S): at 00:38

## 2023-04-25 RX ADMIN — OXYCODONE HYDROCHLORIDE 15 MILLIGRAM(S): 5 TABLET ORAL at 01:30

## 2023-04-25 RX ADMIN — Medication 5 MILLIGRAM(S): at 16:39

## 2023-04-25 RX ADMIN — Medication 650 MILLIGRAM(S): at 12:02

## 2023-04-25 RX ADMIN — POLYETHYLENE GLYCOL 3350 17 GRAM(S): 17 POWDER, FOR SOLUTION ORAL at 18:45

## 2023-04-25 RX ADMIN — HYDROMORPHONE HYDROCHLORIDE 0.5 MILLIGRAM(S): 2 INJECTION INTRAMUSCULAR; INTRAVENOUS; SUBCUTANEOUS at 22:22

## 2023-04-25 RX ADMIN — OXYCODONE HYDROCHLORIDE 15 MILLIGRAM(S): 5 TABLET ORAL at 08:59

## 2023-04-25 RX ADMIN — HYDROMORPHONE HYDROCHLORIDE 0.5 MILLIGRAM(S): 2 INJECTION INTRAMUSCULAR; INTRAVENOUS; SUBCUTANEOUS at 18:45

## 2023-04-25 RX ADMIN — MONTELUKAST 10 MILLIGRAM(S): 4 TABLET, CHEWABLE ORAL at 22:00

## 2023-04-25 RX ADMIN — Medication 650 MILLIGRAM(S): at 01:30

## 2023-04-25 RX ADMIN — OXYCODONE HYDROCHLORIDE 15 MILLIGRAM(S): 5 TABLET ORAL at 12:00

## 2023-04-25 RX ADMIN — Medication 650 MILLIGRAM(S): at 06:32

## 2023-04-25 RX ADMIN — HYDROMORPHONE HYDROCHLORIDE 0.5 MILLIGRAM(S): 2 INJECTION INTRAMUSCULAR; INTRAVENOUS; SUBCUTANEOUS at 19:00

## 2023-04-25 RX ADMIN — SENNA PLUS 2 TABLET(S): 8.6 TABLET ORAL at 22:02

## 2023-04-25 RX ADMIN — Medication 10 MILLIGRAM(S): at 12:11

## 2023-04-25 RX ADMIN — OXYCODONE HYDROCHLORIDE 15 MILLIGRAM(S): 5 TABLET ORAL at 11:11

## 2023-04-25 RX ADMIN — PANTOPRAZOLE SODIUM 40 MILLIGRAM(S): 20 TABLET, DELAYED RELEASE ORAL at 06:31

## 2023-04-25 RX ADMIN — OXYCODONE HYDROCHLORIDE 15 MILLIGRAM(S): 5 TABLET ORAL at 17:57

## 2023-04-25 RX ADMIN — ATORVASTATIN CALCIUM 40 MILLIGRAM(S): 80 TABLET, FILM COATED ORAL at 22:01

## 2023-04-25 RX ADMIN — Medication 650 MILLIGRAM(S): at 23:48

## 2023-04-25 RX ADMIN — Medication 1 MILLIGRAM(S): at 12:01

## 2023-04-25 RX ADMIN — OXYCODONE HYDROCHLORIDE 15 MILLIGRAM(S): 5 TABLET ORAL at 15:40

## 2023-04-25 RX ADMIN — Medication 650 MILLIGRAM(S): at 18:57

## 2023-04-25 RX ADMIN — Medication 500 MILLIGRAM(S): at 06:32

## 2023-04-25 RX ADMIN — OXYCODONE HYDROCHLORIDE 15 MILLIGRAM(S): 5 TABLET ORAL at 04:40

## 2023-04-25 RX ADMIN — OXYCODONE HYDROCHLORIDE 15 MILLIGRAM(S): 5 TABLET ORAL at 00:38

## 2023-04-25 RX ADMIN — OXYCODONE HYDROCHLORIDE 15 MILLIGRAM(S): 5 TABLET ORAL at 14:40

## 2023-04-25 RX ADMIN — LACTULOSE 20 GRAM(S): 10 SOLUTION ORAL at 16:17

## 2023-04-25 RX ADMIN — Medication 1 TABLET(S): at 22:01

## 2023-04-25 RX ADMIN — Medication 650 MILLIGRAM(S): at 13:02

## 2023-04-25 RX ADMIN — Medication 650 MILLIGRAM(S): at 07:17

## 2023-04-25 RX ADMIN — Medication 1 TABLET(S): at 06:32

## 2023-04-25 RX ADMIN — Medication 1 TABLET(S): at 12:01

## 2023-04-25 RX ADMIN — Medication 650 MILLIGRAM(S): at 17:57

## 2023-04-25 RX ADMIN — Medication 500 MILLIGRAM(S): at 18:07

## 2023-04-25 RX ADMIN — ENOXAPARIN SODIUM 40 MILLIGRAM(S): 100 INJECTION SUBCUTANEOUS at 11:03

## 2023-04-25 RX ADMIN — CYCLOBENZAPRINE HYDROCHLORIDE 10 MILLIGRAM(S): 10 TABLET, FILM COATED ORAL at 04:40

## 2023-04-25 RX ADMIN — OXYCODONE HYDROCHLORIDE 15 MILLIGRAM(S): 5 TABLET ORAL at 07:59

## 2023-04-25 RX ADMIN — Medication 100 MILLIGRAM(S): at 12:02

## 2023-04-25 RX ADMIN — LISINOPRIL 10 MILLIGRAM(S): 2.5 TABLET ORAL at 06:32

## 2023-04-25 RX ADMIN — HYDROMORPHONE HYDROCHLORIDE 0.5 MILLIGRAM(S): 2 INJECTION INTRAMUSCULAR; INTRAVENOUS; SUBCUTANEOUS at 22:07

## 2023-04-25 NOTE — DIETITIAN INITIAL EVALUATION ADULT - PERTINENT MEDS FT
MEDICATIONS  (STANDING):  acetaminophen     Tablet .. 650 milliGRAM(s) Oral every 6 hours  acetaminophen   IVPB .. 1000 milliGRAM(s) IV Intermittent once  ascorbic acid 500 milliGRAM(s) Oral two times a day  atorvastatin 40 milliGRAM(s) Oral at bedtime  calcium carbonate 1250 mG  + Vitamin D (OsCal 500 + D) 1 Tablet(s) Oral three times a day  ceFAZolin   IVPB 2000 milliGRAM(s) IV Intermittent every 8 hours  enoxaparin Injectable 40 milliGRAM(s) SubCutaneous every 24 hours  folic acid 1 milliGRAM(s) Oral daily  lisinopril 10 milliGRAM(s) Oral daily  melatonin 3 milliGRAM(s) Oral at bedtime  montelukast 10 milliGRAM(s) Oral daily  multivitamin 1 Tablet(s) Oral daily  naloxegol 25 milliGRAM(s) Oral daily  oxybutynin 10 milliGRAM(s) Oral daily  pantoprazole    Tablet 40 milliGRAM(s) Oral before breakfast  senna 2 Tablet(s) Oral at bedtime    MEDICATIONS  (PRN):  acetaminophen   IVPB .. 1000 milliGRAM(s) IV Intermittent once PRN Mild Pain (1 - 3)  aluminum hydroxide/magnesium hydroxide/simethicone Suspension 30 milliLiter(s) Oral every 4 hours PRN Dyspepsia  AQUAPHOR (petrolatum Ointment) 1 Application(s) Topical three times a day PRN mouth sores  benzocaine/menthol Lozenge 1 Lozenge Oral three times a day PRN Sore Throat  cyclobenzaprine 10 milliGRAM(s) Oral three times a day PRN Muscle Spasm  diazepam    Tablet 5 milliGRAM(s) Oral every 12 hours PRN muscle spasm  diphenhydrAMINE 25 milliGRAM(s) Oral every 4 hours PRN Rash and/or Itching  lactulose Syrup 20 Gram(s) Oral two times a day PRN bowel movement  magnesium hydroxide Suspension 30 milliLiter(s) Oral every 12 hours PRN Constipation  oxyCODONE    IR 15 milliGRAM(s) Oral every 3 hours PRN Severe Pain (7 - 10)  oxyCODONE    IR 10 milliGRAM(s) Oral every 3 hours PRN Moderate Pain (4 - 6)

## 2023-04-25 NOTE — DIETITIAN INITIAL EVALUATION ADULT - PERTINENT LABORATORY DATA
04-24    130<L>  |  93<L>  |  12  ----------------------------<  117<H>  3.5   |  28  |  1.16    Ca    8.4<L>      24 Apr 2023 12:43  Phos  2.7     04-24  Mg     1.7     04-24    TPro  6.4  /  Alb  2.3<L>  /  TBili  0.4  /  DBili  x   /  AST  37  /  ALT  30  /  AlkPhos  53  04-24  POCT Blood Glucose.: 95 mg/dL (04-24-23 @ 12:35)  A1C with Estimated Average Glucose Result: 5.4 % (03-29-23 @ 13:08)  A1C with Estimated Average Glucose Result: 5.5 % (02-06-23 @ 05:26)  A1C with Estimated Average Glucose Result: 5.4 % (07-19-22 @ 14:00)

## 2023-04-25 NOTE — DIETITIAN INITIAL EVALUATION ADULT - OTHER INFO
Pt admitted for posterior spinal fusion T10-L5 decompression laminectomy, s/p PSF T10-L5 decompression laminectomy (04/18). Pt reports he tries to follow healthy diet at home; avoids fried foods, limits red meat, tries to eat a lot of fruits & vegetables.  Pt with good appetite and good PO intake PTA; continues with mostly good appetite, consuming % of meals during LOS. Denies any chew/swallowing difficulty; reports occasional constipation, + large BM yesterday (04/24). Confirms allergy to shellfish. Wt stable.

## 2023-04-26 ENCOUNTER — TRANSCRIPTION ENCOUNTER (OUTPATIENT)
Age: 58
End: 2023-04-26

## 2023-04-26 VITALS
OXYGEN SATURATION: 98 % | SYSTOLIC BLOOD PRESSURE: 132 MMHG | TEMPERATURE: 98 F | RESPIRATION RATE: 16 BRPM | HEART RATE: 87 BPM | DIASTOLIC BLOOD PRESSURE: 82 MMHG

## 2023-04-26 RX ORDER — ASPIRIN/CALCIUM CARB/MAGNESIUM 324 MG
1 TABLET ORAL
Qty: 30 | Refills: 0
Start: 2023-04-26 | End: 2023-05-25

## 2023-04-26 RX ADMIN — Medication 650 MILLIGRAM(S): at 06:01

## 2023-04-26 RX ADMIN — NALOXEGOL OXALATE 25 MILLIGRAM(S): 12.5 TABLET, FILM COATED ORAL at 12:43

## 2023-04-26 RX ADMIN — Medication 1 TABLET(S): at 06:01

## 2023-04-26 RX ADMIN — Medication 650 MILLIGRAM(S): at 00:48

## 2023-04-26 RX ADMIN — Medication 10 MILLIGRAM(S): at 12:44

## 2023-04-26 RX ADMIN — HYDROMORPHONE HYDROCHLORIDE 0.5 MILLIGRAM(S): 2 INJECTION INTRAMUSCULAR; INTRAVENOUS; SUBCUTANEOUS at 04:20

## 2023-04-26 RX ADMIN — OXYCODONE HYDROCHLORIDE 15 MILLIGRAM(S): 5 TABLET ORAL at 11:49

## 2023-04-26 RX ADMIN — PANTOPRAZOLE SODIUM 40 MILLIGRAM(S): 20 TABLET, DELAYED RELEASE ORAL at 06:01

## 2023-04-26 RX ADMIN — ENOXAPARIN SODIUM 40 MILLIGRAM(S): 100 INJECTION SUBCUTANEOUS at 12:43

## 2023-04-26 RX ADMIN — Medication 1 TABLET(S): at 12:44

## 2023-04-26 RX ADMIN — Medication 1 MILLIGRAM(S): at 12:43

## 2023-04-26 RX ADMIN — Medication 650 MILLIGRAM(S): at 13:44

## 2023-04-26 RX ADMIN — OXYCODONE HYDROCHLORIDE 15 MILLIGRAM(S): 5 TABLET ORAL at 06:53

## 2023-04-26 RX ADMIN — HYDROMORPHONE HYDROCHLORIDE 0.5 MILLIGRAM(S): 2 INJECTION INTRAMUSCULAR; INTRAVENOUS; SUBCUTANEOUS at 03:52

## 2023-04-26 RX ADMIN — LISINOPRIL 10 MILLIGRAM(S): 2.5 TABLET ORAL at 06:01

## 2023-04-26 RX ADMIN — OXYCODONE HYDROCHLORIDE 15 MILLIGRAM(S): 5 TABLET ORAL at 10:49

## 2023-04-26 RX ADMIN — Medication 500 MILLIGRAM(S): at 06:02

## 2023-04-26 RX ADMIN — OXYCODONE HYDROCHLORIDE 15 MILLIGRAM(S): 5 TABLET ORAL at 06:01

## 2023-04-26 RX ADMIN — Medication 650 MILLIGRAM(S): at 06:53

## 2023-04-26 RX ADMIN — Medication 650 MILLIGRAM(S): at 12:44

## 2023-04-26 NOTE — DISCHARGE NOTE NURSING/CASE MANAGEMENT/SOCIAL WORK - NSDCPEFALRISK_GEN_ALL_CORE
For information on Fall & Injury Prevention, visit: [email or web address] OR  [email or web address] OR  https://www.cdc.gov/steadi/patient.html

## 2023-04-26 NOTE — DISCHARGE NOTE NURSING/CASE MANAGEMENT/SOCIAL WORK - NSDCFUADDAPPT_GEN_ALL_CORE_FT
Follow up with your surgeon in two weeks. Call for appointment.  If you need more pain medication, call your surgeon's office. For medication refills or authorizations, please call [phone]  We recommend that you call and schedule a follow up appointment within 2-4 weeks with your primary care physician for repeat blood work (CBC and BMP) for post hospital discharge follow-up care.  Call your surgeon if you have increased redness/pain/drainage or fever. Return to ER for shortness of breath/calf tenderness.

## 2023-04-26 NOTE — PROGRESS NOTE ADULT - PROVIDER SPECIALTY LIST ADULT
Internal Medicine
Internal Medicine
Orthopedics
Internal Medicine
Orthopedics

## 2023-04-26 NOTE — PROGRESS NOTE ADULT - SUBJECTIVE AND OBJECTIVE BOX
58yMale s/p PSF T10-L5 POD#6. Pt seen and examined in NAD. Pain is controlled. Pt denies any new complaints. Pt denies CP/SOB/N/V/D/numbness/tingling/bowel or bladder dysfunction. + large BM yesterday.    JPR: 55/175    PE:   Neuro: AAOX3  Abd; soft, NT/ND  Spine: Dressing partially non-adherent +AYSHA with serous drainage. No collections or drainage.    B/L UE: Skin intact. +ROM shoulder/elbow/wrist/fingers. +ok/thumbsup/fingercross signs.  strength: 5/5.  RP2+ NVI.   B/L LE: Skin intact. +ROM hip/knee/ankle/toes. Ankle Dorsi/plantarflexion: 5/5. Calf: soft, compressible and nontender. DP/PT 2+ NVI.                             11.2   7.72  )-----------( 284      ( 23 Apr 2023 14:30 )             32.7       04-23    132<L>  |  96  |  14  ----------------------------<  101<H>  3.7   |  33<H>  |  1.04    Ca    8.6      23 Apr 2023 14:30          A/P: 58yMale s/p PSF T10-L5 POD#6  Dressing changed new aquacell dressing applied   Pain controlled  Monitor AYSHA drain output- off suction for now  PT: WBAT - spinal precautions   DVT ppx: SCDs and lovenox.   Wound care, Isometric exercises, incentive spirometry   Discharge: planning pending AYSHA drain removal   All the above discussed and understood by pt   D/W DR Pierre 
58yMale s/p PSF T10-L5 POD#8. Pt seen and examined in NAD. Pain controlled. Pt denies any new complaints. Pt denies CP/SOB/N/V/D/numbness/tingling/bowel or bladder dysfunction.     Vital Signs Last 24 Hrs  T(C): 36.4 (26 Apr 2023 05:57), Max: 36.7 (25 Apr 2023 17:13)  T(F): 97.6 (26 Apr 2023 05:57), Max: 98 (25 Apr 2023 17:13)  HR: 104 (26 Apr 2023 05:57) (95 - 109)  BP: 127/64 (26 Apr 2023 05:57) (101/62 - 144/70)  BP(mean): --  RR: 18 (26 Apr 2023 05:57) (17 - 18)  SpO2: 100% (26 Apr 2023 05:57) (99% - 100%)    Parameters below as of 26 Apr 2023 05:57  Patient On (Oxygen Delivery Method): room air        PE:   Neuro: AAOX3  Abd; Soft, NT/ND  Spine: Dressing c/d/i     B/L UE: Skin intact. +ROM shoulder/elbow/wrist/fingers. +ok/thumbsup/fingercross signs.  strength: 5/5.  RP2+ NVI.   B/L LE: Skin intact. +ROM hip/knee/ankle/toes. Ankle Dorsi/plantarflexion: 5/5. Calf: soft, compressible and nontender. DP/PT 2+ NVI.                    A/P: 58yMale s/p PSF T10-L5 POD#8  Pain controlled  PT: WBAT - spinal precautions   DVT ppx: SCDs   Wound care, Isometric exercises, incentive spirometry   medically stable for DC home   Discharge: planning for home with home   All the above discussed and understood by pt   D/W DR Pierre   
Patient seen and examined at bedside. Pain well controlled with medication. Patient denies any numbness, tingling, weakness, or any other orthopaedic complaint. Denies N/V/CP/SOB.       VITAL SIGNS:  T(C): 36.9 (04-23-23 @ 05:38), Max: 37.7 (04-22-23 @ 17:38)  HR: 84 (04-23-23 @ 05:38) (84 - 105)  BP: 125/75 (04-23-23 @ 05:38) (112/70 - 132/84)  RR: 17 (04-23-23 @ 05:38) (17 - 19)  SpO2: 95% (04-23-23 @ 05:38) (95% - 99%)      LABS:                        10.2   9.45  )-----------( 204      ( 22 Apr 2023 07:30 )             29.6     04-22    131<L>  |  96  |  12  ----------------------------<  105<H>  4.1   |  31  |  1.02    Ca    8.6      22 Apr 2023 07:30                PE:   Neuro: AAOX3  Abd: Soft, distended. tympanic. No guarding.   Spine: Dressing c/d/i JPX1 with serosanguinous.  B/L UE: Skin intact. +ROM shoulder/elbow/wrist/fingers. +ok/thumbsup/fingercross signs.  strength: 5/5.  RP2+ NVI.   B/L LE: Skin intact. +ROM hip/knee/ankle/toes. Ankle Dorsi/plantarflexion: 5/5. Calf: soft, compressible and nontender. DP/PT 2+ NVI.       A/P: 58yMale s/p  PSF T10-L5 POD#5  LHV and LJP removed- tip intact. new sterile dressing applied  1 uPRBC 4/21  Drains per plastics  Pain controlled  PT: WBAT - spinal precautions   DVT ppx: SCDs   Wound care, Isometric exercises, incentive spirometry   Medical consult appreciated  Discharge: planning for home with home care pending drain removal   All the above discussed and understood by pt   D/W DR Pierre 
TANISHA PADILLA is a 58y Male s/p POSTERIOR SPINAL FUSION L5 DECOMPRESSION LAMINECTOMY L2 REVI    POSTERIOR SPINAL FUSION T10-L5 DECOMPRESSION LAMINECTOMY L1        denies any chest pain shortness of breath palpitation dizziness lightheadedness nausea vomiting fever or chills    T(C): 36.4 (04-25-23 @ 08:44), Max: 36.8 (04-24-23 @ 17:42)  HR: 97 (04-25-23 @ 11:08) (90 - 101)  BP: 105/69 (04-25-23 @ 11:08) (99/72 - 115/75)  RR: 18 (04-25-23 @ 08:44) (18 - 18)  SpO2: 95% (04-25-23 @ 08:44) (95% - 100%)  no jvd/bruit  s1 s2 rrr  cta  s/nt/nd  no calf tend                        11.2   8.65  )-----------( 328      ( 24 Apr 2023 12:43 )             31.7   04-24    130<L>  |  93<L>  |  12  ----------------------------<  117<H>  3.5   |  28  |  1.16    Ca    8.4<L>      24 Apr 2023 12:43  Phos  2.7     04-24  Mg     1.7     04-24    TPro  6.4  /  Alb  2.3<L>  /  TBili  0.4  /  DBili  x   /  AST  37  /  ALT  30  /  AlkPhos  53  04-24      cont dvt px  pain control  bowel regimen  antiemetics  incentive spirometer
TANISHA PADILLA is a 58y Male s/p POSTERIOR SPINAL FUSION L5 DECOMPRESSION LAMINECTOMY L2 REVI    POSTERIOR SPINAL FUSION T10-L5 DECOMPRESSION LAMINECTOMY L1        denies any chest pain shortness of breath palpitation dizziness lightheadedness nausea vomiting fever or chills    T(C): 36.8 (04-24-23 @ 17:42), Max: 36.8 (04-24-23 @ 17:42)  HR: 101 (04-24-23 @ 20:14) (89 - 106)  BP: 99/72 (04-24-23 @ 20:14) (99/72 - 137/86)  RR: 18 (04-24-23 @ 20:14) (17 - 26)  SpO2: 96% (04-24-23 @ 20:14) (96% - 100%)  no jvd/bruit  s1 s2 rrr  cta  s/nt/nd  no calf tend                        11.2   8.65  )-----------( 328      ( 24 Apr 2023 12:43 )             31.7   04-24    130<L>  |  93<L>  |  12  ----------------------------<  117<H>  3.5   |  28  |  1.16    Ca    8.4<L>      24 Apr 2023 12:43  Phos  2.7     04-24  Mg     1.7     04-24    TPro  6.4  /  Alb  2.3<L>  /  TBili  0.4  /  DBili  x   /  AST  37  /  ALT  30  /  AlkPhos  53  04-24      cont dvt px  pain control  bowel regimen  antiemetics  incentive spirometer
TANISHA PADILLA is a 58y Male s/p POSTERIOR SPINAL FUSION L5 DECOMPRESSION LAMINECTOMY L2 REVI    POSTERIOR SPINAL FUSION T10-L5 DECOMPRESSION LAMINECTOMY L1        denies any chest pain shortness of breath palpitation dizziness lightheadedness nausea vomiting fever or chills    T(C): 36.9 (04-21-23 @ 09:45), Max: 37.2 (04-20-23 @ 13:00)  HR: 96 (04-21-23 @ 11:00) (96 - 120)  BP: 118/74 (04-21-23 @ 11:00) (99/65 - 122/73)  RR: 19 (04-21-23 @ 11:00) (16 - 19)  SpO2: 98% (04-21-23 @ 11:00) (95% - 100%)  no jvd/bruit  s1 s2 rrr  cta  s/nt/nd  no calf tend                        9.3    9.07  )-----------( 164      ( 21 Apr 2023 06:15 )             26.8   04-21    134<L>  |  99  |  13  ----------------------------<  123<H>  3.8   |  30  |  1.08    Ca    8.3<L>      21 Apr 2023 06:15        cont dvt px  pain control  bowel regimen  antiemetics  incentive spirometer
58yMale s/p PSF T10-L5 POD#1 with plastic surgery closure. Pt seen and examined in NAD. Pain is not controlled. Notes medication not lasting four hours. Also reports two hospitalizations secondary to opiate induced constipation. Pt denies CP/SOB/N/V/D/numbness/tingling/bowel or bladder dysfunction.   HVR: 50/135  /335  JPR 35/60  JPL 75/130    PE:   Neuro: AAOX3  Abd; Distended. Tympanic. No guarding.   Spine: Dressing c/d/i +HV and +AYSHA's with serosanguinous.    B/L UE: Skin intact. +ROM shoulder/elbow/wrist/fingers. +ok/thumbsup/fingercross signs.  strength: 5/5.  RP2+ NVI.   B/L LE: Skin intact. +ROM hip/knee/ankle/toes. Ankle Dorsi/plantarflexion: 5/5. Calf: soft, compressible and nontender. DP/PT 2+ NVI.                             11.7   14.42 )-----------( 204      ( 19 Apr 2023 06:15 )             35.0       04-19    133<L>  |  101  |  20  ----------------------------<  126<H>  4.1   |  27  |  1.37<H>    Ca    8.2<L>      19 Apr 2023 06:15          A/P: 58yMale s/p PSF T10-L5 POD#1 with plastic surgery closure  DC mohr after PT today  MOnitor drain outputs. Ancef while drains indwelling.   Pain control: Oxycodone 15mg PO Q3hrs. Tylenol Q6h added.   PT: WBAT - spinal precautions   Opiate induced constipation: Movantik added. Reglan X 3 doses. Simethicone X 2 days.   DVT ppx: SCDs   Wound care, Isometric exercises, incentive spirometry   Medical consult appreciated  Discharge: planning pending drain removal  All the above discussed and understood by pt   D/W DR Pierre
58yMale s/p PSF T10-L5 POD#2. Pt seen and examined in NAD. Pain controlled. Pt denies any new complaints. Pt denies CP/SOB/N/V/D/numbness/tingling/bowel or bladder dysfunction. +Flatus  HVR: 125/275  HVL: 35/300  JPR: 25/95  JPL: 130/350    PE:   Neuro: AAOX3  Abd: Soft, distended. tympanic. No guarding.   Spine: Dressing c/d/i +HVX2 +JPX2 with serosanguinous.   B/L UE: Skin intact. +ROM shoulder/elbow/wrist/fingers. +ok/thumbsup/fingercross signs.  strength: 5/5.  RP2+ NVI.   B/L LE: Skin intact. +ROM hip/knee/ankle/toes. Ankle Dorsi/plantarflexion: 5/5. Calf: soft, compressible and nontender. DP/PT 2+ NVI.                             10.8   10.74 )-----------( 175      ( 20 Apr 2023 06:09 )             31.8       04-20    133<L>  |  99  |  16  ----------------------------<  110<H>  4.3   |  30  |  1.13    Ca    8.3<L>      20 Apr 2023 06:09          A/P: 58yMale s/p  PSF T10-L5 POD#2.  Drains per plastics  Pain controlled  PT: WBAT - spinal precautions   DVT ppx: SCDs   Wound care, Isometric exercises, incentive spirometry   Medical consult appreciated  Discharge: planning for home with home care pending drain removal   All the above discussed and understood by pt   D/W DR Pierre 
58yMale s/p PSF T10-L5 POD#3   Pt seen and examined in NAD. Pain controlled. Pt denies any new complaints. Pt denies CP/SOB/N/V/D/numbness/tingling/bowel or bladder dysfunction. +Flatus  HVR: 15/90  HVL: 7.5/62.5  JPR: 70/142.5  JPL: 25/80    PE:   Neuro: AAOX3  Abd: Soft, distended. tympanic. No guarding.   Spine: Dressing c/d/i +HVx2 +JPX2 with serosanguinous.  B/L UE: Skin intact. +ROM shoulder/elbow/wrist/fingers. +ok/thumbsup/fingercross signs.  strength: 5/5.  RP2+ NVI.   B/L LE: Skin intact. +ROM hip/knee/ankle/toes. Ankle Dorsi/plantarflexion: 5/5. Calf: soft, compressible and nontender. DP/PT 2+ NVI.                             9.3    9.07  )-----------( 164      ( 21 Apr 2023 06:15 )             26.8     04-21    134<L>  |  99  |  13  ----------------------------<  123<H>  3.8   |  30  |  1.08    Ca    8.3<L>      21 Apr 2023 06:15          A/P: 58yMale s/p  PSF T10-L5 POD#3  LHV and LJP removed- tip intact. new sterile dressing applied  1 uPRBC 4/21  Drains per plastics  Pain controlled  PT: WBAT - spinal precautions   DVT ppx: SCDs   Wound care, Isometric exercises, incentive spirometry   Medical consult appreciated  Discharge: planning for home with home care pending drain removal   All the above discussed and understood by pt   D/W DR Pierre 
58yMale s/p PSF T10-L5 POD#4   Pt seen and examined in NAD. Pain controlled. Pt denies any new complaints. Pt denies CP/SOB/N/V/D/numbness/tingling/bowel or bladder dysfunction. +Flatus    PE:   Neuro: AAOX3  Abd: Soft, distended. tympanic. No guarding.   Spine: Dressing c/d/i +HVx2 +JPX2 with serosanguinous.  B/L UE: Skin intact. +ROM shoulder/elbow/wrist/fingers. +ok/thumbsup/fingercross signs.  strength: 5/5.  RP2+ NVI.   B/L LE: Skin intact. +ROM hip/knee/ankle/toes. Ankle Dorsi/plantarflexion: 5/5. Calf: soft, compressible and nontender. DP/PT 2+ NVI.       A/P: 58yMale s/p  PSF T10-L5 POD#4  LHV and LJP removed- tip intact. new sterile dressing applied  1 uPRBC 4/21  Drains per plastics  Pain controlled  PT: WBAT - spinal precautions   DVT ppx: SCDs   Wound care, Isometric exercises, incentive spirometry   Medical consult appreciated  Discharge: planning for home with home care pending drain removal   All the above discussed and understood by pt   D/W DR Pierre 
58yMale s/p PSF T10-L5 POD#7. Pt seen and examined in NAD. Pain controlled. Pt denies any new complaints. Pt denies CP/SOB/N/V/D/numbness/tingling/bowel or bladder dysfunction.   AYSHA: 15/65.7    PE:   Neuro: AAOX3  Abd; Soft, NT/ND  Spine: Dressing c/d/i   +AYSHA with scant serousn drainge.   B/L UE: Skin intact. +ROM shoulder/elbow/wrist/fingers. +ok/thumbsup/fingercross signs.  strength: 5/5.  RP2+ NVI.   B/L LE: Skin intact. +ROM hip/knee/ankle/toes. Ankle Dorsi/plantarflexion: 5/5. Calf: soft, compressible and nontender. DP/PT 2+ NVI.                             11.2   8.65  )-----------( 328      ( 24 Apr 2023 12:43 )             31.7       04-24    130<L>  |  93<L>  |  12  ----------------------------<  117<H>  3.5   |  28  |  1.16    Ca    8.4<L>      24 Apr 2023 12:43  Phos  2.7     04-24  Mg     1.7     04-24    TPro  6.4  /  Alb  2.3<L>  /  TBili  0.4  /  DBili  x   /  AST  37  /  ALT  30  /  AlkPhos  53  04-24        A/P: 58yMale s/p PSF T10-L5 POD#7.  AYSHA drain Dc'd. Tip intact. Dry clean dressing applied over insertion site   Pain controlled  PT: WBAT - spinal precautions   DVT ppx: SCDs   S/P RRT activation for chest pain of unknown etiology - no cardiac findings. Now without complaints.   Wound care, Isometric exercises, incentive spirometry   medically stable for DC home   Discharge: planning for home with home care pending auth from workers comp  All the above discussed and understood by pt   D/W DR Pierre   
Post-op Check   POD#0 S/P PSF/Lami T11-L5, Revision L3-L5 with Plastics Closure   58yMale Patient seen and examined, Pain controlled  Patient Denies SOB, CP, N/V/D       PE: L-Spine: Dressing C/D/I, JPx2 intact, HVx3 intact, Sensation/motor intact   B/L UE: Skin intact. +ROM shoulder/elbow/wrist/fingers. +ok/thumbsup/fingercross signs.  strength: 5/5.  RP2+ NVI.   B/L LE: Skin intact. +ROM hip/knee/ankle/toes. Ankle Dorsi/plantarflexion: 5/5. Calf: soft, compressible and nontender. DP/PT 2+ NVI                          12.7   13.58 )-----------( 226      ( 18 Apr 2023 16:33 )             37.2       04-18    134<L>  |  107  |  20  ----------------------------<  165<H>  6.1<H>   |  22  |  1.64<H>    Ca    8.2<L>      18 Apr 2023 16:33          A: As above   P: Pain Control       DVT Prophylaxis      Incentive spirometry      Monitor Drain Output       Discontinue Tate when Ambulating with PT       PT WBAT      Isometric exercises      Discharge Planning      All the above discussed and understood by pt       Ortho to F/U 
TANISHA PADILLA is a 58y Male s/p POSTERIOR SPINAL FUSION L5 DECOMPRESSION LAMINECTOMY L2 REVI    POSTERIOR SPINAL FUSION T10-L5 DECOMPRESSION LAMINECTOMY L1        denies any chest pain shortness of breath palpitation dizziness lightheadedness nausea vomiting fever or chills    T(C): 36.6 (04-19-23 @ 10:06), Max: 36.8 (04-18-23 @ 21:28)  HR: 92 (04-19-23 @ 11:00) (84 - 100)  BP: 138/85 (04-19-23 @ 11:00) (95/68 - 138/85)  RR: 16 (04-19-23 @ 10:06) (15 - 22)  SpO2: 98% (04-19-23 @ 11:00) (95% - 100%)  no jvd/bruit  s1 s2 rrr  cta  s/nt/nd  no calf tend                        11.7   14.42 )-----------( 204      ( 19 Apr 2023 06:15 )             35.0   04-19    133<L>  |  101  |  20  ----------------------------<  126<H>  4.1   |  27  |  1.37<H>    Ca    8.2<L>      19 Apr 2023 06:15        cont dvt px  pain control  bowel regimen  antiemetics  incentive spirometer
TANISHA PADILLA is a 58y Male s/p POSTERIOR SPINAL FUSION L5 DECOMPRESSION LAMINECTOMY L2 REVI    POSTERIOR SPINAL FUSION T10-L5 DECOMPRESSION LAMINECTOMY L1        denies any chest pain shortness of breath palpitation dizziness lightheadedness nausea vomiting fever or chills    T(C): 37.2 (04-20-23 @ 13:00), Max: 37.2 (04-19-23 @ 21:15)  HR: 104 (04-20-23 @ 13:00) (92 - 107)  BP: 99/65 (04-20-23 @ 13:00) (99/65 - 132/60)  RR: 18 (04-20-23 @ 13:00) (17 - 19)  SpO2: 99% (04-20-23 @ 13:00) (93% - 100%)  no jvd/bruit  s1 s2 rrr  cta  s/nt/nd  no calf tend                        10.8   10.74 )-----------( 175      ( 20 Apr 2023 06:09 )             31.8   04-20    133<L>  |  99  |  16  ----------------------------<  110<H>  4.3   |  30  |  1.13    Ca    8.3<L>      20 Apr 2023 06:09        cont dvt px  pain control  bowel regimen  antiemetics  incentive spirometer

## 2023-04-26 NOTE — DISCHARGE NOTE NURSING/CASE MANAGEMENT/SOCIAL WORK - PATIENT PORTAL LINK FT
You can access the FollowMyHealth Patient Portal offered by [facility] by registering at the following website: [email or web address]. By joining [facility]’s FollowMyHealth portal, you will also be able to view your health information using other applications (apps) compatible with our system.

## 2023-05-01 ENCOUNTER — NON-APPOINTMENT (OUTPATIENT)
Age: 58
End: 2023-05-01

## 2023-05-01 ENCOUNTER — FORM ENCOUNTER (OUTPATIENT)
Age: 58
End: 2023-05-01

## 2023-05-01 ENCOUNTER — INPATIENT (INPATIENT)
Facility: HOSPITAL | Age: 58
LOS: 3 days | Discharge: HOME | DRG: 682 | End: 2023-05-05
Attending: HOSPITALIST | Admitting: INTERNAL MEDICINE
Payer: COMMERCIAL

## 2023-05-01 VITALS
TEMPERATURE: 98 F | DIASTOLIC BLOOD PRESSURE: 45 MMHG | OXYGEN SATURATION: 97 % | HEIGHT: 74 IN | WEIGHT: 210.1 LBS | RESPIRATION RATE: 24 BRPM | SYSTOLIC BLOOD PRESSURE: 61 MMHG | HEART RATE: 88 BPM

## 2023-05-01 DIAGNOSIS — Z90.79 ACQUIRED ABSENCE OF OTHER GENITAL ORGAN(S): Chronic | ICD-10-CM

## 2023-05-01 DIAGNOSIS — A41.9 SEPSIS, UNSPECIFIED ORGANISM: ICD-10-CM

## 2023-05-01 DIAGNOSIS — Z98.890 OTHER SPECIFIED POSTPROCEDURAL STATES: Chronic | ICD-10-CM

## 2023-05-01 DIAGNOSIS — D17.9 BENIGN LIPOMATOUS NEOPLASM, UNSPECIFIED: Chronic | ICD-10-CM

## 2023-05-01 LAB
ALBUMIN SERPL ELPH-MCNC: 2.3 G/DL — LOW (ref 3.3–5.2)
ALBUMIN SERPL ELPH-MCNC: 3.2 G/DL — LOW (ref 3.3–5.2)
ALP SERPL-CCNC: 123 U/L — HIGH (ref 40–120)
ALP SERPL-CCNC: 88 U/L — SIGNIFICANT CHANGE UP (ref 40–120)
ALT FLD-CCNC: 75 U/L — HIGH
ALT FLD-CCNC: 99 U/L — HIGH
ANION GAP SERPL CALC-SCNC: 20 MMOL/L — HIGH (ref 5–17)
ANION GAP SERPL CALC-SCNC: 22 MMOL/L — HIGH (ref 5–17)
ANION GAP SERPL CALC-SCNC: 26 MMOL/L — HIGH (ref 5–17)
APPEARANCE UR: CLEAR — SIGNIFICANT CHANGE UP
APTT BLD: 43.2 SEC — HIGH (ref 27.5–35.5)
AST SERPL-CCNC: 50 U/L — HIGH
AST SERPL-CCNC: 67 U/L — HIGH
BACTERIA # UR AUTO: ABNORMAL
BASE EXCESS BLDV CALC-SCNC: -4.8 MMOL/L — LOW (ref -2–3)
BASOPHILS # BLD AUTO: 0 K/UL — SIGNIFICANT CHANGE UP (ref 0–0.2)
BASOPHILS NFR BLD AUTO: 0 % — SIGNIFICANT CHANGE UP (ref 0–2)
BILIRUB SERPL-MCNC: 0.3 MG/DL — LOW (ref 0.4–2)
BILIRUB SERPL-MCNC: 0.4 MG/DL — SIGNIFICANT CHANGE UP (ref 0.4–2)
BILIRUB UR-MCNC: ABNORMAL
BUN SERPL-MCNC: 76.3 MG/DL — HIGH (ref 8–20)
BUN SERPL-MCNC: 76.6 MG/DL — HIGH (ref 8–20)
BUN SERPL-MCNC: 79.6 MG/DL — HIGH (ref 8–20)
BURR CELLS BLD QL SMEAR: PRESENT — SIGNIFICANT CHANGE UP
CA-I SERPL-SCNC: 1.04 MMOL/L — LOW (ref 1.15–1.33)
CALCIUM SERPL-MCNC: 7.7 MG/DL — LOW (ref 8.4–10.5)
CALCIUM SERPL-MCNC: 8.1 MG/DL — LOW (ref 8.4–10.5)
CALCIUM SERPL-MCNC: 8.9 MG/DL — SIGNIFICANT CHANGE UP (ref 8.4–10.5)
CHLORIDE BLDV-SCNC: 82 MMOL/L — LOW (ref 96–108)
CHLORIDE SERPL-SCNC: 76 MMOL/L — LOW (ref 96–108)
CHLORIDE SERPL-SCNC: 82 MMOL/L — LOW (ref 96–108)
CHLORIDE SERPL-SCNC: 86 MMOL/L — LOW (ref 96–108)
CO2 SERPL-SCNC: 18 MMOL/L — LOW (ref 22–29)
CO2 SERPL-SCNC: 19 MMOL/L — LOW (ref 22–29)
CO2 SERPL-SCNC: 20 MMOL/L — LOW (ref 22–29)
COLOR SPEC: YELLOW — SIGNIFICANT CHANGE UP
CREAT SERPL-MCNC: 7.59 MG/DL — HIGH (ref 0.5–1.3)
CREAT SERPL-MCNC: 8.14 MG/DL — HIGH (ref 0.5–1.3)
CREAT SERPL-MCNC: 9.24 MG/DL — HIGH (ref 0.5–1.3)
DIFF PNL FLD: ABNORMAL
EGFR: 6 ML/MIN/1.73M2 — LOW
EGFR: 7 ML/MIN/1.73M2 — LOW
EGFR: 8 ML/MIN/1.73M2 — LOW
EOSINOPHIL # BLD AUTO: 0 K/UL — SIGNIFICANT CHANGE UP (ref 0–0.5)
EOSINOPHIL NFR BLD AUTO: 0 % — SIGNIFICANT CHANGE UP (ref 0–6)
EPI CELLS # UR: SIGNIFICANT CHANGE UP
GAS PNL BLDA: SIGNIFICANT CHANGE UP
GAS PNL BLDV: 116 MMOL/L — CRITICAL LOW (ref 136–145)
GAS PNL BLDV: SIGNIFICANT CHANGE UP
GIANT PLATELETS BLD QL SMEAR: PRESENT — SIGNIFICANT CHANGE UP
GLUCOSE BLDV-MCNC: 108 MG/DL — HIGH (ref 70–99)
GLUCOSE SERPL-MCNC: 103 MG/DL — HIGH (ref 70–99)
GLUCOSE SERPL-MCNC: 77 MG/DL — SIGNIFICANT CHANGE UP (ref 70–99)
GLUCOSE SERPL-MCNC: 99 MG/DL — SIGNIFICANT CHANGE UP (ref 70–99)
GLUCOSE UR QL: NEGATIVE MG/DL — SIGNIFICANT CHANGE UP
HCO3 BLDV-SCNC: 22 MMOL/L — SIGNIFICANT CHANGE UP (ref 22–29)
HCT VFR BLD CALC: 30.2 % — LOW (ref 39–50)
HCT VFR BLDA CALC: 34 % — SIGNIFICANT CHANGE UP
HGB BLD CALC-MCNC: 11.2 G/DL — LOW (ref 12.6–17.4)
HGB BLD-MCNC: 10.6 G/DL — LOW (ref 13–17)
INR BLD: 1.47 RATIO — HIGH (ref 0.88–1.16)
KETONES UR-MCNC: NEGATIVE — SIGNIFICANT CHANGE UP
LACTATE BLDV-MCNC: 2.5 MMOL/L — HIGH (ref 0.5–2)
LACTATE SERPL-SCNC: 1.8 MMOL/L — SIGNIFICANT CHANGE UP (ref 0.5–2)
LEUKOCYTE ESTERASE UR-ACNC: ABNORMAL
LYMPHOCYTES # BLD AUTO: 0.83 K/UL — LOW (ref 1–3.3)
LYMPHOCYTES # BLD AUTO: 8 % — LOW (ref 13–44)
MANUAL SMEAR VERIFICATION: SIGNIFICANT CHANGE UP
MCHC RBC-ENTMCNC: 30 PG — SIGNIFICANT CHANGE UP (ref 27–34)
MCHC RBC-ENTMCNC: 35.1 GM/DL — SIGNIFICANT CHANGE UP (ref 32–36)
MCV RBC AUTO: 85.6 FL — SIGNIFICANT CHANGE UP (ref 80–100)
MONOCYTES # BLD AUTO: 1.85 K/UL — HIGH (ref 0–0.9)
MONOCYTES NFR BLD AUTO: 17.9 % — HIGH (ref 2–14)
NEUTROPHILS # BLD AUTO: 7.58 K/UL — HIGH (ref 1.8–7.4)
NEUTROPHILS NFR BLD AUTO: 69.6 % — SIGNIFICANT CHANGE UP (ref 43–77)
NEUTS BAND # BLD: 3.6 % — SIGNIFICANT CHANGE UP (ref 0–8)
NITRITE UR-MCNC: NEGATIVE — SIGNIFICANT CHANGE UP
OVALOCYTES BLD QL SMEAR: SLIGHT — SIGNIFICANT CHANGE UP
PCO2 BLDV: 44 MMHG — SIGNIFICANT CHANGE UP (ref 42–55)
PH BLDV: 7.3 — LOW (ref 7.32–7.43)
PH UR: 5 — SIGNIFICANT CHANGE UP (ref 5–8)
PLAT MORPH BLD: NORMAL — SIGNIFICANT CHANGE UP
PLATELET # BLD AUTO: 673 K/UL — HIGH (ref 150–400)
PO2 BLDV: <42 MMHG — SIGNIFICANT CHANGE UP (ref 25–45)
POIKILOCYTOSIS BLD QL AUTO: SLIGHT — SIGNIFICANT CHANGE UP
POLYCHROMASIA BLD QL SMEAR: SLIGHT — SIGNIFICANT CHANGE UP
POTASSIUM BLDV-SCNC: 6.1 MMOL/L — HIGH (ref 3.5–5.1)
POTASSIUM SERPL-MCNC: 4.7 MMOL/L — SIGNIFICANT CHANGE UP (ref 3.5–5.3)
POTASSIUM SERPL-MCNC: 5.2 MMOL/L — SIGNIFICANT CHANGE UP (ref 3.5–5.3)
POTASSIUM SERPL-MCNC: 6.2 MMOL/L — CRITICAL HIGH (ref 3.5–5.3)
POTASSIUM SERPL-SCNC: 4.7 MMOL/L — SIGNIFICANT CHANGE UP (ref 3.5–5.3)
POTASSIUM SERPL-SCNC: 5.2 MMOL/L — SIGNIFICANT CHANGE UP (ref 3.5–5.3)
POTASSIUM SERPL-SCNC: 6.2 MMOL/L — CRITICAL HIGH (ref 3.5–5.3)
PROT SERPL-MCNC: 5.6 G/DL — LOW (ref 6.6–8.7)
PROT SERPL-MCNC: 7.2 G/DL — SIGNIFICANT CHANGE UP (ref 6.6–8.7)
PROT UR-MCNC: 30 MG/DL
PROTHROM AB SERPL-ACNC: 17.1 SEC — HIGH (ref 10.5–13.4)
RAPID RVP RESULT: SIGNIFICANT CHANGE UP
RBC # BLD: 3.53 M/UL — LOW (ref 4.2–5.8)
RBC # FLD: 13 % — SIGNIFICANT CHANGE UP (ref 10.3–14.5)
RBC BLD AUTO: ABNORMAL
RBC CASTS # UR COMP ASSIST: ABNORMAL /HPF (ref 0–4)
SAO2 % BLDV: 52.2 % — SIGNIFICANT CHANGE UP
SARS-COV-2 RNA SPEC QL NAA+PROBE: SIGNIFICANT CHANGE UP
SODIUM SERPL-SCNC: 121 MMOL/L — LOW (ref 135–145)
SODIUM SERPL-SCNC: 122 MMOL/L — LOW (ref 135–145)
SODIUM SERPL-SCNC: 126 MMOL/L — LOW (ref 135–145)
SP GR SPEC: 1.02 — SIGNIFICANT CHANGE UP (ref 1.01–1.02)
UROBILINOGEN FLD QL: NEGATIVE MG/DL — SIGNIFICANT CHANGE UP
VARIANT LYMPHS # BLD: 0.9 % — SIGNIFICANT CHANGE UP (ref 0–6)
WBC # BLD: 10.35 K/UL — SIGNIFICANT CHANGE UP (ref 3.8–10.5)
WBC # FLD AUTO: 10.35 K/UL — SIGNIFICANT CHANGE UP (ref 3.8–10.5)
WBC UR QL: SIGNIFICANT CHANGE UP /HPF (ref 0–5)

## 2023-05-01 PROCEDURE — 99291 CRITICAL CARE FIRST HOUR: CPT

## 2023-05-01 PROCEDURE — 71045 X-RAY EXAM CHEST 1 VIEW: CPT | Mod: 26

## 2023-05-01 PROCEDURE — 74176 CT ABD & PELVIS W/O CONTRAST: CPT | Mod: 26,MA

## 2023-05-01 PROCEDURE — 99223 1ST HOSP IP/OBS HIGH 75: CPT

## 2023-05-01 PROCEDURE — 93010 ELECTROCARDIOGRAM REPORT: CPT

## 2023-05-01 RX ORDER — DEXTROSE 50 % IN WATER 50 %
50 SYRINGE (ML) INTRAVENOUS ONCE
Refills: 0 | Status: COMPLETED | OUTPATIENT
Start: 2023-05-01 | End: 2023-05-01

## 2023-05-01 RX ORDER — VANCOMYCIN HCL 1 G
1000 VIAL (EA) INTRAVENOUS ONCE
Refills: 0 | Status: COMPLETED | OUTPATIENT
Start: 2023-05-01 | End: 2023-05-01

## 2023-05-01 RX ORDER — HYDROMORPHONE HYDROCHLORIDE 2 MG/ML
1 INJECTION INTRAMUSCULAR; INTRAVENOUS; SUBCUTANEOUS EVERY 6 HOURS
Refills: 0 | Status: DISCONTINUED | OUTPATIENT
Start: 2023-05-01 | End: 2023-05-03

## 2023-05-01 RX ORDER — HYDROMORPHONE HYDROCHLORIDE 2 MG/ML
1 INJECTION INTRAMUSCULAR; INTRAVENOUS; SUBCUTANEOUS ONCE
Refills: 0 | Status: DISCONTINUED | OUTPATIENT
Start: 2023-05-01 | End: 2023-05-01

## 2023-05-01 RX ORDER — PIPERACILLIN AND TAZOBACTAM 4; .5 G/20ML; G/20ML
3.38 INJECTION, POWDER, LYOPHILIZED, FOR SOLUTION INTRAVENOUS ONCE
Refills: 0 | Status: COMPLETED | OUTPATIENT
Start: 2023-05-01 | End: 2023-05-01

## 2023-05-01 RX ORDER — FENTANYL CITRATE 50 UG/ML
25 INJECTION INTRAVENOUS ONCE
Refills: 0 | Status: DISCONTINUED | OUTPATIENT
Start: 2023-05-01 | End: 2023-05-01

## 2023-05-01 RX ORDER — ACETAMINOPHEN 500 MG
1000 TABLET ORAL ONCE
Refills: 0 | Status: COMPLETED | OUTPATIENT
Start: 2023-05-01 | End: 2023-05-01

## 2023-05-01 RX ORDER — HYDROMORPHONE HYDROCHLORIDE 2 MG/ML
0.5 INJECTION INTRAMUSCULAR; INTRAVENOUS; SUBCUTANEOUS EVERY 6 HOURS
Refills: 0 | Status: DISCONTINUED | OUTPATIENT
Start: 2023-05-01 | End: 2023-05-03

## 2023-05-01 RX ORDER — INSULIN HUMAN 100 [IU]/ML
10 INJECTION, SOLUTION SUBCUTANEOUS ONCE
Refills: 0 | Status: COMPLETED | OUTPATIENT
Start: 2023-05-01 | End: 2023-05-01

## 2023-05-01 RX ORDER — CEFTRIAXONE 500 MG/1
1000 INJECTION, POWDER, FOR SOLUTION INTRAMUSCULAR; INTRAVENOUS EVERY 24 HOURS
Refills: 0 | Status: DISCONTINUED | OUTPATIENT
Start: 2023-05-01 | End: 2023-05-01

## 2023-05-01 RX ORDER — SODIUM CHLORIDE 9 MG/ML
1000 INJECTION, SOLUTION INTRAVENOUS ONCE
Refills: 0 | Status: COMPLETED | OUTPATIENT
Start: 2023-05-01 | End: 2023-05-01

## 2023-05-01 RX ORDER — SODIUM CHLORIDE 9 MG/ML
3000 INJECTION INTRAMUSCULAR; INTRAVENOUS; SUBCUTANEOUS ONCE
Refills: 0 | Status: COMPLETED | OUTPATIENT
Start: 2023-05-01 | End: 2023-05-01

## 2023-05-01 RX ORDER — SODIUM BICARBONATE 1 MEQ/ML
0.39 SYRINGE (ML) INTRAVENOUS
Qty: 150 | Refills: 0 | Status: DISCONTINUED | OUTPATIENT
Start: 2023-05-01 | End: 2023-05-01

## 2023-05-01 RX ORDER — SODIUM ZIRCONIUM CYCLOSILICATE 10 G/10G
10 POWDER, FOR SUSPENSION ORAL ONCE
Refills: 0 | Status: COMPLETED | OUTPATIENT
Start: 2023-05-01 | End: 2023-05-01

## 2023-05-01 RX ORDER — CHLORHEXIDINE GLUCONATE 213 G/1000ML
1 SOLUTION TOPICAL
Refills: 0 | Status: DISCONTINUED | OUTPATIENT
Start: 2023-05-01 | End: 2023-05-05

## 2023-05-01 RX ORDER — ALBUTEROL 90 UG/1
10 AEROSOL, METERED ORAL ONCE
Refills: 0 | Status: COMPLETED | OUTPATIENT
Start: 2023-05-01 | End: 2023-05-01

## 2023-05-01 RX ORDER — CEFTRIAXONE 500 MG/1
1000 INJECTION, POWDER, FOR SOLUTION INTRAMUSCULAR; INTRAVENOUS EVERY 24 HOURS
Refills: 0 | Status: DISCONTINUED | OUTPATIENT
Start: 2023-05-01 | End: 2023-05-02

## 2023-05-01 RX ORDER — ENOXAPARIN SODIUM 100 MG/ML
40 INJECTION SUBCUTANEOUS EVERY 24 HOURS
Refills: 0 | Status: DISCONTINUED | OUTPATIENT
Start: 2023-05-01 | End: 2023-05-05

## 2023-05-01 RX ORDER — NOREPINEPHRINE BITARTRATE/D5W 8 MG/250ML
0.05 PLASTIC BAG, INJECTION (ML) INTRAVENOUS
Qty: 8 | Refills: 0 | Status: DISCONTINUED | OUTPATIENT
Start: 2023-05-01 | End: 2023-05-02

## 2023-05-01 RX ORDER — ONDANSETRON 8 MG/1
4 TABLET, FILM COATED ORAL ONCE
Refills: 0 | Status: COMPLETED | OUTPATIENT
Start: 2023-05-01 | End: 2023-05-01

## 2023-05-01 RX ORDER — SODIUM CHLORIDE 9 MG/ML
1000 INJECTION, SOLUTION INTRAVENOUS
Refills: 0 | Status: DISCONTINUED | OUTPATIENT
Start: 2023-05-01 | End: 2023-05-01

## 2023-05-01 RX ORDER — SODIUM CHLORIDE 9 MG/ML
1000 INJECTION, SOLUTION INTRAVENOUS
Refills: 0 | Status: DISCONTINUED | OUTPATIENT
Start: 2023-05-01 | End: 2023-05-04

## 2023-05-01 RX ORDER — METRONIDAZOLE 500 MG
500 TABLET ORAL EVERY 8 HOURS
Refills: 0 | Status: DISCONTINUED | OUTPATIENT
Start: 2023-05-01 | End: 2023-05-02

## 2023-05-01 RX ADMIN — Medication 1000 MILLIGRAM(S): at 12:22

## 2023-05-01 RX ADMIN — Medication 1000 MILLIGRAM(S): at 10:57

## 2023-05-01 RX ADMIN — Medication 250 MILLIGRAM(S): at 11:19

## 2023-05-01 RX ADMIN — SODIUM CHLORIDE 125 MILLILITER(S): 9 INJECTION, SOLUTION INTRAVENOUS at 23:57

## 2023-05-01 RX ADMIN — Medication 8.93 MICROGRAM(S)/KG/MIN: at 13:27

## 2023-05-01 RX ADMIN — FENTANYL CITRATE 25 MICROGRAM(S): 50 INJECTION INTRAVENOUS at 11:19

## 2023-05-01 RX ADMIN — ALBUTEROL 10 MILLIGRAM(S): 90 AEROSOL, METERED ORAL at 13:02

## 2023-05-01 RX ADMIN — SODIUM CHLORIDE 1000 MILLILITER(S): 9 INJECTION, SOLUTION INTRAVENOUS at 23:57

## 2023-05-01 RX ADMIN — Medication 400 MILLIGRAM(S): at 10:30

## 2023-05-01 RX ADMIN — SODIUM CHLORIDE 3000 MILLILITER(S): 9 INJECTION INTRAMUSCULAR; INTRAVENOUS; SUBCUTANEOUS at 12:22

## 2023-05-01 RX ADMIN — SODIUM CHLORIDE 3000 MILLILITER(S): 9 INJECTION INTRAMUSCULAR; INTRAVENOUS; SUBCUTANEOUS at 10:40

## 2023-05-01 RX ADMIN — Medication 400 MILLIGRAM(S): at 20:42

## 2023-05-01 RX ADMIN — CHLORHEXIDINE GLUCONATE 1 APPLICATION(S): 213 SOLUTION TOPICAL at 23:58

## 2023-05-01 RX ADMIN — PIPERACILLIN AND TAZOBACTAM 200 GRAM(S): 4; .5 INJECTION, POWDER, LYOPHILIZED, FOR SOLUTION INTRAVENOUS at 10:40

## 2023-05-01 RX ADMIN — HYDROMORPHONE HYDROCHLORIDE 1 MILLIGRAM(S): 2 INJECTION INTRAMUSCULAR; INTRAVENOUS; SUBCUTANEOUS at 14:22

## 2023-05-01 RX ADMIN — Medication 50 MILLILITER(S): at 13:01

## 2023-05-01 RX ADMIN — INSULIN HUMAN 10 UNIT(S): 100 INJECTION, SOLUTION SUBCUTANEOUS at 13:03

## 2023-05-01 RX ADMIN — Medication 250 MEQ/KG/HR: at 14:53

## 2023-05-01 RX ADMIN — SODIUM ZIRCONIUM CYCLOSILICATE 10 GRAM(S): 10 POWDER, FOR SUSPENSION ORAL at 13:04

## 2023-05-01 RX ADMIN — HYDROMORPHONE HYDROCHLORIDE 1 MILLIGRAM(S): 2 INJECTION INTRAMUSCULAR; INTRAVENOUS; SUBCUTANEOUS at 21:12

## 2023-05-01 RX ADMIN — ONDANSETRON 4 MILLIGRAM(S): 8 TABLET, FILM COATED ORAL at 11:00

## 2023-05-01 RX ADMIN — HYDROMORPHONE HYDROCHLORIDE 1 MILLIGRAM(S): 2 INJECTION INTRAMUSCULAR; INTRAVENOUS; SUBCUTANEOUS at 15:39

## 2023-05-01 RX ADMIN — ENOXAPARIN SODIUM 40 MILLIGRAM(S): 100 INJECTION SUBCUTANEOUS at 20:42

## 2023-05-01 RX ADMIN — HYDROMORPHONE HYDROCHLORIDE 1 MILLIGRAM(S): 2 INJECTION INTRAMUSCULAR; INTRAVENOUS; SUBCUTANEOUS at 20:42

## 2023-05-01 RX ADMIN — Medication 100 MILLIGRAM(S): at 21:18

## 2023-05-01 RX ADMIN — FENTANYL CITRATE 25 MICROGRAM(S): 50 INJECTION INTRAVENOUS at 11:38

## 2023-05-01 RX ADMIN — PIPERACILLIN AND TAZOBACTAM 3.38 GRAM(S): 4; .5 INJECTION, POWDER, LYOPHILIZED, FOR SOLUTION INTRAVENOUS at 11:10

## 2023-05-01 RX ADMIN — CEFTRIAXONE 1000 MILLIGRAM(S): 500 INJECTION, POWDER, FOR SOLUTION INTRAMUSCULAR; INTRAVENOUS at 20:42

## 2023-05-01 RX ADMIN — Medication 1000 MILLIGRAM(S): at 11:30

## 2023-05-01 RX ADMIN — Medication 1000 MILLIGRAM(S): at 21:12

## 2023-05-01 NOTE — ED PROVIDER NOTE - ATTENDING CONTRIBUTION TO CARE
I, Allyson Zaldivar DO, have personally provided 65 minutes of critical care time exclusive of time spent on separately billable procedures. Time includes review of laboratory data, radiology results, discussion with consultants, and monitoring for potential decompensation. Interventions were performed as documented above.     I personally saw the patient with the resident, and completed the key components of the history and physical exam. I then discussed the management plan with the resident.

## 2023-05-01 NOTE — ED ADULT NURSE NOTE - CHIEF COMPLAINT QUOTE
Patient wheeled into ED with steady gait, Pt was discharged Wed from [place] from a back surgery c/o diarrhea since Friday, abd distention, vomiting since last night, lightheaded, and sever pain, pt is pale and diaphoretic, sent to [facility]

## 2023-05-01 NOTE — ED PROVIDER NOTE - PHYSICAL EXAMINATION
General: mild to moderate acute distress secondary to pain and distension   Head: NC, AT  EENT: EOMI, no scleral icterus, pale mucous membranes  Cardiac: tachycardic but regular rhythm, no apparent murmurs, no lower extremity edema  Respiratory: CTABL, no respiratory distress   Abdomen: distended abdomen, diffuse TTP, nonperitonitic  MSK/Vascular: full ROM, soft compartments, warm extremities, 2+ peripheral pulses b/l  Neuro: AAOx3, sensation to light touch intact  Psych: anxious

## 2023-05-01 NOTE — PATIENT PROFILE ADULT - FALL HARM RISK - HARM RISK INTERVENTIONS

## 2023-05-01 NOTE — CONSULT NOTE ADULT - ASSESSMENT
A/P: Patient is a 59 yo male with PMH of prostate CA (s/p sx/RT/lupron 2018), HTN, GERD, c/o lower back pain after injury while at work on 7-2-2022 whom presented to [facility] on 4/18/23 for posterior spinal fusion and laminectomy T11-L5 and revision of L3-L5 with Plastics Closure now with:     1. Shock 2/2  2. Dehydration/Hypovolemia  3. WILMA   4. Metabolic acdiosis  5. Ileus    Patient presented with days of vomiting.  Hypotensive, received almost 4L of IVF thus far.   Weaned off levophed at the time of our assessment.   CT Abd/Pelvis done which showed Diffuse small and proximal large bowel dilatation with air-fluid, likely representing sequelae of an ileus. There is relative normal caliber of the distal ileum, and possibility of early or partial bowel obstruction cannot be entirely excluded. The normal caliber ileum is also mildly thick-walled. This is nonspecific but raises the possibility of enteritis.  Stool studies including c. diff being sent.   BCx also sent.   Received Vancomycin and Zosyn in the ER.  CXR clear, on room air.   Nephrology consulted for WILMA.   Initially K 6.2, treated with insulin and d50 and repeat 5.2. No EKG changes.   No immediate indication for dialysis.   Suspect WILMA will improve with aggressive rehydration.   LA 1.4.   At this time, patient still being fluid resuscitated but hemodynamically improving. Does not require ICU level of monitoring.   Seen and discussed with Dr. Martinez.    A/P: Patient is a 59 yo male with PMH of prostate CA (s/p sx/RT/lupron 2018), HTN, GERD, c/o lower back pain after injury while at work on 7-2-2022 whom presented to [facility] on 4/18/23 for posterior spinal fusion and laminectomy T11-L5 and revision of L3-L5 with Plastics Closure now with:     1. Shock 2/2  2. Dehydration/Hypovolemia  3. WILMA   4. Metabolic acidosis  5. Ileus    Patient presented with days of vomiting.  Hypotensive, received almost 4L of IVF thus far.   Weaned off levophed at the time of our assessment.   CT Abd/Pelvis done which showed Diffuse small and proximal large bowel dilatation with air-fluid, likely representing sequelae of an ileus. There is relative normal caliber of the distal ileum, and possibility of early or partial bowel obstruction cannot be entirely excluded. The normal caliber ileum is also mildly thick-walled. This is nonspecific but raises the possibility of enteritis.  Stool studies including c. diff being sent.   BCx also sent.   Received Vancomycin and Zosyn in the ER.  CXR clear, on room air.   Nephrology consulted for WILMA.   Initially K 6.2, treated with insulin and d50 and repeat 5.2. No EKG changes.   No immediate indication for dialysis.   Suspect WILMA will improve with aggressive rehydration.   LA 1.4.   Patient hypotensive again despite ongoing fluid resuscitation, will accept patient to MICU for vasopressors.   Discussed with Dr. Martinez.     I have spent a total of 55 mins of nonconsecutive critical care time managing this patient with shock and WILMA.  This included review of relevant history, clinical examination, review of data and images, discussion of treatment with the multidisciplinary team and any consultants involved in this patient’s care as well as family discussion.     I affirm that this patient is critically ill and at high risk for sudden, fatal deterioration due to one or more of the above stated active issues. I managed/supervised life or organ support interventions that required frequent assessment. This time does not include bedside procedures that are documented separately.

## 2023-05-01 NOTE — ED PROVIDER NOTE - CLINICAL SUMMARY MEDICAL DECISION MAKING FREE TEXT BOX
59 y/o male w/recent posterior spinal fusion and laminectomy presenting with severe abdominal pain, vomiting, and confusion. IVF started and septic workup initiated. Metabolic panel reveals a Cr of 9.24 significantly elevated from baseline and an anion gap metabolic acidosis w/elevated liver functions. CT Abd/Pevlis showed dilated bowel loops concerning for ileus vs early/partial obstruction. GI consulted. Pt remained hypotensive despite 3L of IVF, Levo was started and MICU consulted. Pt has difficulty maintaining a MAP of 65 off Levo, admitted to MICU.

## 2023-05-01 NOTE — ED PROVIDER NOTE - CARE PLAN
1 Principal Discharge DX:	Sepsis with acute renal failure  Secondary Diagnosis:	Hyperkalemia  Secondary Diagnosis:	Ileus

## 2023-05-01 NOTE — CONSULT NOTE ADULT - SUBJECTIVE AND OBJECTIVE BOX
[facility] DIVISION OF KIDNEY DISEASES AND HYPERTENSION -- INITIAL CONSULT NOTE  --------------------------------------------------------------------------------  HPI:     57 y/o male w/PMHx of Spinal Fusion and Decompression Laminectomy presenting with severe abdominal distension, nausea/vomiting, diarrhea. Per wife pt was given narcotics for pain relief and laxatives for anticipated constipation side effects, wife believes he took too many laxatives.  Pt noted to have Agusto- s/p IV fluids and mohr in the ED.      PAST HISTORY  --------------------------------------------------------------------------------  PAST MEDICAL & SURGICAL HISTORY:  HTN (hypertension)      GERD (gastroesophageal reflux disease)      Prostate cancer      Lumbar stenosis      S/P prostatectomy  with radiation      Lipoma  neck and left arm      History of lumbar laminectomy for spinal cord decompression        FAMILY HISTORY:  FH: type 2 diabetes mellitus  mother -       PAST SOCIAL HISTORY:    ALLERGIES & MEDICATIONS  --------------------------------------------------------------------------------  Allergies    No Known Drug Allergies  shellfish (Other)    Intolerances      Standing Inpatient Medications  norepinephrine Infusion 0.05 MICROgram(s)/kG/Min IV Continuous <Continuous>  sodium bicarbonate  Infusion 0.393 mEq/kG/Hr IV Continuous <Continuous>    PRN Inpatient Medications      REVIEW OF SYSTEMS  --------------------------------------------------------------------------------  Gen: No weight changes, fatigue, fevers/chills, weakness  Skin: No rashes  Head/Eyes/Ears/Mouth: No headache; Normal hearing; Normal vision w/o blurriness; No sinus pain/discomfort, sore throat  Respiratory: No dyspnea, cough, wheezing, hemoptysis  CV: No chest pain, PND, orthopnea  GI: No abdominal pain, diarrhea, constipation, nausea, vomiting, melena, hematochezia  : No increased frequency, dysuria, hematuria, nocturia  MSK: No joint pain/swelling; no back pain; no edema  Neuro: No dizziness/lightheadedness, weakness, seizures, numbness, tingling  Heme: No easy bruising or bleeding  Endo: No heat/cold intolerance  Psych: No significant nervousness, anxiety, stress, depression    All other systems were reviewed and are negative, except as noted.    VITALS/PHYSICAL EXAM  --------------------------------------------------------------------------------  T(C): 37.4 (23 @ 10:20), Max: 37.4 (23 @ 10:20)  HR: 97 (23 @ 12:00) (88 - 99)  BP: 110/57 (23 @ 12:00) (61/45 - 110/57)  RR: 20 (23 @ 12:00) (20 - 24)  SpO2: 96% (23 12:00) (96% - 97%)  Wt(kg): --  Height (cm): 188 (23 10:11)  Weight (kg): 95.3 (23 10:11)  BMI (kg/m2): 27 (23 10:11)  BSA (m2): 2.22 (23 10:11)      Physical Exam:  	Gen: NAD, well-appearing  	HEENT: PERRL, supple neck, clear oropharynx  	Pulm: CTA B/L  	CV: RRR, S1S2; no rub  	Back: No spinal or CVA tenderness; no sacral edema  	Abd: +BS, soft, nontender/nondistended  	: No suprapubic tenderness  	UE: Warm, FROM, no clubbing, intact strength; no edema; no asterixis  	LE: Warm, FROM, no clubbing, intact strength; no edema  	Neuro: No focal deficits, intact gait  	Psych: Normal affect and mood  	Skin: Warm, without rashes  	Vascular access:    LABS/STUDIES  --------------------------------------------------------------------------------              10.6   10.35 >-----------<  673      [23 @ 10:30]              30.2     122  |  82  |  76.6  ----------------------------<  103      [23 @ 12:05]  5.2   |  20.0  |  8.14        Ca     7.7     [23 @ 12:05]    TPro  5.6  /  Alb  2.3  /  TBili  0.3  /  DBili  x   /  AST  50  /  ALT  75  /  AlkPhos  88  [23 @ 12:05]    PT/INR: PT 17.1 , INR 1.47       [23 10:30]  PTT: 43.2       [23 10:30]      Creatinine Trend:  SCr 8.14 [ 12:05]  SCr 9.24 [ 10:30]  SCr 1.16 [ @ 12:43]  SCr 1.04 [ 14:30]  SCr 1.02 [ @ 07:30]    Urinalysis - [23 @ 11:50]      Color Yellow / Appearance Clear / SG 1.020 / pH 5.0      Gluc Negative / Ketone Negative  / Bili Small / Urobili Negative       Blood Moderate / Protein 30 / Leuk Est Trace / Nitrite Negative      RBC 6-10 / WBC 3-5 / Hyaline  / Gran  / Sq Epi  / Non Sq Epi  / Bacteria Moderate      Lipid: chol 126, TG 86, HDL 60, LDL --      [23 @ 05:26]       [facility] DIVISION OF KIDNEY DISEASES AND HYPERTENSION -- INITIAL CONSULT NOTE  --------------------------------------------------------------------------------  HPI:     57 y/o male w/PMHx of Spinal Fusion and Decompression Laminectomy presenting with severe abdominal distension, nausea/vomiting, diarrhea. Patient discharged from  on 23 with pain medication and bowel regimen. Patient reports that his last bowel movement was Tuesday and endorses he has been vomiting for a few days.     Pt  hypotensive on arrival to the ED: s.p IV fluid boluses.  noted to have Agusto Cr 9.24), Metabolic acidosis and hyperkalemia.  CT A/P done which showed likely ileus.   s/p IV fluids 3 L and started on levophed gtt , sp  mohr  Nephrology consulted for Agusto.  pt seen and examined; reports worsening abdominal distention, inability to have a BM and decreased urination over the past few days.   Denies h.o kidney disease, denies NSAIDs/ Otc meds/ herbal supplements use.  States he sees Dr Jennings (Urology at [facility]) for h/o prostate cancer        PAST HISTORY  --------------------------------------------------------------------------------  PAST MEDICAL & SURGICAL HISTORY:  HTN (hypertension)      GERD (gastroesophageal reflux disease)      Prostate cancer      Lumbar stenosis      S/P prostatectomy  with radiation      Lipoma  neck and left arm      History of lumbar laminectomy for spinal cord decompression        FAMILY HISTORY:  FH: type 2 diabetes mellitus  mother -       PAST SOCIAL HISTORY:     ALLERGIES & MEDICATIONS  --------------------------------------------------------------------------------  Allergies    No Known Drug Allergies  shellfish (Other)    Intolerances      Standing Inpatient Medications  norepinephrine Infusion 0.05 MICROgram(s)/kG/Min IV Continuous <Continuous>  sodium bicarbonate  Infusion 0.393 mEq/kG/Hr IV Continuous <Continuous>    PRN Inpatient Medications      REVIEW OF SYSTEMS  --------------------------------------------------------------------------------  Gen: No weight changes, fatigue+  Skin: No rashes  Head/Eyes/Ears/Mouth: No headache; Normal hearing; Normal vision w/o blurriness; No sinus pain/discomfort, sore throat  Respiratory: No dyspnea, cough, wheezing, hemoptysis  CV: No chest pain, PND, orthopnea  GI: abdominal distension, diarrhea, constipation, nausea, vomiting+  : decreased urination  MSK: No joint pain/swelling; no back pain; no edema  Neuro: No dizziness/lightheadedness, weakness, seizures, numbness, tingling  Heme: No easy bruising or bleeding  Endo: No heat/cold intolerance  Psych: No significant nervousness, anxiety, stress, depression    All other systems were reviewed and are negative, except as noted.    VITALS/PHYSICAL EXAM  --------------------------------------------------------------------------------  T(C): 37.4 (23 @ 10:20), Max: 37.4 (23 @ 10:20)  HR: 97 (23 @ 12:00) (88 - 99)  BP: 110/57 (23 @ 12:00) (61/45 - 110/57)  RR: 20 (23 @ 12:00) (20 - 24)  SpO2: 96% (23 @ 12:00) (96% - 97%)  Wt(kg): --  Height (cm): 188 (23 10:11)  Weight (kg): 95.3 (23 @ 10:11)  BMI (kg/m2): 27 (23 @ 10:11)  BSA (m2): 2.22 (23 10:11)      Physical Exam:  	Gen: NAD  	HEENT: dry mucus membranes  	Pulm: CTA B/L  	CV: RRR, S1S2; no rub  	Back: No spinal or CVA tenderness; no sacral edema  	Abd: +BS, soft, tender/distended+  	: mohr  	UE: Warm, ; no edema; no asterixis  	LE: Warm,no edema  	Neuro: No focal deficit  	Skin: Warm  LABS/STUDIES  --------------------------------------------------------------------------------              10.6   10.35 >-----------<  673      [23 @ 10:30]              30.2     122  |  82  |  76.6  ----------------------------<  103      [23 @ 12:05]  5.2   |  20.0  |  8.14        Ca     7.7     [23 12:05]    TPro  5.6  /  Alb  2.3  /  TBili  0.3  /  DBili  x   /  AST  50  /  ALT  75  /  AlkPhos  88  [23 @ 12:05]    PT/INR: PT 17.1 , INR 1.47       [23 10:30]  PTT: 43.2       [23 10:30]      Creatinine Trend:  SCr 8.14 [ 12:05]  SCr 9.24 [05-01 @ 10:30]  SCr 1.16 [ @ 12:43]  SCr 1.04 [ @ 14:30]  SCr 1.02 [ @ 07:30]    Urinalysis - [23 @ 11:50]      Color Yellow / Appearance Clear / SG 1.020 / pH 5.0      Gluc Negative / Ketone Negative  / Bili Small / Urobili Negative       Blood Moderate / Protein 30 / Leuk Est Trace / Nitrite Negative      RBC 6-10 / WBC 3-5 / Hyaline  / Gran  / Sq Epi  / Non Sq Epi  / Bacteria Moderate      Lipid: chol 126, TG 86, HDL 60, LDL --      [23 @ 05:26]

## 2023-05-01 NOTE — ED ADULT NURSE NOTE - OBJECTIVE STATEMENT
Patient arrived to ED today from home with c/o wheeled into ED with steady gait, Pt was discharged Wed from [place] from a back surgery c/o diarrhea since Friday, abd distention, vomiting since last night, lightheaded, and sever pain, pt is pale and diaphoretic, sent to [facility] Patient arrived to ED today from home with c/o diarrhea, abdominal pains, urinary retention, general weakness, abdominal distension, vomiting, lightheadedness, sweats.  Patient was discharged Wednesday from [place] from a back surgery.  Patient brought to critical care due to being found hypotensive.

## 2023-05-01 NOTE — CONSULT NOTE ADULT - ASSESSMENT
Patient is a 59 yo male with PMH of prostate CA (s/p sx/RT/lupron 2018), HTN, GERD, c/o lower back pain after injury while at work on 7-2-2022 whom presented to [facility] on 4/18/23 for posterior spinal fusion and laminectomy T11-L5 and revision of L3-L5 with Plastics Closure now with: Agusto,, hyperkalemia and metabolic acidosis    Agusto on CKD stage ? II/III  Scr 1.1-1.4 over the past few months; most recent Scr prior to this admission was 1.1 on 04/24  Scr 9.2 on presentation s/p mohr- non oliguric  SCr improving to 8.1  UA with moderate blood, protein-, trace LE , 6-10 rbcs, moderate bacteria  CT abdomen with normal appearing kidneys    Agusto likely multifactorial; severe pre renal, ? obstructive in setting of ileus/ constipation  Scr now improving to 7.5<- 8.1- pt is non oliguric on mohr  Continue IV hydration  No indication for RRT    Hyperkalemia  s/p medical management- repeat K+ improved to 4.7    metabolic acidosis  continue bicarb gtt    Hyponatremia; acute on chronic- now in setting of hypovolemia  IV hydration as above

## 2023-05-01 NOTE — ED PROVIDER NOTE - OBJECTIVE STATEMENT
57 y/o male w/PMHx of Spinal Fusion and Decompression Laminectomy presenting with severe abdominal distension, nausea/vomiting, diarrhea. Per wife pt was given narcotics for pain relief and laxatives for anticipated constipation side effects, wife believes he took too many laxatives.
Well developed

## 2023-05-01 NOTE — PATIENT PROFILE ADULT - FUNCTIONAL ASSESSMENT - BASIC MOBILITY SECTION LABEL
FMLA or Disability Forms were received and faxed to the Forms Completion Department today at [phone]. (Please include all appropriate authorization forms with your fax).    Did you have the patient complete (in full) and sign the “Authorization For Disclosure of Health Information Forms Completion” form? Yes    Have you communicated that the Forms Completion Process may take up to 14 days? Yes    If you have questions about this encounter, please contact the Forms Completion Dept at [phone], Option 3.  
Form completed and sent to Physician's Office electronically via In Basket messaging for review and signature.     Completed form will be faxed to [employer], [phone].  
Form received at [facility] on 7/21/2021.     Please note that it takes 7-10 business days for completion.     Signed authorization received with form.    
Paperwork received at provider's office, signed, and faxed back to forms completion dept. Confirmation received.    
Received signed and completed form from Physician's Office.      Faxed to [facility] [employer].  
.

## 2023-05-01 NOTE — CONSULT NOTE ADULT - SUBJECTIVE AND OBJECTIVE BOX
REASON FOR CONSULT: Hypotension, WILMA     CONSULT REQUESTED BY: Dr. Woodson    Patient is a 58y old  Male who presents with a chief complaint of vomitting    BRIEF HOSPITAL COURSE: Patient is a 57 yo male with PMH of prostate CA (s/p sx/RT/lupron ), HTN, GERD, c/o lower back pain after injury while at work on 2022 whom presented to [facility] on 23 for posterior spinal fusion and laminectomy T11-L5 and revision of L3-L5 with Plastics Closure. Patient       Events last 24 hours: ***    PAST MEDICAL & SURGICAL HISTORY:  HTN (hypertension)      GERD (gastroesophageal reflux disease)      Prostate cancer      Lumbar stenosis      S/P prostatectomy  with radiation      Lipoma  neck and left arm      History of lumbar laminectomy for spinal cord decompression        Allergies    No Known Drug Allergies  shellfish (Other)    Intolerances      FAMILY HISTORY:  FH: type 2 diabetes mellitus  mother -         Review of Systems:  CONSTITUTIONAL: No fever, chills, or fatigue  EYES: No eye pain, visual disturbances, or discharge  ENMT:  No difficulty hearing, tinnitus, vertigo; No sinus or throat pain  NECK: No pain or stiffness  RESPIRATORY: No cough, wheezing, chills or hemoptysis; No shortness of breath  CARDIOVASCULAR: No chest pain, palpitations, dizziness, or leg swelling  GASTROINTESTINAL: No abdominal or epigastric pain. No nausea, vomiting, or hematemesis; No diarrhea or constipation. No melena or hematochezia.  GENITOURINARY: No dysuria, frequency, hematuria, or incontinence  NEUROLOGICAL: No headaches, memory loss, loss of strength, numbness, or tremors  SKIN: No itching, burning, rashes, or lesions   MUSCULOSKELETAL: No joint pain or swelling; No muscle, back, or extremity pain  PSYCHIATRIC: No depression, anxiety, mood swings, or difficulty sleeping      Medications:    norepinephrine Infusion 0.05 MICROgram(s)/kG/Min IV Continuous <Continuous>                  sodium bicarbonate  Infusion 0.393 mEq/kG/Hr IV Continuous <Continuous>                ICU Vital Signs Last 24 Hrs  T(C): 37.4 (01 May 2023 10:20), Max: 37.4 (01 May 2023 10:20)  T(F): 99.3 (01 May 2023 10:20), Max: 99.3 (01 May 2023 10:20)  HR: 97 (01 May 2023 12:00) (88 - 99)  BP: 110/57 (01 May 2023 12:00) (61/45 - 110/57)  BP(mean): --  ABP: --  ABP(mean): --  RR: 20 (01 May 2023 12:00) (20 - 24)  SpO2: 96% (01 May 2023 12:00) (96% - 97%)    O2 Parameters below as of 01 May 2023 12:00  Patient On (Oxygen Delivery Method): room air          Vital Signs Last 24 Hrs  T(C): 37.4 (01 May 2023 10:20), Max: 37.4 (01 May 2023 10:20)  T(F): 99.3 (01 May 2023 10:20), Max: 99.3 (01 May 2023 10:20)  HR: 97 (01 May 2023 12:00) (88 - 99)  BP: 110/57 (01 May 2023 12:00) (61/45 - 110/57)  BP(mean): --  RR: 20 (01 May 2023 12:00) (20 - 24)  SpO2: 96% (01 May 2023 12:00) (96% - 97%)    Parameters below as of 01 May 2023 12:00  Patient On (Oxygen Delivery Method): room air        ABG - ( 01 May 2023 10:58 )  pH, Arterial: 7.370 pH, Blood: x     /  pCO2: 34    /  pO2: 85    / HCO3: 20    / Base Excess: -5.6  /  SaO2: 98.3                I&O's Detail        LABS:                        10.6   10.35 )-----------( 673      ( 01 May 2023 10:30 )             30.2     05    122<L>  |  82<L>  |  76.6<H>  ----------------------------<  103<H>  5.2   |  20.0<L>  |  8.14<H>    Ca    7.7<L>      01 May 2023 12:05    TPro  5.6<L>  /  Alb  2.3<L>  /  TBili  0.3<L>  /  DBili  x   /  AST  50<H>  /  ALT  75<H>  /  AlkPhos  88  05-01          CAPILLARY BLOOD GLUCOSE      POCT Blood Glucose.: 90 mg/dL (01 May 2023 12:47)    PT/INR - ( 01 May 2023 10:30 )   PT: 17.1 sec;   INR: 1.47 ratio         PTT - ( 01 May 2023 10:30 )  PTT:43.2 sec  Urinalysis Basic - ( 01 May 2023 11:50 )    Color: Yellow / Appearance: Clear / S.020 / pH: x  Gluc: x / Ketone: Negative  / Bili: Small / Urobili: Negative mg/dL   Blood: x / Protein: 30 mg/dL / Nitrite: Negative   Leuk Esterase: Trace / RBC: 6-10 /HPF / WBC 3-5 /HPF   Sq Epi: x / Non Sq Epi: x / Bacteria: Moderate      CULTURES:      Physical Examination:    General: No acute distress.  Alert, oriented, interactive, nonfocal    HEENT: Pupils equal, reactive to light.  Symmetric.    PULM: Clear to auscultation bilaterally, no significant sputum production    CVS: Regular rate and rhythm, no murmurs, rubs, or gallops    ABD: Soft, nondistended, nontender, normoactive bowel sounds, no masses    EXT: No edema, nontender    SKIN: Warm and well perfused, no rashes noted.    RADIOLOGY: ***    CRITICAL CARE TIME SPENT: ***   REASON FOR CONSULT: Hypotension, WILMA     CONSULT REQUESTED BY: Dr. Woodson    Patient is a 58y old  Male who presents with a chief complaint of vomitting    BRIEF HOSPITAL COURSE: Patient is a 59 yo male with PMH of prostate CA (s/p sx/RT/lupron ), HTN, GERD, c/o lower back pain after injury while at work on 2022 whom presented to [facility] on 23 for posterior spinal fusion and laminectomy T11-L5 and revision of L3-L5 with Plastics Closure. Patient discharged from  on 23 with pain medication and bowel regimen. Patient     Events last 24 hours: ***    PAST MEDICAL & SURGICAL HISTORY:  HTN (hypertension)      GERD (gastroesophageal reflux disease)      Prostate cancer      Lumbar stenosis      S/P prostatectomy  with radiation      Lipoma  neck and left arm      History of lumbar laminectomy for spinal cord decompression        Allergies    No Known Drug Allergies  shellfish (Other)    Intolerances      FAMILY HISTORY:  FH: type 2 diabetes mellitus  mother -         Review of Systems:  CONSTITUTIONAL: No fever, chills, or fatigue  EYES: No eye pain, visual disturbances, or discharge  ENMT:  No difficulty hearing, tinnitus, vertigo; No sinus or throat pain  NECK: No pain or stiffness  RESPIRATORY: No cough, wheezing, chills or hemoptysis; No shortness of breath  CARDIOVASCULAR: No chest pain, palpitations, dizziness, or leg swelling  GASTROINTESTINAL: No abdominal or epigastric pain. No nausea, vomiting, or hematemesis; No diarrhea or constipation. No melena or hematochezia.  GENITOURINARY: No dysuria, frequency, hematuria, or incontinence  NEUROLOGICAL: No headaches, memory loss, loss of strength, numbness, or tremors  SKIN: No itching, burning, rashes, or lesions   MUSCULOSKELETAL: No joint pain or swelling; No muscle, back, or extremity pain  PSYCHIATRIC: No depression, anxiety, mood swings, or difficulty sleeping      Medications:    norepinephrine Infusion 0.05 MICROgram(s)/kG/Min IV Continuous <Continuous>                  sodium bicarbonate  Infusion 0.393 mEq/kG/Hr IV Continuous <Continuous>                ICU Vital Signs Last 24 Hrs  T(C): 37.4 (01 May 2023 10:20), Max: 37.4 (01 May 2023 10:20)  T(F): 99.3 (01 May 2023 10:20), Max: 99.3 (01 May 2023 10:20)  HR: 97 (01 May 2023 12:00) (88 - 99)  BP: 110/57 (01 May 2023 12:00) (61/45 - 110/57)  BP(mean): --  ABP: --  ABP(mean): --  RR: 20 (01 May 2023 12:00) (20 - 24)  SpO2: 96% (01 May 2023 12:00) (96% - 97%)    O2 Parameters below as of 01 May 2023 12:00  Patient On (Oxygen Delivery Method): room air          Vital Signs Last 24 Hrs  T(C): 37.4 (01 May 2023 10:20), Max: 37.4 (01 May 2023 10:20)  T(F): 99.3 (01 May 2023 10:20), Max: 99.3 (01 May 2023 10:20)  HR: 97 (01 May 2023 12:00) (88 - 99)  BP: 110/57 (01 May 2023 12:00) (61/45 - 110/57)  BP(mean): --  RR: 20 (01 May 2023 12:00) (20 - 24)  SpO2: 96% (01 May 2023 12:00) (96% - 97%)    Parameters below as of 01 May 2023 12:00  Patient On (Oxygen Delivery Method): room air        ABG - ( 01 May 2023 10:58 )  pH, Arterial: 7.370 pH, Blood: x     /  pCO2: 34    /  pO2: 85    / HCO3: 20    / Base Excess: -5.6  /  SaO2: 98.3                I&O's Detail        LABS:                        10.6   10.35 )-----------( 673      ( 01 May 2023 10:30 )             30.2     05-    122<L>  |  82<L>  |  76.6<H>  ----------------------------<  103<H>  5.2   |  20.0<L>  |  8.14<H>    Ca    7.7<L>      01 May 2023 12:05    TPro  5.6<L>  /  Alb  2.3<L>  /  TBili  0.3<L>  /  DBili  x   /  AST  50<H>  /  ALT  75<H>  /  AlkPhos  88  05-          CAPILLARY BLOOD GLUCOSE      POCT Blood Glucose.: 90 mg/dL (01 May 2023 12:47)    PT/INR - ( 01 May 2023 10:30 )   PT: 17.1 sec;   INR: 1.47 ratio         PTT - ( 01 May 2023 10:30 )  PTT:43.2 sec  Urinalysis Basic - ( 01 May 2023 11:50 )    Color: Yellow / Appearance: Clear / S.020 / pH: x  Gluc: x / Ketone: Negative  / Bili: Small / Urobili: Negative mg/dL   Blood: x / Protein: 30 mg/dL / Nitrite: Negative   Leuk Esterase: Trace / RBC: 6-10 /HPF / WBC 3-5 /HPF   Sq Epi: x / Non Sq Epi: x / Bacteria: Moderate      CULTURES:      Physical Examination:    General: No acute distress.  Alert, oriented, interactive, nonfocal    HEENT: Pupils equal, reactive to light.  Symmetric.    PULM: Clear to auscultation bilaterally, no significant sputum production    CVS: Regular rate and rhythm, no murmurs, rubs, or gallops    ABD: Soft, nondistended, nontender, normoactive bowel sounds, no masses    EXT: No edema, nontender    SKIN: Warm and well perfused, no rashes noted.    RADIOLOGY: ***    CRITICAL CARE TIME SPENT: ***   REASON FOR CONSULT: Hypotension, WILMA     CONSULT REQUESTED BY: Dr. Woodson    Patient is a 58y old  Male who presents with a chief complaint of vomitting    BRIEF HOSPITAL COURSE: Patient is a 57 yo male with PMH of prostate CA (s/p sx/RT/lupron ), HTN, GERD, c/o lower back pain after injury while at work on 2022 whom presented to [facility] on 23 for posterior spinal fusion and laminectomy T11-L5 and revision of L3-L5 with Plastics Closure. Patient discharged from  on 23 with pain medication and bowel regimen. Patient reports that his last bowel movement was Tuesday and endorses he has been vomiting for a few days. Patient confused at times, but work-up in the ER revealed significant WILMA (Cr 9.24), Metabolic acidosis, Dehydration. CT A/P done which showed a likely ileus. Patient received 3L IVF in the ER, still hypotensive so started on levophed. Patient also cultured and given zosyn and vancomycin. MICU consulted. Patient endorsing diffuse abdominal pain. Reports he had been having diarrhea but that has stopped but has been vomiting over the last few days. Levophed running at 0.03mcg/kg/min at time of exam.       PAST MEDICAL & SURGICAL HISTORY:  HTN (hypertension)      GERD (gastroesophageal reflux disease)      Prostate cancer      Lumbar stenosis      S/P prostatectomy  with radiation      Lipoma  neck and left arm      History of lumbar laminectomy for spinal cord decompression        Allergies    No Known Drug Allergies  shellfish (Other)    Intolerances      FAMILY HISTORY:  FH: type 2 diabetes mellitus  mother -         Review of Systems:  CONSTITUTIONAL: ++CHILLS No fever or fatigue  EYES: No eye pain, visual disturbances, or discharge  ENMT:  No difficulty hearing, tinnitus, vertigo; No sinus or throat pain  NECK: No pain or stiffness  RESPIRATORY: No cough, wheezing, chills or hemoptysis; No shortness of breath  CARDIOVASCULAR: No chest pain, palpitations, dizziness, or leg swelling  GASTROINTESTINAL: ++VOMITING, DIARRHEA, DISTENTION No abdominal or epigastric pain. No hematemesis; No diarrhea or constipation. No melena or hematochezia.  GENITOURINARY: No dysuria, frequency, hematuria, or incontinence  NEUROLOGICAL: No headaches, memory loss, loss of strength, numbness, or tremors  SKIN: No itching, burning, rashes, or lesions   MUSCULOSKELETAL: No joint pain or swelling; No muscle, back, or extremity pain  PSYCHIATRIC: No depression, anxiety, mood swings, or difficulty sleeping      Medications:  norepinephrine Infusion 0.05 MICROgram(s)/kG/Min IV Continuous <Continuous>  sodium bicarbonate  Infusion 0.393 mEq/kG/Hr IV Continuous <Continuous>    ICU Vital Signs Last 24 Hrs  T(C): 37.4 (01 May 2023 10:20), Max: 37.4 (01 May 2023 10:20)  T(F): 99.3 (01 May 2023 10:20), Max: 99.3 (01 May 2023 10:20)  HR: 97 (01 May 2023 12:00) (88 - 99)  BP: 110/57 (01 May 2023 12:00) (61/45 - 110/57)  RR: 20 (01 May 2023 12:00) (20 - 24)  SpO2: 96% (01 May 2023 12:00) (96% - 97%)    O2 Parameters below as of 01 May 2023 12:00  Patient On (Oxygen Delivery Method): room air          Vital Signs Last 24 Hrs  T(C): 37.4 (01 May 2023 10:20), Max: 37.4 (01 May 2023 10:20)  T(F): 99.3 (01 May 2023 10:20), Max: 99.3 (01 May 2023 10:20)  HR: 97 (01 May 2023 12:00) (88 - 99)  BP: 110/57 (01 May 2023 12:00) (61/45 - 110/57)  RR: 20 (01 May 2023 12:00) (20 - 24)  SpO2: 96% (01 May 2023 12:00) (96% - 97%)    Parameters below as of 01 May 2023 12:00  Patient On (Oxygen Delivery Method): room air        ABG - ( 01 May 2023 10:58 )  pH, Arterial: 7.370 pH, Blood: x     /  pCO2: 34    /  pO2: 85    / HCO3: 20    / Base Excess: -5.6  /  SaO2: 98.3                I&O's Detail        LABS:                        10.6   10.35 )-----------( 673      ( 01 May 2023 10:30 )             30.2     05-01    122<L>  |  82<L>  |  76.6<H>  ----------------------------<  103<H>  5.2   |  20.0<L>  |  8.14<H>    Ca    7.7<L>      01 May 2023 12:05    TPro  5.6<L>  /  Alb  2.3<L>  /  TBili  0.3<L>  /  DBili  x   /  AST  50<H>  /  ALT  75<H>  /  AlkPhos  88            CAPILLARY BLOOD GLUCOSE      POCT Blood Glucose.: 90 mg/dL (01 May 2023 12:47)    PT/INR - ( 01 May 2023 10:30 )   PT: 17.1 sec;   INR: 1.47 ratio         PTT - ( 01 May 2023 10:30 )  PTT:43.2 sec  Urinalysis Basic - ( 01 May 2023 11:50 )    Color: Yellow / Appearance: Clear / S.020 / pH: x  Gluc: x / Ketone: Negative  / Bili: Small / Urobili: Negative mg/dL   Blood: x / Protein: 30 mg/dL / Nitrite: Negative   Leuk Esterase: Trace / RBC: 6-10 /HPF / WBC 3-5 /HPF   Sq Epi: x / Non Sq Epi: x / Bacteria: Moderate      CULTURES:      Physical Examination:    General: No acute distress.      Neuro: Alert, oriented x 2 (confused to the date), interactive, nonfocal    HEENT: Pupils equal, reactive to light.  Symmetric.    PULM: Clear to auscultation bilaterally    CVS: tachycardic but regular rhythm    ABD: Distended, diffusely tender, no guarding or rigidity, no peritoneal signs    EXT: No edema, nontender    SKIN: Warm and well perfused, no rashes noted.

## 2023-05-01 NOTE — PROCEDURE NOTE - NSPROCDETAILS_GEN_ALL_CORE
nasogastric/audible air bolus/placement confirmed by auscultation/gastric secretions aspirated, placement confirmed/connected to suction

## 2023-05-02 LAB
ALBUMIN SERPL ELPH-MCNC: 2.5 G/DL — LOW (ref 3.3–5.2)
ALP SERPL-CCNC: 83 U/L — SIGNIFICANT CHANGE UP (ref 40–120)
ALT FLD-CCNC: 61 U/L — HIGH
ANION GAP SERPL CALC-SCNC: 16 MMOL/L — SIGNIFICANT CHANGE UP (ref 5–17)
ANION GAP SERPL CALC-SCNC: 16 MMOL/L — SIGNIFICANT CHANGE UP (ref 5–17)
ANION GAP SERPL CALC-SCNC: 17 MMOL/L — SIGNIFICANT CHANGE UP (ref 5–17)
ANION GAP SERPL CALC-SCNC: 18 MMOL/L — HIGH (ref 5–17)
AST SERPL-CCNC: 44 U/L — HIGH
BILIRUB SERPL-MCNC: 0.3 MG/DL — LOW (ref 0.4–2)
BUN SERPL-MCNC: 41.2 MG/DL — HIGH (ref 8–20)
BUN SERPL-MCNC: 47.4 MG/DL — HIGH (ref 8–20)
BUN SERPL-MCNC: 65.8 MG/DL — HIGH (ref 8–20)
BUN SERPL-MCNC: 69.9 MG/DL — HIGH (ref 8–20)
CA-I BLD-SCNC: 1.1 MMOL/L — LOW (ref 1.15–1.33)
CALCIUM SERPL-MCNC: 7.2 MG/DL — LOW (ref 8.4–10.5)
CALCIUM SERPL-MCNC: 7.7 MG/DL — LOW (ref 8.4–10.5)
CALCIUM SERPL-MCNC: 8.2 MG/DL — LOW (ref 8.4–10.5)
CALCIUM SERPL-MCNC: 8.5 MG/DL — SIGNIFICANT CHANGE UP (ref 8.4–10.5)
CHLORIDE SERPL-SCNC: 86 MMOL/L — LOW (ref 96–108)
CHLORIDE SERPL-SCNC: 88 MMOL/L — LOW (ref 96–108)
CHLORIDE SERPL-SCNC: 93 MMOL/L — LOW (ref 96–108)
CHLORIDE SERPL-SCNC: 97 MMOL/L — SIGNIFICANT CHANGE UP (ref 96–108)
CO2 SERPL-SCNC: 21 MMOL/L — LOW (ref 22–29)
CO2 SERPL-SCNC: 21 MMOL/L — LOW (ref 22–29)
CO2 SERPL-SCNC: 23 MMOL/L — SIGNIFICANT CHANGE UP (ref 22–29)
CO2 SERPL-SCNC: 24 MMOL/L — SIGNIFICANT CHANGE UP (ref 22–29)
CREAT SERPL-MCNC: 1.62 MG/DL — HIGH (ref 0.5–1.3)
CREAT SERPL-MCNC: 2.1 MG/DL — HIGH (ref 0.5–1.3)
CREAT SERPL-MCNC: 3.7 MG/DL — HIGH (ref 0.5–1.3)
CREAT SERPL-MCNC: 5.46 MG/DL — HIGH (ref 0.5–1.3)
CULTURE RESULTS: NO GROWTH — SIGNIFICANT CHANGE UP
EGFR: 11 ML/MIN/1.73M2 — LOW
EGFR: 18 ML/MIN/1.73M2 — LOW
EGFR: 36 ML/MIN/1.73M2 — LOW
EGFR: 49 ML/MIN/1.73M2 — LOW
GLUCOSE SERPL-MCNC: 78 MG/DL — SIGNIFICANT CHANGE UP (ref 70–99)
GLUCOSE SERPL-MCNC: 82 MG/DL — SIGNIFICANT CHANGE UP (ref 70–99)
GLUCOSE SERPL-MCNC: 95 MG/DL — SIGNIFICANT CHANGE UP (ref 70–99)
GLUCOSE SERPL-MCNC: 98 MG/DL — SIGNIFICANT CHANGE UP (ref 70–99)
HCT VFR BLD CALC: 25.2 % — LOW (ref 39–50)
HGB BLD-MCNC: 8.7 G/DL — LOW (ref 13–17)
LACTATE SERPL-SCNC: 1 MMOL/L — SIGNIFICANT CHANGE UP (ref 0.5–2)
MAGNESIUM SERPL-MCNC: 1.7 MG/DL — SIGNIFICANT CHANGE UP (ref 1.6–2.6)
MAGNESIUM SERPL-MCNC: 1.8 MG/DL — SIGNIFICANT CHANGE UP (ref 1.6–2.6)
MAGNESIUM SERPL-MCNC: 2 MG/DL — SIGNIFICANT CHANGE UP (ref 1.6–2.6)
MCHC RBC-ENTMCNC: 29.5 PG — SIGNIFICANT CHANGE UP (ref 27–34)
MCHC RBC-ENTMCNC: 34.5 GM/DL — SIGNIFICANT CHANGE UP (ref 32–36)
MCV RBC AUTO: 85.4 FL — SIGNIFICANT CHANGE UP (ref 80–100)
PHOSPHATE SERPL-MCNC: 4.3 MG/DL — SIGNIFICANT CHANGE UP (ref 2.4–4.7)
PHOSPHATE SERPL-MCNC: 4.5 MG/DL — SIGNIFICANT CHANGE UP (ref 2.4–4.7)
PHOSPHATE SERPL-MCNC: 5.5 MG/DL — HIGH (ref 2.4–4.7)
PLATELET # BLD AUTO: 668 K/UL — HIGH (ref 150–400)
POTASSIUM SERPL-MCNC: 4.1 MMOL/L — SIGNIFICANT CHANGE UP (ref 3.5–5.3)
POTASSIUM SERPL-MCNC: 4.2 MMOL/L — SIGNIFICANT CHANGE UP (ref 3.5–5.3)
POTASSIUM SERPL-MCNC: 4.4 MMOL/L — SIGNIFICANT CHANGE UP (ref 3.5–5.3)
POTASSIUM SERPL-MCNC: 4.4 MMOL/L — SIGNIFICANT CHANGE UP (ref 3.5–5.3)
POTASSIUM SERPL-SCNC: 4.1 MMOL/L — SIGNIFICANT CHANGE UP (ref 3.5–5.3)
POTASSIUM SERPL-SCNC: 4.2 MMOL/L — SIGNIFICANT CHANGE UP (ref 3.5–5.3)
POTASSIUM SERPL-SCNC: 4.4 MMOL/L — SIGNIFICANT CHANGE UP (ref 3.5–5.3)
POTASSIUM SERPL-SCNC: 4.4 MMOL/L — SIGNIFICANT CHANGE UP (ref 3.5–5.3)
PROT SERPL-MCNC: 5.3 G/DL — LOW (ref 6.6–8.7)
RBC # BLD: 2.95 M/UL — LOW (ref 4.2–5.8)
RBC # FLD: 13 % — SIGNIFICANT CHANGE UP (ref 10.3–14.5)
SODIUM SERPL-SCNC: 125 MMOL/L — LOW (ref 135–145)
SODIUM SERPL-SCNC: 128 MMOL/L — LOW (ref 135–145)
SODIUM SERPL-SCNC: 133 MMOL/L — LOW (ref 135–145)
SODIUM SERPL-SCNC: 133 MMOL/L — LOW (ref 135–145)
SPECIMEN SOURCE: SIGNIFICANT CHANGE UP
WBC # BLD: 6.78 K/UL — SIGNIFICANT CHANGE UP (ref 3.8–10.5)
WBC # FLD AUTO: 6.78 K/UL — SIGNIFICANT CHANGE UP (ref 3.8–10.5)

## 2023-05-02 PROCEDURE — 99233 SBSQ HOSP IP/OBS HIGH 50: CPT

## 2023-05-02 PROCEDURE — 99223 1ST HOSP IP/OBS HIGH 75: CPT

## 2023-05-02 RX ADMIN — HYDROMORPHONE HYDROCHLORIDE 1 MILLIGRAM(S): 2 INJECTION INTRAMUSCULAR; INTRAVENOUS; SUBCUTANEOUS at 12:30

## 2023-05-02 RX ADMIN — CHLORHEXIDINE GLUCONATE 1 APPLICATION(S): 213 SOLUTION TOPICAL at 05:12

## 2023-05-02 RX ADMIN — ENOXAPARIN SODIUM 40 MILLIGRAM(S): 100 INJECTION SUBCUTANEOUS at 21:56

## 2023-05-02 RX ADMIN — HYDROMORPHONE HYDROCHLORIDE 1 MILLIGRAM(S): 2 INJECTION INTRAMUSCULAR; INTRAVENOUS; SUBCUTANEOUS at 05:53

## 2023-05-02 RX ADMIN — SODIUM CHLORIDE 125 MILLILITER(S): 9 INJECTION, SOLUTION INTRAVENOUS at 12:32

## 2023-05-02 RX ADMIN — HYDROMORPHONE HYDROCHLORIDE 0.5 MILLIGRAM(S): 2 INJECTION INTRAMUSCULAR; INTRAVENOUS; SUBCUTANEOUS at 20:00

## 2023-05-02 RX ADMIN — SODIUM CHLORIDE 125 MILLILITER(S): 9 INJECTION, SOLUTION INTRAVENOUS at 21:58

## 2023-05-02 RX ADMIN — HYDROMORPHONE HYDROCHLORIDE 0.5 MILLIGRAM(S): 2 INJECTION INTRAMUSCULAR; INTRAVENOUS; SUBCUTANEOUS at 17:05

## 2023-05-02 RX ADMIN — HYDROMORPHONE HYDROCHLORIDE 1 MILLIGRAM(S): 2 INJECTION INTRAMUSCULAR; INTRAVENOUS; SUBCUTANEOUS at 21:56

## 2023-05-02 RX ADMIN — HYDROMORPHONE HYDROCHLORIDE 1 MILLIGRAM(S): 2 INJECTION INTRAMUSCULAR; INTRAVENOUS; SUBCUTANEOUS at 22:26

## 2023-05-02 RX ADMIN — HYDROMORPHONE HYDROCHLORIDE 1 MILLIGRAM(S): 2 INJECTION INTRAMUSCULAR; INTRAVENOUS; SUBCUTANEOUS at 05:23

## 2023-05-02 RX ADMIN — Medication 100 MILLIGRAM(S): at 12:32

## 2023-05-02 RX ADMIN — HYDROMORPHONE HYDROCHLORIDE 1 MILLIGRAM(S): 2 INJECTION INTRAMUSCULAR; INTRAVENOUS; SUBCUTANEOUS at 12:45

## 2023-05-02 RX ADMIN — Medication 100 MILLIGRAM(S): at 05:12

## 2023-05-02 NOTE — PROGRESS NOTE ADULT - ASSESSMENT
Impression:  1. ileus versus partial small bowel obstruction  2. WILMA  3. metabolic acidosis  4. hyponatremia  5. hyperkalemia  6. shock, not otherwise specified.      Plan:  Neuro - stable    CV -  actively titrating pressors support as needed to maintain MAP> 65           cont IV hydration with balance fluids           will give an additional 1L fluid bolus now           lactate normalized           suspected dehydration and hypovolemia as source           check cortisol in am given hyponatremia and hyperkalemia    Pulm -  stable on RA    GI -  NPO         NGT decompression, monitor output         IV hydration         CT showing ileus vs early ? pSBO    Renal - Cr downtrending on repeat labs overnight, non-oliguric, avoid MAP<65, renally adjust all meds, avoid nephrotoxins, strict I/Os, no hydro on CT, BMP in am, K normalized, cont IV hydration with               balanced fluids, f/u nephro.               AG improved off bicarb infusion now, cont to monitor.    Heme -  Pharmacologic DVT PPx  in addition to SCD's, H/H stable, no signs of active bleeding    ID - afebrile, empiric IV abx with Ctx and flagyl for now, Bcx pending, trend WBC, abx discontinuation based on clinical features and culture data.      Endo -  Aggressive glycemic control to limit FS glucose to < 180mg/dl, BS stable

## 2023-05-02 NOTE — PROGRESS NOTE ADULT - ASSESSMENT
Patient is a 57 yo male with PMH of prostate CA (s/p sx/RT/lupron 2018), HTN, GERD, c/o lower back pain after injury while at work on 7-2-2022 whom presented to [facility] on 4/18/23 for posterior spinal fusion and laminectomy T11-L5 and revision of L3-L5 with Plastics Closure now with: Agusto,, hyperkalemia and metabolic acidosis    Agusto on CKD stage ? II/III  Scr 1.1-1.4 over the past few months; most recent Scr prior to this admission was 1.1 on 04/24  Scr 9.2 on presentation s/p mohr- non oliguric  UA with moderate blood, protein-, trace LE , 6-10 rbcs, moderate bacteria  CT abdomen with normal appearing kidneys    Agusto likely severe pre renal, ? obstructive in setting of ileus/ constipation  Scr now improving to  3.7<- 7.5<- 8.1- pt is non oliguric on mohr- UOP ~ 4 L  Continue IV hydration  No indication for RRT    Hyperkalemia  s/p medical management- repeat K+ improved to 4.7    metabolic acidosis- s/p bicarb gtt  improved    Hyponatremia; acute on chronic- now in setting of hypovolemia  Improving with IV hydration

## 2023-05-02 NOTE — PROGRESS NOTE ADULT - SUBJECTIVE AND OBJECTIVE BOX
[facility] DIVISION OF KIDNEY DISEASES AND HYPERTENSION -- FOLLOW UP NOTE  --------------------------------------------------------------------------------  Chief Complaint: Agusto    24 hour events/subjective:  pt seen and examined in MICU  remains on low dose levophed  NGT on suction  UOP ~ 3.9 L /24 hours  feels better since arrival        PAST HISTORY  --------------------------------------------------------------------------------  No significant changes to PMH, PSH, FHx, SHx, unless otherwise noted    ALLERGIES & MEDICATIONS  --------------------------------------------------------------------------------  Allergies    No Known Drug Allergies  shellfish (Other)    Intolerances      Standing Inpatient Medications  cefTRIAXone Injectable. 1000 milliGRAM(s) IV Push every 24 hours  chlorhexidine 2% Cloths 1 Application(s) Topical <User Schedule>  enoxaparin Injectable 40 milliGRAM(s) SubCutaneous every 24 hours  lactated ringers. 1000 milliLiter(s) IV Continuous <Continuous>  metroNIDAZOLE  IVPB 500 milliGRAM(s) IV Intermittent every 8 hours  norepinephrine Infusion 0.05 MICROgram(s)/kG/Min IV Continuous <Continuous>    PRN Inpatient Medications  HYDROmorphone  Injectable 0.5 milliGRAM(s) IV Push every 6 hours PRN  HYDROmorphone  Injectable 1 milliGRAM(s) IV Push every 6 hours PRN      REVIEW OF SYSTEMS  --------------------------------------------------------------------------------  Gen: No weight changes, fatigue, fevers/chills, weakness  Skin: No rashes  Head/Eyes/Ears/Mouth: No headache; Normal hearing; Normal vision w/o blurriness; No sinus pain/discomfort, sore throat  Respiratory: No dyspnea, cough, wheezing, hemoptysis  CV: No chest pain, PND, orthopnea  GI: No abdominal pain, diarrhea, constipation, nausea, vomiting, melena, hematochezia  : No increased frequency, dysuria, hematuria, nocturia  MSK: No joint pain/swelling; no back pain; no edema  Neuro: No dizziness/lightheadedness, weakness, seizures, numbness, tingling  Heme: No easy bruising or bleeding  Endo: No heat/cold intolerance  Psych: No significant nervousness, anxiety, stress, depression    All other systems were reviewed and are negative, except as noted.    VITALS/PHYSICAL EXAM  --------------------------------------------------------------------------------  T(C): 36.7 (05-02-23 @ 12:06), Max: 37.1 (05-02-23 @ 01:15)  HR: 110 (05-02-23 @ 13:00) (100 - 117)  BP: 124/70 (05-02-23 @ 13:00) (66/42 - 164/90)  RR: 22 (05-02-23 @ 13:00) (14 - 29)  SpO2: 98% (05-02-23 @ 13:00) (80% - 100%)  Wt(kg): --  Height (cm): 188 (05-01-23 @ 10:11)  Weight (kg): 95.3 (05-01-23 @ 10:11)  BMI (kg/m2): 27 (05-01-23 @ 10:11)  BSA (m2): 2.22 (05-01-23 @ 10:11)      05-01-23 @ 07:01  -  05-02-23 @ 07:00  --------------------------------------------------------  IN: 3898.2 mL / OUT: 4380 mL / NET: -481.8 mL    05-02-23 @ 07:01  -  05-02-23 @ 13:48  --------------------------------------------------------  IN: 660.7 mL / OUT: 651 mL / NET: 9.7 mL      Physical Exam:  	Gen: NAD  	HEENT: NGT to suction  	Pulm: CTA B/L  	CV: RRR, S1S2; no rub  	Abd: +BS, soft, tender/distended  	: mohr+  	UE: Warm, ; no edema  	LE: Warm, ; no edema  	Neuro: No focal deficit  	Psych: Normal affect and mood  	Skin: Warm,    LABS/STUDIES  --------------------------------------------------------------------------------              8.7    6.78  >-----------<  668      [05-02-23 @ 05:24]              25.2     128  |  88  |  65.8  ----------------------------<  98      [05-02-23 @ 05:24]  4.4   |  24.0  |  3.70        Ca     8.2     [05-02-23 @ 05:24]      iCa    1.10     [05-02 @ 05:24]      Mg     2.0     [05-02-23 @ 05:24]      Phos  4.3     [05-02-23 @ 05:24]    TPro  5.3  /  Alb  2.5  /  TBili  0.3  /  DBili  x   /  AST  44  /  ALT  61  /  AlkPhos  83  [05-01-23 @ 23:54]    PT/INR: PT 17.1 , INR 1.47       [05-01-23 @ 10:30]  PTT: 43.2       [05-01-23 @ 10:30]      Creatinine Trend:  SCr 3.70 [05-02 @ 05:24]  SCr 5.46 [05-01 @ 23:54]  SCr 7.59 [05-01 @ 15:05]  SCr 8.14 [05-01 @ 12:05]  SCr 9.24 [05-01 @ 10:30]    Urinalysis - [05-01-23 @ 11:50]      Color Yellow / Appearance Clear / SG 1.020 / pH 5.0      Gluc Negative / Ketone Negative  / Bili Small / Urobili Negative       Blood Moderate / Protein 30 / Leuk Est Trace / Nitrite Negative      RBC 6-10 / WBC 3-5 / Hyaline  / Gran  / Sq Epi  / Non Sq Epi  / Bacteria Moderate      Lipid: chol 126, TG 86, HDL 60, LDL --      [02-06-23 @ 05:26]

## 2023-05-02 NOTE — PROGRESS NOTE ADULT - ASSESSMENT
Impression  59 yo male with prostate cancer s/p surgery/radiation, HTN, GERD, s/p lumbar laminectomy and fusion at [facility] in April 2023, presented to the ED with abdominal distention, pain, generalized weakness, found to have acute renal failure, metabolic acidosis, ileus vs partial SBO.    pre-renal azotemia/ WILMA/ dehydration  - now improving with IV hydration  - received bicarb infusion  - good urine output       Ileus vs partial SBO/ possible enteritis   - nurse reports patient having bowel movements  - continue NG tube today, NPO, bowel rest, serial abdominal exams  - on abx, awaiting GI PCR panel and stool cultures  - possible proctocolitis on CT    Recent laminectomy/ spinal fusion  - will have orthopedic team look at CT, no drainage from surgical site Impression  59 yo male with prostate cancer s/p surgery/radiation, HTN, GERD, s/p lumbar laminectomy and fusion at [facility] in April 2023, presented to the ED with abdominal distention, pain, generalized weakness, found to have acute renal failure, metabolic acidosis, ileus vs partial SBO.    pre-renal azotemia/ WILMA/ dehydration  - now improving with IV hydration  - received bicarb infusion  - good urine output       Ileus vs partial SBO/ possible enteritis   - nurse reports patient having bowel movements  - continue NG tube today, NPO, bowel rest, serial abdominal exams  - on abx, awaiting GI PCR panel and stool cultures  - possible proctocolitis on CT    Recent lumbar spinal surgery  - made several attempts to reach his orthopedist's office (Faisal Pierre) without success, will keep trying     Sedation/Analgesia: dilaudid  Vasoactive medications: norepi - low dose  DVT prophylaxis: heparin  GI prophylaxis: protonix  Nutrition: npo  Central line: none  Tate: remove  Mobility: oob

## 2023-05-02 NOTE — PROGRESS NOTE ADULT - SUBJECTIVE AND OBJECTIVE BOX
Interval HPI:  Feels better today  had liquid bowel movement last night    Exam:  alert, nad  reg rhythm  lungs clear  abd soft, no rebound or guarding, less pain per patient  no edema    Radiology:     On Admission  23 (1d)  HPI:    PAST MEDICAL & SURGICAL HISTORY:  HTN (hypertension)      GERD (gastroesophageal reflux disease)      Prostate cancer      Lumbar stenosis      S/P prostatectomy  with radiation      Lipoma  neck and left arm      History of lumbar laminectomy for spinal cord decompression          Antimicrobial:  cefTRIAXone Injectable. 1000 milliGRAM(s) IV Push every 24 hours  metroNIDAZOLE  IVPB 500 milliGRAM(s) IV Intermittent every 8 hours    Cardiovascular:  norepinephrine Infusion 0.05 MICROgram(s)/kG/Min IV Continuous <Continuous>    Pulmonary:    Hematalogic:  enoxaparin Injectable 40 milliGRAM(s) SubCutaneous every 24 hours    Other:  chlorhexidine 2% Cloths 1 Application(s) Topical <User Schedule>  HYDROmorphone  Injectable 0.5 milliGRAM(s) IV Push every 6 hours PRN  HYDROmorphone  Injectable 1 milliGRAM(s) IV Push every 6 hours PRN  lactated ringers. 1000 milliLiter(s) IV Continuous <Continuous>      Drug Dosing Weight  Height (cm): 188 (01 May 2023 10:11)  Weight (kg): 95.3 (01 May 2023 10:11)  BMI (kg/m2): 27 (01 May 2023 10:11)  BSA (m2): 2.22 (01 May 2023 10:11)    T(C): 36.7 (23 @ 12:06), Max: 37.1 (23 @ 01:15)  HR: 109 (23 @ 16:00)  BP: 114/71 (23 @ 16:00)  BP(mean): 85 (23 @ 16:00)  ABP: --  ABP(mean): --  RR: 19 (23 @ 16:00)  SpO2: 93% (23 @ 16:00)    ABG - ( 01 May 2023 10:58 )  pH, Arterial: 7.370 pH, Blood: x     /  pCO2: 34    /  pO2: 85    / HCO3: 20    / Base Excess: -5.6  /  SaO2: 98.3                   @ 07:01  -   @ 07:00  --------------------------------------------------------  IN: 3898.2 mL / OUT: 4380 mL / NET: -481.8 mL              LABS:  CBC Full  -  ( 02 May 2023 05:24 )  WBC Count : 6.78 K/uL  RBC Count : 2.95 M/uL  Hemoglobin : 8.7 g/dL  Hematocrit : 25.2 %  Platelet Count - Automated : 668 K/uL  Mean Cell Volume : 85.4 fl  Mean Cell Hemoglobin : 29.5 pg  Mean Cell Hemoglobin Concentration : 34.5 gm/dL  Auto Neutrophil # : x  Auto Lymphocyte # : x  Auto Monocyte # : x  Auto Eosinophil # : x  Auto Basophil # : x  Auto Neutrophil % : x  Auto Lymphocyte % : x  Auto Monocyte % : x  Auto Eosinophil % : x  Auto Basophil % : x        133<L>  |  97  |  47.4<H>  ----------------------------<  82  4.2   |  21.0<L>  |  2.10<H>    Ca    7.2<L>      02 May 2023 13:26  Phos  4.5     -  Mg     1.7         TPro  5.3<L>  /  Alb  2.5<L>  /  TBili  0.3<L>  /  DBili  x   /  AST  44<H>  /  ALT  61<H>  /  AlkPhos  83      PT/INR - ( 01 May 2023 10:30 )   PT: 17.1 sec;   INR: 1.47 ratio         PTT - ( 01 May 2023 10:30 )  PTT:43.2 sec  Urinalysis Basic - ( 01 May 2023 11:50 )    Color: Yellow / Appearance: Clear / S.020 / pH: x  Gluc: x / Ketone: Negative  / Bili: Small / Urobili: Negative mg/dL   Blood: x / Protein: 30 mg/dL / Nitrite: Negative   Leuk Esterase: Trace / RBC: 6-10 /HPF / WBC 3-5 /HPF   Sq Epi: x / Non Sq Epi: x / Bacteria: Moderate      Culture Results:   No growth ( @ 11:50)  Culture Results:   No growth to date. ( @ 10:30)  Culture Results:   No growth to date. ( @ 10:20)    ____________________________________________________________________________________________________

## 2023-05-02 NOTE — PROGRESS NOTE ADULT - SUBJECTIVE AND OBJECTIVE BOX
Patient is a 58y old  Male who presents with a chief complaint of     BRIEF HOSPITAL COURSE:   58M with PMHx prostate CA (sp sx/RT/lupron), HTN, GERD, chronic low back pain sp injury, sp posterior spinal fusion and lami T11-L5 and revision of L3-L5 with plastic closure who presented with abd pain, vomiting. Found to have ileus vs partial SBO, WILMA, AGMA, dehydration, hypotension requiring pressors. Admitted to MICU.     Events last 24 hours: afebrile, remains on pressors, NGT with green bilious output (350ml), urine output /hr.     PAST MEDICAL & SURGICAL HISTORY:  HTN (hypertension)      GERD (gastroesophageal reflux disease)      Prostate cancer      Lumbar stenosis      S/P prostatectomy  with radiation      Lipoma  neck and left arm      History of lumbar laminectomy for spinal cord decompression          Review of Systems:  12 pt ROS negative unless otherwise stated above    Medications:  cefTRIAXone Injectable. 1000 milliGRAM(s) IV Push every 24 hours  metroNIDAZOLE  IVPB 500 milliGRAM(s) IV Intermittent every 8 hours    norepinephrine Infusion 0.05 MICROgram(s)/kG/Min IV Continuous <Continuous>      HYDROmorphone  Injectable 0.5 milliGRAM(s) IV Push every 6 hours PRN  HYDROmorphone  Injectable 1 milliGRAM(s) IV Push every 6 hours PRN      enoxaparin Injectable 40 milliGRAM(s) SubCutaneous every 24 hours          lactated ringers. 1000 milliLiter(s) IV Continuous <Continuous>      chlorhexidine 2% Cloths 1 Application(s) Topical <User Schedule>            ICU Vital Signs Last 24 Hrs  T(C): 37.4 (01 May 2023 10:20), Max: 37.4 (01 May 2023 10:20)  T(F): 99.3 (01 May 2023 10:20), Max: 99.3 (01 May 2023 10:20)  HR: 108 (02 May 2023 00:30) (88 - 117)  BP: 110/57 (02 May 2023 00:30) (61/45 - 128/103)  BP(mean): 70 (02 May 2023 00:30) (45 - 114)  ABP: --  ABP(mean): --  RR: 20 (02 May 2023 00:30) (14 - 27)  SpO2: 97% (02 May 2023 00:30) (80% - 100%)    O2 Parameters below as of 01 May 2023 15:30  Patient On (Oxygen Delivery Method): room air            ABG - ( 01 May 2023 10:58 )  pH, Arterial: 7.370 pH, Blood: x     /  pCO2: 34    /  pO2: 85    / HCO3: 20    / Base Excess: -5.6  /  SaO2: 98.3        I&O's Summary    01 May 2023 07:01  -  02 May 2023 00:51  --------------------------------------------------------  IN: 1141.1 mL / OUT: 1330 mL / NET: -188.9 mL          LABS:                        10.6   10.35 )-----------( 673      ( 01 May 2023 10:30 )             30.2     05-01    125<L>  |  86<L>  |  69.9<H>  ----------------------------<  95  4.1   |  21.0<L>  |  5.46<H>    Ca    7.7<L>      01 May 2023 23:54  Phos  5.5     05-  Mg     1.8     05-    TPro  5.3<L>  /  Alb  2.5<L>  /  TBili  0.3<L>  /  DBili  x   /  AST  44<H>  /  ALT  61<H>  /  AlkPhos  83  05-          CAPILLARY BLOOD GLUCOSE      POCT Blood Glucose.: 90 mg/dL (01 May 2023 12:47)    PT/INR - ( 01 May 2023 10:30 )   PT: 17.1 sec;   INR: 1.47 ratio         PTT - ( 01 May 2023 10:30 )  PTT:43.2 sec  Urinalysis Basic - ( 01 May 2023 11:50 )    Color: Yellow / Appearance: Clear / S.020 / pH: x  Gluc: x / Ketone: Negative  / Bili: Small / Urobili: Negative mg/dL   Blood: x / Protein: 30 mg/dL / Nitrite: Negative   Leuk Esterase: Trace / RBC: 6-10 /HPF / WBC 3-5 /HPF   Sq Epi: x / Non Sq Epi: x / Bacteria: Moderate      CULTURES:  Rapid RVP Result: NotDetec ( @ 11:55)      Physical Examination:    General: No acute distress.  Alert, oriented, interactive, nonfocal    HEENT: Pupils equal, reactive to light.  Symmetric.    PULM: Clear to auscultation bilaterally, no significant sputum production    CVS: Regular rate and rhythm, no murmurs, rubs, or gallops    ABD: Soft, nondistended, nontender, normoactive bowel sounds, no masses    EXT: No edema, nontender    SKIN: Warm and well perfused, no rashes noted.    RADIOLOGY:   ACC: [id number] EXAM:  CT ABDOMEN AND PELVIS   ORDERED BY: REGAN VALLE     PROCEDURE DATE:  2023          INTERPRETATION:  CLINICAL INFORMATION: Sepsis. Abdominal pain. Renal   failure.    COMPARISON: 2023.    CONTRAST/COMPLICATIONS:  IV Contrast: NONE  Oral Contrast: NONE  Complications: None reported at time of study completion    PROCEDURE:  CT of the Abdomen and Pelvis was performed.  Sagittal and coronal reformats were performed.    FINDINGS:  LOWER CHEST: The heart size is normal. Coronary calcifications are noted.   There is a small hiatal hernia. Dependent atelectatic changes are seen   posteriorly. Scattered bulla are seen..    LIVER: Scattered subcentimeter hypodensities of the liver too small to   characterize.  BILE DUCTS: Normal caliber.  GALLBLADDER: Questionable gallbladder wall thickening. Evaluation is   limited due to streak artifact from posterior spinal hardware.  SPLEEN: Within normal limits.  PANCREAS: Within normal limits.  ADRENALS: Within normal limits.  KIDNEYS/URETERS: Within normal limits.    BLADDER: Tate catheter.  REPRODUCTIVE ORGANS: Prostatectomy changes noted    BOWEL: Diffusely dilated fluid-filled small bowel loops measure up to 3.9   cm in diameter. There is also gaseous and fluid distention of the   proximal colon. There is no abrupt transition of bowel caliber. There is   a somewhat more gradual transition to normal caliber distal ileum, which   is also thick-walled. Question wall thickening of the rectum which is   also under distended.    PERITONEUM: No ascites.  VESSELS: Within normal limits.  RETROPERITONEUM/LYMPH NODES: Nonenlarged retroperitoneal herberth hepatis   lymph nodes appreciated.  ABDOMINAL WALL/BONES: Degenerative changes are noted throughout the spine   and hips. There is interval placement of screw plate fixation of T11-L2   with subsequent revision or extension of hardware are present from L3   through L5. There is new bony graft material noted, with extensive   lucencies seen surrounding the posterior aspects of the hardware at the   level of T11-L2. Posterior to the L2-L5 level. Complex appearing fluid   collection which measures 14.0 x 1.3 x 3.2 cm in the transverse, AP and   craniocaudal dimensions respectively. There is small fat-containing   umbilical hernia is appreciated. There is increased anterior   intervertebral disc space widening at L1-2.    IMPRESSION:  Posterior spinal fusion changes from T11 through L5. Associated bony   graft material noted laterally is associated with extensive lucency.   There is also posterior subcutaneous fluid collection. Overall findings   may suggest sequelae of hardware complication/infection.    Increased intervertebral disc space widening of the anterior L1-L2   interspace noted. This may be related to postoperative correction, though   given the additional findings, possibility of infection is also raised.    Diffuse small and proximal large bowel dilatation with air-fluid, likely   representing sequelae of an ileus. There is relative normal caliber of   the distal ileum, and possibility of early or partial bowel obstruction   cannot be entirely excluded. The normal caliber ileum is also mildly   thick-walled. This is nonspecific but raises the possibility of enteritis.    Questionable gallbladder wall thickening. Correlate clinically for   underlying gallbladder pathology. If clinically indicated, sonography can   be obtained for further evaluation.    Questionable thickening of the rectum which may be related to   underdistention versus mild proctocolitis. Correlate clinically.      --- End of Report ---            RODRICK FRANKLIN MD; Attending Radiologist  This document has been electronically signed. May  1 2023  1:20PM    CRITICAL CARE TIME SPENT: Critical Care time: 55 mins assessing presenting problems of acute illness that poses high probability of life threatening deterioration or end organ damage/dysfunction.  Medical decision making including Initiating plan of care, reviewing data, reviewing radiology, direct patient bedside evaluation and interpretation of vital signs, discussion with multidisciplinary team, all non inclusive of procedures.   Patient is a 58y old  Male who presents with a chief complaint of     BRIEF HOSPITAL COURSE:   58M with PMHx prostate CA (sp sx/RT/lupron), HTN, GERD, chronic low back pain sp injury, sp posterior spinal fusion and lami T11-L5 and revision of L3-L5 with plastic closure who presented with abd pain, vomiting. Found to have ileus vs partial SBO, WILMA, AGMA, dehydration, hypotension requiring pressors. Admitted to MICU.     Events last 24 hours: afebrile, remains on pressors, NGT with green bilious output (350ml), urine output /hr.     PAST MEDICAL & SURGICAL HISTORY:  HTN (hypertension)      GERD (gastroesophageal reflux disease)      Prostate cancer      Lumbar stenosis      S/P prostatectomy  with radiation      Lipoma  neck and left arm      History of lumbar laminectomy for spinal cord decompression          Review of Systems:  12 pt ROS negative unless otherwise stated above    Medications:  cefTRIAXone Injectable. 1000 milliGRAM(s) IV Push every 24 hours  metroNIDAZOLE  IVPB 500 milliGRAM(s) IV Intermittent every 8 hours    norepinephrine Infusion 0.05 MICROgram(s)/kG/Min IV Continuous <Continuous>      HYDROmorphone  Injectable 0.5 milliGRAM(s) IV Push every 6 hours PRN  HYDROmorphone  Injectable 1 milliGRAM(s) IV Push every 6 hours PRN      enoxaparin Injectable 40 milliGRAM(s) SubCutaneous every 24 hours          lactated ringers. 1000 milliLiter(s) IV Continuous <Continuous>      chlorhexidine 2% Cloths 1 Application(s) Topical <User Schedule>            ICU Vital Signs Last 24 Hrs  T(C): 37.4 (01 May 2023 10:20), Max: 37.4 (01 May 2023 10:20)  T(F): 99.3 (01 May 2023 10:20), Max: 99.3 (01 May 2023 10:20)  HR: 108 (02 May 2023 00:30) (88 - 117)  BP: 110/57 (02 May 2023 00:30) (61/45 - 128/103)  BP(mean): 70 (02 May 2023 00:30) (45 - 114)  ABP: --  ABP(mean): --  RR: 20 (02 May 2023 00:30) (14 - 27)  SpO2: 97% (02 May 2023 00:30) (80% - 100%)    O2 Parameters below as of 01 May 2023 15:30  Patient On (Oxygen Delivery Method): room air            ABG - ( 01 May 2023 10:58 )  pH, Arterial: 7.370 pH, Blood: x     /  pCO2: 34    /  pO2: 85    / HCO3: 20    / Base Excess: -5.6  /  SaO2: 98.3        I&O's Summary    01 May 2023 07:01  -  02 May 2023 00:51  --------------------------------------------------------  IN: 1141.1 mL / OUT: 1330 mL / NET: -188.9 mL          LABS:                        10.6   10.35 )-----------( 673      ( 01 May 2023 10:30 )             30.2     05-01    125<L>  |  86<L>  |  69.9<H>  ----------------------------<  95  4.1   |  21.0<L>  |  5.46<H>    Ca    7.7<L>      01 May 2023 23:54  Phos  5.5     05-  Mg     1.8     05-    TPro  5.3<L>  /  Alb  2.5<L>  /  TBili  0.3<L>  /  DBili  x   /  AST  44<H>  /  ALT  61<H>  /  AlkPhos  83  05-          CAPILLARY BLOOD GLUCOSE      POCT Blood Glucose.: 90 mg/dL (01 May 2023 12:47)    PT/INR - ( 01 May 2023 10:30 )   PT: 17.1 sec;   INR: 1.47 ratio         PTT - ( 01 May 2023 10:30 )  PTT:43.2 sec  Urinalysis Basic - ( 01 May 2023 11:50 )    Color: Yellow / Appearance: Clear / S.020 / pH: x  Gluc: x / Ketone: Negative  / Bili: Small / Urobili: Negative mg/dL   Blood: x / Protein: 30 mg/dL / Nitrite: Negative   Leuk Esterase: Trace / RBC: 6-10 /HPF / WBC 3-5 /HPF   Sq Epi: x / Non Sq Epi: x / Bacteria: Moderate      CULTURES:  Rapid RVP Result: NotDetec ( @ 11:55)      Physical Examination:    General: No acute distress.  Alert, oriented, interactive, nonfocal    HEENT: Pupils equal, reactive to light.  Symmetric.    PULM: Clear to auscultation bilaterally    CVS: Regular rhythm, ST    ABD: Softly distended, mild tenderness to palpation, no rebound, hypoactive     EXT: No edema, SCDs    SKIN: Warm and well perfused, no rashes noted.    RADIOLOGY:   ACC: [id number] EXAM:  CT ABDOMEN AND PELVIS   ORDERED BY: REGAN VALLE     PROCEDURE DATE:  2023          INTERPRETATION:  CLINICAL INFORMATION: Sepsis. Abdominal pain. Renal   failure.    COMPARISON: 2023.    CONTRAST/COMPLICATIONS:  IV Contrast: NONE  Oral Contrast: NONE  Complications: None reported at time of study completion    PROCEDURE:  CT of the Abdomen and Pelvis was performed.  Sagittal and coronal reformats were performed.    FINDINGS:  LOWER CHEST: The heart size is normal. Coronary calcifications are noted.   There is a small hiatal hernia. Dependent atelectatic changes are seen   posteriorly. Scattered bulla are seen..    LIVER: Scattered subcentimeter hypodensities of the liver too small to   characterize.  BILE DUCTS: Normal caliber.  GALLBLADDER: Questionable gallbladder wall thickening. Evaluation is   limited due to streak artifact from posterior spinal hardware.  SPLEEN: Within normal limits.  PANCREAS: Within normal limits.  ADRENALS: Within normal limits.  KIDNEYS/URETERS: Within normal limits.    BLADDER: Tate catheter.  REPRODUCTIVE ORGANS: Prostatectomy changes noted    BOWEL: Diffusely dilated fluid-filled small bowel loops measure up to 3.9   cm in diameter. There is also gaseous and fluid distention of the   proximal colon. There is no abrupt transition of bowel caliber. There is   a somewhat more gradual transition to normal caliber distal ileum, which   is also thick-walled. Question wall thickening of the rectum which is   also under distended.    PERITONEUM: No ascites.  VESSELS: Within normal limits.  RETROPERITONEUM/LYMPH NODES: Nonenlarged retroperitoneal herberth hepatis   lymph nodes appreciated.  ABDOMINAL WALL/BONES: Degenerative changes are noted throughout the spine   and hips. There is interval placement of screw plate fixation of T11-L2   with subsequent revision or extension of hardware are present from L3   through L5. There is new bony graft material noted, with extensive   lucencies seen surrounding the posterior aspects of the hardware at the   level of T11-L2. Posterior to the L2-L5 level. Complex appearing fluid   collection which measures 14.0 x 1.3 x 3.2 cm in the transverse, AP and   craniocaudal dimensions respectively. There is small fat-containing   umbilical hernia is appreciated. There is increased anterior   intervertebral disc space widening at L1-2.    IMPRESSION:  Posterior spinal fusion changes from T11 through L5. Associated bony   graft material noted laterally is associated with extensive lucency.   There is also posterior subcutaneous fluid collection. Overall findings   may suggest sequelae of hardware complication/infection.    Increased intervertebral disc space widening of the anterior L1-L2   interspace noted. This may be related to postoperative correction, though   given the additional findings, possibility of infection is also raised.    Diffuse small and proximal large bowel dilatation with air-fluid, likely   representing sequelae of an ileus. There is relative normal caliber of   the distal ileum, and possibility of early or partial bowel obstruction   cannot be entirely excluded. The normal caliber ileum is also mildly   thick-walled. This is nonspecific but raises the possibility of enteritis.    Questionable gallbladder wall thickening. Correlate clinically for   underlying gallbladder pathology. If clinically indicated, sonography can   be obtained for further evaluation.    Questionable thickening of the rectum which may be related to   underdistention versus mild proctocolitis. Correlate clinically.      --- End of Report ---            RODRICK FRANKLIN MD; Attending Radiologist  This document has been electronically signed. May  1 2023  1:20PM    CRITICAL CARE TIME SPENT: Critical Care time: 55 mins assessing presenting problems of acute illness that poses high probability of life threatening deterioration or end organ damage/dysfunction.  Medical decision making including Initiating plan of care, reviewing data, reviewing radiology, direct patient bedside evaluation and interpretation of vital signs, discussion with multidisciplinary team, all non inclusive of procedures.

## 2023-05-03 ENCOUNTER — APPOINTMENT (OUTPATIENT)
Dept: ORTHOPEDIC SURGERY | Facility: CLINIC | Age: 58
End: 2023-05-03

## 2023-05-03 LAB
ALBUMIN SERPL ELPH-MCNC: 2.9 G/DL — LOW (ref 3.3–5.2)
ALP SERPL-CCNC: 82 U/L — SIGNIFICANT CHANGE UP (ref 40–120)
ALT FLD-CCNC: 41 U/L — HIGH
ANION GAP SERPL CALC-SCNC: 18 MMOL/L — HIGH (ref 5–17)
AST SERPL-CCNC: 23 U/L — SIGNIFICANT CHANGE UP
BILIRUB SERPL-MCNC: 0.3 MG/DL — LOW (ref 0.4–2)
BUN SERPL-MCNC: 36.4 MG/DL — HIGH (ref 8–20)
CALCIUM SERPL-MCNC: 8.5 MG/DL — SIGNIFICANT CHANGE UP (ref 8.4–10.5)
CHLORIDE SERPL-SCNC: 95 MMOL/L — LOW (ref 96–108)
CO2 SERPL-SCNC: 22 MMOL/L — SIGNIFICANT CHANGE UP (ref 22–29)
CREAT SERPL-MCNC: 1.34 MG/DL — HIGH (ref 0.5–1.3)
EGFR: 62 ML/MIN/1.73M2 — SIGNIFICANT CHANGE UP
GLUCOSE SERPL-MCNC: 91 MG/DL — SIGNIFICANT CHANGE UP (ref 70–99)
HCT VFR BLD CALC: 25.6 % — LOW (ref 39–50)
HGB BLD-MCNC: 8.8 G/DL — LOW (ref 13–17)
MAGNESIUM SERPL-MCNC: 2 MG/DL — SIGNIFICANT CHANGE UP (ref 1.6–2.6)
MCHC RBC-ENTMCNC: 29.8 PG — SIGNIFICANT CHANGE UP (ref 27–34)
MCHC RBC-ENTMCNC: 34.4 GM/DL — SIGNIFICANT CHANGE UP (ref 32–36)
MCV RBC AUTO: 86.8 FL — SIGNIFICANT CHANGE UP (ref 80–100)
PHOSPHATE SERPL-MCNC: 3.4 MG/DL — SIGNIFICANT CHANGE UP (ref 2.4–4.7)
PLATELET # BLD AUTO: 541 K/UL — HIGH (ref 150–400)
POTASSIUM SERPL-MCNC: 4 MMOL/L — SIGNIFICANT CHANGE UP (ref 3.5–5.3)
POTASSIUM SERPL-SCNC: 4 MMOL/L — SIGNIFICANT CHANGE UP (ref 3.5–5.3)
PROT SERPL-MCNC: 5.7 G/DL — LOW (ref 6.6–8.7)
RBC # BLD: 2.95 M/UL — LOW (ref 4.2–5.8)
RBC # FLD: 12.9 % — SIGNIFICANT CHANGE UP (ref 10.3–14.5)
SODIUM SERPL-SCNC: 135 MMOL/L — SIGNIFICANT CHANGE UP (ref 135–145)
WBC # BLD: 7.16 K/UL — SIGNIFICANT CHANGE UP (ref 3.8–10.5)
WBC # FLD AUTO: 7.16 K/UL — SIGNIFICANT CHANGE UP (ref 3.8–10.5)

## 2023-05-03 PROCEDURE — 74018 RADEX ABDOMEN 1 VIEW: CPT | Mod: 26

## 2023-05-03 PROCEDURE — 99232 SBSQ HOSP IP/OBS MODERATE 35: CPT

## 2023-05-03 PROCEDURE — 99223 1ST HOSP IP/OBS HIGH 75: CPT

## 2023-05-03 PROCEDURE — 99497 ADVNCD CARE PLAN 30 MIN: CPT | Mod: 25

## 2023-05-03 RX ORDER — HYDROMORPHONE HYDROCHLORIDE 2 MG/ML
2 INJECTION INTRAMUSCULAR; INTRAVENOUS; SUBCUTANEOUS EVERY 4 HOURS
Refills: 0 | Status: DISCONTINUED | OUTPATIENT
Start: 2023-05-03 | End: 2023-05-05

## 2023-05-03 RX ORDER — LACTULOSE 10 G/15ML
30 SOLUTION ORAL
Refills: 0 | Status: DISCONTINUED | OUTPATIENT
Start: 2023-05-03 | End: 2023-05-05

## 2023-05-03 RX ORDER — GABAPENTIN 400 MG/1
100 CAPSULE ORAL THREE TIMES A DAY
Refills: 0 | Status: DISCONTINUED | OUTPATIENT
Start: 2023-05-03 | End: 2023-05-05

## 2023-05-03 RX ORDER — ONDANSETRON 8 MG/1
4 TABLET, FILM COATED ORAL EVERY 6 HOURS
Refills: 0 | Status: DISCONTINUED | OUTPATIENT
Start: 2023-05-03 | End: 2023-05-05

## 2023-05-03 RX ORDER — HALOPERIDOL DECANOATE 100 MG/ML
5 INJECTION INTRAMUSCULAR EVERY 6 HOURS
Refills: 0 | Status: DISCONTINUED | OUTPATIENT
Start: 2023-05-03 | End: 2023-05-05

## 2023-05-03 RX ORDER — DEXMEDETOMIDINE HYDROCHLORIDE IN 0.9% SODIUM CHLORIDE 4 UG/ML
0.3 INJECTION INTRAVENOUS
Qty: 200 | Refills: 0 | Status: DISCONTINUED | OUTPATIENT
Start: 2023-05-03 | End: 2023-05-04

## 2023-05-03 RX ORDER — POLYETHYLENE GLYCOL 3350 17 G/17G
17 POWDER, FOR SOLUTION ORAL DAILY
Refills: 0 | Status: DISCONTINUED | OUTPATIENT
Start: 2023-05-03 | End: 2023-05-05

## 2023-05-03 RX ORDER — METHOCARBAMOL 500 MG/1
750 TABLET, FILM COATED ORAL EVERY 8 HOURS
Refills: 0 | Status: DISCONTINUED | OUTPATIENT
Start: 2023-05-03 | End: 2023-05-05

## 2023-05-03 RX ORDER — OLANZAPINE 15 MG/1
5 TABLET, FILM COATED ORAL EVERY 6 HOURS
Refills: 0 | Status: DISCONTINUED | OUTPATIENT
Start: 2023-05-03 | End: 2023-05-04

## 2023-05-03 RX ORDER — ASPIRIN/CALCIUM CARB/MAGNESIUM 324 MG
81 TABLET ORAL DAILY
Refills: 0 | Status: DISCONTINUED | OUTPATIENT
Start: 2023-05-03 | End: 2023-05-05

## 2023-05-03 RX ORDER — HYDROMORPHONE HYDROCHLORIDE 2 MG/ML
1 INJECTION INTRAMUSCULAR; INTRAVENOUS; SUBCUTANEOUS
Refills: 0 | Status: DISCONTINUED | OUTPATIENT
Start: 2023-05-03 | End: 2023-05-03

## 2023-05-03 RX ORDER — DIAZEPAM 5 MG
10 TABLET ORAL ONCE
Refills: 0 | Status: DISCONTINUED | OUTPATIENT
Start: 2023-05-03 | End: 2023-05-03

## 2023-05-03 RX ORDER — DIAZEPAM 5 MG
5 TABLET ORAL ONCE
Refills: 0 | Status: DISCONTINUED | OUTPATIENT
Start: 2023-05-03 | End: 2023-05-03

## 2023-05-03 RX ORDER — SENNA PLUS 8.6 MG/1
2 TABLET ORAL AT BEDTIME
Refills: 0 | Status: DISCONTINUED | OUTPATIENT
Start: 2023-05-03 | End: 2023-05-05

## 2023-05-03 RX ORDER — DIAZEPAM 5 MG
10 TABLET ORAL EVERY 12 HOURS
Refills: 0 | Status: DISCONTINUED | OUTPATIENT
Start: 2023-05-03 | End: 2023-05-05

## 2023-05-03 RX ORDER — DIAZEPAM 5 MG
20 TABLET ORAL ONCE
Refills: 0 | Status: DISCONTINUED | OUTPATIENT
Start: 2023-05-03 | End: 2023-05-03

## 2023-05-03 RX ORDER — LIDOCAINE 4 G/100G
2 CREAM TOPICAL DAILY
Refills: 0 | Status: DISCONTINUED | OUTPATIENT
Start: 2023-05-03 | End: 2023-05-05

## 2023-05-03 RX ORDER — ATORVASTATIN CALCIUM 80 MG/1
40 TABLET, FILM COATED ORAL AT BEDTIME
Refills: 0 | Status: DISCONTINUED | OUTPATIENT
Start: 2023-05-03 | End: 2023-05-05

## 2023-05-03 RX ORDER — DIAZEPAM 5 MG
10 TABLET ORAL ONCE
Refills: 0 | Status: DISCONTINUED | OUTPATIENT
Start: 2023-05-03 | End: 2023-05-04

## 2023-05-03 RX ORDER — ACETAMINOPHEN 500 MG
975 TABLET ORAL EVERY 8 HOURS
Refills: 0 | Status: DISCONTINUED | OUTPATIENT
Start: 2023-05-03 | End: 2023-05-05

## 2023-05-03 RX ORDER — HYDROMORPHONE HYDROCHLORIDE 2 MG/ML
0.5 INJECTION INTRAMUSCULAR; INTRAVENOUS; SUBCUTANEOUS EVERY 6 HOURS
Refills: 0 | Status: DISCONTINUED | OUTPATIENT
Start: 2023-05-03 | End: 2023-05-05

## 2023-05-03 RX ADMIN — CHLORHEXIDINE GLUCONATE 1 APPLICATION(S): 213 SOLUTION TOPICAL at 05:47

## 2023-05-03 RX ADMIN — ENOXAPARIN SODIUM 40 MILLIGRAM(S): 100 INJECTION SUBCUTANEOUS at 21:31

## 2023-05-03 RX ADMIN — Medication 10 MILLIGRAM(S): at 18:11

## 2023-05-03 RX ADMIN — HYDROMORPHONE HYDROCHLORIDE 1 MILLIGRAM(S): 2 INJECTION INTRAMUSCULAR; INTRAVENOUS; SUBCUTANEOUS at 01:05

## 2023-05-03 RX ADMIN — DEXMEDETOMIDINE HYDROCHLORIDE IN 0.9% SODIUM CHLORIDE 7.15 MICROGRAM(S)/KG/HR: 4 INJECTION INTRAVENOUS at 23:34

## 2023-05-03 RX ADMIN — SODIUM CHLORIDE 125 MILLILITER(S): 9 INJECTION, SOLUTION INTRAVENOUS at 21:30

## 2023-05-03 RX ADMIN — OLANZAPINE 5 MILLIGRAM(S): 15 TABLET, FILM COATED ORAL at 23:34

## 2023-05-03 RX ADMIN — SODIUM CHLORIDE 125 MILLILITER(S): 9 INJECTION, SOLUTION INTRAVENOUS at 05:54

## 2023-05-03 RX ADMIN — HYDROMORPHONE HYDROCHLORIDE 1 MILLIGRAM(S): 2 INJECTION INTRAMUSCULAR; INTRAVENOUS; SUBCUTANEOUS at 01:35

## 2023-05-03 RX ADMIN — Medication 10 MILLIGRAM(S): at 16:30

## 2023-05-03 RX ADMIN — DEXMEDETOMIDINE HYDROCHLORIDE IN 0.9% SODIUM CHLORIDE 7.15 MICROGRAM(S)/KG/HR: 4 INJECTION INTRAVENOUS at 20:29

## 2023-05-03 RX ADMIN — Medication 20 MILLIGRAM(S): at 15:55

## 2023-05-03 RX ADMIN — DEXMEDETOMIDINE HYDROCHLORIDE IN 0.9% SODIUM CHLORIDE 7.15 MICROGRAM(S)/KG/HR: 4 INJECTION INTRAVENOUS at 22:53

## 2023-05-03 RX ADMIN — DEXMEDETOMIDINE HYDROCHLORIDE IN 0.9% SODIUM CHLORIDE 7.15 MICROGRAM(S)/KG/HR: 4 INJECTION INTRAVENOUS at 22:15

## 2023-05-03 NOTE — GOALS OF CARE CONVERSATION - ADVANCED CARE PLANNING - CONVERSATION DETAILS
We discussed current issues    Throughout the conversation he wants to be transferred to  as his spinal case is workers compensation    After much explanation, he conceded to get better be discharged and then go to  as o/p    Then discussed advanced care of plan.    He glared at me and said 'yes I want to live and I want treatment'    FULL CODE

## 2023-05-03 NOTE — PHYSICAL THERAPY INITIAL EVALUATION ADULT - PERTINENT HX OF CURRENT PROBLEM, REHAB EVAL
57 y/o M with a h/o prostate cancer s/p surgery/radiation, HTN, GERD, s/p lumbar laminectomy and fusion at [facility] in April 2023, admitted on 5/1 with complaints of abdominal distention/pain and generalized weakness. He was found to have acute renal failure, hyperkalemia, metabolic acidosis, hypovolemia, and ileus vs partial SBO. Hospital course complicated by mixed distributive/hypovolemic shock requiring IV vasopressor therapy.

## 2023-05-03 NOTE — CHART NOTE - NSCHARTNOTEFT_GEN_A_CORE
Called by RN for CODE GREY    Pt agitated, confused and pulled out all wires and lines and trying to leave.    Pt sitting in bed, has changed his clothes, is disconnected from infusions and wires  Is asking for IV lines to be removed.    Pt hyperactive/ excited stating he reina stay here because of the smell it gives him asthma and wants to go home and go see his doctor tomorrow.   Then says he was downstairs for 3 days and felt the same way.   After discussion earlier int he day, he is clear about where he is.  Denies Alcohol use past 2 years.     # Toxic Metabolic Encephalopathy  unclear etio- Opiate withdrawal vs ICU psychosis  was on Oxy at home for a long time at large doses  He does not want to use Oxy anymore  per Pain mngmt started on PO Dilaudid now  Valium 5mg x 2 doses STAT now  U tox STAT  Await spouse to give better Social history. Called by RN for CODE GREY    Pt agitated, confused and pulled out all wires and lines and trying to leave.    Pt sitting in bed, has changed his clothes, is disconnected from infusions and wires  Is asking for IV lines to be removed.    Pt hyperactive/ excited stating he reina stay here because of the smell it gives him asthma and wants to go home and go see his doctor tomorrow.   Then says he was downstairs for 3 days and felt the same way.   After discussion earlier int he day, he is clear about where he is.  Denies Alcohol use past 2 years.     # Toxic Metabolic Encephalopathy  unclear etio- Opiate withdrawal vs ICU psychosis  was on Oxy at home for a long time at large doses  He does not want to use Oxy anymore  per Pain mngmt started on PO Dilaudid now  Valium 5mg x 2 doses STAT now  U tox STAT  Await spouse to give better Social history.    D/W Dr Frost. If repeat episodes occur on the floor, to recall ICU.

## 2023-05-03 NOTE — PROGRESS NOTE ADULT - ASSESSMENT
57 y/o M with a h/o prostate cancer s/p surgery/radiation, HTN, GERD, s/p lumbar laminectomy and fusion at [facility] in April 2023, with:    # Mixed distributive/hypovolemic shock  # WILMA  # Hyperkalemia  # Metabolic acidosis  # Ileus, likely opioid-induced    - weaned off norepinephrine infusion, monitor hemodynamics closely, maintain a MAP > 65  - WILMA pre-renal in nature, possible ischemic ATN component  - BUN/Cr steadily improving, potassium level and acid-base balance have stabilized on repeat lab work, nonoliguric  - continue IVF hydration with LR @ 125cc/hr for now  - will deescalate NGT to low intermittent suction given multiple BMs, can hopefully remove later today and advance diet  - AXR ordered for this morning  - no good evidence for active infection, blood and urine cultures are unremarkable, discontinue ceftriaxone and metronidazole    Case discussed with MICU physician, Dr. Schulzt.

## 2023-05-03 NOTE — CONSULT NOTE ADULT - SUBJECTIVE AND OBJECTIVE BOX
CC: back pain    HPI: per chart review:  59 y/o M with a h/o prostate cancer s/p surgery/radiation, HTN, GERD, s/p lumbar laminectomy and fusion at [facility] in April 2023, admitted on 5/1 with complaints of abdominal distention/pain and generalized weakness. He was found to have acute renal failure, hyperkalemia, metabolic acidosis, hypovolemia, and ileus vs partial SBO. Hospital course complicated by mixed distributive/hypovolemic shock requiring IV vasopressor therapy. Acute renal failure has resolved with Cr of 9.24 on admission and currently 1.34. Patient is currently alert and oriented and is tachycardic but when asking, patient explains his anxiety and uncontrolled pain are provoking factors for him. Patient in ICU now is being given LR at 125 ml/ hr. Giving Lovenox for DVT ppx.  Patient is being followed by Pain management and we appreciate pain management recommendations. Currently patient is on Clear liquid diet and is having bowel movements. Spoke with neurosurgery and appreciate input about potential further imaging with MRI with and without contrast.       Interval Hx:  Patient seen during rounds  Patient reports pain to be controlled on current medications  Patient denies sedation with medications   discussed transition to po meds  does not wish to be placed back on po oxycodone        Analgesic Dosing for past 24 hours reviewed as below:    HYDROmorphone  Injectable   0.5 milliGRAM(s) IV Push (05-02-23 @ 17:05)    HYDROmorphone  Injectable   1 milliGRAM(s) IV Push (05-02-23 @ 21:56)    HYDROmorphone  Injectable   1 milliGRAM(s) IV Push (05-03-23 @ 01:05)          T(C): 36.5 (05-03-23 @ 10:38), Max: 37.2 (05-02-23 @ 23:25)  HR: 121 (05-03-23 @ 11:00) (101 - 124)  BP: 119/88 (05-03-23 @ 11:00) (105/73 - 142/63)  RR: 24 (05-03-23 @ 11:00) (15 - 30)  SpO2: 98% (05-03-23 @ 11:00) (68% - 100%)      05-02-23 @ 07:01  -  05-03-23 @ 07:00  --------------------------------------------------------  IN: 3153.6 mL / OUT: 2701 mL / NET: 452.6 mL        chlorhexidine 2% Cloths 1 Application(s) Topical <User Schedule>  enoxaparin Injectable 40 milliGRAM(s) SubCutaneous every 24 hours  HYDROmorphone  Injectable 0.5 milliGRAM(s) IV Push every 6 hours PRN  HYDROmorphone  Injectable 1 milliGRAM(s) IV Push every 3 hours PRN  lactated ringers. 1000 milliLiter(s) IV Continuous <Continuous>                          8.8    7.16  )-----------( 541      ( 03 May 2023 03:43 )             25.6     05-03    135  |  95<L>  |  36.4<H>  ----------------------------<  91  4.0   |  22.0  |  1.34<H>    Ca    8.5      03 May 2023 03:43  Phos  3.4     05-03  Mg     2.0     05-03    TPro  5.7<L>  /  Alb  2.9<L>  /  TBili  0.3<L>  /  DBili  x   /  AST  23  /  ALT  41<H>  /  AlkPhos  82  05-03          Pain Service   [phone]

## 2023-05-03 NOTE — DIETITIAN INITIAL EVALUATION ADULT - PERTINENT MEDS FT
MEDICATIONS  (STANDING):  chlorhexidine 2% Cloths 1 Application(s) Topical <User Schedule>  enoxaparin Injectable 40 milliGRAM(s) SubCutaneous every 24 hours  lactated ringers. 1000 milliLiter(s) (125 mL/Hr) IV Continuous <Continuous>    MEDICATIONS  (PRN):  HYDROmorphone  Injectable 0.5 milliGRAM(s) IV Push every 6 hours PRN Moderate Pain (4 - 6)  HYDROmorphone  Injectable 1 milliGRAM(s) IV Push every 3 hours PRN Severe Pain (7 - 10)

## 2023-05-03 NOTE — DIETITIAN INITIAL EVALUATION ADULT - ORAL INTAKE PTA/DIET HISTORY
Pt reports having surgery in April (approximately 1 month ago) since surgery has been unable to keep food down; endorses N/V. Pt reports weight loss of 15-20lbs in this time. Pt reports wanting to eat however unable to keep food down. Pt is currently on clear liquid diet;+ NG in place; pt reports every time he drinks fluid it comes right out NG. Pt also reports multiple watery BM's.
none

## 2023-05-03 NOTE — PROGRESS NOTE ADULT - ASSESSMENT
59 yo male with PMH of prostate CA (s/p sx/RT/lupron 2018), HTN, recent posterior spinal fusion and laminectomy T11-L5 and revision of L3-L5 now a/w  WILMA, hyperkalemia and metabolic acidosis and ileus    WILMA on CKD stage II  -WILMA pre-renal improved w/ IVF  -Scr 1.1-1.4 over the past few months; most recent Scr prior to this admission was 1.1 on 04/24  -Scr 9.2 on presentation s/p mohr- non oliguric  -UA with moderate blood, protein-, trace LE , 6-10 rbcs, moderate bacteria  -CT abdomen with normal appearing kidneys, no HDN  -Scr back to almost baseline    Hyperkalemia  s/p medical management- repeat K+ improved to 4.7    Metabolic acidosis- s/p bicarb gtt  improved    Hyponatremia; acute on chronic- now in setting of hypovolemia  Improved with IV hydration     Ileus: ? opoid induced, imporving    Nephrology will sign off, thanks for the consult.

## 2023-05-03 NOTE — DIETITIAN INITIAL EVALUATION ADULT - PERTINENT LABORATORY DATA
05-03    135  |  95<L>  |  36.4<H>  ----------------------------<  91  4.0   |  22.0  |  1.34<H>    Ca    8.5      03 May 2023 03:43  Phos  3.4     05-03  Mg     2.0     05-03    TPro  5.7<L>  /  Alb  2.9<L>  /  TBili  0.3<L>  /  DBili  x   /  AST  23  /  ALT  41<H>  /  AlkPhos  82  05-03  A1C with Estimated Average Glucose Result: 5.4 % (03-29-23 @ 13:08)  A1C with Estimated Average Glucose Result: 5.5 % (02-06-23 @ 05:26)  A1C with Estimated Average Glucose Result: 5.4 % (07-19-22 @ 14:00)

## 2023-05-03 NOTE — CONSULT NOTE ADULT - ASSESSMENT
59 yo male with PMH of prostate CA (s/p sx/RT/lupron 2018), HTN, GERD, c/o lower back pain after injury while at work on 7-2-2022 whom presented to [facility] on 4/18/23 for posterior spinal fusion and laminectomy T11-L5 and revision of L3-L5 with Plastics Closure. Patient discharged from  on 4/26/23 with pain medication and bowel regimen. Patient reports that his last bowel movement was Tuesday and endorses he has been vomiting for a few days. Patient confused at times, but work-up in the ER revealed significant WILMA (Cr 9.24), Metabolic acidosis, Dehydration. CT A/P done which showed a likely ileus. Patient received 3L IVF in the ER, still hypotensive so started on levophed. Patient also cultured and given zosyn and vancomycin. MICU consulted. Patient endorsing diffuse abdominal pain. Reports he had been having diarrhea but that has stopped but has been vomiting over the last few days. Levophed running at 0.03mcg/kg/min at time of exam.     COURSE:  s/p pressors. turned off yesterday. also noted WILMA + MA and hypovolemia. Now on IVF  Ileus resolved and has BMs  WILMA resolved    # Ileus  resolving well  BMs +  tolerating PO diet  Lytes WNL  etio- sec to narc use    # s/p shock  s/p pressors  c/s negative  likely sec to hypovolemia    # s/p Spinal fusion 4/18  site intact  No neuro deficits  o/p f/u post discharge    # Pain issues  Pain mngmt appreciated  hydromorphone PO 2mg [id number] PRN severe pain  acetaminophen PO 975mg [id number] standing.   gabapentin PO 300mg [id number] standing.  robaxin 750mg q8h standing.   start lidocaine Patch 12 hours on, 12 hours off. Max 3 patches on at one time  Bowel regimen- senna, miralax and suppositories/ lactulose PRN    # WILMA/ ATN on CKD stg 2 with metabolic acidosis  sec to hypovolemia/ shock  Crt 9-->1.34  Scr 1.1-1.4 over the past few months  Resolved and at baseline now  s/p bicarb GTT    Lovenox

## 2023-05-03 NOTE — DIETITIAN INITIAL EVALUATION ADULT - ETIOLOGY
Related to inability to meet sufficient protein energy needs with altered GI function in setting of Ileus

## 2023-05-03 NOTE — CONSULT NOTE ADULT - ASSESSMENT
58M  recent lumbar lami and fusion last month  here with sbo, now resolving  wishes to avoid oxycodone for pain control    - Recommend starting hydromorphone PO 2mg [id number] PRN severe pain  - Recommend starting acetaminophen PO 975mg [id number] standing. HOLD for liver function test dysfunction  - Recommend starting gabapentin PO 300mg [id number] standing. HOLD for sedation  - Recommend starting robaxin 750mg q8h standing. HOLD for sedation   start lidocaine   Patch 12 hours on, 12 hours off. Max 3 patches on at one time

## 2023-05-03 NOTE — CHART NOTE - NSCHARTNOTEFT_GEN_A_CORE
57 y/o M with a h/o prostate cancer s/p surgery/radiation, HTN, GERD, s/p lumbar laminectomy and fusion at [facility] in April 2023, admitted on 5/1 with complaints of abdominal distention/pain and generalized weakness. He was found to have acute renal failure, hyperkalemia, metabolic acidosis, hypovolemia, and ileus vs partial SBO. Hospital course complicated by mixed distributive/hypovolemic shock requiring IV vasopressor therapy. Acute renal failure has resolved with Cr of 9.24 on admission and currently 1.34. Patient is currently alert and oriented and is tachycardic but when asking, patient explains his anxiety and uncontrolled pain are provoking factors for him. Patient in ICU now is being given LR at 125 ml/ hr. Giving Lovenox for DVT ppx.  Patient is being followed by Pain management and we appreciate pain management recommendations. Currently patient is on Clear liquid diet and is having bowel movements. Spoke with neurosurgery and appreciate input about potential further imaging with MRI with and without contrast. Signed out to Dr. Dean. 59 y/o M with a h/o prostate cancer s/p surgery/radiation, HTN, GERD, s/p lumbar laminectomy and fusion at [facility] in April 2023, admitted on 5/1 with complaints of abdominal distention/pain and generalized weakness. He was found to have acute renal failure, hyperkalemia, metabolic acidosis, hypovolemia, and ileus vs partial SBO. Hospital course complicated by mixed distributive/hypovolemic shock requiring IV vasopressor therapy. Acute renal failure has resolved with Cr of 9.24 on admission and currently 1.34. Patient is currently alert and oriented and is tachycardic but when asking, patient explains his anxiety and uncontrolled pain are provoking factors for him. Patient in ICU now is being given LR at 125 ml/ hr. Giving Lovenox for DVT ppx.  Patient is being followed by Pain management and we appreciate pain management recommendations. Currently patient is on Clear liquid diet and is having bowel movements. Spoke with neurosurgery and appreciate input about potential further imaging with MRI with and without contrast. Signed out to Dr. Dean.    Spoke with patient's surgeon Dr. Pierre ([phone]). Discussed that patient has previous history of hospitalizations. Addressed the current state of the patient as well as patient's CT-imaging of the back and pelvis. Surgeon said that the patient can be seen outpatient by him. 57 y/o M with a h/o prostate cancer s/p surgery/radiation, HTN, GERD, s/p lumbar laminectomy and fusion at [facility]/ in April 2023, admitted on 5/1 with complaints of abdominal distention, pain and generalized weakness. He was found to have acute renal failure, hyperkalemia, metabolic acidosis, hypovolemia, and ileus vs partial SBO. Hospital course complicated by mixed distributive/hypovolemic shock requiring IV vasopressor therapy. Acute renal failure has resolved with Cr of 9.24 on admission and currently 1.34 with IV fluid hydration. NGT was placed for SBO vs ileus decompression with clinical improvement and passage of bowel movements. Patient is currently alert and oriented and is tachycardic but when asking, patient explains his anxiety and uncontrolled pain are provoking factors for him. Pain management consulted for multimodal pain control plan to limit opiate burden. Currently patient is tolerating a clear liquid diet and is having bowel movements. CT revealed non-specific post-surgical changes with possible sequelae of infection vs hardware complication. Neurosurgery consulted and case discussed with KLEVER Washburn. Patient optimized for transfer out of medical floors and case endorsed to Dr. Dean, who agreed to continue care from this point.    Spoke with Neurosurgery KLEVER Washburn, per Dr. Deras they are deferring evaluation and are recommending that we contact patient's primary surgeon to discuss CT findings.     Spoke with patient's surgeon Dr. Pierre ([phone]) who performed patient's laminectomy at . Informed him of the current state of the patient, as well as patient's CT findings. Surgeon reviewed patient's CT results and advised that these are expected post-operative changes and patient can follow up as an outpatient with him.

## 2023-05-03 NOTE — DIETITIAN INITIAL EVALUATION ADULT - OTHER INFO
Pt is a 57 y/o M with a h/o prostate cancer s/p surgery/radiation, HTN, GERD, s/p lumbar laminectomy and fusion at [facility] in April 2023, admitted on 5/1 with complaints of abdominal distention/pain and generalized weakness. He was found to have acute renal failure, hyperkalemia, metabolic acidosis, hypovolemia, and ileus vs partial SBO. Hospital course complicated by mixed distributive/hypovolemic shock requiring IV vasopressor therapy.

## 2023-05-03 NOTE — PROGRESS NOTE ADULT - SUBJECTIVE AND OBJECTIVE BOX
Reason for visit: WILMA    Subjective: Taken off pressors. Patient denied any cardiac or urinary complains. No fever/chills. Good UOP.    ROS: All systems were reviewed in detail pertinent positive and negative mentioned above, rest are negative.    Physical Exam:  Gen: no acute distress  MS: alert, conversing normally  Eyes: EOMI, no icterus  HENT: NCAT, MMM  CV: rhythm reg reg, rate normal, no m/g/r  Chest: CTAB, no w/r/r,  Abd: NT, distended  Extremities: No edema    =======================================================  Vital Signs Last 24 Hrs  T(C): 36.4 (03 May 2023 07:30), Max: 37.2 (02 May 2023 23:25)  T(F): 97.5 (03 May 2023 07:30), Max: 98.9 (02 May 2023 23:25)  HR: 108 (03 May 2023 10:00) (101 - 124)  BP: 133/76 (03 May 2023 10:00) (105/73 - 164/90)  BP(mean): 88 (03 May 2023 10:00) (74 - 158)  RR: 20 (03 May 2023 10:00) (15 - 30)  SpO2: 86% (03 May 2023 10:00) (68% - 100%)    Parameters below as of 03 May 2023 09:00  Patient On (Oxygen Delivery Method): room air      I&O's Summary    02 May 2023 07:01  -  03 May 2023 07:00  --------------------------------------------------------  IN: 3153.6 mL / OUT: 2701 mL / NET: 452.6 mL      =======================================================  Current Antibiotics:    Other medications:  chlorhexidine 2% Cloths 1 Application(s) Topical <User Schedule>  enoxaparin Injectable 40 milliGRAM(s) SubCutaneous every 24 hours  lactated ringers. 1000 milliLiter(s) IV Continuous <Continuous>    =======================================================  0503    135  |  95<L>  |  36.4<H>  ----------------------------<  91  4.0   |  22.0  |  1.34<H>    Ca    8.5      03 May 2023 03:43  Phos  3.4       Mg     2.0         TPro  5.7<L>  /  Alb  2.9<L>  /  TBili  0.3<L>  /  DBili  x   /  AST  23  /  ALT  41<H>  /  AlkPhos  82      Creatinine, Serum: 1.34 mg/dL (23 @ 03:43)  Creatinine, Serum: 1.62 mg/dL (23 @ 20:18)  Creatinine, Serum: 2.10 mg/dL (23 @ 13:26)  Creatinine, Serum: 3.70 mg/dL (23 @ 05:24)  Creatinine, Serum: 5.46 mg/dL (23 @ 23:54)  Creatinine, Serum: 7.59 mg/dL (23 @ 15:05)  Creatinine, Serum: 8.14 mg/dL (23 @ 12:05)  Creatinine, Serum: 9.24 mg/dL (23 @ 10:30)    Urinalysis Basic - ( 01 May 2023 11:50 )    Color: Yellow / Appearance: Clear / S.020 / pH: x  Gluc: x / Ketone: Negative  / Bili: Small / Urobili: Negative mg/dL   Blood: x / Protein: 30 mg/dL / Nitrite: Negative   Leuk Esterase: Trace / RBC: 6-10 /HPF / WBC 3-5 /HPF   Sq Epi: x / Non Sq Epi: x / Bacteria: Moderate      =======================================================

## 2023-05-03 NOTE — CONSULT NOTE ADULT - SUBJECTIVE AND OBJECTIVE BOX
MICU D/G ACCEPTANCE NOTE:      HPI:  59 yo male with PMH of prostate CA (s/p sx/RT/lupron ), HTN, GERD, c/o lower back pain after injury while at work on 2022 whom presented to [facility] on 23 for posterior spinal fusion and laminectomy T11-L5 and revision of L3-L5 with Plastics Closure. Patient discharged from  on 23 with pain medication and bowel regimen. Patient reports that his last bowel movement was Tuesday and endorses he has been vomiting for a few days. Patient confused at times, but work-up in the ER revealed significant WILMA (Cr 9.24), Metabolic acidosis, Dehydration. CT A/P done which showed a likely ileus. Patient received 3L IVF in the ER, still hypotensive so started on levophed. Patient also cultured and given zosyn and vancomycin. MICU consulted. Patient endorsing diffuse abdominal pain. Reports he had been having diarrhea but that has stopped but has been vomiting over the last few days. Levophed running at 0.03mcg/kg/min at time of exam.     COURSE:  s/p pressors. turned off yesterday. also noted WILMA + MA and hypovolemia. Now on IVF  Ileus resolved and has BMs  WILMA resolved    PAST MEDICAL & SURGICAL HISTORY:  HTN (hypertension)  GERD (gastroesophageal reflux disease)  Prostate cancer  Lumbar stenosis  S/P prostatectomy  with radiation  Lipoma  neck and left arm  History of lumbar laminectomy for spinal cord decompression    Allergies  No Known Drug Allergies  shellfish (Other)    SOCIAL HISTORY:  occupation- ; lives at home with family.    FAMILY HISTORY:  FH: type 2 diabetes mellitus  mother -     MEDICATIONS  (STANDING):  chlorhexidine 2% Cloths 1 Application(s) Topical <User Schedule>  enoxaparin Injectable 40 milliGRAM(s) SubCutaneous every 24 hours  lactated ringers. 1000 milliLiter(s) (125 mL/Hr) IV Continuous <Continuous>    MEDICATIONS  (PRN):  HYDROmorphone  Injectable 0.5 milliGRAM(s) IV Push every 6 hours PRN Moderate Pain (4 - 6)  HYDROmorphone  Injectable 1 milliGRAM(s) IV Push every 3 hours PRN Severe Pain (7 - 10)      Vital Signs Last 24 Hrs  T(C): 36.5 (03 May 2023 10:38), Max: 37.2 (02 May 2023 23:25)  T(F): 97.7 (03 May 2023 10:38), Max: 98.9 (02 May 2023 23:25)  HR: 121 (03 May 2023 11:00) (101 - 124)  BP: 119/88 (03 May 2023 11:00) (110/77 - 142/63)  BP(mean): 93 (03 May 2023 11:00) (74 - 133)  RR: 24 (03 May 2023 11:00) (15 - 30)  SpO2: 98% (03 May 2023 11:00) (68% - 100%)    Parameters below as of 03 May 2023 09:00  Patient On (Oxygen Delivery Method): room air    SUBEJECTIVE:  sitting up in chair  denies abd pain or discomfort  states he had BM  is more concern about being transferred to   explained that his acute issues for which he was hospitalized is resolving with treatment   and there is no need for transfer    REVIEW OF SYSTEMS:    as above    PHYSICAL EXAM:    GENERAL: Comfortable, more concerned with going back to   HEAD:  Atraumatic, Normocephalic  EYES: EOMI, PERRLA, conjunctiva and sclera clear  ENT: Moist mucous membranes, No lesions  NECK: Supple, No JVD, Normal thyroid  NERVOUS SYSTEM:  Alert & Oriented X 3, Good concentration; Motor Strength 5/5 B/L upper and lower extremities  CHEST/LUNG: CTA bilaterally; No rales, rhonchi, wheezing, or rubs  HEART: Regular rate and rhythm; No murmurs, rubs, or gallops  ABDOMEN: Hard all quadrants, Nontender, Nondistended; Bowel sounds present and dull  EXTREMITIES:  2+ Peripheral Pulses, No clubbing, cyanosis, or edema  SKIN: No rashes or lesions  SPINE: midline surg incision C/D/I, sutures intact    LABS:                        8.8    7.16  )-----------( 541      ( 03 May 2023 03:43 )             25.6     05-03    135  |  95<L>  |  36.4<H>  ----------------------------<  91  4.0   |  22.0  |  1.34<H>    Ca    8.5      03 May 2023 03:43  Phos  3.4     05-03  Mg     2.0     05-03    TPro  5.7<L>  /  Alb  2.9<L>  /  TBili  0.3<L>  /  DBili  x   /  AST  23  /  ALT  41<H>  /  AlkPhos  82  05-03          RADIOLOGY & ADDITIONAL STUDIES:    Reviewed

## 2023-05-03 NOTE — PHYSICAL THERAPY INITIAL EVALUATION ADULT - GENERAL OBSERVATIONS, REHAB EVAL
Pt received in bed, + IV loc, +Tele//BP monitoring, breathing on RA in NAD, in 0/10 pain, agreeable to PT evaluation

## 2023-05-03 NOTE — PHYSICAL THERAPY INITIAL EVALUATION ADULT - ADDITIONAL COMMENTS
pt reports living in private home with family, someone will be able to assist. Pt has 1 [address] and no stairs inside. Independent prior to admission with use of RW. 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

## 2023-05-03 NOTE — PROGRESS NOTE ADULT - SUBJECTIVE AND OBJECTIVE BOX
Patient is a 58y old  Male who presents with a chief complaint of     BRIEF HOSPITAL COURSE: ***    Events last 24 hours: ***        PAST MEDICAL & SURGICAL HISTORY:  HTN (hypertension)      GERD (gastroesophageal reflux disease)      Prostate cancer      Lumbar stenosis      S/P prostatectomy  with radiation      Lipoma  neck and left arm      History of lumbar laminectomy for spinal cord decompression          Review of Systems:  CONSTITUTIONAL: No fever, chills, or fatigue  EYES: No eye pain, visual disturbances, or discharge  ENMT:  No difficulty hearing, tinnitus, vertigo; No sinus or throat pain  NECK: No pain or stiffness  RESPIRATORY: No cough, wheezing, chills or hemoptysis; No shortness of breath  CARDIOVASCULAR: No chest pain, palpitations, dizziness, or leg swelling  GASTROINTESTINAL: No abdominal or epigastric pain. No nausea, vomiting, or hematemesis; No diarrhea or constipation. No melena or hematochezia.  GENITOURINARY: No dysuria, frequency, hematuria, or incontinence  NEUROLOGICAL: No headaches, memory loss, loss of strength, numbness, or tremors  SKIN: No itching, burning, rashes, or lesions   MUSCULOSKELETAL: No joint pain or swelling; No muscle, back, or extremity pain  PSYCHIATRIC: No depression, anxiety, mood swings, or difficulty sleeping      Medications:        HYDROmorphone  Injectable 0.5 milliGRAM(s) IV Push every 6 hours PRN  HYDROmorphone  Injectable 1 milliGRAM(s) IV Push every 3 hours PRN      enoxaparin Injectable 40 milliGRAM(s) SubCutaneous every 24 hours          lactated ringers. 1000 milliLiter(s) IV Continuous <Continuous>      chlorhexidine 2% Cloths 1 Application(s) Topical <User Schedule>            ICU Vital Signs Last 24 Hrs  T(C): 37.2 (02 May 2023 23:25), Max: 37.2 (02 May 2023 23:25)  T(F): 98.9 (02 May 2023 23:25), Max: 98.9 (02 May 2023 23:25)  HR: 116 (03 May 2023 00:00) (105 - 116)  BP: 117/64 (03 May 2023 00:00) (91/57 - 164/90)  BP(mean): 79 (03 May 2023 00:00) (47 - 158)  ABP: --  ABP(mean): --  RR: 20 (03 May 2023 00:00) (14 - 30)  SpO2: 98% (03 May 2023 00:00) (89% - 100%)    O2 Parameters below as of 02 May 2023 20:00  Patient On (Oxygen Delivery Method): room air            ABG - ( 01 May 2023 10:58 )  pH, Arterial: 7.370 pH, Blood: x     /  pCO2: 34    /  pO2: 85    / HCO3: 20    / Base Excess: -5.6  /  SaO2: 98.3                I&O's Detail    01 May 2023 07:01  -  02 May 2023 07:00  --------------------------------------------------------  IN:    Lactated Ringers: 1875 mL    Lactated Ringers Bolus: 1000 mL    Norepinephrine: 23.2 mL    Sodium Bicarbonate: 1000 mL  Total IN: 3898.2 mL    OUT:    Indwelling Catheter - Urethral (mL): 3930 mL    Nasogastric/Oral tube (mL): 450 mL  Total OUT: 4380 mL    Total NET: -481.8 mL      02 May 2023 07:01  -  03 May 2023 01:13  --------------------------------------------------------  IN:    Lactated Ringers: 1250 mL    Norepinephrine: 28.6 mL    Oral Fluid: 100 mL  Total IN: 1378.6 mL    OUT:    Indwelling Catheter - Urethral (mL): 351 mL    Nasogastric/Oral tube (mL): 600 mL    Voided (mL): 1250 mL  Total OUT: 2201 mL    Total NET: -822.4 mL            LABS:                        8.7    6.78  )-----------( 668      ( 02 May 2023 05:24 )             25.2     05-02    133<L>  |  93<L>  |  41.2<H>  ----------------------------<  78  4.4   |  23.0  |  1.62<H>    Ca    8.5      02 May 2023 20:18  Phos  4.5     -  Mg     1.7     -    TPro  5.3<L>  /  Alb  2.5<L>  /  TBili  0.3<L>  /  DBili  x   /  AST  44<H>  /  ALT  61<H>  /  AlkPhos  83            CAPILLARY BLOOD GLUCOSE      POCT Blood Glucose.: 90 mg/dL (01 May 2023 12:47)    PT/INR - ( 01 May 2023 10:30 )   PT: 17.1 sec;   INR: 1.47 ratio         PTT - ( 01 May 2023 10:30 )  PTT:43.2 sec  Urinalysis Basic - ( 01 May 2023 11:50 )    Color: Yellow / Appearance: Clear / S.020 / pH: x  Gluc: x / Ketone: Negative  / Bili: Small / Urobili: Negative mg/dL   Blood: x / Protein: 30 mg/dL / Nitrite: Negative   Leuk Esterase: Trace / RBC: 6-10 /HPF / WBC 3-5 /HPF   Sq Epi: x / Non Sq Epi: x / Bacteria: Moderate      CULTURES:  Rapid RVP Result: NotDetec (23 @ 11:55)  Culture Results:   No growth (23 @ 11:50)  Culture Results:   No growth to date. (23 @ 10:30)  Culture Results:   No growth to date. (23 @ 10:20)        Physical Examination:    General: No acute distress.  Alert, oriented, interactive, nonfocal    HEENT: Pupils equal, reactive to light.  Symmetric.    PULM: Clear to auscultation bilaterally, no significant sputum production    CVS: Regular rate and rhythm, no murmurs, rubs, or gallops    ABD: Soft, nondistended, nontender, normoactive bowel sounds, no masses    EXT: No edema, nontender    SKIN: Warm and well perfused, no rashes noted.    NEURO: A&Ox3, strength 5/5 all extremities, cranial nerves grossly intact, no focal deficits        RADIOLOGY: ***        CRITICAL CARE TIME SPENT: ***  Time spent evaluating/treating patient with medical issues that pose a high risk for life threatening deterioration and/or end-organ damage, reviewing data/labs/imaging, discussing case with multidisciplinary team, discussing plan/goals of care with patient/family. Non-inclusive of procedure time.   Patient is a 58y old  Male who presents with a chief complaint of     BRIEF HOSPITAL COURSE: 59 y/o M with a h/o prostate cancer s/p surgery/radiation, HTN, GERD, s/p lumbar laminectomy and fusion at [facility] in 2023, admitted on  with complaints of abdominal distention/pain and generalized weakness. He was found to have acute renal failure, hyperkalemia, metabolic acidosis, hypovolemia, and ileus vs partial SBO. Hospital course complicated by mixed distributive/hypovolemic shock requiring IV vasopressor therapy.      Events last 24 hours: Weaned off IV vasopressor. Making urine. He reports having 5 loose ("slushie") BMs. He continues to endorse lower back pain and difficulty sleeping.        PAST MEDICAL & SURGICAL HISTORY:  HTN (hypertension)      GERD (gastroesophageal reflux disease)      Prostate cancer      Lumbar stenosis      S/P prostatectomy  with radiation      Lipoma  neck and left arm      History of lumbar laminectomy for spinal cord decompression          Review of Systems:  CONSTITUTIONAL: No fever, chills, or fatigue  EYES: No eye pain, visual disturbances, or discharge  ENMT:  No difficulty hearing, tinnitus, vertigo; No sinus or throat pain  NECK: No pain or stiffness  RESPIRATORY: No cough, wheezing, chills or hemoptysis; No shortness of breath  CARDIOVASCULAR: No chest pain, palpitations, dizziness, or leg swelling  GASTROINTESTINAL: No abdominal or epigastric pain. No nausea, vomiting, or hematemesis; No diarrhea or constipation. No melena or hematochezia.  GENITOURINARY: No dysuria, frequency, hematuria, or incontinence  NEUROLOGICAL: No headaches, memory loss, loss of strength, numbness, or tremors  SKIN: No itching, burning, rashes, or lesions   MUSCULOSKELETAL: No joint pain or swelling; (+)lower back pain  PSYCHIATRIC: No depression, anxiety, mood swings, (+) difficulty sleeping      Medications:    HYDROmorphone  Injectable 0.5 milliGRAM(s) IV Push every 6 hours PRN  HYDROmorphone  Injectable 1 milliGRAM(s) IV Push every 3 hours PRN  enoxaparin Injectable 40 milliGRAM(s) SubCutaneous every 24 hours  lactated ringers. 1000 milliLiter(s) IV Continuous <Continuous>  chlorhexidine 2% Cloths 1 Application(s) Topical <User Schedule>            ICU Vital Signs Last 24 Hrs  T(C): 37.2 (02 May 2023 23:25), Max: 37.2 (02 May 2023 23:25)  T(F): 98.9 (02 May 2023 23:25), Max: 98.9 (02 May 2023 23:25)  HR: 116 (03 May 2023 00:00) (105 - 116)  BP: 117/64 (03 May 2023 00:00) (91/57 - 164/90)  BP(mean): 79 (03 May 2023 00:00) (47 - 158)  ABP: --  ABP(mean): --  RR: 20 (03 May 2023 00:00) (14 - 30)  SpO2: 98% (03 May 2023 00:00) (89% - 100%)    O2 Parameters below as of 02 May 2023 20:00  Patient On (Oxygen Delivery Method): room air            ABG - ( 01 May 2023 10:58 )  pH, Arterial: 7.370 pH, Blood: x     /  pCO2: 34    /  pO2: 85    / HCO3: 20    / Base Excess: -5.6  /  SaO2: 98.3                I&O's Detail    01 May 2023 07:01  -  02 May 2023 07:00  --------------------------------------------------------  IN:    Lactated Ringers: 1875 mL    Lactated Ringers Bolus: 1000 mL    Norepinephrine: 23.2 mL    Sodium Bicarbonate: 1000 mL  Total IN: 3898.2 mL    OUT:    Indwelling Catheter - Urethral (mL): 3930 mL    Nasogastric/Oral tube (mL): 450 mL  Total OUT: 4380 mL    Total NET: -481.8 mL      02 May 2023 07:01  -  03 May 2023 01:13  --------------------------------------------------------  IN:    Lactated Ringers: 1250 mL    Norepinephrine: 28.6 mL    Oral Fluid: 100 mL  Total IN: 1378.6 mL    OUT:    Indwelling Catheter - Urethral (mL): 351 mL    Nasogastric/Oral tube (mL): 600 mL    Voided (mL): 1250 mL  Total OUT: 2201 mL    Total NET: -822.4 mL            LABS:                        8.7    6.78  )-----------( 668      ( 02 May 2023 05:24 )             25.2     05-02    133<L>  |  93<L>  |  41.2<H>  ----------------------------<  78  4.4   |  23.0  |  1.62<H>    Ca    8.5      02 May 2023 20:18  Phos  4.5     05-02  Mg     1.7     05-02    TPro  5.3<L>  /  Alb  2.5<L>  /  TBili  0.3<L>  /  DBili  x   /  AST  44<H>  /  ALT  61<H>  /  AlkPhos  83  05-          CAPILLARY BLOOD GLUCOSE      POCT Blood Glucose.: 90 mg/dL (01 May 2023 12:47)    PT/INR - ( 01 May 2023 10:30 )   PT: 17.1 sec;   INR: 1.47 ratio         PTT - ( 01 May 2023 10:30 )  PTT:43.2 sec  Urinalysis Basic - ( 01 May 2023 11:50 )    Color: Yellow / Appearance: Clear / S.020 / pH: x  Gluc: x / Ketone: Negative  / Bili: Small / Urobili: Negative mg/dL   Blood: x / Protein: 30 mg/dL / Nitrite: Negative   Leuk Esterase: Trace / RBC: 6-10 /HPF / WBC 3-5 /HPF   Sq Epi: x / Non Sq Epi: x / Bacteria: Moderate      CULTURES:  Rapid RVP Result: NotDetec (23 @ 11:55)  Culture Results:   No growth (23 @ 11:50)  Culture Results:   No growth to date. (23 @ 10:30)  Culture Results:   No growth to date. (23 @ 10:20)        Physical Examination:    General: No acute distress.  Alert, oriented, interactive, nonfocal    HEENT: Pupils equal, reactive to light.  Symmetric.    PULM: Clear to auscultation bilaterally, no significant sputum production    CVS: tachycardic, reg rhythm, no murmurs, rubs, or gallops    ABD: Soft, distended, nontender, hypooactive bowel sounds, no masses    EXT: No edema, nontender    SKIN: Warm and well perfused, no rashes noted.    NEURO: A&Ox3, strength 5/5 all extremities, cranial nerves grossly intact, no focal deficits        RADIOLOGY:     < from: CT Abdomen and Pelvis No Cont (23 @ 12:29) >  FINDINGS:  LOWER CHEST: The heart size is normal. Coronary calcifications are noted.   There is a small hiatal hernia. Dependent atelectatic changes are seen   posteriorly. Scattered bulla are seen..    LIVER: Scattered subcentimeter hypodensities of the liver too small to   characterize.  BILE DUCTS: Normal caliber.  GALLBLADDER: Questionable gallbladder wall thickening. Evaluation is   limited due to streak artifact from posterior spinal hardware.  SPLEEN: Within normal limits.  PANCREAS: Within normal limits.  ADRENALS: Within normal limits.  KIDNEYS/URETERS: Within normal limits.    BLADDER: Tate catheter.  REPRODUCTIVE ORGANS: Prostatectomy changes noted    BOWEL: Diffusely dilated fluid-filled small bowel loops measure up to 3.9   cm in diameter. There is also gaseous and fluid distention of the   proximal colon. There is no abrupt transition of bowel caliber. There is   a somewhat more gradual transition to normal caliber distal ileum, which   is also thick-walled. Question wall thickening of the rectum which is   also under distended.    PERITONEUM: No ascites.  VESSELS: Within normal limits.  RETROPERITONEUM/LYMPH NODES: Nonenlarged retroperitoneal herberth hepatis   lymph nodes appreciated.  ABDOMINAL WALL/BONES: Degenerative changes are noted throughout the spine  and hips. There is interval placement of screw plate fixation of T11-L2   with subsequent revision or extension of hardware are present from L3   through L5. There is new bony graft material noted, with extensive   lucencies seen surrounding the posterior aspects of the hardware at the   level of T11-L2. Posterior to the L2-L5 level. Complex appearing fluid   collection which measures 14.0 x 1.3 x 3.2 cm in the transverse, AP and   craniocaudal dimensions respectively. There is small fat-containing   umbilical hernia is appreciated. There is increased anterior   intervertebral disc space widening at L1-2.    IMPRESSION:  Posterior spinal fusion changes from T11 through L5. Associated bony   graft material noted laterally is associated with extensive lucency.   There is also posterior subcutaneous fluid collection. Overall findings   may suggest sequelae of hardware complication/infection.    Increased intervertebral disc space widening of the anterior L1-L2   interspace noted. This may be related to postoperative correction, though   given the additional findings, possibility of infection is also raised.    Diffuse small and proximal large bowel dilatation with air-fluid, likely   representing sequelae of an ileus. There is relativenormal caliber of   the distal ileum, and possibility of early or partial bowel obstruction   cannot be entirely excluded. The normal caliber ileum is also mildly   thick-walled. This is nonspecific but raises the possibility of enteritis.    Questionable gallbladder wall thickening. Correlate clinically for   underlying gallbladder pathology. If clinically indicated, sonography can   be obtained for further evaluation.    Questionable thickening of the rectum which may be related to   underdistention versus mild proctocolitis. Correlate clinically.

## 2023-05-03 NOTE — DIETITIAN INITIAL EVALUATION ADULT - NSFNSGIIOFT_GEN_A_CORE
05-02-23 @ 07:01  -  05-03-23 @ 07:00  --------------------------------------------------------  OUT:    Nasogastric/Oral tube (mL): 800 mL  Total OUT: 800 mL    Total NET: -800 mL

## 2023-05-04 LAB
ALBUMIN SERPL ELPH-MCNC: 2.6 G/DL — LOW (ref 3.3–5.2)
ALP SERPL-CCNC: 74 U/L — SIGNIFICANT CHANGE UP (ref 40–120)
ALT FLD-CCNC: 29 U/L — SIGNIFICANT CHANGE UP
ANION GAP SERPL CALC-SCNC: 12 MMOL/L — SIGNIFICANT CHANGE UP (ref 5–17)
AST SERPL-CCNC: 15 U/L — SIGNIFICANT CHANGE UP
BASOPHILS # BLD AUTO: 0.06 K/UL — SIGNIFICANT CHANGE UP (ref 0–0.2)
BASOPHILS NFR BLD AUTO: 0.9 % — SIGNIFICANT CHANGE UP (ref 0–2)
BILIRUB SERPL-MCNC: 0.2 MG/DL — LOW (ref 0.4–2)
BUN SERPL-MCNC: 26.3 MG/DL — HIGH (ref 8–20)
BURR CELLS BLD QL SMEAR: PRESENT — SIGNIFICANT CHANGE UP
CALCIUM SERPL-MCNC: 8.3 MG/DL — LOW (ref 8.4–10.5)
CHLORIDE SERPL-SCNC: 101 MMOL/L — SIGNIFICANT CHANGE UP (ref 96–108)
CO2 SERPL-SCNC: 25 MMOL/L — SIGNIFICANT CHANGE UP (ref 22–29)
CREAT SERPL-MCNC: 1.08 MG/DL — SIGNIFICANT CHANGE UP (ref 0.5–1.3)
EGFR: 80 ML/MIN/1.73M2 — SIGNIFICANT CHANGE UP
ELLIPTOCYTES BLD QL SMEAR: SLIGHT — SIGNIFICANT CHANGE UP
EOSINOPHIL # BLD AUTO: 0.16 K/UL — SIGNIFICANT CHANGE UP (ref 0–0.5)
EOSINOPHIL NFR BLD AUTO: 2.6 % — SIGNIFICANT CHANGE UP (ref 0–6)
GIANT PLATELETS BLD QL SMEAR: PRESENT — SIGNIFICANT CHANGE UP
GLUCOSE SERPL-MCNC: 118 MG/DL — HIGH (ref 70–99)
HCT VFR BLD CALC: 25.8 % — LOW (ref 39–50)
HGB BLD-MCNC: 8.6 G/DL — LOW (ref 13–17)
HYPOCHROMIA BLD QL: SLIGHT — SIGNIFICANT CHANGE UP
LYMPHOCYTES # BLD AUTO: 0.64 K/UL — LOW (ref 1–3.3)
LYMPHOCYTES # BLD AUTO: 10.4 % — LOW (ref 13–44)
MAGNESIUM SERPL-MCNC: 1.7 MG/DL — SIGNIFICANT CHANGE UP (ref 1.6–2.6)
MANUAL SMEAR VERIFICATION: SIGNIFICANT CHANGE UP
MCHC RBC-ENTMCNC: 29.4 PG — SIGNIFICANT CHANGE UP (ref 27–34)
MCHC RBC-ENTMCNC: 33.3 GM/DL — SIGNIFICANT CHANGE UP (ref 32–36)
MCV RBC AUTO: 88.1 FL — SIGNIFICANT CHANGE UP (ref 80–100)
MONOCYTES # BLD AUTO: 0.54 K/UL — SIGNIFICANT CHANGE UP (ref 0–0.9)
MONOCYTES NFR BLD AUTO: 8.7 % — SIGNIFICANT CHANGE UP (ref 2–14)
MYELOCYTES NFR BLD: 2.6 % — HIGH (ref 0–0)
NEUTROPHILS # BLD AUTO: 4.63 K/UL — SIGNIFICANT CHANGE UP (ref 1.8–7.4)
NEUTROPHILS NFR BLD AUTO: 66.1 % — SIGNIFICANT CHANGE UP (ref 43–77)
NEUTS BAND # BLD: 8.7 % — HIGH (ref 0–8)
PHOSPHATE SERPL-MCNC: 3.2 MG/DL — SIGNIFICANT CHANGE UP (ref 2.4–4.7)
PLAT MORPH BLD: NORMAL — SIGNIFICANT CHANGE UP
PLATELET # BLD AUTO: 505 K/UL — HIGH (ref 150–400)
POIKILOCYTOSIS BLD QL AUTO: SIGNIFICANT CHANGE UP
POLYCHROMASIA BLD QL SMEAR: SIGNIFICANT CHANGE UP
POTASSIUM SERPL-MCNC: 3.9 MMOL/L — SIGNIFICANT CHANGE UP (ref 3.5–5.3)
POTASSIUM SERPL-SCNC: 3.9 MMOL/L — SIGNIFICANT CHANGE UP (ref 3.5–5.3)
PROT SERPL-MCNC: 5.3 G/DL — LOW (ref 6.6–8.7)
RBC # BLD: 2.93 M/UL — LOW (ref 4.2–5.8)
RBC # FLD: 12.8 % — SIGNIFICANT CHANGE UP (ref 10.3–14.5)
RBC BLD AUTO: ABNORMAL
SMUDGE CELLS # BLD: PRESENT — SIGNIFICANT CHANGE UP
SODIUM SERPL-SCNC: 137 MMOL/L — SIGNIFICANT CHANGE UP (ref 135–145)
WBC # BLD: 6.19 K/UL — SIGNIFICANT CHANGE UP (ref 3.8–10.5)
WBC # FLD AUTO: 6.19 K/UL — SIGNIFICANT CHANGE UP (ref 3.8–10.5)

## 2023-05-04 PROCEDURE — 99233 SBSQ HOSP IP/OBS HIGH 50: CPT

## 2023-05-04 RX ORDER — TRAMADOL HYDROCHLORIDE 50 MG/1
25 TABLET ORAL EVERY 8 HOURS
Refills: 0 | Status: DISCONTINUED | OUTPATIENT
Start: 2023-05-04 | End: 2023-05-05

## 2023-05-04 RX ORDER — METOCLOPRAMIDE HCL 10 MG
10 TABLET ORAL ONCE
Refills: 0 | Status: COMPLETED | OUTPATIENT
Start: 2023-05-04 | End: 2023-05-04

## 2023-05-04 RX ADMIN — OLANZAPINE 5 MILLIGRAM(S): 15 TABLET, FILM COATED ORAL at 05:18

## 2023-05-04 RX ADMIN — CHLORHEXIDINE GLUCONATE 1 APPLICATION(S): 213 SOLUTION TOPICAL at 05:31

## 2023-05-04 RX ADMIN — DEXMEDETOMIDINE HYDROCHLORIDE IN 0.9% SODIUM CHLORIDE 7.15 MICROGRAM(S)/KG/HR: 4 INJECTION INTRAVENOUS at 05:30

## 2023-05-04 RX ADMIN — Medication 975 MILLIGRAM(S): at 21:12

## 2023-05-04 RX ADMIN — DEXMEDETOMIDINE HYDROCHLORIDE IN 0.9% SODIUM CHLORIDE 7.15 MICROGRAM(S)/KG/HR: 4 INJECTION INTRAVENOUS at 03:07

## 2023-05-04 RX ADMIN — Medication 975 MILLIGRAM(S): at 13:43

## 2023-05-04 RX ADMIN — Medication 10 MILLIGRAM(S): at 23:41

## 2023-05-04 RX ADMIN — LIDOCAINE 2 PATCH: 4 CREAM TOPICAL at 19:10

## 2023-05-04 RX ADMIN — ENOXAPARIN SODIUM 40 MILLIGRAM(S): 100 INJECTION SUBCUTANEOUS at 21:13

## 2023-05-04 RX ADMIN — LIDOCAINE 2 PATCH: 4 CREAM TOPICAL at 17:34

## 2023-05-04 RX ADMIN — ONDANSETRON 4 MILLIGRAM(S): 8 TABLET, FILM COATED ORAL at 21:13

## 2023-05-04 RX ADMIN — DEXMEDETOMIDINE HYDROCHLORIDE IN 0.9% SODIUM CHLORIDE 7.15 MICROGRAM(S)/KG/HR: 4 INJECTION INTRAVENOUS at 00:11

## 2023-05-04 RX ADMIN — Medication 975 MILLIGRAM(S): at 22:27

## 2023-05-04 RX ADMIN — Medication 10 MILLIGRAM(S): at 05:18

## 2023-05-04 RX ADMIN — GABAPENTIN 100 MILLIGRAM(S): 400 CAPSULE ORAL at 13:43

## 2023-05-04 RX ADMIN — Medication 81 MILLIGRAM(S): at 13:43

## 2023-05-04 RX ADMIN — METHOCARBAMOL 750 MILLIGRAM(S): 500 TABLET, FILM COATED ORAL at 21:12

## 2023-05-04 RX ADMIN — ATORVASTATIN CALCIUM 40 MILLIGRAM(S): 80 TABLET, FILM COATED ORAL at 21:12

## 2023-05-04 RX ADMIN — Medication 10 MILLIGRAM(S): at 19:55

## 2023-05-04 RX ADMIN — METHOCARBAMOL 750 MILLIGRAM(S): 500 TABLET, FILM COATED ORAL at 17:34

## 2023-05-04 RX ADMIN — Medication 10 MILLIGRAM(S): at 00:52

## 2023-05-04 RX ADMIN — DEXMEDETOMIDINE HYDROCHLORIDE IN 0.9% SODIUM CHLORIDE 7.15 MICROGRAM(S)/KG/HR: 4 INJECTION INTRAVENOUS at 05:19

## 2023-05-04 RX ADMIN — SENNA PLUS 2 TABLET(S): 8.6 TABLET ORAL at 21:13

## 2023-05-04 RX ADMIN — GABAPENTIN 100 MILLIGRAM(S): 400 CAPSULE ORAL at 21:13

## 2023-05-04 NOTE — PROGRESS NOTE ADULT - PROVIDER SPECIALTY LIST ADULT
Critical Care
Nephrology
Nephrology
Pain Medicine
Critical Care
Critical Care
Internal Medicine
MICU

## 2023-05-04 NOTE — PROGRESS NOTE ADULT - ASSESSMENT
58M  recent lumbar lami and fusion last month  wishes to avoid oxycodone for pain control    Pain controlled  Pain service will sign off  Please reconsult as needed       if pain becomes uncontroleld:  increase robaxin to 1000mg TID

## 2023-05-04 NOTE — PROGRESS NOTE ADULT - SUBJECTIVE AND OBJECTIVE BOX
Patient is a 58y old  Male who presents with a chief complaint of ileus (03 May 2023 15:00)      BRIEF HOSPITAL COURSE: 59 y/o M with a h/o prostate cancer s/p surgery/radiation, HTN, GERD, s/p lumbar laminectomy and fusion at [facility] in April 2023, admitted on 5/1 with complaints of abdominal distention/pain and generalized weakness. He was found to have acute renal failure, hyperkalemia, metabolic acidosis, hypovolemia, and ileus vs partial SBO. Hospital course complicated by mixed distributive/hypovolemic shock requiring IV vasopressor therapy.      Events last 24 hours: Developed severe agitation/delirium/hallucinations yesterday requiring aggressive IV sedation. Remains on dexmedetomidine infusion. Continues to have loose bowel movements.        PAST MEDICAL & SURGICAL HISTORY:  HTN (hypertension)      GERD (gastroesophageal reflux disease)      Prostate cancer      Lumbar stenosis      S/P prostatectomy  with radiation      Lipoma  neck and left arm      History of lumbar laminectomy for spinal cord decompression          Review of Systems:  CONSTITUTIONAL: No fever, chills, or fatigue  EYES: No eye pain, visual disturbances, or discharge  ENMT:  No difficulty hearing, tinnitus, vertigo; No sinus or throat pain  NECK: No pain or stiffness  RESPIRATORY: No cough, wheezing, chills or hemoptysis; No shortness of breath  CARDIOVASCULAR: No chest pain, palpitations, dizziness, or leg swelling  GASTROINTESTINAL: No abdominal or epigastric pain. No nausea, vomiting, or hematemesis; No diarrhea or constipation. No melena or hematochezia.  GENITOURINARY: No dysuria, frequency, hematuria, or incontinence  NEUROLOGICAL: No headaches, memory loss, loss of strength, numbness, or tremors  SKIN: No itching, burning, rashes, or lesions   MUSCULOSKELETAL: No joint pain or swelling; No muscle, back, or extremity pain  PSYCHIATRIC: No depression, anxiety, mood swings, or difficulty sleeping      Medications:    acetaminophen     Tablet .. 975 milliGRAM(s) Oral every 8 hours  dexMEDEtomidine Infusion 0.3 MICROgram(s)/kG/Hr IV Continuous <Continuous>  diazepam  Injectable 10 milliGRAM(s) IV Push every 12 hours  gabapentin 100 milliGRAM(s) Oral three times a day  haloperidol    Injectable 5 milliGRAM(s) IntraMuscular every 6 hours PRN  HYDROmorphone   Tablet 2 milliGRAM(s) Oral every 4 hours PRN  HYDROmorphone  Injectable 0.5 milliGRAM(s) IV Push every 6 hours PRN  methocarbamol 750 milliGRAM(s) Oral every 8 hours  OLANZapine Injectable 5 milliGRAM(s) IntraMuscular every 6 hours  ondansetron Injectable 4 milliGRAM(s) IV Push every 6 hours PRN  aspirin enteric coated 81 milliGRAM(s) Oral daily  enoxaparin Injectable 40 milliGRAM(s) SubCutaneous every 24 hours  bisacodyl Suppository 10 milliGRAM(s) Rectal daily PRN  lactulose Syrup 30 Gram(s) Oral two times a day PRN  polyethylene glycol 3350 17 Gram(s) Oral daily  senna 2 Tablet(s) Oral at bedtime  atorvastatin 40 milliGRAM(s) Oral at bedtime  lactated ringers. 1000 milliLiter(s) IV Continuous <Continuous>  chlorhexidine 2% Cloths 1 Application(s) Topical <User Schedule>  lidocaine   4% Patch 2 Patch Transdermal daily            ICU Vital Signs Last 24 Hrs  T(C): 36.3 (04 May 2023 00:10), Max: 37.2 (03 May 2023 15:22)  T(F): 97.4 (04 May 2023 00:10), Max: 99 (03 May 2023 15:22)  HR: 72 (04 May 2023 02:00) (72 - 121)  BP: 101/63 (04 May 2023 02:00) (93/59 - 163/100)  BP(mean): 74 (04 May 2023 02:00) (69 - 133)  ABP: --  ABP(mean): --  RR: 28 (04 May 2023 02:00) (12 - 49)  SpO2: 94% (04 May 2023 02:00) (68% - 100%)    O2 Parameters below as of 03 May 2023 20:00  Patient On (Oxygen Delivery Method): room air                I&O's Detail    02 May 2023 07:01  -  03 May 2023 07:00  --------------------------------------------------------  IN:    Lactated Ringers: 2875 mL    Norepinephrine: 28.6 mL    Oral Fluid: 250 mL  Total IN: 3153.6 mL    OUT:    Indwelling Catheter - Urethral (mL): 351 mL    Nasogastric/Oral tube (mL): 800 mL    Voided (mL): 1550 mL  Total OUT: 2701 mL    Total NET: 452.6 mL      03 May 2023 07:01  -  04 May 2023 03:07  --------------------------------------------------------  IN:    Dexmedetomidine: 248.5 mL    Lactated Ringers: 2000 mL  Total IN: 2248.5 mL    OUT:    Voided (mL): 650 mL  Total OUT: 650 mL    Total NET: 1598.5 mL            LABS:                        8.8    7.16  )-----------( 541      ( 03 May 2023 03:43 )             25.6     05-03    135  |  95<L>  |  36.4<H>  ----------------------------<  91  4.0   |  22.0  |  1.34<H>    Ca    8.5      03 May 2023 03:43  Phos  3.4     05-03  Mg     2.0     05-03    TPro  5.7<L>  /  Alb  2.9<L>  /  TBili  0.3<L>  /  DBili  x   /  AST  23  /  ALT  41<H>  /  AlkPhos  82  05-03          CAPILLARY BLOOD GLUCOSE            CULTURES:  Rapid RVP Result: NotDetec (05-01-23 @ 11:55)  Culture Results:   No growth (05-01-23 @ 11:50)  Culture Results:   No growth to date. (05-01-23 @ 10:30)  Culture Results:   No growth to date. (05-01-23 @ 10:20)        Physical Examination:    General: No acute distress.  sedated, periodic agitation    HEENT: Pupils equal, reactive to light.  Symmetric.    PULM: Clear to auscultation bilaterally, no significant sputum production    CVS: Regular rate and rhythm, no murmurs, rubs, or gallops    ABD: Soft, distended, nontender, normoactive bowel sounds, no masses    EXT: No edema, nontender    SKIN: Warm and well perfused, no rashes noted.    NEURO: sedated, confused, follows commands, moves all extremities purposefully        RADIOLOGY:     < from: Xray Abdomen 1 View Portable, IMMEDIATE (05.03.23 @ 10:54) >  FINDINGS:  Thoracolumbar spinal fusion hardware in place.  Generalized gaseous distention of multiple small bowel loops - widest   diameter is in 5.0cm range - mildly increased from about 4.0cm on 5/1/23   CT scan of abdomen and pelvis.  No free intraperitoneal air.  No acute bony finding    IMPRESSION:  Since 5/1/23 CT  scan, mild increase in gaseous distention of small bowel   loops due to progression of ileus or to partial small bowel obstruction.

## 2023-05-04 NOTE — PROGRESS NOTE ADULT - ASSESSMENT
59 y/o M with a h/o prostate cancer s/p surgery/radiation, HTN, GERD, s/p lumbar laminectomy and fusion at [facility] in April 2023, with:    # Mixed distributive/hypovolemic shock  # WILMA  # Hyperkalemia  # Metabolic acidosis  # Ileus, likely opioid-induced  # Hyperactive delirium/agitation    New onset hyperactive delirium with agitation requiring aggressive sedation for patient safety. Possible withdrawal syndrome? Suspect at least partial component of ICU delirium.    - requiring high dose dexmedetomidine infusion, wean as able  - add ATC IM olanzapine and IV diazepam  - weaned off norepinephrine infusion, monitor hemodynamics closely, maintain a MAP > 65  - WILMA pre-renal in nature, possible ischemic ATN component  - BUN/Cr steadily improving, potassium level and acid-base balance have stabilized on repeat lab work, nonoliguric  - continue IVF hydration with LR @ 125cc/hr for now  - NGT removed given multiple BMs, advance diet as tolerated  - no good evidence for active infection, blood and urine cultures are unremarkable  - pain management input appreciated, recommended regimen has been started    Case discussed with MICU physician, Dr. Schultz.

## 2023-05-04 NOTE — PROGRESS NOTE ADULT - SUBJECTIVE AND OBJECTIVE BOX
Interval Hx:  Patient seen during rounds  Patient reports pain to be controlled on current medications  Patient denies sedation with medications     Analgesic Dosing for past 24 hours reviewed as below:    diazepam  Injectable   10 milliGRAM(s) IV Push (05-03-23 @ 16:30)    diazepam  Injectable   20 milliGRAM(s) IV Push (05-03-23 @ 15:55)    diazepam  Injectable   10 milliGRAM(s) IV Push (05-04-23 @ 05:18)    diazepam  Injectable   10 milliGRAM(s) IV Push (05-03-23 @ 18:11)    diazepam  Injectable   10 milliGRAM(s) IV Push (05-04-23 @ 00:52)    OLANZapine Injectable   5 milliGRAM(s) IntraMuscular (05-04-23 @ 05:18)   5 milliGRAM(s) IntraMuscular (05-03-23 @ 23:34)          T(C): 36.5 (05-04-23 @ 11:37), Max: 37.2 (05-03-23 @ 15:22)  HR: 84 (05-04-23 @ 08:45) (67 - 115)  BP: 120/83 (05-04-23 @ 08:45) (93/59 - 163/100)  RR: 18 (05-04-23 @ 08:45) (16 - 49)  SpO2: 100% (05-04-23 @ 08:45) (90% - 100%)      05-03-23 @ 07:01  -  05-04-23 @ 07:00  --------------------------------------------------------  IN: 3326 mL / OUT: 975 mL / NET: 2351 mL    05-04-23 @ 07:01  -  05-04-23 @ 12:00  --------------------------------------------------------  IN: 272 mL / OUT: 0 mL / NET: 272 mL        acetaminophen     Tablet .. 975 milliGRAM(s) Oral every 8 hours  aspirin enteric coated 81 milliGRAM(s) Oral daily  atorvastatin 40 milliGRAM(s) Oral at bedtime  bisacodyl Suppository 10 milliGRAM(s) Rectal daily PRN  chlorhexidine 2% Cloths 1 Application(s) Topical <User Schedule>  diazepam  Injectable 10 milliGRAM(s) IV Push every 12 hours  enoxaparin Injectable 40 milliGRAM(s) SubCutaneous every 24 hours  gabapentin 100 milliGRAM(s) Oral three times a day  haloperidol    Injectable 5 milliGRAM(s) IntraMuscular every 6 hours PRN  HYDROmorphone   Tablet 2 milliGRAM(s) Oral every 4 hours PRN  HYDROmorphone  Injectable 0.5 milliGRAM(s) IV Push every 6 hours PRN  lactulose Syrup 30 Gram(s) Oral two times a day PRN  lidocaine   4% Patch 2 Patch Transdermal daily  methocarbamol 750 milliGRAM(s) Oral every 8 hours  ondansetron Injectable 4 milliGRAM(s) IV Push every 6 hours PRN  polyethylene glycol 3350 17 Gram(s) Oral daily  senna 2 Tablet(s) Oral at bedtime  traMADol 25 milliGRAM(s) Oral every 8 hours PRN                          8.6    6.19  )-----------( 505      ( 04 May 2023 04:00 )             25.8     05-04    137  |  101  |  26.3<H>  ----------------------------<  118<H>  3.9   |  25.0  |  1.08    Ca    8.3<L>      04 May 2023 04:00  Phos  3.2     05-04  Mg     1.7     05-04    TPro  5.3<L>  /  Alb  2.6<L>  /  TBili  0.2<L>  /  DBili  x   /  AST  15  /  ALT  29  /  AlkPhos  74  05-04          Pain Service   [phone]

## 2023-05-04 NOTE — PROGRESS NOTE ADULT - TIME BILLING
Pain Management - chart review, patient interview
coordinating care with nephrology, MICU PA/resident, MICU RN, reviewing labs and prior records, personally reviewing and interpreting imaging, documenting findings and assessment in the medical record.

## 2023-05-04 NOTE — PROGRESS NOTE ADULT - ASSESSMENT
57 yo male with PMH of prostate CA (s/p sx/RT/lupron 2018), HTN, GERD, c/o lower back pain after injury while at work on 7-2-2022 whom presented to [facility] on 4/18/23 for posterior spinal fusion and laminectomy T11-L5 and revision of L3-L5 with Plastics Closure. Patient discharged from  on 4/26/23 with pain medication and bowel regimen. Patient reports that his last bowel movement was Tuesday and endorses he has been vomiting for a few days. Patient confused at times, but work-up in the ER revealed significant WILMA (Cr 9.24), Metabolic acidosis, Dehydration. CT A/P done which showed a likely ileus. Patient received 3L IVF in the ER, still hypotensive so started on levophed. Patient also cultured and given zosyn and vancomycin. MICU consulted. Patient endorsing diffuse abdominal pain. Reports he had been having diarrhea but that has stopped but has been vomiting over the last few days. Levophed running at 0.03mcg/kg/min at time of exam.     COURSE:  s/p pressors. turned off yesterday. also noted WILMA + MA and hypovolemia. Now on IVF  Ileus resolved and has BMs  WILMA resolved  Developed severe agitation/delirium/hallucinations yesterday requiring aggressive IV sedation. Precedex turned off at 10 Am today. PRN Zyprexa given. Continues to have loose bowel movements.    # Toxic Metabolic encephalopathy  resolving well  likely sec to withdrawal formopiates  U tox ordered yest, nver done  s/p precedex  Haldol IM pRN and Valium 10 bid being recommended and given by MICU  COnt same    # Ileus  resolving well  BMs +  tolerating PO diet  Lytes WNL  etio- sec to narc use    # s/p shock  s/p pressors  c/s negative  likely sec to hypovolemia    # s/p Spinal fusion 4/18  site intact  No neuro deficits  o/p f/u post discharge    # Pain issues  Pain mngmt appreciated  hydromorphone PO 2mg [id number] PRN severe pain  acetaminophen PO 975mg [id number] standing.   gabapentin PO 300mg [id number] standing.  robaxin 750mg q8h standing.   start lidocaine Patch 12 hours on, 12 hours off. Max 3 patches on at one time  Bowel regimen- senna, miralax and suppositories/ lactulose PRN    # WILMA/ ATN on CKD stg 2 with metabolic acidosis  sec to hypovolemia/ shock  Crt 9-->1.34  Scr 1.1-1.4 over the past few months  Resolved and at baseline now  s/p bicarb GTT    Lovenox    Dispo- Anticipate short stay.   yesterday- patient's surgeon Dr. Pierre ([phone]) contacted. Discussed that patient has previous history of hospitalizations. Addressed the current state of the patient as well as patient's CT-imaging of the back and pelvis. Surgeon said that the patient can be seen outpatient by him.

## 2023-05-04 NOTE — CHART NOTE - NSCHARTNOTEFT_GEN_A_CORE
59 y/o M with a h/o prostate cancer s/p surgery/radiation, HTN, GERD, s/p lumbar laminectomy and fusion at [facility]/ in April 2023, admitted on 5/1 with complaints of abdominal distention, pain and generalized weakness. He was found to have acute renal failure, hyperkalemia, metabolic acidosis, hypovolemia, and ileus vs partial SBO. Hospital course complicated by mixed distributive/hypovolemic shock requiring IV vasopressor therapy. Acute renal failure has resolved with Cr of 9.24 on admission and currently 1.34 with IV fluid hydration. NGT was placed for SBO vs ileus decompression with clinical improvement and passage of bowel movements. Patient is currently alert and oriented and is tachycardic but when asking, patient explains his anxiety and uncontrolled pain are provoking factors for him. Pain management consulted for multimodal pain control plan to limit opiate burden. Currently patient is tolerating a clear liquid diet and is having bowel movements. CT revealed non-specific post-surgical changes with possible sequelae of infection vs hardware complication. Neurosurgery consulted and case discussed with KLEVER Washburn. Patient in ICU was in acute delirium and was given Valium and Haldol. Patient has been off Precedex since the morning. Patient optimized for transfer out of ICU to medical floors and case endorsed to Dr. Dean, who agreed to continue care from this point.    Spoke with Neurosurgery KLEVER Washburn, per Dr. Deras they are deferring evaluation and are recommending that we contact patient's primary surgeon to discuss CT findings.     Spoke with patient's surgeon Dr. Pierre ([phone]) who performed patient's laminectomy at . Informed him of the current state of the patient, as well as patient's CT findings. Surgeon reviewed patient's CT results and advised that these are expected post-operative changes and patient can follow up as an outpatient with him. Expressed that findings on CT are to be expected.

## 2023-05-04 NOTE — PROGRESS NOTE ADULT - SUBJECTIVE AND OBJECTIVE BOX
FULL CONSULT DONE YESTERDAY FOR MICU D/G. LATER INTHE DAY PT DEVELOPED TOXIC METABOLIC ENCEPHALOPATHY LIKELY WITHDRAWAING FORM NARCS.  taken back to micu with Precedex   Now beign d/g    HEALTH ISSUES - PROBLEM Dx:  59 yo male with PMH of prostate CA (s/p sx/RT/lupron 2018), HTN, GERD, c/o lower back pain after injury while at work on 7-2-2022 whom presented to [facility] on 4/18/23 for posterior spinal fusion and laminectomy T11-L5 and revision of L3-L5 with Plastics Closure. Patient discharged from  on 4/26/23 with pain medication and bowel regimen. Patient reports that his last bowel movement was Tuesday and endorses he has been vomiting for a few days. Patient confused at times, but work-up in the ER revealed significant WILMA (Cr 9.24), Metabolic acidosis, Dehydration. CT A/P done which showed a likely ileus. Patient received 3L IVF in the ER, still hypotensive so started on levophed. Patient also cultured and given zosyn and vancomycin. MICU consulted. Patient endorsing diffuse abdominal pain. Reports he had been having diarrhea but that has stopped but has been vomiting over the last few days. Levophed running at 0.03mcg/kg/min at time of exam.     COURSE:  s/p pressors. turned off yesterday. also noted WILMA + MA and hypovolemia. Now on IVF  Ileus resolved and has BMs  WILMA resolved  Developed severe agitation/delirium/hallucinations yesterday requiring aggressive IV sedation. Precedex turned off at 10 Am today. PRN Zyprexa given. Continues to have loose bowel movements.    INTERVAL HPI/ OVERNIGHT EVENTS:    Is coherent now  apologetic of his behavior yesterday  offers no pain complaints    REVIEW OF SYSTEMS:    as above    Vital Signs Last 24 Hrs  T(C): 36.5 (04 May 2023 11:37), Max: 37.2 (03 May 2023 15:22)  T(F): 97.7 (04 May 2023 11:37), Max: 99 (03 May 2023 15:22)  HR: 93 (04 May 2023 12:00) (67 - 115)  BP: 148/72 (04 May 2023 12:00) (93/59 - 163/100)  BP(mean): 95 (04 May 2023 12:00) (69 - 130)  RR: 29 (04 May 2023 12:00) (17 - 49)  SpO2: 96% (04 May 2023 12:00) (90% - 100%)    Parameters below as of 04 May 2023 12:00  Patient On (Oxygen Delivery Method): room air    PHYSICAL EXAM-  GENERAL: Comfortable, denies pain  HEAD:  Atraumatic, Normocephalic  EYES: EOMI, PERRLA, conjunctiva and sclera clear  ENT: Moist mucous membranes, No lesions  NECK: Supple, No JVD, Normal thyroid  NERVOUS SYSTEM:  Alert & Oriented X 3, Good concentration; Motor Strength 5/5 B/L upper and lower extremities  CHEST/LUNG: CTA bilaterally; No rales, rhonchi, wheezing, or rubs  HEART: Regular rate and rhythm; No murmurs, rubs, or gallops  ABDOMEN: Hard all quadrants, Nontender, Nondistended; Bowel sounds present and dull  EXTREMITIES:  2+ Peripheral Pulses, No clubbing, cyanosis, or edema  SKIN: No rashes or lesions  SPINE: midline surg incision C/D/I, sutures intact      MEDICATIONS  (STANDING):  acetaminophen     Tablet .. 975 milliGRAM(s) Oral every 8 hours  aspirin enteric coated 81 milliGRAM(s) Oral daily  atorvastatin 40 milliGRAM(s) Oral at bedtime  chlorhexidine 2% Cloths 1 Application(s) Topical <User Schedule>  diazepam  Injectable 10 milliGRAM(s) IV Push every 12 hours  enoxaparin Injectable 40 milliGRAM(s) SubCutaneous every 24 hours  gabapentin 100 milliGRAM(s) Oral three times a day  lidocaine   4% Patch 2 Patch Transdermal daily  methocarbamol 750 milliGRAM(s) Oral every 8 hours  polyethylene glycol 3350 17 Gram(s) Oral daily  senna 2 Tablet(s) Oral at bedtime    MEDICATIONS  (PRN):  bisacodyl Suppository 10 milliGRAM(s) Rectal daily PRN Constipation  haloperidol    Injectable 5 milliGRAM(s) IntraMuscular every 6 hours PRN Agitation  HYDROmorphone   Tablet 2 milliGRAM(s) Oral every 4 hours PRN Severe Pain (7 - 10)  HYDROmorphone  Injectable 0.5 milliGRAM(s) IV Push every 6 hours PRN breakthrough  lactulose Syrup 30 Gram(s) Oral two times a day PRN if no BMs for 2 days in a row  ondansetron Injectable 4 milliGRAM(s) IV Push every 6 hours PRN Nausea and/or Vomiting  traMADol 25 milliGRAM(s) Oral every 8 hours PRN Moderate Pain (4 - 6)      LABS:                        8.6    6.19  )-----------( 505      ( 04 May 2023 04:00 )             25.8     05-04    137  |  101  |  26.3<H>  ----------------------------<  118<H>  3.9   |  25.0  |  1.08    Ca    8.3<L>      04 May 2023 04:00  Phos  3.2     05-04  Mg     1.7     05-04    TPro  5.3<L>  /  Alb  2.6<L>  /  TBili  0.2<L>  /  DBili  x   /  AST  15  /  ALT  29  /  AlkPhos  74  05-04

## 2023-05-04 NOTE — PROGRESS NOTE ADULT - NUTRITIONAL ASSESSMENT
This patient has been assessed with a concern for Malnutrition and has been determined to have a diagnosis/diagnoses of Severe protein-calorie malnutrition.    This patient is being managed with:   Diet Full Liquid-  Entered: May  3 2023 10:44AM  
This patient has been assessed with a concern for Malnutrition and has been determined to have a diagnosis/diagnoses of Severe protein-calorie malnutrition.    This patient is being managed with:   Diet Full Liquid-  Entered: May  3 2023 10:44AM

## 2023-05-05 ENCOUNTER — TRANSCRIPTION ENCOUNTER (OUTPATIENT)
Age: 58
End: 2023-05-05

## 2023-05-05 VITALS
HEART RATE: 84 BPM | TEMPERATURE: 98 F | RESPIRATION RATE: 18 BRPM | DIASTOLIC BLOOD PRESSURE: 78 MMHG | OXYGEN SATURATION: 99 % | SYSTOLIC BLOOD PRESSURE: 138 MMHG

## 2023-05-05 LAB
ANION GAP SERPL CALC-SCNC: 14 MMOL/L — SIGNIFICANT CHANGE UP (ref 5–17)
ANISOCYTOSIS BLD QL: SLIGHT — SIGNIFICANT CHANGE UP
BASOPHILS # BLD AUTO: 0 K/UL — SIGNIFICANT CHANGE UP (ref 0–0.2)
BASOPHILS NFR BLD AUTO: 0 % — SIGNIFICANT CHANGE UP (ref 0–2)
BUN SERPL-MCNC: 15.8 MG/DL — SIGNIFICANT CHANGE UP (ref 8–20)
BURR CELLS BLD QL SMEAR: PRESENT — SIGNIFICANT CHANGE UP
CALCIUM SERPL-MCNC: 8.3 MG/DL — LOW (ref 8.4–10.5)
CHLORIDE SERPL-SCNC: 100 MMOL/L — SIGNIFICANT CHANGE UP (ref 96–108)
CO2 SERPL-SCNC: 22 MMOL/L — SIGNIFICANT CHANGE UP (ref 22–29)
CREAT SERPL-MCNC: 1.1 MG/DL — SIGNIFICANT CHANGE UP (ref 0.5–1.3)
EGFR: 78 ML/MIN/1.73M2 — SIGNIFICANT CHANGE UP
EOSINOPHIL # BLD AUTO: 0.1 K/UL — SIGNIFICANT CHANGE UP (ref 0–0.5)
EOSINOPHIL NFR BLD AUTO: 0.9 % — SIGNIFICANT CHANGE UP (ref 0–6)
FERRITIN SERPL-MCNC: 278 NG/ML — SIGNIFICANT CHANGE UP (ref 30–400)
GLUCOSE SERPL-MCNC: 108 MG/DL — HIGH (ref 70–99)
HCT VFR BLD CALC: 28 % — LOW (ref 39–50)
HGB BLD-MCNC: 9.4 G/DL — LOW (ref 13–17)
IRON SATN MFR SERPL: 11 % — LOW (ref 16–55)
IRON SATN MFR SERPL: 21 UG/DL — LOW (ref 59–158)
LYMPHOCYTES # BLD AUTO: 0.8 K/UL — LOW (ref 1–3.3)
LYMPHOCYTES # BLD AUTO: 7 % — LOW (ref 13–44)
MAGNESIUM SERPL-MCNC: 1.5 MG/DL — LOW (ref 1.8–2.6)
MANUAL SMEAR VERIFICATION: SIGNIFICANT CHANGE UP
MCHC RBC-ENTMCNC: 30.1 PG — SIGNIFICANT CHANGE UP (ref 27–34)
MCHC RBC-ENTMCNC: 33.6 GM/DL — SIGNIFICANT CHANGE UP (ref 32–36)
MCV RBC AUTO: 89.7 FL — SIGNIFICANT CHANGE UP (ref 80–100)
METAMYELOCYTES # FLD: 1.7 % — HIGH (ref 0–0)
MONOCYTES # BLD AUTO: 0.6 K/UL — SIGNIFICANT CHANGE UP (ref 0–0.9)
MONOCYTES NFR BLD AUTO: 5.3 % — SIGNIFICANT CHANGE UP (ref 2–14)
MYELOCYTES NFR BLD: 0.9 % — HIGH (ref 0–0)
NEUTROPHILS # BLD AUTO: 9.47 K/UL — HIGH (ref 1.8–7.4)
NEUTROPHILS NFR BLD AUTO: 83.3 % — HIGH (ref 43–77)
OVALOCYTES BLD QL SMEAR: SLIGHT — SIGNIFICANT CHANGE UP
PLAT MORPH BLD: NORMAL — SIGNIFICANT CHANGE UP
PLATELET # BLD AUTO: 571 K/UL — HIGH (ref 150–400)
POLYCHROMASIA BLD QL SMEAR: SLIGHT — SIGNIFICANT CHANGE UP
POTASSIUM SERPL-MCNC: 3.8 MMOL/L — SIGNIFICANT CHANGE UP (ref 3.5–5.3)
POTASSIUM SERPL-SCNC: 3.8 MMOL/L — SIGNIFICANT CHANGE UP (ref 3.5–5.3)
PROMYELOCYTES # FLD: 0.9 % — HIGH (ref 0–0)
RBC # BLD: 3.12 M/UL — LOW (ref 4.2–5.8)
RBC # FLD: 12.7 % — SIGNIFICANT CHANGE UP (ref 10.3–14.5)
RBC BLD AUTO: ABNORMAL
SODIUM SERPL-SCNC: 136 MMOL/L — SIGNIFICANT CHANGE UP (ref 135–145)
TIBC SERPL-MCNC: 184 UG/DL — LOW (ref 220–430)
TRANSFERRIN SERPL-MCNC: 129 MG/DL — LOW (ref 180–329)
WBC # BLD: 11.37 K/UL — HIGH (ref 3.8–10.5)
WBC # FLD AUTO: 11.37 K/UL — HIGH (ref 3.8–10.5)

## 2023-05-05 PROCEDURE — 74018 RADEX ABDOMEN 1 VIEW: CPT

## 2023-05-05 PROCEDURE — 85018 HEMOGLOBIN: CPT

## 2023-05-05 PROCEDURE — 82435 ASSAY OF BLOOD CHLORIDE: CPT

## 2023-05-05 PROCEDURE — 85014 HEMATOCRIT: CPT

## 2023-05-05 PROCEDURE — 85730 THROMBOPLASTIN TIME PARTIAL: CPT

## 2023-05-05 PROCEDURE — 82803 BLOOD GASES ANY COMBINATION: CPT

## 2023-05-05 PROCEDURE — 85025 COMPLETE CBC W/AUTO DIFF WBC: CPT

## 2023-05-05 PROCEDURE — 0225U NFCT DS DNA&RNA 21 SARSCOV2: CPT

## 2023-05-05 PROCEDURE — 83735 ASSAY OF MAGNESIUM: CPT

## 2023-05-05 PROCEDURE — 99239 HOSP IP/OBS DSCHRG MGMT >30: CPT

## 2023-05-05 PROCEDURE — 82728 ASSAY OF FERRITIN: CPT

## 2023-05-05 PROCEDURE — 81001 URINALYSIS AUTO W/SCOPE: CPT

## 2023-05-05 PROCEDURE — 83550 IRON BINDING TEST: CPT

## 2023-05-05 PROCEDURE — 94640 AIRWAY INHALATION TREATMENT: CPT

## 2023-05-05 PROCEDURE — 82962 GLUCOSE BLOOD TEST: CPT

## 2023-05-05 PROCEDURE — 84466 ASSAY OF TRANSFERRIN: CPT

## 2023-05-05 PROCEDURE — 87086 URINE CULTURE/COLONY COUNT: CPT

## 2023-05-05 PROCEDURE — 87040 BLOOD CULTURE FOR BACTERIA: CPT

## 2023-05-05 PROCEDURE — 82947 ASSAY GLUCOSE BLOOD QUANT: CPT

## 2023-05-05 PROCEDURE — 36415 COLL VENOUS BLD VENIPUNCTURE: CPT

## 2023-05-05 PROCEDURE — 99285 EMERGENCY DEPT VISIT HI MDM: CPT

## 2023-05-05 PROCEDURE — 80048 BASIC METABOLIC PNL TOTAL CA: CPT

## 2023-05-05 PROCEDURE — 83540 ASSAY OF IRON: CPT

## 2023-05-05 PROCEDURE — 85027 COMPLETE CBC AUTOMATED: CPT

## 2023-05-05 PROCEDURE — 84295 ASSAY OF SERUM SODIUM: CPT

## 2023-05-05 PROCEDURE — 80053 COMPREHEN METABOLIC PANEL: CPT

## 2023-05-05 PROCEDURE — 74176 CT ABD & PELVIS W/O CONTRAST: CPT | Mod: MA

## 2023-05-05 PROCEDURE — 82330 ASSAY OF CALCIUM: CPT

## 2023-05-05 PROCEDURE — 84100 ASSAY OF PHOSPHORUS: CPT

## 2023-05-05 PROCEDURE — 93005 ELECTROCARDIOGRAM TRACING: CPT

## 2023-05-05 PROCEDURE — 83605 ASSAY OF LACTIC ACID: CPT

## 2023-05-05 PROCEDURE — 71045 X-RAY EXAM CHEST 1 VIEW: CPT

## 2023-05-05 PROCEDURE — 84132 ASSAY OF SERUM POTASSIUM: CPT

## 2023-05-05 PROCEDURE — 85610 PROTHROMBIN TIME: CPT

## 2023-05-05 RX ORDER — GABAPENTIN 400 MG/1
1 CAPSULE ORAL
Qty: 90 | Refills: 0
Start: 2023-05-05 | End: 2023-06-03

## 2023-05-05 RX ORDER — PSYLLIUM SEED (WITH DEXTROSE)
1 POWDER (GRAM) ORAL THREE TIMES A DAY
Refills: 0 | Status: DISCONTINUED | OUTPATIENT
Start: 2023-05-05 | End: 2023-05-05

## 2023-05-05 RX ORDER — MAGNESIUM SULFATE 500 MG/ML
1 VIAL (ML) INJECTION ONCE
Refills: 0 | Status: COMPLETED | OUTPATIENT
Start: 2023-05-05 | End: 2023-05-05

## 2023-05-05 RX ORDER — ACETAMINOPHEN 500 MG
3 TABLET ORAL
Qty: 0 | Refills: 0 | DISCHARGE
Start: 2023-05-05

## 2023-05-05 RX ADMIN — GABAPENTIN 100 MILLIGRAM(S): 400 CAPSULE ORAL at 06:30

## 2023-05-05 RX ADMIN — LIDOCAINE 2 PATCH: 4 CREAM TOPICAL at 15:24

## 2023-05-05 RX ADMIN — METHOCARBAMOL 750 MILLIGRAM(S): 500 TABLET, FILM COATED ORAL at 15:25

## 2023-05-05 RX ADMIN — METHOCARBAMOL 750 MILLIGRAM(S): 500 TABLET, FILM COATED ORAL at 06:30

## 2023-05-05 RX ADMIN — GABAPENTIN 100 MILLIGRAM(S): 400 CAPSULE ORAL at 15:24

## 2023-05-05 RX ADMIN — Medication 975 MILLIGRAM(S): at 07:30

## 2023-05-05 RX ADMIN — CHLORHEXIDINE GLUCONATE 1 APPLICATION(S): 213 SOLUTION TOPICAL at 06:30

## 2023-05-05 RX ADMIN — LIDOCAINE 2 PATCH: 4 CREAM TOPICAL at 06:29

## 2023-05-05 RX ADMIN — Medication 975 MILLIGRAM(S): at 15:24

## 2023-05-05 RX ADMIN — Medication 975 MILLIGRAM(S): at 06:30

## 2023-05-05 RX ADMIN — Medication 81 MILLIGRAM(S): at 15:23

## 2023-05-05 RX ADMIN — Medication 975 MILLIGRAM(S): at 16:24

## 2023-05-05 RX ADMIN — Medication 100 GRAM(S): at 11:14

## 2023-05-05 NOTE — DISCHARGE NOTE PROVIDER - PROVIDER TOKENS
FREE:[LAST:[PCP],PHONE:[(   )    -],FAX:[(   )    -],FOLLOWUP:[1 month]],PROVIDER:[TOKEN:[[id number]],FOLLOWUP:[2 weeks]] PROVIDER:[TOKEN:[[id number]],FOLLOWUP:[2 weeks]],FREE:[LAST:[PCP],PHONE:[(   )    -],FAX:[(   )    -],FOLLOWUP:[1 month]],PROVIDER:[TOKEN:[[id number]]]

## 2023-05-05 NOTE — DISCHARGE NOTE PROVIDER - NSDCACTIVITY_GEN_ALL_CORE
No restrictions Bathing allowed/Do not drive or operate machinery/Showering allowed/Do not make important decisions/Stairs allowed/Walking - Indoors allowed/No heavy lifting/straining/Walking - Outdoors allowed

## 2023-05-05 NOTE — DISCHARGE NOTE PROVIDER - NSDCMRMEDTOKEN_GEN_ALL_CORE_FT
acetaminophen 325 mg oral tablet: 2 tab(s) orally every 6 hours  ascorbic acid 500 mg oral tablet: 1 tab(s) orally 2 times a day  aspirin 81 mg oral delayed release tablet: 1 tab(s) orally once a day To prevent DVT MDD: 1  cyclobenzaprine 10 mg oral tablet: 1 tab(s) orally every 8 hours MDD: 3  lisinopril-hydrochlorothiazide 20 mg-25 mg oral tablet: 1 orally once a day  MiraLax oral powder for reconstitution: 17 gram(s) orally once a day (at bedtime)  montelukast 10 mg oral tablet: 1 orally once a day  Multiple Vitamins oral tablet: 1 tab(s) orally once a day  naloxegol 25 mg oral tablet: 1 tab(s) orally once a day (at bedtime) opiate induced constipation MDD: 1  naloxegol 25 mg oral tablet: 1 tab(s) orally once a day MDD: 1  Narcan 4 mg/0.1 mL nasal spray: 4 milligram(s) intranasally once , repeat as necessary.   As needed. For suspected opiate overdose   Follow instructions on packet MDD: 0.2 ml  oxybutynin 10 mg/24 hr oral tablet, extended release: 1 tab(s) orally once a day  oxyCODONE 15 mg oral tablet: 1 tab(s) orally every 4 hours as needed for pain may take every 3-4 hrs as needed for pain MDD: 6  Protonix 40 mg oral delayed release tablet: 1 tab(s) orally once a day  rosuvastatin 10 mg oral capsule: 1 orally once a day  senna leaf extract oral tablet: 2 tab(s) orally once a day (at bedtime)   acetaminophen 325 mg oral tablet: 3 tab(s) orally every 8 hours  ascorbic acid 500 mg oral tablet: 1 tab(s) orally 2 times a day  aspirin 81 mg oral delayed release tablet: 1 tab(s) orally once a day To prevent DVT MDD: 1  cyclobenzaprine 10 mg oral tablet: 1 tab(s) orally every 8 hours MDD: 3  gabapentin 100 mg oral capsule: 1 cap(s) orally 3 times a day  lisinopril-hydrochlorothiazide 20 mg-25 mg oral tablet: 1 orally once a day  MiraLax oral powder for reconstitution: 17 gram(s) orally once a day (at bedtime)  montelukast 10 mg oral tablet: 1 orally once a day  Multiple Vitamins oral tablet: 1 tab(s) orally once a day  naloxegol 25 mg oral tablet: 1 tab(s) orally once a day (at bedtime) opiate induced constipation MDD: 1  naloxegol 25 mg oral tablet: 1 tab(s) orally once a day MDD: 1  Narcan 4 mg/0.1 mL nasal spray: 4 milligram(s) intranasally once , repeat as necessary.   As needed. For suspected opiate overdose   Follow instructions on packet MDD: 0.2 ml  oxybutynin 10 mg/24 hr oral tablet, extended release: 1 tab(s) orally once a day  oxyCODONE 15 mg oral tablet: 1 tab(s) orally every 4 hours as needed for pain may take every 3-4 hrs as needed for pain MDD: 6  Protonix 40 mg oral delayed release tablet: 1 tab(s) orally once a day  rosuvastatin 10 mg oral capsule: 1 orally once a day  senna leaf extract oral tablet: 2 tab(s) orally once a day (at bedtime)

## 2023-05-05 NOTE — DISCHARGE NOTE PROVIDER - HOSPITAL COURSE
58 year old male with past medical history of prostate CA (s/p sx/RT/lupron 2018), HTN, GERD, complaining of lower back pain after injury while at work on 7-2-2022 who presented to [facility] on 4/18/23 for posterior spinal fusion and laminectomy T11-L5 and revision of L3-L5 with Plastics Closure. Patient was discharged from [facility] on 4/26/23 with pain medication and bowel regimen. Patient reports that his last bowel movement was Tuesday and endorses he has been vomiting for a few days. Patient confused at times, but work-up in the ER revealed significant WILMA (Cr 9.24), Metabolic acidosis, Dehydration. CT A/P done which showed a likely ileus. Patient received 3L IVF in the ER, still hypotensive so started on levophed. Patient also cultured and given zosyn and vancomycin. MICU consulted, accepted admission, and initiated Levophed. Levophed slowly weaned off. IVF continued for hypovolemia and WILMA. Ileus resolved with BMs. He was initially downgraded on 5/3 but developed severe agitation/delirium/hallucinations requiring aggressive IV sedation. Precedex weaned off and Zyprexa given. Agitation likely due to opiate withdrawal. Mentation improved and back to baseline. He was downgraded on 5/4 to the medicine service. Patient is now medically stable for discharge.    58 year old male with HTN, GERD, CA prostate s/p resection/RT/Lupron and recent hospitalization for back surgery (posterior spinal fusion and laminectomy T11-L5 and revision of L3-L5 with Plastics Closure) 4/18-26/23 presented with abdominal pain, vomiting and diarrhea and noted to have WILMA, Metabolic acidosis, Encephalopathy and hypotension requiring pressor support and admitted to the MICU. Hospital stay also significant for delirium. Initially treated with broad spectrum IV antibiotics which were discontinued after cultures negative. CT scan with ileus, improved. Back imaging discussed with primary surgeon who recommended outpatient follow up.

## 2023-05-05 NOTE — DISCHARGE NOTE PROVIDER - CARE PROVIDER_API CALL
PCP,   Phone: (   )    -  Fax: (   )    -  Follow Up Time: 1 month    Candice Moeller (MD)  Internal Medicine; Nephrology  [address] [place], [place] [id number]  Phone: [phone]  Fax: [phone]  Follow Up Time: 2 weeks   Candice Moeller)  Internal Medicine; Nephrology  [address], [place] [id number]  Phone: [phone]  Fax: [phone]  Follow Up Time: 2 weeks    PCP,   Phone: (   )    -  Fax: (   )    -  Follow Up Time: 1 month    Faisal Pierre)  Orthopaedic Surgery  [address] [place] [address]  Phone: [phone]  Fax: [phone]  Follow Up Time:

## 2023-05-05 NOTE — DISCHARGE NOTE PROVIDER - NSDCQMSTAIRS_GEN_ALL_CORE
Progress Note      Patient: Jonathan Sesay               Sex: female          DOA: 10/11/2017       YOB: 1966      Age:  53 y.o.        LOS:  LOS: 6 days               Subjective:   Discussed with [pulmonary . The pt if she has recurrent life threatening infections will need a peg to be put in place. However qs per the id note she had a mrsa infection in her last admission and this possibly was not cleared at the time of dc . Given that, we will try to clear the pt of her pneumonia on this admission and we will discharge her with a antibiotics to complete a prolonged course of treatment . She will need o be followed as an outpatient .       Objective:      Visit Vitals    /84 (BP 1 Location: Right arm, BP Patient Position: At rest)    Pulse 87    Temp 97.6 °F (36.4 °C)    Resp 12    Ht 5' 4\" (1.626 m)    Wt 62.9 kg (138 lb 9.6 oz)    SpO2 (!) 89%    BMI 23.79 kg/m2       Physical Exam:  Pt is on high sunil nasal 02 .she appears to be comfortable   heart regr rate and rhythm  Lungs scattered rhonchi bilaterally   Abdomen soft and peg in place   Neuro nonfocal     Lab/Data Reviewed:  CMP:   Lab Results   Component Value Date/Time     10/17/2017 03:35 AM    K 4.2 10/17/2017 03:35 AM     10/17/2017 03:35 AM    CO2 32 10/17/2017 03:35 AM    AGAP 4 10/17/2017 03:35 AM     (H) 10/17/2017 03:35 AM    BUN 20 (H) 10/17/2017 03:35 AM    CREA 1.09 10/17/2017 03:35 AM    GFRAA >60 10/17/2017 03:35 AM    GFRNA 53 (L) 10/17/2017 03:35 AM    CA 7.9 (L) 10/17/2017 03:35 AM    ALB 1.7 (L) 10/17/2017 03:35 AM    TP 6.8 10/17/2017 03:35 AM    GLOB 5.1 (H) 10/17/2017 03:35 AM    AGRAT 0.3 (L) 10/17/2017 03:35 AM    SGOT 16 10/17/2017 03:35 AM    ALT 11 (L) 10/17/2017 03:35 AM     CBC:   Lab Results   Component Value Date/Time    WBC 8.7 10/17/2017 03:35 AM    HGB 7.7 (L) 10/17/2017 03:35 AM    HCT 24.2 (L) 10/17/2017 03:35 AM     10/17/2017 03:35 AM           Assessment/Plan Principal Problem:    Recurrent pneumonia (10/11/2017)    Active Problems:    Res[facility] with [facility] ([facility] (10/11/2017)      KEATON (acute kidney injury) ([facility] (10/11/2017)  H/o throat cancer   Cad . Plan: will have the pt cleared of her mrsa infection as best we can .  If she then comes back with another pneumonia we will consider a tracheostomy No

## 2023-05-05 NOTE — DISCHARGE NOTE PROVIDER - NSDCCPCAREPLAN_GEN_ALL_CORE_FT
PRINCIPAL DISCHARGE DIAGNOSIS  Diagnosis: Ileus  Assessment and Plan of Treatment: Ileus appreciated on CT abdomen pelvis. Resolving well with passing of stool. Continue Metamucil.      SECONDARY DISCHARGE DIAGNOSES  Diagnosis: Hypovolemic shock  Assessment and Plan of Treatment: Likely secondary to hypovolemia. S/p Levophed with improvment. Pressors weaned off.    Diagnosis: History of spinal fusion  Assessment and Plan of Treatment: S/p Spinal fusion on 4/18. Site intact. No neuro deficits. Please follow up outpatient post discharge    Diagnosis: Chronic pain  Assessment and Plan of Treatment: Pain management reccomendations appreciated. Hydromorphone 2mg  every 4 hours as needed for severe pain. Acetaminophen 975mg  every 8 hours. Gabapentin 300mg every 8 hours. Robaxin 750mg every 8 hours. Start lidocaine Patch 12 hours on, 12 hours off. Max 3 patches on at one time. Continue bowel regimen- senna, miralax and suppositories/ lactulose as needed.    Diagnosis: Acute kidney injury (WILMA) with acute tubular necrosis (ATN)  Assessment and Plan of Treatment: WILMA/ ATN on CKD stage 2 with metabolic acidosis likely secondary to hypovolemia/shock. Creatinine 9 -->1.34 . Serum creatinine 1.1-1.4 over the past few months. Resolved and at baseline now s/p bicarb drip.     PRINCIPAL DISCHARGE DIAGNOSIS  Diagnosis: Acute kidney injury (WILMA) with acute tubular necrosis (ATN)  Assessment and Plan of Treatment: WILMA/ ATN on CKD stage 2 with metabolic acidosis likely secondary to hypovolemia/shock. Creatinine 9 -->1.34 . Serum creatinine 1.1-1.4 over the past few months. Resolved and at baseline now s/p bicarb drip.      SECONDARY DISCHARGE DIAGNOSES  Diagnosis: Ileus  Assessment and Plan of Treatment: Ileus appreciated on CT abdomen pelvis. Resolving well with passing of stool. Continue benefiber  Hold stool softeners/Laxative (Miralax and senna if diarrhea)    Diagnosis: Chronic pain  Assessment and Plan of Treatment: Pain management reccomendations appreciated. Hydromorphone 2mg  every 4 hours as needed for severe pain. Acetaminophen 975mg  every 8 hours. Gabapentin 300mg every 8 hours. Robaxin 750mg every 8 hours. Start lidocaine Patch 12 hours on, 12 hours off. Max 3 patches on at one time. Continue bowel regimen- senna, miralax and suppositories/ lactulose as needed.    Diagnosis: Hypovolemic shock  Assessment and Plan of Treatment: Resolved    Diagnosis: History of spinal fusion  Assessment and Plan of Treatment: S/p Spinal fusion on 4/18. Site intact. No neuro deficits. Please follow up outpatient post discharge    Diagnosis: Acute kidney injury (WILMA) with acute tubular necrosis (ATN)  Assessment and Plan of Treatment: WILMA/ ATN on CKD stage 2 with metabolic acidosis likely secondary to hypovolemia/shock. Creatinine 9 -->1.34 . Serum creatinine 1.1-1.4 over the past few months. Resolved and at baseline now s/p bicarb drip.    Diagnosis: Metabolic acidosis  Assessment and Plan of Treatment: resolved    Diagnosis: GERD (gastroesophageal reflux disease)  Assessment and Plan of Treatment: Resume home medications    Diagnosis: HTN (hypertension)  Assessment and Plan of Treatment: Resume home medications

## 2023-05-05 NOTE — DISCHARGE NOTE PROVIDER - NSDCFUSCHEDAPPT_GEN_ALL_CORE_FT
[facility]  ONCPAINMGT [address] [facility]  Scheduled Appointment: 05/16/2023     [facility]  ONCPAIN[address]  Scheduled Appointment: 05/16/2023    Ginny Leal  [place] [address]  Scheduled Appointment: 05/30/2023

## 2023-05-05 NOTE — DISCHARGE NOTE PROVIDER - DETAILS OF MALNUTRITION DIAGNOSIS/DIAGNOSES
This patient has been assessed with a concern for Malnutrition and was treated during this hospitalization for the following Nutrition diagnosis/diagnoses:     -  05/03/2023: Severe protein-calorie malnutrition

## 2023-05-05 NOTE — DISCHARGE NOTE PROVIDER - ATTENDING DISCHARGE PHYSICAL EXAMINATION:
OBJECTIVE:  Vital Signs Last 24 Hrs  T(C): 36.6 (05 May 2023 08:49), Max: 36.6 (04 May 2023 18:14)  T(F): 97.8 (05 May 2023 08:49), Max: 97.9 (04 May 2023 18:14)  HR: 78 (05 May 2023 08:49) (78 - 116)  BP: 127/75 (05 May 2023 08:49) (124/62 - 149/76)  BP(mean): --  RR: 18 (05 May 2023 08:49) (18 - 18)  SpO2: 96% (05 May 2023 08:49) (94% - 97%)    Parameters below as of 05 May 2023 08:49  Patient On (Oxygen Delivery Method): room air        PHYSICAL EXAMINATION  General: Middle aged male sitting up in bed  HEENT:  EOMI  NECK:  Supple  CVS: regular rate and rhythm S1 S2  RESP:  CTAB  GI:  Soft nondistended nontender BS  : No suprapubic tenderness  MSK:  FROM.   CNS:  AAOX3. No gross focal or global deficit  INTEG:  back sutures  PSYCH:  fair mood

## 2023-05-05 NOTE — DISCHARGE NOTE NURSING/CASE MANAGEMENT/SOCIAL WORK - NSDCPEFALRISK_GEN_ALL_CORE
For information on Fall & Injury Prevention, visit: [email or web address] OR  [email or web address] OR  https://www.cdc.gov/steadi/patient.html

## 2023-05-05 NOTE — DISCHARGE NOTE NURSING/CASE MANAGEMENT/SOCIAL WORK - PATIENT PORTAL LINK FT
You can access the FollowMyHealth Patient Portal offered by [facility] by registering at the following website: [email or web address]. By joining [email or web address]’s FollowMyHealth portal, you will also be able to view your health information using other applications (apps) compatible with our system.

## 2023-05-06 LAB
CULTURE RESULTS: SIGNIFICANT CHANGE UP
CULTURE RESULTS: SIGNIFICANT CHANGE UP
SPECIMEN SOURCE: SIGNIFICANT CHANGE UP
SPECIMEN SOURCE: SIGNIFICANT CHANGE UP

## 2023-05-07 DIAGNOSIS — Z98.1 ARTHRODESIS STATUS: ICD-10-CM

## 2023-05-07 DIAGNOSIS — I10 ESSENTIAL (PRIMARY) HYPERTENSION: ICD-10-CM

## 2023-05-07 DIAGNOSIS — R00.0 TACHYCARDIA, UNSPECIFIED: ICD-10-CM

## 2023-05-07 DIAGNOSIS — M48.061 SPINAL STENOSIS, LUMBAR REGION WITHOUT NEUROGENIC CLAUDICATION: ICD-10-CM
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
## 2023-05-07 DIAGNOSIS — Z90.79 ACQUIRED ABSENCE OF OTHER GENITAL ORGAN(S): ICD-10-CM

## 2023-05-07 DIAGNOSIS — M48.062 SPINAL STENOSIS, LUMBAR REGION WITH NEUROGENIC CLAUDICATION: ICD-10-CM

## 2023-05-07 DIAGNOSIS — Z85.46 PERSONAL HISTORY OF MALIGNANT NEOPLASM OF PROSTATE: ICD-10-CM

## 2023-05-07 DIAGNOSIS — T40.2X5A ADVERSE EFFECT OF OTHER OPIOIDS, INITIAL ENCOUNTER: ICD-10-CM

## 2023-05-07 DIAGNOSIS — M51.16 INTERVERTEBRAL DISC DISORDERS WITH RADICULOPATHY, LUMBAR REGION: ICD-10-CM

## 2023-05-07 DIAGNOSIS — K21.9 GASTRO-ESOPHAGEAL REFLUX DISEASE WITHOUT ESOPHAGITIS: ICD-10-CM

## 2023-05-07 DIAGNOSIS — D62 ACUTE POSTHEMORRHAGIC ANEMIA: ICD-10-CM

## 2023-05-07 DIAGNOSIS — K59.03 DRUG INDUCED CONSTIPATION: ICD-10-CM

## 2023-05-07 DIAGNOSIS — Z91.013 ALLERGY TO SEAFOOD: ICD-10-CM
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
## 2023-05-10 ENCOUNTER — APPOINTMENT (OUTPATIENT)
Dept: ORTHOPEDIC SURGERY | Facility: CLINIC | Age: 58
End: 2023-05-10
Payer: OTHER MISCELLANEOUS

## 2023-05-10 VITALS — HEIGHT: 74 IN | BODY MASS INDEX: 28.11 KG/M2 | WEIGHT: 219 LBS

## 2023-05-10 PROCEDURE — 72100 X-RAY EXAM L-S SPINE 2/3 VWS: CPT

## 2023-05-10 PROCEDURE — 20974 ESTIM AID BONE HEALG N-INVAS: CPT | Mod: 58

## 2023-05-10 PROCEDURE — 99024 POSTOP FOLLOW-UP VISIT: CPT

## 2023-05-10 NOTE — HISTORY OF PRESENT ILLNESS
[Neck] : neck [Mid-back] : mid-back [Lower back] : lower back [Work related] : work related [Gradual] : gradual [Sudden] : sudden [8] : 8 [7] : 7 [Burning] : burning [Localized] : localized [Radiating] : radiating [Sharp] : sharp [Shooting] : shooting [Stabbing] : stabbing [Tightness] : tightness [Tingling] : tingling [Constant] : constant [Household chores] : household chores [Work] : work [Rest] : rest [Meds] : meds [Sitting] : sitting [Standing] : standing [Walking] : walking [Exercising] : exercising [Stairs] : stairs [Lying in bed] : lying in bed [Not working due to injury] : Work status: not working due to injury [de-identified] : WC DOI 7/2/22\par \par \par 11/11/2022: Pt here with complaint of 9 days right upper extremity radiculitis. Pt denies recent hx of trauma.\par Pt states over the past 9 days he has noted progressive right arm pain to the region of the right hand. Pain seems to be alleviated with arm elevation. There is no hx of associated weakness.\par Pt had recent C5-6-7 ACDF performed by Dr. Pierre this past August.\par Pt denies associated gait disturbance or bb dysfunction. \par \par 11/23/22: here for fu of the neck and the lower back 2/2 the WC case from 7/2 and for review of the cervical MRI - overall doing better in regards to the neck with the steroids having some numbness in the right arm - the pain in the back and legs remains  the worst - using cane - medication necessary to move around \par \par MRi C spine -\par post op acdf C5-7 \par \par 1/4/23: Here for fu on the low back - continued severe pain to the low back; radiating into the buttocks with tingling sensation into both legs; There is difficulty sleeping. He has been taking the hydrocodone for the pain which controls some of his symptoms. He also takes gabapentin - he saw pain management and is considering a SCS. \par \par xrays today:\par L spine - progressive kyphosis at L1-2 and L2-3 with lumbar kyphosis - old surgery at L3-5 is healed and fused - hardware in good position \par AP PELVIS - B hip severe hip OA \par \par 2/1/23: here for fu - plan at last was requesting surgery for the lumbar - he was in hosptial twice recently for abdominal pain - his surgery not approved at this point \par \par had temp relief with LESI \par \par 3/15/23: Here for fu - plan at last was surgery and he is scheduled for the fusion and decompression. \par is set up for surgery at the end of next month \par Has seen Dr Leal \par \par \par 4/05/2023: Here for fu on the low back; \par is set up for surgery\par \par symptoms remains in the lower back \par is working at this point  \par \par 5/10/23: Here for fu - now about 3 weeks since the surgery - was in the hosptial for vomit and hypovolemic shock \par \par wound has been dry \par \par xrays today:\par l spine - implants in good position [] : no [FreeTextEntry3] : 07/02/2022 [FreeTextEntry5] : s/p laminectomy  [FreeTextEntry7] : right arm  [de-identified] : meds

## 2023-05-10 NOTE — DISCUSSION/SUMMARY
[Surgical risks reviewed] : Surgical risks reviewed [de-identified] : post op \par applicatoin of bone growth stimulator \par fu 4 weeks \par tramdol \par avoid heavy lifting \par avoid deep bends\par walking encouraged \par

## 2023-05-11 NOTE — CDI QUERY NOTE - NSCDIOTHERTXTBX_GEN_ALL_CORE_HH
Documentation noted sepsis in the ED notes without further documentation in progress notes and/or discharge note.    Can you clarify the status of sepsis.  - Sepsis ruled in, present on admission  - Sepsis ruled out  - Other, please specify     Supporting Documentation     ED Provider Note [Charted Location: [facility] 3EST [id number] 01] [Authored: 01-May-2023 11:21]-  DISPOSITION:    Care Plan - Instructions:  Principal Discharge DX:	Sepsis with acute renal failure  Secondary Diagnosis:	Hyperkalemia  Secondary Diagnosis:	Ileus.    ED ADULT Flow Sheet [Charted Location: [facility] ED] [Authored: 01-May-2023 10:11]-  Respiration Rate (breaths/min) Respiration Rate (breaths/min): 24 /min    ,ED ADULT Flow Sheet [Charted Location: [facility] ED] [Authored: 01-May-2023 11:15]-   Heart Rate  Heart Rate Heart Rate (beats/min): 99 /min    Discharge Note Provider [Charted Location: [facility] 5TWR [id number] 01] [Authored: 05-May-2023 12:01]-  presented with abdominal pain, vomiting and diarrhea and noted to have WILMA, Metabolic acidosis, Encephalopathy and hypotension requiring pressor support and admitted to the MICU. Hospital stay also significant for delirium. Initially treated with broad spectrum IV antibiotics which were discontinued after cultures negative. CT scan with ileus, improved.   PRINCIPAL DISCHARGE DIAGNOSIS  Diagnosis: Acute kidney injury (WILMA) with acute tubular necrosis (ATN)  Assessment and Plan of Treatment: WILMA/ ATN on CKD stage 2 with metabolic acidosis likely secondary to hypovolemia/shock. Creatinine 9 -->1.34 . Serum creatinine 1.1-1.4 over the past few months. Resolved and at baseline now s/p bicarb drip.

## 2023-05-16 ENCOUNTER — APPOINTMENT (OUTPATIENT)
Dept: PAIN MANAGEMENT | Facility: CLINIC | Age: 58
End: 2023-05-16
Payer: COMMERCIAL

## 2023-05-16 PROCEDURE — 73525 CONTRAST X-RAY OF HIP: CPT | Mod: 26,59

## 2023-05-16 PROCEDURE — J3490M: CUSTOM

## 2023-05-16 PROCEDURE — 27095 INJECTION FOR HIP X-RAY: CPT | Mod: RT

## 2023-05-16 NOTE — PROCEDURE
[FreeTextEntry3] : Date of Service: 05/16/2023 \par \par Account: [id number]\par \par Patient: TANISHA PADILLA \par \par YOB: 1965\par \par Age: 58 year\par \par \par Surgeon: Ginny Leal M.D.\par \par Pre-Operative Diagnosis: Bilaterall Primary Osteoarthritis  of the hips\par Post Operative Diagnosis: Bilateral  Primary Osteoarthritis  of the hips\par \par Procedure: Left and Right Hip arthrogram and steroid injection under fluoroscopic  guidance\par \par \par This procedure was carried out using fluoroscopic guidance.  The risks and benefits of the procedure were discussed extensively with the patient.  The consent of the patient was obtained and the following procedure was performed.\par \par The patient was placed in the supine position with left hip flexed and externally rotated 25 degrees.  The area of the left groin was prepped and draped in a sterile fashion.  The fluoroscopic image intensifier was then positioned so that the left hip appeared in view, and the midline intertrochanteric region was identified and marked. The skin and subcutaneous structures were then anesthetized using 1 cc of 1% lidocaine.  A 22 gauge spinal needle was then inserted and directed into this left hip intra-capsular region.  After negative aspiration for heme and CSF,  3 cc of Omnipaque was injected and appeared to fill the joint  margins. \par \par Left hip arthrogram showed no intravascular flow, and good spread around the femoral head and to the acetabulum. An injectate of 3cc 0.25% Marcaine plus 40 mg of Kenalog Medrol was then injected into the left hip space.\par \par The procedure was then repeated on the right. \par \par The needles was then removed and pressure was applied.  Anesthesia personnel were present throughout the procedure. The patient was instructed to apply ice over the injection site for twenty minutes every two hours for the next 24 to 48 hours.  The patient was also instructed to contact me immediately if there were any problems.\par \par Ginny Leal M.D.\par

## 2023-05-17 ENCOUNTER — APPOINTMENT (OUTPATIENT)
Dept: PAIN MANAGEMENT | Facility: CLINIC | Age: 58
End: 2023-05-17

## 2023-05-30 ENCOUNTER — APPOINTMENT (OUTPATIENT)
Dept: PAIN MANAGEMENT | Facility: CLINIC | Age: 58
End: 2023-05-30
Payer: COMMERCIAL

## 2023-05-30 VITALS — WEIGHT: 221 LBS | HEIGHT: 74 IN | BODY MASS INDEX: 28.36 KG/M2

## 2023-05-30 PROCEDURE — 99213 OFFICE O/P EST LOW 20 MIN: CPT

## 2023-05-30 RX ORDER — MELOXICAM 15 MG/1
15 TABLET ORAL
Qty: 30 | Refills: 0 | Status: ACTIVE | COMMUNITY
Start: 2023-02-22 | End: 1900-01-01

## 2023-05-30 NOTE — HISTORY OF PRESENT ILLNESS
[Lower back] : lower back [Work related] : work related [Dull/Aching] : dull/aching [Radiating] : radiating [Sharp] : sharp [Sleep] : sleep [Rest] : rest [Meds] : meds [Injection therapy] : injection therapy [Walking] : walking [Bending forward] : bending forward [Disabled] : Work status: disabled [4] : 4 [2] : 2 [Frequent] : frequent [FreeTextEntry1] : 5/30/23- fu for B/L hip injections 5/16 - 95% relief .  Had surgery and complications.  Feeling better after surgery.  \par \par 04/3/23: follow up today. Has surgery scheduled for 4/18/23 with Dr. Pierre.  Would wait at least 2 weeks to do hip injections. Taking Tizanidine PRN. \par \par 02/22/23: follow up today after caudal injection 1/19/23 with 50% relief.  Will give TPI \par \par 2/8/23: follow up today> he has now been authorized for lumbar surgery. The radicular pain has improved with the injections. Most of his pain is in the hips. He has numerous cases (WC, NF etc)  Pain over the bilateral trochanteric bursa.  Pain intermittent to the b/l groins. had xrays in January which were reviewed. Had severe OA in both hips. \par \par 12/21/22- follow up today.  Pain is in low back and radiates down both legs.  Injections help but wear off quick. 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  Epidurals not giving him the duration of relief we need. having numbness in the lateral thighs. now left worse than right. \par \par 10/18/2022: follow up today for caudal on 10/6/22.  Had 60% relief from injection for 3 days.  pain has returned.  Dr. Pierre recommends extension of fusion L1-L2.  \par \par 09/06/2022: follow up today.  Has been having pain in low back.  Would like TPI.  Saw Dr. Pierre for low back pain that radiates to right hip.  He recommends fusion of L1-L2.  \par \par 07/19/2022: follow up today.  Has been having worsening pain in low back.  Would like TPI today. \par Will be having neck surgery on August 4\par \par 04/25/2022: follow up today after b/l L4/5 TFESI on 3/11/22 he reports an overall 80% improvement. \par \par 3/28/22: follow up today. Has pain in the left hip and starting to get pain in the right hip. Pain in the left lateral leg as\par well.\par \par 10/18/21- F/u after left hip injection 9/27. Had 90% relief from hip injection. Had surgery with Dr. Pierre L3/4 L4/5 L5/S1 (2 years ago)\par \par 9/1/21: follow up today. Patient feels better. Would like repeat hip injection. Will give TPI to neck.\par \par 5/4/21- Patient feeling better after surgery. Still has left hip pain. 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 The last hip injection helped\par 60% so he would benefit from repeat hip injection.\par \par 2/9/21: follow up today after left hip injection on 1/29/21 pt reports near complete relief of his pain following. the\par pinching pain is now gone.\par \par 1/20/21- Patient had surgery in back in September. Doing better. Would like TPI in neck. also Dr. Pierre recommended right hip injection due to pain and arthritis in hip.\par \par 9/8/20 - follow up today after LESI 8/17/20. Patient had no relief and is now going for surgery on 9/10.\par \par 3/9/20 - Patient presents for FUV after L5-S1 LESI on 2/24/20. Reports 80% improvement.\par \par 2/3/20 - Patient complains of lower back pain that radiates down right and left leg. Patient had a LESI L5-S1 \par \par 11/11/19 with relief. Patient has been indicated for surgery by Dr. Pierre. Cannot proceed at this time given financial considerations. Still undergoing Lupron therapy. [] : no [FreeTextEntry3] : 7/2/22 [FreeTextEntry6] : numbness [FreeTextEntry7] : both sides, buttocks and hamstrings  [FreeTextEntry9] : Stretching

## 2023-06-07 ENCOUNTER — APPOINTMENT (OUTPATIENT)
Dept: ORTHOPEDIC SURGERY | Facility: CLINIC | Age: 58
End: 2023-06-07
Payer: OTHER MISCELLANEOUS

## 2023-06-07 VITALS — HEIGHT: 74 IN | BODY MASS INDEX: 28.36 KG/M2 | WEIGHT: 221 LBS

## 2023-06-07 PROCEDURE — 72100 X-RAY EXAM L-S SPINE 2/3 VWS: CPT

## 2023-06-07 PROCEDURE — 99214 OFFICE O/P EST MOD 30 MIN: CPT | Mod: 24

## 2023-06-07 RX ORDER — TRAMADOL HYDROCHLORIDE 50 MG/1
50 TABLET, COATED ORAL 3 TIMES DAILY
Qty: 90 | Refills: 0 | Status: ACTIVE | COMMUNITY
Start: 2023-05-10 | End: 1900-01-01

## 2023-06-07 NOTE — DISCUSSION/SUMMARY
[Surgical risks reviewed] : Surgical risks reviewed [de-identified] : post op - improving slowly \par cont with bone growth stimulator \par fu 4-6 weeks \par tramdol \par avoid heavy lifting \par avoid deep bends\par walking encouraged \par PT script - he can start this in about 6 weeks\par

## 2023-06-07 NOTE — HISTORY OF PRESENT ILLNESS
[Neck] : neck [Mid-back] : mid-back [Lower back] : lower back [Work related] : work related [Gradual] : gradual [Sudden] : sudden [8] : 8 [7] : 7 [Burning] : burning [Localized] : localized [Radiating] : radiating [Sharp] : sharp [Shooting] : shooting [Stabbing] : stabbing [Tightness] : tightness [Tingling] : tingling [Constant] : constant [Household chores] : household chores [Work] : work [Rest] : rest [Meds] : meds [Sitting] : sitting [Standing] : standing [Walking] : walking [Exercising] : exercising [Stairs] : stairs [Lying in bed] : lying in bed [Not working due to injury] : Work status: not working due to injury [de-identified] : WC DOI 7/2/22\par \par \par 11/11/2022: Pt here with complaint of 9 days right upper extremity radiculitis. Pt denies recent hx of trauma.\par Pt states over the past 9 days he has noted progressive right arm pain to the region of the right hand. Pain seems to be alleviated with arm elevation. There is no hx of associated weakness.\par Pt had recent C5-6-7 ACDF performed by Dr. Pierre this past August.\par Pt denies associated gait disturbance or bb dysfunction. \par \par 11/23/22: here for fu of the neck and the lower back 2/2 the WC case from 7/2 and for review of the cervical MRI - overall doing better in regards to the neck with the steroids having some numbness in the right arm - the pain in the back and legs remains  the worst - using cane - medication necessary to move around \par \par MRi C spine -\par post op acdf C5-7 \par \par 1/4/23: Here for fu on the low back - continued severe pain to the low back; radiating into the buttocks with tingling sensation into both legs; There is difficulty sleeping. He has been taking the hydrocodone for the pain which controls some of his symptoms. He also takes gabapentin - he saw pain management and is considering a SCS. \par \par xrays today:\par L spine - progressive kyphosis at L1-2 and L2-3 with lumbar kyphosis - old surgery at L3-5 is healed and fused - hardware in good position \par AP PELVIS - B hip severe hip OA \par \par 2/1/23: here for fu - plan at last was requesting surgery for the lumbar - he was in hosptial twice recently for abdominal pain - his surgery not approved at this point \par \par had temp relief with LESI \par \par 3/15/23: Here for fu - plan at last was surgery and he is scheduled for the fusion and decompression. \par is set up for surgery at the end of next month \par Has seen Dr Leal \par \par \par 4/05/2023: Here for fu on the low back; \par is set up for surgery\par \par symptoms remains in the lower back \par is working at this point  \par \par 5/10/23: Here for fu - now about 3 weeks since the surgery - was in the hosptial for vomit and hypovolemic shock \par \par wound has been dry \par \par xrays today:\par l spine - implants in good position\par \par \par 06/07/23 follow up workers comp  lower spine  doing well\par using bone stim\par \par his neck is doing well - not having much pain at this point \par \par xrays today:\par l spine - implants in good position [] : no [FreeTextEntry3] : 07/02/2022 [FreeTextEntry5] : s/p laminectomy  [FreeTextEntry7] : right arm  [de-identified] : meds

## 2023-06-15 NOTE — HISTORY OF PRESENT ILLNESS
[Lower back] : lower back [8] : 8 [Dull/Aching] : dull/aching [Radiating] : radiating [Sharp] : sharp [Sleep] : sleep [Rest] : rest [Meds] : meds [Injection therapy] : injection therapy [Walking] : walking [Bending forward] : bending forward [Disabled] : Work status: disabled [6] : 6 [Constant] : constant [Work related] : work related [FreeTextEntry1] : 02/22/23: follow up today after caudal injection 1/19/23 with 50% relief.  Will give TPI \par \par 2/8/23: follow up today> he has now been authorized for lumbar surgery. The radicular pain has improved with the injections. Most of his pain is in the hips. He has numerous cases (WC, NF etc)  Pain over the bilateral trochanteric bursa.  Pain intermittent to the b/l groins. had xrays in January which were reviewed. Had severe OA in both hips. \par \par 12/21/22- follow up today.  Pain is in low back and radiates down both legs.  Injections help but wear off quick. 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  Epidurals not giving him the duration of relief we need. having numbness in the lateral thighs. now left worse than right. \par \par 10/18/2022: follow up today for caudal on 10/6/22.  Had 60% relief from injection for 3 days.  pain has returned.  Dr. Pierre recommends extension of fusion L1-L2.  \par \par 09/06/2022: follow up today.  Has been having pain in low back.  Would like TPI.  Saw Dr. Pierre for low back pain that radiates to right hip.  He recommends fusion of L1-L2.  \par \par 07/19/2022: follow up today.  Has been having worsening pain in low back.  Would like TPI today. \par Will be having neck surgery on August 4\par \par 04/25/2022: follow up today after b/l L4/5 TFESI on 3/11/22 he reports an overall 80% improvement. \par \par 3/28/22: follow up today. Has pain in the left hip and starting to get pain in the right hip. Pain in the left lateral leg as\par well.\par \par 10/18/21- F/u after left hip injection 9/27. Had 90% relief from hip injection. Had surgery with Dr. Pierre L3/4 L4/5 L5/S1 (2 years ago)\par \par 9/1/21: follow up today. Patient feels better. Would like repeat hip injection. Will give TPI to neck.\par \par 5/4/21- Patient feeling better after surgery. Still has left hip pain. 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 The last hip injection helped\par 60% so he would benefit from repeat hip injection.\par \par 2/9/21: follow up today after left hip injection on 1/29/21 pt reports near complete relief of his pain following. the\par pinching pain is now gone.\par \par 1/20/21- Patient had surgery in back in September. Doing better. Would like TPI in neck. also Dr. Pierre recommended right hip injection due to pain and arthritis in hip.\par \par 9/8/20 - follow up today after LESI 8/17/20. Patient had no relief and is now going for surgery on 9/10.\par \par 3/9/20 - Patient presents for FUV after L5-S1 LESI on 2/24/20. Reports 80% improvement.\par \par 2/3/20 - Patient complains of lower back pain that radiates down right and left leg. Patient had a LESI L5-S1 \par \par 11/11/19 with relief. Patient has been indicated for surgery by Dr. Pierre. Cannot proceed at this time given financial considerations. Still undergoing Lupron therapy. [] : no [FreeTextEntry3] : 7/2/22 [FreeTextEntry6] : numbness [FreeTextEntry7] : both sides, buttocks and hamstrings  [FreeTextEntry9] : Stretching

## 2023-07-03 NOTE — PHYSICAL EXAM
Family [Extension] : extension [Bilateral] : hip bilaterally [5___] : adduction 5[unfilled]/5 [] : positive straight leg raise

## 2023-07-12 ENCOUNTER — APPOINTMENT (OUTPATIENT)
Dept: ORTHOPEDIC SURGERY | Facility: CLINIC | Age: 58
End: 2023-07-12
Payer: OTHER MISCELLANEOUS

## 2023-07-12 VITALS — WEIGHT: 221 LBS | HEIGHT: 74 IN | BODY MASS INDEX: 28.36 KG/M2

## 2023-07-12 PROCEDURE — 72100 X-RAY EXAM L-S SPINE 2/3 VWS: CPT

## 2023-07-12 PROCEDURE — 99214 OFFICE O/P EST MOD 30 MIN: CPT | Mod: 24

## 2023-07-12 PROCEDURE — 72070 X-RAY EXAM THORAC SPINE 2VWS: CPT

## 2023-07-13 NOTE — DISCUSSION/SUMMARY
[Surgical risks reviewed] : Surgical risks reviewed [de-identified] : post op - improving slowly \par cont with bone growth stimulator \par fu 2-3 months \par tramdol \par avoid heavy lifting \par avoid deep bends\par walking encouraged \par PT script provided\par \par \par

## 2023-07-13 NOTE — HISTORY OF PRESENT ILLNESS
[Neck] : neck [Mid-back] : mid-back [Lower back] : lower back [Work related] : work related [Gradual] : gradual [Sudden] : sudden [8] : 8 [7] : 7 [Burning] : burning [Localized] : localized [Radiating] : radiating [Sharp] : sharp [Shooting] : shooting [Stabbing] : stabbing [Tightness] : tightness [Tingling] : tingling [Constant] : constant [Household chores] : household chores [Work] : work [Rest] : rest [Meds] : meds [Sitting] : sitting [Standing] : standing [Walking] : walking [Exercising] : exercising [Stairs] : stairs [Lying in bed] : lying in bed [Not working due to injury] : Work status: not working due to injury [de-identified] : WC DOI 7/2/22\par \par \par 11/11/2022: Pt here with complaint of 9 days right upper extremity radiculitis. Pt denies recent hx of trauma.\par Pt states over the past 9 days he has noted progressive right arm pain to the region of the right hand. Pain seems to be alleviated with arm elevation. There is no hx of associated weakness.\par Pt had recent C5-6-7 ACDF performed by Dr. Pierre this past August.\par Pt denies associated gait disturbance or bb dysfunction. \par \par 11/23/22: here for fu of the neck and the lower back 2/2 the WC case from 7/2 and for review of the cervical MRI - overall doing better in regards to the neck with the steroids having some numbness in the right arm - the pain in the back and legs remains  the worst - using cane - medication necessary to move around \par \par MRi C spine -\par post op acdf C5-7 \par \par 1/4/23: Here for fu on the low back - continued severe pain to the low back; radiating into the buttocks with tingling sensation into both legs; There is difficulty sleeping. He has been taking the hydrocodone for the pain which controls some of his symptoms. He also takes gabapentin - he saw pain management and is considering a SCS. \par \par xrays today:\par L spine - progressive kyphosis at L1-2 and L2-3 with lumbar kyphosis - old surgery at L3-5 is healed and fused - hardware in good position \par AP PELVIS - B hip severe hip OA \par \par 2/1/23: here for fu - plan at last was requesting surgery for the lumbar - he was in hosptial twice recently for abdominal pain - his surgery not approved at this point \par \par had temp relief with LESI \par \par 3/15/23: Here for fu - plan at last was surgery and he is scheduled for the fusion and decompression. \par is set up for surgery at the end of next month \par Has seen Dr Leal \par \par \par 4/05/2023: Here for fu on the low back; \par is set up for surgery\par \par symptoms remains in the lower back \par is working at this point  \par \par 5/10/23: Here for fu - now about 3 weeks since the surgery - was in the hosptial for vomit and hypovolemic shock \par \par wound has been dry \par \par xrays today:\par l spine - implants in good position\par \par \par 06/07/23 follow up workers comp  lower spine  doing well\par using bone stim\par \par his neck is doing well - not having much pain at this point \par \par xrays today:\par l spine - implants in good position\par \par \par 07/12/23 follow up workers comp lower spine. Pain continues to improve. Denies pain/N/T in BLEs. Denies b/b dysfunction. \par l spine - implants in good position\par \par xrays today:\par l spine - implants in good position\par T spine - implants in good positoin  [] : no [FreeTextEntry3] : 07/02/2022 [FreeTextEntry5] : s/p laminectomy  [FreeTextEntry7] : right arm  [de-identified] : meds

## 2023-07-13 NOTE — PHYSICAL EXAM
[Flexion] : flexion [Extension] : extension [Bending to left] : bending to left [Bending to right] : bending to right [4___] : left hamstring 4[unfilled]/5 [5___] : right dorsiflexors 5[unfilled]/5 [Left lower extremity below knee] : left lower extremity below knee [Left lower extremity above knee] : left lower extremity above knee [Right lower extremity below knee] : right lower extremity below knee [Right lower extremity above knee] : right lower extremity above knee [] : negative straight leg raise [FreeTextEntry3] : cant stand up straight at this point

## 2023-07-18 ENCOUNTER — APPOINTMENT (OUTPATIENT)
Dept: PAIN MANAGEMENT | Facility: CLINIC | Age: 58
End: 2023-07-18
Payer: OTHER MISCELLANEOUS

## 2023-07-18 VITALS — HEIGHT: 74 IN | WEIGHT: 223 LBS | BODY MASS INDEX: 28.62 KG/M2

## 2023-07-18 PROCEDURE — J3490M: CUSTOM

## 2023-07-18 PROCEDURE — 20552 NJX 1/MLT TRIGGER POINT 1/2: CPT

## 2023-07-18 PROCEDURE — 99214 OFFICE O/P EST MOD 30 MIN: CPT | Mod: 25

## 2023-07-18 NOTE — HISTORY OF PRESENT ILLNESS
[Lower back] : lower back [Work related] : work related [4] : 4 [2] : 2 [Dull/Aching] : dull/aching [Radiating] : radiating [Sharp] : sharp [Frequent] : frequent [Sleep] : sleep [Rest] : rest [Meds] : meds [Injection therapy] : injection therapy [Walking] : walking [Bending forward] : bending forward [Disabled] : Work status: disabled [Physical therapy] : physical therapy [FreeTextEntry1] : 07/18/2023: follow up today.  Feeling improvement after surgeries.  Will give TPI.  \par \par 5/30/23- fu for B/L hip injections 5/16 - 95% relief .  Had surgery and complications.  Feeling better after surgery.  \par \par 04/3/23: follow up today. Has surgery scheduled for 4/18/23 with Dr. Pierre.  Would wait at least 2 weeks to do hip injections. Taking Tizanidine PRN. \par \par 02/22/23: follow up today after caudal injection 1/19/23 with 50% relief.  Will give TPI \par \par 2/8/23: follow up today> he has now been authorized for lumbar surgery. The radicular pain has improved with the injections. Most of his pain is in the hips. He has numerous cases (WC, NF etc)  Pain over the bilateral trochanteric bursa.  Pain intermittent to the b/l groins. had xrays in January which were reviewed. Had severe OA in both hips. \par \par 12/21/22- follow up today.  Pain is in low back and radiates down both legs.  Injections help but wear off quick. 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  Epidurals not giving him the duration of relief we need. having numbness in the lateral thighs. now left worse than right. \par \par 10/18/2022: follow up today for caudal on 10/6/22.  Had 60% relief from injection for 3 days.  pain has returned.  Dr. Pierre recommends extension of fusion L1-L2.  \par \par 09/06/2022: follow up today.  Has been having pain in low back.  Would like TPI.  Saw Dr. Pierre for low back pain that radiates to right hip.  He recommends fusion of L1-L2.  \par \par 07/19/2022: follow up today.  Has been having worsening pain in low back.  Would like TPI today. \par Will be having neck surgery on August 4\par \par 04/25/2022: follow up today after b/l L4/5 TFESI on 3/11/22 he reports an overall 80% improvement. \par \par 3/28/22: follow up today. Has pain in the left hip and starting to get pain in the right hip. Pain in the left lateral leg as\par well.\par \par 10/18/21- F/u after left hip injection 9/27. Had 90% relief from hip injection. Had surgery with Dr. Pierre L3/4 L4/5 L5/S1 (2 years ago)\par \par 9/1/21: follow up today. Patient feels better. Would like repeat hip injection. Will give TPI to neck.\par \par 5/4/21- Patient feeling better after surgery. Still has left hip pain. 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 The last hip injection helped\par 60% so he would benefit from repeat hip injection.\par \par 2/9/21: follow up today after left hip injection on 1/29/21 pt reports near complete relief of his pain following. the\par pinching pain is now gone.\par \par 1/20/21- Patient had surgery in back in September. Doing better. Would like TPI in neck. also Dr. Pierre recommended right hip injection due to pain and arthritis in hip.\par \par 9/8/20 - follow up today after LESI 8/17/20. Patient had no relief and is now going for surgery on 9/10.\par \par 3/9/20 - Patient presents for FUV after L5-S1 LESI on 2/24/20. Reports 80% improvement.\par \par 2/3/20 - Patient complains of lower back pain that radiates down right and left leg. Patient had a LESI L5-S1 \par \par 11/11/19 with relief. Patient has been indicated for surgery by Dr. Pierre. Cannot proceed at this time given financial considerations. Still undergoing Lupron therapy. [] : no [FreeTextEntry3] : 7/2/22 [FreeTextEntry6] : numbness [FreeTextEntry7] : both sides, buttocks and hamstrings  [FreeTextEntry9] : Stretching

## 2023-07-20 NOTE — CONSULT NOTE ADULT - NSTELEHEALTH_GEN_ALL_CORE
4 Eyes Skin Assessment     NAME:  Kelle Chávez OF BIRTH:  1966  MEDICAL RECORD NUMBER:  [id number]    The patient is being assessed for  Admission    I agree that at least one RN has performed a thorough Head to Toe Skin Assessment on the patient. ALL assessment sites listed below have been assessed. Areas assessed by both nurses:    Head, Face, Ears, Shoulders, Back, Chest, Arms, Elbows, Hands, Sacrum. Buttock, Coccyx, Ischium, Legs. Feet and Heels, and Under Medical Devices         Does the Patient have a Wound?  No noted wound(s)       Miki Prevention initiated by RN: No  Wound Care Orders initiated by RN: No    Pressure Injury (Stage 3,4, Unstageable, DTI, NWPT, and Complex wounds) if present, place Wound referral order by RN under : No    New Ostomies, if present place, Ostomy referral order under : No     Nurse 1 eSignature: Electronically signed by Colleen Charles RN on 7/20/23 at 7:31 PM EDT    **SHARE this note so that the co-signing nurse can place an eSignature**    Nurse 2 eSignature: {Esignature:[id number]}
What Type Of Note Output Would You Prefer (Optional)?: Standard Output
How Severe Is Your Skin Lesion?: moderate
No
treated_been_treated
Is This A New Presentation, Or A Follow-Up?: Mole
When Was It Treated?: 11/2022

## 2023-08-16 NOTE — PHYSICAL THERAPY INITIAL EVALUATION ADULT - ASSISTIVE DEVICE FOR TRANSFER: STAND/SIT, REHAB EVAL
rolling walker Bactrim Pregnancy And Lactation Text: This medication is Pregnancy Category D and is known to cause fetal risk.  It is also excreted in breast milk.

## 2023-08-22 ENCOUNTER — EMERGENCY (EMERGENCY)
Facility: HOSPITAL | Age: 58
LOS: 1 days | Discharge: HOME | End: 2023-08-22
Attending: EMERGENCY MEDICINE
Payer: COMMERCIAL

## 2023-08-22 VITALS
TEMPERATURE: 99 F | SYSTOLIC BLOOD PRESSURE: 122 MMHG | DIASTOLIC BLOOD PRESSURE: 80 MMHG | RESPIRATION RATE: 16 BRPM | HEIGHT: 74 IN | OXYGEN SATURATION: 98 % | WEIGHT: 199.96 LBS | HEART RATE: 98 BPM

## 2023-08-22 DIAGNOSIS — D17.9 BENIGN LIPOMATOUS NEOPLASM, UNSPECIFIED: Chronic | ICD-10-CM

## 2023-08-22 DIAGNOSIS — Z90.79 ACQUIRED ABSENCE OF OTHER GENITAL ORGAN(S): Chronic | ICD-10-CM

## 2023-08-22 DIAGNOSIS — Z98.890 OTHER SPECIFIED POSTPROCEDURAL STATES: Chronic | ICD-10-CM

## 2023-08-22 LAB
ALBUMIN SERPL ELPH-MCNC: 4.4 G/DL — SIGNIFICANT CHANGE UP (ref 3.3–5.2)
ALP SERPL-CCNC: 69 U/L — SIGNIFICANT CHANGE UP (ref 40–120)
ALT FLD-CCNC: 16 U/L — SIGNIFICANT CHANGE UP
ANION GAP SERPL CALC-SCNC: 16 MMOL/L — SIGNIFICANT CHANGE UP (ref 5–17)
AST SERPL-CCNC: 15 U/L — SIGNIFICANT CHANGE UP
BASOPHILS # BLD AUTO: 0.01 K/UL — SIGNIFICANT CHANGE UP (ref 0–0.2)
BASOPHILS NFR BLD AUTO: 0.1 % — SIGNIFICANT CHANGE UP (ref 0–2)
BILIRUB SERPL-MCNC: 0.5 MG/DL — SIGNIFICANT CHANGE UP (ref 0.4–2)
BUN SERPL-MCNC: 21.1 MG/DL — HIGH (ref 8–20)
CALCIUM SERPL-MCNC: 9.6 MG/DL — SIGNIFICANT CHANGE UP (ref 8.4–10.5)
CHLORIDE SERPL-SCNC: 94 MMOL/L — LOW (ref 96–108)
CO2 SERPL-SCNC: 25 MMOL/L — SIGNIFICANT CHANGE UP (ref 22–29)
CREAT SERPL-MCNC: 1.58 MG/DL — HIGH (ref 0.5–1.3)
EGFR: 51 ML/MIN/1.73M2 — LOW
EOSINOPHIL # BLD AUTO: 0.05 K/UL — SIGNIFICANT CHANGE UP (ref 0–0.5)
EOSINOPHIL NFR BLD AUTO: 0.6 % — SIGNIFICANT CHANGE UP (ref 0–6)
GLUCOSE SERPL-MCNC: 113 MG/DL — HIGH (ref 70–99)
HCT VFR BLD CALC: 42.5 % — SIGNIFICANT CHANGE UP (ref 39–50)
HGB BLD-MCNC: 14.3 G/DL — SIGNIFICANT CHANGE UP (ref 13–17)
IMM GRANULOCYTES NFR BLD AUTO: 0.3 % — SIGNIFICANT CHANGE UP (ref 0–0.9)
LACTATE BLDV-MCNC: 1.5 MMOL/L — SIGNIFICANT CHANGE UP (ref 0.5–2)
LIDOCAIN IGE QN: 39 U/L — SIGNIFICANT CHANGE UP (ref 22–51)
LYMPHOCYTES # BLD AUTO: 1.51 K/UL — SIGNIFICANT CHANGE UP (ref 1–3.3)
LYMPHOCYTES # BLD AUTO: 18.9 % — SIGNIFICANT CHANGE UP (ref 13–44)
MCHC RBC-ENTMCNC: 27.9 PG — SIGNIFICANT CHANGE UP (ref 27–34)
MCHC RBC-ENTMCNC: 33.6 GM/DL — SIGNIFICANT CHANGE UP (ref 32–36)
MCV RBC AUTO: 83 FL — SIGNIFICANT CHANGE UP (ref 80–100)
MONOCYTES # BLD AUTO: 0.6 K/UL — SIGNIFICANT CHANGE UP (ref 0–0.9)
MONOCYTES NFR BLD AUTO: 7.5 % — SIGNIFICANT CHANGE UP (ref 2–14)
NEUTROPHILS # BLD AUTO: 5.8 K/UL — SIGNIFICANT CHANGE UP (ref 1.8–7.4)
NEUTROPHILS NFR BLD AUTO: 72.6 % — SIGNIFICANT CHANGE UP (ref 43–77)
PLATELET # BLD AUTO: 313 K/UL — SIGNIFICANT CHANGE UP (ref 150–400)
POTASSIUM SERPL-MCNC: 3.9 MMOL/L — SIGNIFICANT CHANGE UP (ref 3.5–5.3)
POTASSIUM SERPL-SCNC: 3.9 MMOL/L — SIGNIFICANT CHANGE UP (ref 3.5–5.3)
PROT SERPL-MCNC: 7.5 G/DL — SIGNIFICANT CHANGE UP (ref 6.6–8.7)
RBC # BLD: 5.12 M/UL — SIGNIFICANT CHANGE UP (ref 4.2–5.8)
RBC # FLD: 12.6 % — SIGNIFICANT CHANGE UP (ref 10.3–14.5)
SODIUM SERPL-SCNC: 135 MMOL/L — SIGNIFICANT CHANGE UP (ref 135–145)
WBC # BLD: 7.99 K/UL — SIGNIFICANT CHANGE UP (ref 3.8–10.5)
WBC # FLD AUTO: 7.99 K/UL — SIGNIFICANT CHANGE UP (ref 3.8–10.5)

## 2023-08-22 PROCEDURE — 36415 COLL VENOUS BLD VENIPUNCTURE: CPT

## 2023-08-22 PROCEDURE — 93005 ELECTROCARDIOGRAM TRACING: CPT

## 2023-08-22 PROCEDURE — 99285 EMERGENCY DEPT VISIT HI MDM: CPT | Mod: 25

## 2023-08-22 PROCEDURE — 74177 CT ABD & PELVIS W/CONTRAST: CPT | Mod: MA

## 2023-08-22 PROCEDURE — 83605 ASSAY OF LACTIC ACID: CPT

## 2023-08-22 PROCEDURE — 83690 ASSAY OF LIPASE: CPT

## 2023-08-22 PROCEDURE — 74177 CT ABD & PELVIS W/CONTRAST: CPT | Mod: 26,MA

## 2023-08-22 PROCEDURE — 99285 EMERGENCY DEPT VISIT HI MDM: CPT

## 2023-08-22 PROCEDURE — 93010 ELECTROCARDIOGRAM REPORT: CPT

## 2023-08-22 PROCEDURE — 96374 THER/PROPH/DIAG INJ IV PUSH: CPT | Mod: XU

## 2023-08-22 PROCEDURE — 85025 COMPLETE CBC W/AUTO DIFF WBC: CPT

## 2023-08-22 PROCEDURE — 96375 TX/PRO/DX INJ NEW DRUG ADDON: CPT

## 2023-08-22 PROCEDURE — 80053 COMPREHEN METABOLIC PANEL: CPT

## 2023-08-22 RX ORDER — MORPHINE SULFATE 50 MG/1
4 CAPSULE, EXTENDED RELEASE ORAL ONCE
Refills: 0 | Status: DISCONTINUED | OUTPATIENT
Start: 2023-08-22 | End: 2023-08-22

## 2023-08-22 RX ORDER — ONDANSETRON 8 MG/1
1 TABLET, FILM COATED ORAL
Qty: 15 | Refills: 0
Start: 2023-08-22 | End: 2023-08-26

## 2023-08-22 RX ORDER — ONDANSETRON 8 MG/1
4 TABLET, FILM COATED ORAL ONCE
Refills: 0 | Status: COMPLETED | OUTPATIENT
Start: 2023-08-22 | End: 2023-08-22

## 2023-08-22 RX ADMIN — MORPHINE SULFATE 4 MILLIGRAM(S): 50 CAPSULE, EXTENDED RELEASE ORAL at 03:10

## 2023-08-22 RX ADMIN — ONDANSETRON 4 MILLIGRAM(S): 8 TABLET, FILM COATED ORAL at 03:09

## 2023-08-22 RX ADMIN — MORPHINE SULFATE 4 MILLIGRAM(S): 50 CAPSULE, EXTENDED RELEASE ORAL at 04:10

## 2023-08-22 NOTE — ED ADULT NURSE NOTE - NSFALLUNIVINTERV_ED_ALL_ED
Bed/Stretcher in lowest position, wheels locked, appropriate side rails in place/Call bell, personal items and telephone in reach/Instruct patient to call for assistance before getting out of bed/chair/stretcher/Non-slip footwear applied when patient is off stretcher/[place] to call system/Physically safe environment - no spills, clutter or unnecessary equipment/Purposeful proactive rounding/Room/bathroom lighting operational, light cord in reach

## 2023-08-22 NOTE — ED PROVIDER NOTE - PATIENT PORTAL LINK FT
You can access the FollowMyHealth Patient Portal offered by [facility] by registering at the following website: [email or web address]. By joining "[employer]’s FollowMyHealth portal, you will also be able to view your health information using other applications (apps) compatible with our system.

## 2023-08-22 NOTE — ED ADULT TRIAGE NOTE - CHIEF COMPLAINT QUOTE
Pt came to ED c.o diffuse abdominal pain, N/V, lightheadedness and dizziness x one week. Pt states hx of GERD and is currently on Protonix, sulcralfate and Pepcid. Pt saw GI last week and is scheduled for a CT scan. Pt concerned with his weight loss and newly diagnosed kidney issues.

## 2023-08-22 NOTE — ED ADULT NURSE NOTE - OBJECTIVE STATEMENT
Assumed care of pt. Pt AOx3. Pt c/o N/V and abdominal pain that started 2 weeks ago. Pt reporting constipation. Abdomen soft. PM hx Prostate CA. Bed locked lowest position.

## 2023-08-22 NOTE — ED PROVIDER NOTE - OBJECTIVE STATEMENT
58 year old male with PMH HTN, prostate CA, SBO presents with vomiting. Pt reports that he has had 1 week of intermittent nausea and vomiting. he reports associated cramping diffuse abd pain. No diarrhea, melena, dysuria, fevers, chills.

## 2023-08-22 NOTE — ED PROVIDER NOTE - ENMT NEGATIVE STATEMENT, MLM
Problem: Mechanical Ventilation  Goal: Patient will maintain patent airway  Outcome: Progressing     Problem: Mechanical Ventilation  Goal: Respiratory status - ventilation  Description  Movement of air in and out of the lungs.    Liberate from ventilator  Outcome: Progressing     Problem: Mechanical Ventilation  Goal: ET tube will be managed safely  Outcome: Progressing    ZEHRA WilcoxT       Ears: no ear pain and no hearing problems. Nose: no nasal congestion and no nasal drainage. Mouth/Throat: no dysphagia, no hoarseness and no throat pain. Neck: no lumps, no pain, no stiffness and no swollen glands.

## 2023-08-22 NOTE — ED PROVIDER NOTE - CLINICAL SUMMARY MEDICAL DECISION MAKING FREE TEXT BOX
Pt states he feels much better and Sx have resolved. Abd non-tender on exam. Pt tolerated PO. Labs and CT reviewed. pt is well appearing and stable for dc

## 2023-08-23 ENCOUNTER — APPOINTMENT (OUTPATIENT)
Dept: ORTHOPEDIC SURGERY | Facility: CLINIC | Age: 58
End: 2023-08-23
Payer: OTHER MISCELLANEOUS

## 2023-08-23 PROCEDURE — 72070 X-RAY EXAM THORAC SPINE 2VWS: CPT

## 2023-08-23 PROCEDURE — 72100 X-RAY EXAM L-S SPINE 2/3 VWS: CPT

## 2023-08-23 PROCEDURE — 99214 OFFICE O/P EST MOD 30 MIN: CPT

## 2023-08-23 PROCEDURE — 72170 X-RAY EXAM OF PELVIS: CPT

## 2023-08-23 NOTE — DISCUSSION/SUMMARY
[Surgical risks reviewed] : Surgical risks reviewed [de-identified] : reviewed the case/options with him  post op - improving slowly  not able to return to work in any capacity  fu 2-3 months   avoid heavy lifting  avoid deep bends walking encouraged

## 2023-08-23 NOTE — HISTORY OF PRESENT ILLNESS
[Neck] : neck [Mid-back] : mid-back [Lower back] : lower back [Work related] : work related [Gradual] : gradual [Sudden] : sudden [5] : 5 [4] : 4 [Burning] : burning [Localized] : localized [Radiating] : radiating [Sharp] : sharp [Shooting] : shooting [Stabbing] : stabbing [Tightness] : tightness [Tingling] : tingling [Constant] : constant [Household chores] : household chores [Work] : work [Rest] : rest [Meds] : meds [Sitting] : sitting [Standing] : standing [Walking] : walking [Stairs] : stairs [Exercising] : exercising [Lying in bed] : lying in bed [Not working due to injury] : Work status: not working due to injury [de-identified] : WC DOI 7/2/22 11/11/2022: Pt here with complaint of 9 days right upper extremity radiculitis. Pt denies recent hx of trauma. Pt states over the past 9 days he has noted progressive right arm pain to the region of the right hand. Pain seems to be alleviated with arm elevation. There is no hx of associated weakness. Pt had recent C5-6-7 ACDF performed by Dr. Pierre this past August. Pt denies associated gait disturbance or bb dysfunction.   11/23/22: here for fu of the neck and the lower back 2/2 the WC case from 7/2 and for review of the cervical MRI - overall doing better in regards to the neck with the steroids having some numbness in the right arm - the pain in the back and legs remains  the worst - using cane - medication necessary to move around   MRi C spine - post op acdf C5-7   1/4/23: Here for fu on the low back - continued severe pain to the low back; radiating into the buttocks with tingling sensation into both legs; There is difficulty sleeping. He has been taking the hydrocodone for the pain which controls some of his symptoms. He also takes gabapentin - he saw pain management and is considering a SCS.   xrays today: L spine - progressive kyphosis at L1-2 and L2-3 with lumbar kyphosis - old surgery at L3-5 is healed and fused - hardware in good position  AP PELVIS - B hip severe hip OA   2/1/23: here for fu - plan at last was requesting surgery for the lumbar - he was in hosptial twice recently for abdominal pain - his surgery not approved at this point   had temp relief with LESI   3/15/23: Here for fu - plan at last was surgery and he is scheduled for the fusion and decompression.  is set up for surgery at the end of next month  Has seen Dr Leal    4/05/2023: Here for fu on the low back;  is set up for surgery  symptoms remains in the lower back  is working at this point    5/10/23: Here for fu - now about 3 weeks since the surgery - was in the hosptial for vomit and hypovolemic shock   wound has been dry   xrays today: l spine - implants in good position   06/07/23 follow up workers comp  lower spine  doing well using bone stim  his neck is doing well - not having much pain at this point   xrays today: l spine - implants in good position   07/12/23 follow up workers comp lower spine. Pain continues to improve. Denies pain/N/T in BLEs. Denies b/b dysfunction.  l spine - implants in good position  xrays today: l spine - implants in good position T spine - implants in good positoin   08/23/23 follow up workers comp lower spine dcont pt, doing well, having stomach pain  WAS IN THE er FOR ABDOMINAL ISSUES AND THEN cant take the medicatin  xrays today: T spine - hardware in good position L spine - handware in good position ap pelvis - severe hip OA B  [] : no [FreeTextEntry3] : 07/02/2022 [FreeTextEntry5] : s/p laminectomy  [FreeTextEntry7] : right arm  [de-identified] : meds

## 2023-08-25 ENCOUNTER — APPOINTMENT (OUTPATIENT)
Age: 58
End: 2023-08-25

## 2023-08-30 ENCOUNTER — APPOINTMENT (OUTPATIENT)
Dept: PAIN MANAGEMENT | Facility: CLINIC | Age: 58
End: 2023-08-30
Payer: COMMERCIAL

## 2023-08-30 PROCEDURE — 27093 INJECTION FOR HIP X-RAY: CPT | Mod: LT

## 2023-08-30 PROCEDURE — 73525 CONTRAST X-RAY OF HIP: CPT | Mod: 26,59

## 2023-08-30 NOTE — PROCEDURE
HPI Comments: 2:21 PM Kristofer Haas is a 60 y.o. female with a history of asthma, HTN, DM, and hypertensive heart disease who presents to ED for the evaluation of constant centralized CP that began four days ago. She reports the pain quality as \"a pressure\" with associated SOB. She states pain is worse if she exerts herself or moves/turns over in bed. Associated Sx include cough with yellow sputum. Pt states that she has had Sx like this before but does not know the cause. Pt denies taking her blood pressure medication today. Denies vomiting, diaphoresis, fever, edema. No other complaints, associated symptoms or modifying factors at this time. PCP: Nolene Skiff, MD          The history is provided by the patient. Past Medical History:   Diagnosis Date    Arthritis     Asthma     Cataract     Diabetes mellitus ([facility], [place] [address]     History of echocardiogram 12/29/2009    EF 50%. Mild-mod conc LVH. Mod DDfx. LAE. Mild MR.      Hypercholesterolemia     Hypertension     Hypertensive heart disease     Normal nuclear stress test 09/08/2000    No ischemia or prior infarction. Neg EKG on submax EST. Ex time 15:02. Past Surgical History:   Procedure Laterality Date    HX CHOLECYSTECTOMY  2001    HX TUBAL LIGATION  1975         Family History:   Problem Relation Age of Onset    Hypertension Mother     Ovarian Cancer Mother     Heart Attack Father     Breast Cancer Maternal Aunt        Social History     Social History    Marital status:      Spouse name: N/A    Number of children: N/A    Years of education: N/A     Occupational History    Not on file.      Social History Main Topics    Smoking status: Never Smoker    Smokeless tobacco: Never Used    Alcohol use No    Drug use: No    Sexual activity: Not on file     Other Topics Concern    Not on file     Social History Narrative         ALLERGIES: Codeine and Sulfa (sulfonamide antibiotics)    Review of Systems [FreeTextEntry3] : Date of Service: 08/30/2023   Account: [id number]  Patient: TANISHA PADILLA   YOB: 1965  Age: 58 year   Surgeon: Ginny Leal M.D.  Pre-Operative Diagnosis: Unilateral Primary Osteoarthritis , Left Hip (M16.12)  Post Operative Diagnosis: Unilateral Primary Osteoarthritis , Left Hip (M16.12)  Procedure: Left Hip arthrogram and steroid injection under fluoroscopic  guidance   This procedure was carried out using fluoroscopic guidance.  The risks and benefits of the procedure were discussed extensively with the patient.  The consent of the patient was obtained and the following procedure was performed.  The patient was placed in the supine position with left hip flexed and externally rotated 25 degrees.  The area of the left groin was prepped and draped in a sterile fashion.  The fluoroscopic image intensifier was then positioned so that the left hip appeared in view, and the midline intertrochanteric region was identified and marked. The skin and subcutaneous structures were then anesthetized using 1 cc of 1% lidocaine.  A 22 gauge spinal needle was then inserted and directed into this left hip intra-capsular region.  After negative aspiration for heme,  3 cc of Omnipaque was injected and appeared to fill the joint  margins.  Left hip arthrogram showed no intravascular flow, and good spread around the femoral head and to the acetabulum. An injectate of 3cc 0.25% Marcaine plus 80 mg of Kenalog Medrol was then injected into the left hip space.  The needle was then removed and pressure was applied.  Anesthesia personnel were present throughout the procedure. The patient was instructed to apply ice over the injection site for twenty minutes every two hours for the next 24 to 48 hours.  The patient was also instructed to contact me immediately if there were any problems.  Ginny Leal M.D.  Constitutional: Negative for chills, fatigue and fever. HENT: Negative. Negative for sore throat. Eyes: Negative. Respiratory: Positive for cough (yellow sputum). Negative for shortness of breath. Cardiovascular: Positive for chest pain. Negative for palpitations. Gastrointestinal: Negative for abdominal pain, nausea and vomiting. Genitourinary: Negative for dysuria. Musculoskeletal: Negative. Skin: Negative. Neurological: Negative for dizziness, weakness, light-headedness and headaches. Psychiatric/Behavioral: Negative. All other systems reviewed and are negative. Vitals:    09/22/17 1530 09/22/17 1545 09/22/17 1616 09/22/17 1617   BP: (!) 197/113 (!) 177/102 153/85    Pulse: 80 69  69   Resp: 20 21  26   Temp:       SpO2: 100% 100%  99%   Weight:       Height:                Physical Exam   Constitutional: She is oriented to person, place, and time. She appears well-developed and well-nourished. HENT:   Head: Normocephalic and atraumatic. Mouth/Throat: Oropharynx is clear and moist.   Eyes: Conjunctivae are normal. Pupils are equal, round, and reactive to light. No scleral icterus. Neck: Normal range of motion. Neck supple. No JVD present. No tracheal deviation present. Cardiovascular: Normal rate, regular rhythm and normal heart sounds. Pulmonary/Chest: Effort normal and breath sounds normal. No respiratory distress. She has no wheezes. Abdominal: Soft. Bowel sounds are normal.   Musculoskeletal: Normal range of motion. Neurological: She is alert and oriented to person, place, and time. She has normal strength. Gait normal. GCS eye subscore is 4. GCS verbal subscore is 5. GCS motor subscore is 6. Skin: Skin is warm and dry. Psychiatric: She has a normal mood and affect. Nursing note and vitals reviewed.        MDM  Number of Diagnoses or Management Options  Chest pain, unspecified type:   Hypertensive urgency:   Diagnosis management comments: BP and pain have improved with meds. Patient has multiple cardiac risk factors, borderline elevated troponin, poorly controlled hypertension. Will require admission for further eval and treatment.   Cardiology has been consulted    ED Course       Procedures    Vitals:  Patient Vitals for the past 12 hrs:   Temp Pulse Resp BP SpO2   09/22/17 1617 - 69 26 - 99 %   09/22/17 1616 - - - 153/85 -   09/22/17 1545 - 69 21 (!) 177/102 100 %   09/22/17 1530 - 80 20 (!) 197/113 100 %   09/22/17 1515 - 87 20 (!) 196/114 100 %   09/22/17 1500 - 87 24 (!) 194/112 100 %   09/22/17 1445 - 87 20 (!) 196/125 100 %   09/22/17 1444 - 89 18 (!) 193/119 99 %   09/22/17 1440 - - - - 90 %   09/22/17 1430 - 87 25 (!) 185/115 (!) 89 %   09/22/17 1402 98.3 °F (36.8 °C) 89 20 (!) 198/127 94 %         Medications ordered:   Medications   nitroglycerin (NITROSTAT) tablet 0.4 mg (not administered)   cloNIDine HCl (CATAPRES) tablet 0.2 mg (0.2 mg Oral Given 9/22/17 1453)   carvedilol (COREG) tablet 25 mg (25 mg Oral Given 9/22/17 1453)   aspirin chewable tablet 162 mg (162 mg Oral Given 9/22/17 1453)   potassium chloride (K-DUR, KLOR-CON) SR tablet 40 mEq (40 mEq Oral Given 9/22/17 1517)   sodium chloride 0.9 % bolus infusion 1,000 mL (1,000 mL IntraVENous New Bag 9/22/17 1529)   labetalol (NORMODYNE;TRANDATE) 20 mg/4 mL (5 mg/mL) injection 20 mg (20 mg IntraVENous Given 9/22/17 1546)   iopamidol (ISOVUE-370) 76 % injection  mL (100 mL IntraVENous Given 9/22/17 1552)         Lab findings:  Recent Results (from the past 12 hour(s))   EKG, 12 LEAD, INITIAL    Collection Time: 09/22/17  2:21 PM   Result Value Ref Range    Ventricular Rate 90 BPM    Atrial Rate 90 BPM    P-R Interval 164 ms    QRS Duration 104 ms    Q-T Interval 386 ms    QTC Calculation (Bezet) 472 ms    Calculated P Axis 40 degrees    Calculated R Axis -36 degrees    Calculated T Axis 119 degrees    Diagnosis       Normal sinus rhythm  Possible Left atrial enlargement  Left axis deviation  Left ventricular hypertrophy with repolarization abnormality  Cannot rule out Septal infarct (cited on or before 01-FEB-2014)  Abnormal ECG  When compared with ECG of 01-FEB-2014 10:11,  T wave inversion less evident in Lateral leads     CBC WITH AUTOMATED DIFF    Collection Time: 09/22/17  2:35 PM   Result Value Ref Range    WBC 6.8 4.6 - 13.2 K/uL    RBC 4.00 (L) 4.20 - 5.30 M/uL    HGB 10.8 (L) 12.0 - 16.0 g/dL    HCT 34.2 (L) 35.0 - 45.0 %    MCV 85.5 74.0 - 97.0 FL    MCH 27.0 24.0 - 34.0 PG    MCHC 31.6 31.0 - 37.0 g/dL    RDW 14.5 11.6 - 14.5 %    PLATELET 127 661 - 824 K/uL    MPV 11.5 9.2 - 11.8 FL    NEUTROPHILS 81 (H) 40 - 73 %    LYMPHOCYTES 13 (L) 21 - 52 %    MONOCYTES 4 3 - 10 %    EOSINOPHILS 2 0 - 5 %    BASOPHILS 0 0 - 2 %    ABS. NEUTROPHILS 5.5 1.8 - 8.0 K/UL    ABS. LYMPHOCYTES 0.9 0.9 - 3.6 K/UL    ABS. MONOCYTES 0.3 0.05 - 1.2 K/UL    ABS. EOSINOPHILS 0.1 0.0 - 0.4 K/UL    ABS.  BASOPHILS 0.0 0.0 - 0.06 K/UL    DF AUTOMATED     METABOLIC PANEL, BASIC    Collection Time: 09/22/17  2:35 PM   Result Value Ref Range    Sodium 141 136 - 145 mmol/L    Potassium 2.9 (LL) 3.5 - 5.5 mmol/L    Chloride 104 100 - 108 mmol/L    CO2 33 (H) 21 - 32 mmol/L    Anion gap 4 3.0 - 18 mmol/L    Glucose 172 (H) 74 - 99 mg/dL    BUN 25 (H) 7.0 - 18 MG/DL    Creatinine 1.16 0.6 - 1.3 MG/DL    BUN/Creatinine ratio 22 (H) 12 - 20      GFR est AA 57 (L) >60 ml/min/1.73m2    GFR est non-AA 47 (L) >60 ml/min/1.73m2    Calcium 8.7 8.5 - 10.1 MG/DL   TROPONIN I    Collection Time: 09/22/17  2:35 PM   Result Value Ref Range    Troponin-I, Qt. 0.06 0.00 - 0.06 NG/ML   MAGNESIUM    Collection Time: 09/22/17  2:35 PM   Result Value Ref Range    Magnesium 2.0 1.6 - 2.6 mg/dL   EKG, 12 LEAD, SUBSEQUENT    Collection Time: 09/22/17  4:36 PM   Result Value Ref Range    Ventricular Rate 70 BPM    Atrial Rate 70 BPM    P-R Interval 180 ms    QRS Duration 96 ms    Q-T Interval 434 ms    QTC Calculation (Bezet) 468 ms Calculated P Axis 29 degrees    Calculated R Axis -30 degrees    Calculated T Axis 114 degrees    Diagnosis       Normal sinus rhythm  Possible Left atrial enlargement  Left axis deviation  Septal infarct (cited on or before 01-FEB-2014)  Abnormal ECG  When compared with ECG of 22-SEP-2017 14:22,  No significant change was found         EKG interpretation by ED Physician:    NSR 90, motion artifact in inferior leads, no STEMI (14:22)    NSR 70, no acute ST changes (16:36)    X-Ray, CT or other radiology findings or impressions:  Xr Chest Pa Lat    Result Date: 9/22/2017  Procedure:  Chest PA and Lateral. Indication:  Chest pain, shortness of breath, productive cough. Comparison:  7/22/15. Findings: The cardiac silhouette is moderate to prominently enlarged. New left costophrenic angle blunting is observed with left lower lung zone opacities obscuring the left hemidiaphragmatic outline. New streaky densities are also identified in the right lung base. No pneumothorax. Impression: 1. Increased cardiac silhouette size. 2.  Bilateral pleural effusion with associated atelectatic changes. Concurrent/superimposed infiltrate cannot excluded on either side. Progress notes, Consult notes or additional Procedure notes:   5:31 PM Consult:  Discussed care with ROSEANNE Singh 37 discussion; including history of patients chief complaint, available diagnostic results, and treatment course. Agrees to plan, consider anticoagulation if Troponin continues to increase. 5:42 PM  Consult:  Discussed care with Dr. Arianne Martinez, hospitalist Standard discussion; including history of patients chief complaint, available diagnostic results, and treatment course. Agrees to admission. Disposition:  Diagnosis: No diagnosis found.     Disposition: Admitted    Follow-up Information     None           Patient's Medications   Start Taking    No medications on file   Continue Taking    ASPIRIN DELAYED-RELEASE 81 MG TABLET    Take 81 mg by mouth daily. CARVEDILOL (COREG) 25 MG TABLET    Take 25 mg by mouth two (2) times daily (with meals). CLONIDINE HCL (CATAPRES) 0.2 MG TABLET    Take 0.2 mg by mouth two (2) times a day. GABAPENTIN (NEURONTIN) 300 MG CAPSULE    Take 300 mg by mouth four (4) times daily. INSULIN GLARGINE (LANTUS) 100 UNIT/ML INJECTION    45 Units by SubCUTAneous route daily. 15 units in the evening. MOMETASONE-FORMOTEROL (DULERA) 200-5 MCG/ACTUATION HFA INHALER    Take 2 Puffs by inhalation two (2) times a day. OFLOXACIN (FLOXIN) 0.3 % OPHTHALMIC SOLUTION    Administer 3 Drops to left eye daily. PRAVASTATIN (PRAVACHOL) 20 MG TABLET    Take 20 mg by mouth nightly. SPIRONOLACTONE (ALDACTONE) 25 MG TABLET    Take 12.5 mg by mouth daily. VALSARTAN-HYDROCHLOROTHIAZIDE (DIOVAN-HCT) 160-25 MG PER TABLET    Take 1 Tab by mouth daily. These Medications have changed    No medications on file   Stop Taking    ALBUTEROL (PROVENTIL HFA, VENTOLIN HFA) 90 MCG/ACTUATION INHALER    Take 1 Puff by inhalation as needed. ASPIRIN DELAYED-RELEASE 81 MG TABLET    Take  by mouth daily. AZILSARTAN MED/CHLORTHALIDONE (EDARBYCLOR PO)    Take 25 mg by mouth daily. CARVEDILOL (COREG) 12.5 MG TABLET    Take 1 Tab by mouth two (2) times daily (with meals). CHLORTHALIDONE (HYGROTEN) 25 MG TABLET    Take  by mouth daily. CLONIDINE (CATAPRES) 0.1 MG TABLET    Take  by mouth two (2) times a day. DULOXETINE HCL (CYMBALTA PO)    Take  by mouth. LINACLOTIDE (LINZESS) 145 MCG CAP CAPSULE    Take 145 mcg by mouth Daily (before breakfast). MOMETASONE/FORMOTEROL (DULERA IN)    Take 2 Puffs by inhalation two (2) times a day. MONTELUKAST (SINGULAIR) 10 MG TABLET    Take 10 mg by mouth daily. MULTIVITAMIN (ONE A DAY) TABLET    Take 1 Tab by mouth daily. OXAPROZIN (DAYPRO) 600 MG TABLET    Take 600 mg by mouth daily. SPIRONOLACTONE (ALDACTONE) 25 MG TABLET    Take 12.5 mg by mouth daily. TRAMADOL (ULTRAM) 50 MG TABLET    Take 50 mg by mouth every six (6) hours as needed for Pain. Scribe Attestation:   Medina Sosa acting as a scribe for and in the presence of Valorie Ponce MD September 22, 2017 at 2:14 PM     Signed by: Carl Mendoza, September 22, 2017 at 2:14 PM     Provider Attestation:   I personally performed the services described in the documentation, reviewed the documentation, as recorded by the scribe in my presence, and it accurately and completely records my words and actions. Reviewed and signed by:  Valorie Ponce MD      7:00 PM  I have discussed case with Dr. Sara Bashir. Standard discussion regarding this patient's presenting symptoms, PMHX, exam, vital signs, available ancillary and diagnostic studies. Consulting agrees to take over care of the patient at this time due to my going off duty.

## 2023-09-05 NOTE — REASON FOR VISIT
[Follow-Up Visit] : a follow-up pain management visit [FreeTextEntry2] : Left Hip arthrogram and steroid injection under fluoroscopic guidance

## 2023-09-05 NOTE — HISTORY OF PRESENT ILLNESS
[Work related] : work related [Lower back] : lower back [Dull/Aching] : dull/aching [4] : 4 [2] : 2 [Radiating] : radiating [Sharp] : sharp [Frequent] : frequent [Sleep] : sleep [Rest] : rest [Physical therapy] : physical therapy [Meds] : meds [Walking] : walking [Injection therapy] : injection therapy [Bending forward] : bending forward [Disabled] : Work status: disabled [FreeTextEntry1] : 09/05/2023: follow up today.  07/18/2023: follow up today.  Feeling improvement after surgeries.  Will give TPI.    5/30/23- fu for B/L hip injections 5/16 - 95% relief .  Had surgery and complications.  Feeling better after surgery.    04/3/23: follow up today. Has surgery scheduled for 4/18/23 with Dr. Pierre.  Would wait at least 2 weeks to do hip injections. Taking Tizanidine PRN.   02/22/23: follow up today after caudal injection 1/19/23 with 50% relief.  Will give TPI   2/8/23: follow up today> he has now been authorized for lumbar surgery. The radicular pain has improved with the injections. Most of his pain is in the hips. He has numerous cases (WC, NF etc)  Pain over the bilateral trochanteric bursa.  Pain intermittent to the b/l groins. had xrays in January which were reviewed. Had severe OA in both hips.   12/21/22- follow up today.  Pain is in low back and radiates down both legs.  Injections help but wear off quick. 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  Epidurals not giving him the duration of relief we need. having numbness in the lateral thighs. now left worse than right.   10/18/2022: follow up today for caudal on 10/6/22.  Had 60% relief from injection for 3 days.  pain has returned.  Dr. Pierre recommends extension of fusion L1-L2.    09/06/2022: follow up today.  Has been having pain in low back.  Would like TPI.  Saw Dr. Pierre for low back pain that radiates to right hip.  He recommends fusion of L1-L2.    07/19/2022: follow up today.  Has been having worsening pain in low back.  Would like TPI today.  Will be having neck surgery on August 4 04/25/2022: follow up today after b/l L4/5 TFESI on 3/11/22 he reports an overall 80% improvement.   3/28/22: follow up today. Has pain in the left hip and starting to get pain in the right hip. Pain in the left lateral leg as well.  10/18/21- F/u after left hip injection 9/27. Had 90% relief from hip injection. Had surgery with Dr. Pierre L3/4 L4/5 L5/S1 (2 years ago)  9/1/21: follow up today. Patient feels better. Would like repeat hip injection. Will give TPI to neck.  5/4/21- Patient feeling better after surgery. Still has left hip pain. 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 The last hip injection helped 60% so he would benefit from repeat hip injection.  2/9/21: follow up today after left hip injection on 1/29/21 pt reports near complete relief of his pain following. the pinching pain is now gone.  1/20/21- Patient had surgery in back in September. Doing better. Would like TPI in neck. also Dr. Pierre recommended right hip injection due to pain and arthritis in hip.  9/8/20 - follow up today after LESI 8/17/20. Patient had no relief and is now going for surgery on 9/10.  3/9/20 - Patient presents for FUV after L5-S1 LESI on 2/24/20. Reports 80% improvement.  2/3/20 - Patient complains of lower back pain that radiates down right and left leg. Patient had a LESI L5-S1   11/11/19 with relief. Patient has been indicated for surgery by Dr. Pierre. Cannot proceed at this time given financial considerations. Still undergoing Lupron therapy. [] : no [FreeTextEntry3] : 7/2/22 [FreeTextEntry6] : numbness [FreeTextEntry7] : both sides, buttocks and hamstrings  [FreeTextEntry9] : Stretching

## 2023-09-05 NOTE — HISTORY OF PRESENT ILLNESS
[Lower back] : lower back [Work related] : work related [2] : 2 [Dull/Aching] : dull/aching [4] : 4 [Radiating] : radiating [Sharp] : sharp [Frequent] : frequent [Sleep] : sleep [Rest] : rest [Meds] : meds [Physical therapy] : physical therapy [Walking] : walking [Injection therapy] : injection therapy [Bending forward] : bending forward [Disabled] : Work status: disabled [FreeTextEntry1] : 09/05/2023: follow up today.  07/18/2023: follow up today.  Feeling improvement after surgeries.  Will give TPI.    5/30/23- fu for B/L hip injections 5/16 - 95% relief .  Had surgery and complications.  Feeling better after surgery.    04/3/23: follow up today. Has surgery scheduled for 4/18/23 with Dr. Pierre.  Would wait at least 2 weeks to do hip injections. Taking Tizanidine PRN.   02/22/23: follow up today after caudal injection 1/19/23 with 50% relief.  Will give TPI   2/8/23: follow up today> he has now been authorized for lumbar surgery. The radicular pain has improved with the injections. Most of his pain is in the hips. He has numerous cases (WC, NF etc)  Pain over the bilateral trochanteric bursa.  Pain intermittent to the b/l groins. had xrays in January which were reviewed. Had severe OA in both hips.   12/21/22- follow up today.  Pain is in low back and radiates down both legs.  Injections help but wear off quick. 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  Epidurals not giving him the duration of relief we need. having numbness in the lateral thighs. now left worse than right.   10/18/2022: follow up today for caudal on 10/6/22.  Had 60% relief from injection for 3 days.  pain has returned.  Dr. Pierre recommends extension of fusion L1-L2.    09/06/2022: follow up today.  Has been having pain in low back.  Would like TPI.  Saw Dr. Pierre for low back pain that radiates to right hip.  He recommends fusion of L1-L2.    07/19/2022: follow up today.  Has been having worsening pain in low back.  Would like TPI today.  Will be having neck surgery on August 4 04/25/2022: follow up today after b/l L4/5 TFESI on 3/11/22 he reports an overall 80% improvement.   3/28/22: follow up today. Has pain in the left hip and starting to get pain in the right hip. Pain in the left lateral leg as well.  10/18/21- F/u after left hip injection 9/27. Had 90% relief from hip injection. Had surgery with Dr. Pierre L3/4 L4/5 L5/S1 (2 years ago)  9/1/21: follow up today. Patient feels better. Would like repeat hip injection. Will give TPI to neck.  5/4/21- Patient feeling better after surgery. Still has left hip pain. 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 The last hip injection helped 60% so he would benefit from repeat hip injection.  2/9/21: follow up today after left hip injection on 1/29/21 pt reports near complete relief of his pain following. the pinching pain is now gone.  1/20/21- Patient had surgery in back in September. Doing better. Would like TPI in neck. also Dr. Pierre recommended right hip injection due to pain and arthritis in hip.  9/8/20 - follow up today after LESI 8/17/20. Patient had no relief and is now going for surgery on 9/10.  3/9/20 - Patient presents for FUV after L5-S1 LESI on 2/24/20. Reports 80% improvement.  2/3/20 - Patient complains of lower back pain that radiates down right and left leg. Patient had a LESI L5-S1   11/11/19 with relief. Patient has been indicated for surgery by Dr. Pierre. Cannot proceed at this time given financial considerations. Still undergoing Lupron therapy. [] : no [FreeTextEntry3] : 7/2/22 [FreeTextEntry6] : numbness [FreeTextEntry7] : both sides, buttocks and hamstrings  [FreeTextEntry9] : Stretching

## 2023-09-11 ENCOUNTER — EMERGENCY (EMERGENCY)
Facility: HOSPITAL | Age: 58
LOS: 1 days | Discharge: HOME | End: 2023-09-11
Attending: EMERGENCY MEDICINE
Payer: COMMERCIAL

## 2023-09-11 VITALS
HEART RATE: 98 BPM | DIASTOLIC BLOOD PRESSURE: 76 MMHG | RESPIRATION RATE: 20 BRPM | SYSTOLIC BLOOD PRESSURE: 136 MMHG | HEIGHT: 74 IN | TEMPERATURE: 99 F | OXYGEN SATURATION: 95 %

## 2023-09-11 DIAGNOSIS — Z90.79 ACQUIRED ABSENCE OF OTHER GENITAL ORGAN(S): Chronic | ICD-10-CM

## 2023-09-11 DIAGNOSIS — D17.9 BENIGN LIPOMATOUS NEOPLASM, UNSPECIFIED: Chronic | ICD-10-CM

## 2023-09-11 DIAGNOSIS — Z98.890 OTHER SPECIFIED POSTPROCEDURAL STATES: Chronic | ICD-10-CM

## 2023-09-11 LAB
ALBUMIN SERPL ELPH-MCNC: 4.7 G/DL — SIGNIFICANT CHANGE UP (ref 3.3–5.2)
ALP SERPL-CCNC: 72 U/L — SIGNIFICANT CHANGE UP (ref 40–120)
ALT FLD-CCNC: 19 U/L — SIGNIFICANT CHANGE UP
ANION GAP SERPL CALC-SCNC: 12 MMOL/L — SIGNIFICANT CHANGE UP (ref 5–17)
APPEARANCE UR: CLEAR — SIGNIFICANT CHANGE UP
AST SERPL-CCNC: 16 U/L — SIGNIFICANT CHANGE UP
BASOPHILS # BLD AUTO: 0.02 K/UL — SIGNIFICANT CHANGE UP (ref 0–0.2)
BASOPHILS NFR BLD AUTO: 0.3 % — SIGNIFICANT CHANGE UP (ref 0–2)
BILIRUB SERPL-MCNC: 0.6 MG/DL — SIGNIFICANT CHANGE UP (ref 0.4–2)
BILIRUB UR-MCNC: NEGATIVE — SIGNIFICANT CHANGE UP
BUN SERPL-MCNC: 21.5 MG/DL — HIGH (ref 8–20)
CALCIUM SERPL-MCNC: 9.6 MG/DL — SIGNIFICANT CHANGE UP (ref 8.4–10.5)
CHLORIDE SERPL-SCNC: 99 MMOL/L — SIGNIFICANT CHANGE UP (ref 96–108)
CO2 SERPL-SCNC: 26 MMOL/L — SIGNIFICANT CHANGE UP (ref 22–29)
COLOR SPEC: YELLOW — SIGNIFICANT CHANGE UP
CREAT SERPL-MCNC: 1.31 MG/DL — HIGH (ref 0.5–1.3)
DIFF PNL FLD: NEGATIVE — SIGNIFICANT CHANGE UP
EGFR: 63 ML/MIN/1.73M2 — SIGNIFICANT CHANGE UP
EOSINOPHIL # BLD AUTO: 0.07 K/UL — SIGNIFICANT CHANGE UP (ref 0–0.5)
EOSINOPHIL NFR BLD AUTO: 0.9 % — SIGNIFICANT CHANGE UP (ref 0–6)
GLUCOSE SERPL-MCNC: 102 MG/DL — HIGH (ref 70–99)
GLUCOSE UR QL: NEGATIVE MG/DL — SIGNIFICANT CHANGE UP
HCT VFR BLD CALC: 41.6 % — SIGNIFICANT CHANGE UP (ref 39–50)
HGB BLD-MCNC: 14.2 G/DL — SIGNIFICANT CHANGE UP (ref 13–17)
IMM GRANULOCYTES NFR BLD AUTO: 0.3 % — SIGNIFICANT CHANGE UP (ref 0–0.9)
KETONES UR-MCNC: NEGATIVE — SIGNIFICANT CHANGE UP
LEUKOCYTE ESTERASE UR-ACNC: NEGATIVE — SIGNIFICANT CHANGE UP
LIDOCAIN IGE QN: 38 U/L — SIGNIFICANT CHANGE UP (ref 22–51)
LYMPHOCYTES # BLD AUTO: 1 K/UL — SIGNIFICANT CHANGE UP (ref 1–3.3)
LYMPHOCYTES # BLD AUTO: 12.5 % — LOW (ref 13–44)
MCHC RBC-ENTMCNC: 29 PG — SIGNIFICANT CHANGE UP (ref 27–34)
MCHC RBC-ENTMCNC: 34.1 GM/DL — SIGNIFICANT CHANGE UP (ref 32–36)
MCV RBC AUTO: 85.1 FL — SIGNIFICANT CHANGE UP (ref 80–100)
MONOCYTES # BLD AUTO: 0.49 K/UL — SIGNIFICANT CHANGE UP (ref 0–0.9)
MONOCYTES NFR BLD AUTO: 6.1 % — SIGNIFICANT CHANGE UP (ref 2–14)
NEUTROPHILS # BLD AUTO: 6.39 K/UL — SIGNIFICANT CHANGE UP (ref 1.8–7.4)
NEUTROPHILS NFR BLD AUTO: 79.9 % — HIGH (ref 43–77)
NITRITE UR-MCNC: NEGATIVE — SIGNIFICANT CHANGE UP
PH UR: 7 — SIGNIFICANT CHANGE UP (ref 5–8)
PLATELET # BLD AUTO: 258 K/UL — SIGNIFICANT CHANGE UP (ref 150–400)
POTASSIUM SERPL-MCNC: 4.3 MMOL/L — SIGNIFICANT CHANGE UP (ref 3.5–5.3)
POTASSIUM SERPL-SCNC: 4.3 MMOL/L — SIGNIFICANT CHANGE UP (ref 3.5–5.3)
PROT SERPL-MCNC: 7.6 G/DL — SIGNIFICANT CHANGE UP (ref 6.6–8.7)
PROT UR-MCNC: NEGATIVE — SIGNIFICANT CHANGE UP
RBC # BLD: 4.89 M/UL — SIGNIFICANT CHANGE UP (ref 4.2–5.8)
RBC # FLD: 13.6 % — SIGNIFICANT CHANGE UP (ref 10.3–14.5)
SODIUM SERPL-SCNC: 137 MMOL/L — SIGNIFICANT CHANGE UP (ref 135–145)
SP GR SPEC: 1.01 — SIGNIFICANT CHANGE UP (ref 1.01–1.02)
UROBILINOGEN FLD QL: NEGATIVE MG/DL — SIGNIFICANT CHANGE UP
WBC # BLD: 7.99 K/UL — SIGNIFICANT CHANGE UP (ref 3.8–10.5)
WBC # FLD AUTO: 7.99 K/UL — SIGNIFICANT CHANGE UP (ref 3.8–10.5)

## 2023-09-11 PROCEDURE — 96361 HYDRATE IV INFUSION ADD-ON: CPT

## 2023-09-11 PROCEDURE — 85025 COMPLETE CBC W/AUTO DIFF WBC: CPT

## 2023-09-11 PROCEDURE — 99285 EMERGENCY DEPT VISIT HI MDM: CPT

## 2023-09-11 PROCEDURE — 80053 COMPREHEN METABOLIC PANEL: CPT

## 2023-09-11 PROCEDURE — 74177 CT ABD & PELVIS W/CONTRAST: CPT | Mod: 26,MA

## 2023-09-11 PROCEDURE — 87086 URINE CULTURE/COLONY COUNT: CPT

## 2023-09-11 PROCEDURE — 96365 THER/PROPH/DIAG IV INF INIT: CPT | Mod: XU

## 2023-09-11 PROCEDURE — 96376 TX/PRO/DX INJ SAME DRUG ADON: CPT

## 2023-09-11 PROCEDURE — 83690 ASSAY OF LIPASE: CPT

## 2023-09-11 PROCEDURE — 99284 EMERGENCY DEPT VISIT MOD MDM: CPT | Mod: 25

## 2023-09-11 PROCEDURE — 36415 COLL VENOUS BLD VENIPUNCTURE: CPT

## 2023-09-11 PROCEDURE — 81003 URINALYSIS AUTO W/O SCOPE: CPT

## 2023-09-11 PROCEDURE — 74177 CT ABD & PELVIS W/CONTRAST: CPT | Mod: MA

## 2023-09-11 PROCEDURE — 96375 TX/PRO/DX INJ NEW DRUG ADDON: CPT

## 2023-09-11 RX ORDER — SODIUM CHLORIDE 9 MG/ML
1000 INJECTION INTRAMUSCULAR; INTRAVENOUS; SUBCUTANEOUS ONCE
Refills: 0 | Status: COMPLETED | OUTPATIENT
Start: 2023-09-11 | End: 2023-09-11

## 2023-09-11 RX ORDER — SUCRALFATE 1 G
1 TABLET ORAL ONCE
Refills: 0 | Status: COMPLETED | OUTPATIENT
Start: 2023-09-11 | End: 2023-09-11

## 2023-09-11 RX ORDER — METOCLOPRAMIDE HCL 10 MG
10 TABLET ORAL ONCE
Refills: 0 | Status: COMPLETED | OUTPATIENT
Start: 2023-09-11 | End: 2023-09-11

## 2023-09-11 RX ORDER — FAMOTIDINE 10 MG/ML
20 INJECTION INTRAVENOUS ONCE
Refills: 0 | Status: COMPLETED | OUTPATIENT
Start: 2023-09-11 | End: 2023-09-11

## 2023-09-11 RX ORDER — MORPHINE SULFATE 50 MG/1
4 CAPSULE, EXTENDED RELEASE ORAL ONCE
Refills: 0 | Status: DISCONTINUED | OUTPATIENT
Start: 2023-09-11 | End: 2023-09-11

## 2023-09-11 RX ORDER — ONDANSETRON 8 MG/1
4 TABLET, FILM COATED ORAL ONCE
Refills: 0 | Status: COMPLETED | OUTPATIENT
Start: 2023-09-11 | End: 2023-09-11

## 2023-09-11 RX ORDER — SODIUM CHLORIDE 9 MG/ML
1000 INJECTION, SOLUTION INTRAVENOUS
Refills: 0 | Status: DISCONTINUED | OUTPATIENT
Start: 2023-09-11 | End: 2023-09-18

## 2023-09-11 RX ADMIN — SODIUM CHLORIDE 1000 MILLILITER(S): 9 INJECTION INTRAMUSCULAR; INTRAVENOUS; SUBCUTANEOUS at 09:52

## 2023-09-11 RX ADMIN — ONDANSETRON 4 MILLIGRAM(S): 8 TABLET, FILM COATED ORAL at 08:35

## 2023-09-11 RX ADMIN — Medication 20 MILLIGRAM(S): at 10:13

## 2023-09-11 RX ADMIN — SODIUM CHLORIDE 1000 MILLILITER(S): 9 INJECTION INTRAMUSCULAR; INTRAVENOUS; SUBCUTANEOUS at 11:53

## 2023-09-11 RX ADMIN — SODIUM CHLORIDE 1000 MILLILITER(S): 9 INJECTION INTRAMUSCULAR; INTRAVENOUS; SUBCUTANEOUS at 10:11

## 2023-09-11 RX ADMIN — ONDANSETRON 4 MILLIGRAM(S): 8 TABLET, FILM COATED ORAL at 10:10

## 2023-09-11 RX ADMIN — SODIUM CHLORIDE 150 MILLILITER(S): 9 INJECTION, SOLUTION INTRAVENOUS at 14:27

## 2023-09-11 RX ADMIN — FAMOTIDINE 20 MILLIGRAM(S): 10 INJECTION INTRAVENOUS at 08:35

## 2023-09-11 RX ADMIN — Medication 1 GRAM(S): at 08:35

## 2023-09-11 RX ADMIN — Medication 104 MILLIGRAM(S): at 14:25

## 2023-09-11 RX ADMIN — Medication 10 MILLIGRAM(S): at 15:16

## 2023-09-11 RX ADMIN — SODIUM CHLORIDE 1000 MILLILITER(S): 9 INJECTION INTRAMUSCULAR; INTRAVENOUS; SUBCUTANEOUS at 08:35

## 2023-09-11 RX ADMIN — MORPHINE SULFATE 4 MILLIGRAM(S): 50 CAPSULE, EXTENDED RELEASE ORAL at 08:35

## 2023-09-11 NOTE — ED ADULT NURSE NOTE - OBJECTIVE STATEMENT
Patient arrived to ED with c/o abdominal pain and vomiting for past couple of days, this morning was worst. Patient states hx of GERD, takes Protonix and Carafate which usually help but are not working. Patient reports he has scheduled outpatient MRI abdomen tomorrow. Patient reports no chest pain, SOB, numbness or tingling.

## 2023-09-11 NOTE — ED STATDOCS - ATTENDING APP SHARED VISIT CONTRIBUTION OF CARE
I, Bubba Nails, performed the initial face to face bedside interview with this patient regarding history of present illness, review of symptoms and relevant past medical, social and family history.  I completed an independent physical examination.  I was the provider who initially evaluated this patient.  Follow-up on ordered tests (ie labs, radiologic studies) and re-evaluation of the patient's status has been communicated to the ACP.  Disposition of the patient will be based on test outcome and response to ED interventions.

## 2023-09-11 NOTE — ED STATDOCS - PROGRESS NOTE DETAILS
KLEVER Bravo: pt re-assessed, pain resolved. pt still noting slight nausea. fluids, zofran and bentyl ordered. KLEVER Bravo: pt still reporting nausea and epigastric abdominal discomfort. IVF, nausea meds ordered, CT abd/pel ordered

## 2023-09-11 NOTE — ED STATDOCS - CLINICAL SUMMARY MEDICAL DECISION MAKING FREE TEXT BOX
Acute on chronic abd pain:  labs, fluids, symptom treatment and reassess.  If testing negative and symptom improvement: f/u with GI for MRI tomorrow , as scheduled. Acute on chronic abd pain:  labs, fluids, symptom treatment and reassess.  If testing negative and symptom improvement: f/u with GI for MRI tomorrow , as scheduled.  KLEVER Bravo: abd pain resolved after medications, IV fluids. labs reviewed, no emergent findings. still experiencing mild nausea so given additional 1L NS and 4mg zofran Acute on chronic abd pain:  labs, fluids, symptom treatment and reassess.  If testing negative and symptom improvement: f/u with GI for MRI tomorrow , as scheduled.  KLEVER Bravo: abd pain resolved after medications, IV fluids. labs reviewed, no emergent findings. still experiencing mild nausea so given additional 1L NS and 4mg zofran however still vomiting. further evaluated with CT abd/pel Acute on chronic abd pain:  labs, fluids, symptom treatment and reassess.  If testing negative and symptom improvement: f/u with GI for MRI tomorrow , as scheduled.  KLEVER Bravo: abd pain resolved after medications, IV fluids. labs reviewed, no emergent findings. still experiencing mild nausea so given additional 1L NS and 4mg zofran however still vomiting. further evaluated with CT abd/pel      Pt resting comfortably, symptoms resolved. CT scan shows possible cystitis although UA negative for infection and pt reports no urinary symptoms, otherwise CT scan shows no other acute pathology. PT stable for d/c.

## 2023-09-11 NOTE — ED STATDOCS - PATIENT PORTAL LINK FT
You can access the FollowMyHealth Patient Portal offered by [facility] by registering at the following website: [email or web address]. By joining [employer]’s FollowMyHealth portal, you will also be able to view your health information using other applications (apps) compatible with our system.

## 2023-09-11 NOTE — ED ADULT NURSE NOTE - NSICDXPASTMEDICALHX_GEN_ALL_CORE_FT
PAST MEDICAL HISTORY:  Acute renal failure     GERD (gastroesophageal reflux disease)     HTN (hypertension)     Lumbar stenosis     Prostate cancer

## 2023-09-11 NOTE — ED STATDOCS - PHYSICAL EXAMINATION
PE: nontoxic appearing, wretching, moist mms, no scleral icterus, abd soft/ nondistended and dull to percussion, no abd tenderness, no R/R/G, no cvat, no jaundice.

## 2023-09-11 NOTE — ED ADULT TRIAGE NOTE - CHIEF COMPLAINT QUOTE
patient c/o several days of intermittent abdominal pain and vomiting, this morning was worst. states hx of GERD, takes protonix and carafate which usually help but are not working. has scheduled outpatient MRI abdomen tomorrow. no fevers.

## 2023-09-11 NOTE — ED STATDOCS - OBJECTIVE STATEMENT
History of stomach problems with prior negative endoscopy, scheduled for upper abdominal MRI tomorrow.  Presents with epigastric burning/tightness with associated nausea and vomiting for the past few days.  Believes symptoms may have been triggered by food intake (Bolognese sauce).  Associated diarrhea.  Denies hematemesis or coffee-ground emesis.    Denies hematochezia. taking Carafate tablets and pantoprazole with no relief.  GI specialist: [facility].  Denies smoking. Denies EtOH use.  Denies prior abd surgeries.

## 2023-09-11 NOTE — ED STATDOCS - WORK/EXCUSE FORM DATE
Patient arrived to ED today with c/o right elbow pain.  Patient states she fell onto her arm at work 6 days ago.  Patient states she slipped on a patch of ice at her job while opening her car door and fell onto her right arm.
14-Sep-2023

## 2023-09-12 LAB
CULTURE RESULTS: SIGNIFICANT CHANGE UP
SPECIMEN SOURCE: SIGNIFICANT CHANGE UP

## 2023-09-19 ENCOUNTER — APPOINTMENT (OUTPATIENT)
Dept: PAIN MANAGEMENT | Facility: CLINIC | Age: 58
End: 2023-09-19
Payer: OTHER MISCELLANEOUS

## 2023-09-19 ENCOUNTER — APPOINTMENT (OUTPATIENT)
Dept: ORTHOPEDIC SURGERY | Facility: CLINIC | Age: 58
End: 2023-09-19
Payer: COMMERCIAL

## 2023-09-19 ENCOUNTER — APPOINTMENT (OUTPATIENT)
Dept: PAIN MANAGEMENT | Facility: CLINIC | Age: 58
End: 2023-09-19

## 2023-09-19 VITALS — HEIGHT: 74 IN | BODY MASS INDEX: 28.62 KG/M2 | WEIGHT: 223 LBS

## 2023-09-19 VITALS — HEIGHT: 74 IN | WEIGHT: 227 LBS | BODY MASS INDEX: 29.13 KG/M2

## 2023-09-19 DIAGNOSIS — Z84.1 FAMILY HISTORY OF DISORDERS OF KIDNEY AND URETER: ICD-10-CM

## 2023-09-19 DIAGNOSIS — Z87.448 PERSONAL HISTORY OF OTHER DISEASES OF URINARY SYSTEM: ICD-10-CM

## 2023-09-19 DIAGNOSIS — M16.12 UNILATERAL PRIMARY OSTEOARTHRITIS, LEFT HIP: ICD-10-CM

## 2023-09-19 DIAGNOSIS — Z98.1 ARTHRODESIS STATUS: ICD-10-CM

## 2023-09-19 PROBLEM — N17.9 ACUTE KIDNEY FAILURE, UNSPECIFIED: Chronic | Status: ACTIVE | Noted: 2023-09-11

## 2023-09-19 PROCEDURE — 99214 OFFICE O/P EST MOD 30 MIN: CPT

## 2023-09-19 PROCEDURE — 99213 OFFICE O/P EST LOW 20 MIN: CPT | Mod: 25

## 2023-09-19 PROCEDURE — J3490M: CUSTOM

## 2023-09-19 PROCEDURE — 20553 NJX 1/MLT TRIGGER POINTS 3/>: CPT

## 2023-09-25 ENCOUNTER — APPOINTMENT (OUTPATIENT)
Dept: PAIN MANAGEMENT | Facility: CLINIC | Age: 58
End: 2023-09-25

## 2023-10-04 ENCOUNTER — APPOINTMENT (OUTPATIENT)
Dept: PAIN MANAGEMENT | Facility: CLINIC | Age: 58
End: 2023-10-04

## 2023-10-11 ENCOUNTER — APPOINTMENT (OUTPATIENT)
Dept: ORTHOPEDIC SURGERY | Facility: CLINIC | Age: 58
End: 2023-10-11
Payer: OTHER MISCELLANEOUS

## 2023-10-11 VITALS — HEIGHT: 74 IN | BODY MASS INDEX: 29.13 KG/M2 | WEIGHT: 227 LBS

## 2023-10-11 PROCEDURE — J3490M: CUSTOM

## 2023-10-11 PROCEDURE — 72070 X-RAY EXAM THORAC SPINE 2VWS: CPT

## 2023-10-11 PROCEDURE — 72170 X-RAY EXAM OF PELVIS: CPT

## 2023-10-11 PROCEDURE — 99214 OFFICE O/P EST MOD 30 MIN: CPT | Mod: 25

## 2023-10-11 PROCEDURE — 20552 NJX 1/MLT TRIGGER POINT 1/2: CPT

## 2023-10-11 PROCEDURE — 72100 X-RAY EXAM L-S SPINE 2/3 VWS: CPT

## 2023-11-16 ENCOUNTER — EMERGENCY (EMERGENCY)
Facility: HOSPITAL | Age: 58
LOS: 1 days | Discharge: HOME | End: 2023-11-16
Attending: STUDENT IN AN ORGANIZED HEALTH CARE EDUCATION/TRAINING PROGRAM
Payer: COMMERCIAL

## 2023-11-16 VITALS
TEMPERATURE: 98 F | DIASTOLIC BLOOD PRESSURE: 75 MMHG | HEART RATE: 89 BPM | HEIGHT: 74 IN | WEIGHT: 205.03 LBS | RESPIRATION RATE: 18 BRPM | OXYGEN SATURATION: 98 % | SYSTOLIC BLOOD PRESSURE: 119 MMHG

## 2023-11-16 DIAGNOSIS — D17.9 BENIGN LIPOMATOUS NEOPLASM, UNSPECIFIED: Chronic | ICD-10-CM

## 2023-11-16 DIAGNOSIS — Z98.890 OTHER SPECIFIED POSTPROCEDURAL STATES: Chronic | ICD-10-CM

## 2023-11-16 DIAGNOSIS — Z90.79 ACQUIRED ABSENCE OF OTHER GENITAL ORGAN(S): Chronic | ICD-10-CM

## 2023-11-16 LAB
ALBUMIN SERPL ELPH-MCNC: 4.2 G/DL — SIGNIFICANT CHANGE UP (ref 3.3–5.2)
ALBUMIN SERPL ELPH-MCNC: 4.2 G/DL — SIGNIFICANT CHANGE UP (ref 3.3–5.2)
ALP SERPL-CCNC: 66 U/L — SIGNIFICANT CHANGE UP (ref 40–120)
ALP SERPL-CCNC: 66 U/L — SIGNIFICANT CHANGE UP (ref 40–120)
ALT FLD-CCNC: 14 U/L — SIGNIFICANT CHANGE UP
ALT FLD-CCNC: 14 U/L — SIGNIFICANT CHANGE UP
ANION GAP SERPL CALC-SCNC: 15 MMOL/L — SIGNIFICANT CHANGE UP (ref 5–17)
ANION GAP SERPL CALC-SCNC: 15 MMOL/L — SIGNIFICANT CHANGE UP (ref 5–17)
AST SERPL-CCNC: 18 U/L — SIGNIFICANT CHANGE UP
AST SERPL-CCNC: 18 U/L — SIGNIFICANT CHANGE UP
BASOPHILS # BLD AUTO: 0.02 K/UL — SIGNIFICANT CHANGE UP (ref 0–0.2)
BASOPHILS # BLD AUTO: 0.02 K/UL — SIGNIFICANT CHANGE UP (ref 0–0.2)
BASOPHILS NFR BLD AUTO: 0.2 % — SIGNIFICANT CHANGE UP (ref 0–2)
BASOPHILS NFR BLD AUTO: 0.2 % — SIGNIFICANT CHANGE UP (ref 0–2)
BILIRUB SERPL-MCNC: 0.8 MG/DL — SIGNIFICANT CHANGE UP (ref 0.4–2)
BILIRUB SERPL-MCNC: 0.8 MG/DL — SIGNIFICANT CHANGE UP (ref 0.4–2)
BUN SERPL-MCNC: 27.8 MG/DL — HIGH (ref 8–20)
BUN SERPL-MCNC: 27.8 MG/DL — HIGH (ref 8–20)
CALCIUM SERPL-MCNC: 9.1 MG/DL — SIGNIFICANT CHANGE UP (ref 8.4–10.5)
CALCIUM SERPL-MCNC: 9.1 MG/DL — SIGNIFICANT CHANGE UP (ref 8.4–10.5)
CHLORIDE SERPL-SCNC: 94 MMOL/L — LOW (ref 96–108)
CHLORIDE SERPL-SCNC: 94 MMOL/L — LOW (ref 96–108)
CO2 SERPL-SCNC: 25 MMOL/L — SIGNIFICANT CHANGE UP (ref 22–29)
CO2 SERPL-SCNC: 25 MMOL/L — SIGNIFICANT CHANGE UP (ref 22–29)
CREAT SERPL-MCNC: 2.19 MG/DL — HIGH (ref 0.5–1.3)
CREAT SERPL-MCNC: 2.19 MG/DL — HIGH (ref 0.5–1.3)
EGFR: 34 ML/MIN/1.73M2 — LOW
EGFR: 34 ML/MIN/1.73M2 — LOW
EOSINOPHIL # BLD AUTO: 0.18 K/UL — SIGNIFICANT CHANGE UP (ref 0–0.5)
EOSINOPHIL # BLD AUTO: 0.18 K/UL — SIGNIFICANT CHANGE UP (ref 0–0.5)
EOSINOPHIL NFR BLD AUTO: 2.2 % — SIGNIFICANT CHANGE UP (ref 0–6)
EOSINOPHIL NFR BLD AUTO: 2.2 % — SIGNIFICANT CHANGE UP (ref 0–6)
GLUCOSE SERPL-MCNC: 100 MG/DL — HIGH (ref 70–99)
GLUCOSE SERPL-MCNC: 100 MG/DL — HIGH (ref 70–99)
HCT VFR BLD CALC: 39.5 % — SIGNIFICANT CHANGE UP (ref 39–50)
HCT VFR BLD CALC: 39.5 % — SIGNIFICANT CHANGE UP (ref 39–50)
HGB BLD-MCNC: 13.9 G/DL — SIGNIFICANT CHANGE UP (ref 13–17)
HGB BLD-MCNC: 13.9 G/DL — SIGNIFICANT CHANGE UP (ref 13–17)
IMM GRANULOCYTES NFR BLD AUTO: 0.4 % — SIGNIFICANT CHANGE UP (ref 0–0.9)
IMM GRANULOCYTES NFR BLD AUTO: 0.4 % — SIGNIFICANT CHANGE UP (ref 0–0.9)
LIDOCAIN IGE QN: 35 U/L — SIGNIFICANT CHANGE UP (ref 22–51)
LIDOCAIN IGE QN: 35 U/L — SIGNIFICANT CHANGE UP (ref 22–51)
LYMPHOCYTES # BLD AUTO: 1.03 K/UL — SIGNIFICANT CHANGE UP (ref 1–3.3)
LYMPHOCYTES # BLD AUTO: 1.03 K/UL — SIGNIFICANT CHANGE UP (ref 1–3.3)
LYMPHOCYTES # BLD AUTO: 12.7 % — LOW (ref 13–44)
LYMPHOCYTES # BLD AUTO: 12.7 % — LOW (ref 13–44)
MCHC RBC-ENTMCNC: 31 PG — SIGNIFICANT CHANGE UP (ref 27–34)
MCHC RBC-ENTMCNC: 31 PG — SIGNIFICANT CHANGE UP (ref 27–34)
MCHC RBC-ENTMCNC: 35.2 GM/DL — SIGNIFICANT CHANGE UP (ref 32–36)
MCHC RBC-ENTMCNC: 35.2 GM/DL — SIGNIFICANT CHANGE UP (ref 32–36)
MCV RBC AUTO: 88.2 FL — SIGNIFICANT CHANGE UP (ref 80–100)
MCV RBC AUTO: 88.2 FL — SIGNIFICANT CHANGE UP (ref 80–100)
MONOCYTES # BLD AUTO: 0.68 K/UL — SIGNIFICANT CHANGE UP (ref 0–0.9)
MONOCYTES # BLD AUTO: 0.68 K/UL — SIGNIFICANT CHANGE UP (ref 0–0.9)
MONOCYTES NFR BLD AUTO: 8.4 % — SIGNIFICANT CHANGE UP (ref 2–14)
MONOCYTES NFR BLD AUTO: 8.4 % — SIGNIFICANT CHANGE UP (ref 2–14)
NEUTROPHILS # BLD AUTO: 6.17 K/UL — SIGNIFICANT CHANGE UP (ref 1.8–7.4)
NEUTROPHILS # BLD AUTO: 6.17 K/UL — SIGNIFICANT CHANGE UP (ref 1.8–7.4)
NEUTROPHILS NFR BLD AUTO: 76.1 % — SIGNIFICANT CHANGE UP (ref 43–77)
NEUTROPHILS NFR BLD AUTO: 76.1 % — SIGNIFICANT CHANGE UP (ref 43–77)
PLATELET # BLD AUTO: 289 K/UL — SIGNIFICANT CHANGE UP (ref 150–400)
PLATELET # BLD AUTO: 289 K/UL — SIGNIFICANT CHANGE UP (ref 150–400)
POTASSIUM SERPL-MCNC: 4.6 MMOL/L — SIGNIFICANT CHANGE UP (ref 3.5–5.3)
POTASSIUM SERPL-MCNC: 4.6 MMOL/L — SIGNIFICANT CHANGE UP (ref 3.5–5.3)
POTASSIUM SERPL-SCNC: 4.6 MMOL/L — SIGNIFICANT CHANGE UP (ref 3.5–5.3)
POTASSIUM SERPL-SCNC: 4.6 MMOL/L — SIGNIFICANT CHANGE UP (ref 3.5–5.3)
PROT SERPL-MCNC: 7.2 G/DL — SIGNIFICANT CHANGE UP (ref 6.6–8.7)
PROT SERPL-MCNC: 7.2 G/DL — SIGNIFICANT CHANGE UP (ref 6.6–8.7)
RBC # BLD: 4.48 M/UL — SIGNIFICANT CHANGE UP (ref 4.2–5.8)
RBC # BLD: 4.48 M/UL — SIGNIFICANT CHANGE UP (ref 4.2–5.8)
RBC # FLD: 13.2 % — SIGNIFICANT CHANGE UP (ref 10.3–14.5)
RBC # FLD: 13.2 % — SIGNIFICANT CHANGE UP (ref 10.3–14.5)
SODIUM SERPL-SCNC: 134 MMOL/L — LOW (ref 135–145)
SODIUM SERPL-SCNC: 134 MMOL/L — LOW (ref 135–145)
WBC # BLD: 8.11 K/UL — SIGNIFICANT CHANGE UP (ref 3.8–10.5)
WBC # BLD: 8.11 K/UL — SIGNIFICANT CHANGE UP (ref 3.8–10.5)
WBC # FLD AUTO: 8.11 K/UL — SIGNIFICANT CHANGE UP (ref 3.8–10.5)
WBC # FLD AUTO: 8.11 K/UL — SIGNIFICANT CHANGE UP (ref 3.8–10.5)

## 2023-11-16 PROCEDURE — 99284 EMERGENCY DEPT VISIT MOD MDM: CPT

## 2023-11-16 RX ORDER — ONDANSETRON 8 MG/1
4 TABLET, FILM COATED ORAL ONCE
Refills: 0 | Status: COMPLETED | OUTPATIENT
Start: 2023-11-16 | End: 2023-11-16

## 2023-11-16 RX ORDER — SODIUM CHLORIDE 9 MG/ML
1000 INJECTION, SOLUTION INTRAVENOUS ONCE
Refills: 0 | Status: COMPLETED | OUTPATIENT
Start: 2023-11-16 | End: 2023-11-16

## 2023-11-16 RX ORDER — FAMOTIDINE 10 MG/ML
20 INJECTION INTRAVENOUS ONCE
Refills: 0 | Status: COMPLETED | OUTPATIENT
Start: 2023-11-16 | End: 2023-11-16

## 2023-11-16 RX ORDER — MORPHINE SULFATE 50 MG/1
4 CAPSULE, EXTENDED RELEASE ORAL ONCE
Refills: 0 | Status: DISCONTINUED | OUTPATIENT
Start: 2023-11-16 | End: 2023-11-16

## 2023-11-16 RX ADMIN — ONDANSETRON 4 MILLIGRAM(S): 8 TABLET, FILM COATED ORAL at 20:40

## 2023-11-16 RX ADMIN — SODIUM CHLORIDE 1000 MILLILITER(S): 9 INJECTION, SOLUTION INTRAVENOUS at 23:14

## 2023-11-16 RX ADMIN — MORPHINE SULFATE 4 MILLIGRAM(S): 50 CAPSULE, EXTENDED RELEASE ORAL at 20:39

## 2023-11-16 RX ADMIN — SODIUM CHLORIDE 1000 MILLILITER(S): 9 INJECTION, SOLUTION INTRAVENOUS at 20:38

## 2023-11-16 RX ADMIN — SODIUM CHLORIDE 1000 MILLILITER(S): 9 INJECTION, SOLUTION INTRAVENOUS at 00:30

## 2023-11-16 RX ADMIN — SODIUM CHLORIDE 1000 MILLILITER(S): 9 INJECTION, SOLUTION INTRAVENOUS at 21:45

## 2023-11-16 RX ADMIN — FAMOTIDINE 20 MILLIGRAM(S): 10 INJECTION INTRAVENOUS at 20:38

## 2023-11-16 RX ADMIN — MORPHINE SULFATE 4 MILLIGRAM(S): 50 CAPSULE, EXTENDED RELEASE ORAL at 20:50

## 2023-11-16 NOTE — ED PROVIDER NOTE - PATIENT PORTAL LINK FT
You can access the FollowMyHealth Patient Portal offered by [facility] by registering at the following website: [email or web address]. By joining [employer]’s FollowMyHealth portal, you will also be able to view your health information using other applications (apps) compatible with our system.

## 2023-11-16 NOTE — ED PROVIDER NOTE - OBJECTIVE STATEMENT
59yo male with pmh of GERD and HTN presents with epigastric pain/n/v. Pt states for the past couple of days with these symptoms has had before and told it's his GERD. PT states his home meds haven't helped. Pt states all that works for him is famotidine, morphine, zofran and ivf. Pt denies fevers/chills, ha, loc, focal neuro deficits, cp/sob/palp, cough, diarrhea, urinary symptoms, recent travel and sick contacts.

## 2023-11-16 NOTE — ED PROVIDER NOTE - CARE PROVIDER_API CALL
Tish Deras  Gastroenterology  [address], [address]  [place] [address]  Phone: [phone]  Fax: [phone]  Follow Up Time:

## 2023-11-16 NOTE — ED ADULT NURSE NOTE - NSFALLUNIVINTERV_ED_ALL_ED
Bed/Stretcher in lowest position, wheels locked, appropriate side rails in place/Call bell, personal items and telephone in reach/Instruct patient to call for assistance before getting out of bed/chair/stretcher/Non-slip footwear applied when patient is off stretcher/[place] to call system/Physically safe environment - no spills, clutter or unnecessary equipment/Purposeful proactive rounding/Room/bathroom lighting operational, light cord in reach

## 2023-11-16 NOTE — ED ADULT NURSE NOTE - OBJECTIVE STATEMENT
pt a&ox4, rr even and unlabored on room air, no apparent distress noted. pt c/o mid abdominal pain since Sunday, along with nausea and vomiting. pt denies CP. pt given emesis bag at bedside. pt safety maintained.

## 2023-11-16 NOTE — ED ADULT TRIAGE NOTE - CHIEF COMPLAINT QUOTE
pt states he has abdominal pain nausea & vomiting since Sunday, has not eaten since Monday  MD states it was from Motrin tylenol combo  A&OX3, resp wnl

## 2023-11-16 NOTE — ED PROVIDER NOTE - PHYSICAL EXAMINATION
Const: Awake, alert and oriented. non toxic  HEENT: NC/AT. Moist mucous membranes.  Eyes: No scleral icterus. EOMI.  Neck:. Soft and supple. Full ROM without pain.  Cardiac: Regular rate and regular rhythm. +S1/S2. Peripheral pulses 2+ and symmetric. No LE edema.  Resp: Speaking in full sentences. No evidence of respiratory distress. No wheezes, rales or rhonchi.  Abd: Soft, non-tender, non-distended. Normal bowel sounds in all 4 quadrants. No guarding or rebound.  Back: Spine midline and non-tender. No CVAT.  Skin: No rashes, abrasions or lacerations.  Lymph: No cervical lymphadenopathy.  Neuro: Awake, alert & oriented x 3. Moves all extremities symmetrically.

## 2023-11-16 NOTE — ED PROVIDER NOTE - CLINICAL SUMMARY MEDICAL DECISION MAKING FREE TEXT BOX
57yo male with pmh of GERD and HTN presents with epigastric pain/n/v. - pt with benign abd, non toxic, basic labs eval for pancreatitis, symptomatic control reassess 57yo male with pmh of GERD and HTN presents with epigastric pain/n/v. - pt with benign abd, non toxic, basic labs eval for pancreatitis, symptomatic control reassess    KLEVER Retana: labs revealed abnormal BUN/cr- trending down with fluids. pt reported improvement of pain with meds. pt tolerated PO and ambulatory in ED. abdomen benign on reeval. discussed supportive care measures and return precautions. advised to f.u with PCP. referred to GI. pt verbalized understanding and agreement

## 2023-11-17 VITALS
SYSTOLIC BLOOD PRESSURE: 100 MMHG | TEMPERATURE: 99 F | RESPIRATION RATE: 16 BRPM | HEART RATE: 77 BPM | OXYGEN SATURATION: 95 % | DIASTOLIC BLOOD PRESSURE: 64 MMHG

## 2023-11-17 LAB
ANION GAP SERPL CALC-SCNC: 10 MMOL/L — SIGNIFICANT CHANGE UP (ref 5–17)
ANION GAP SERPL CALC-SCNC: 10 MMOL/L — SIGNIFICANT CHANGE UP (ref 5–17)
ANION GAP SERPL CALC-SCNC: 13 MMOL/L — SIGNIFICANT CHANGE UP (ref 5–17)
ANION GAP SERPL CALC-SCNC: 13 MMOL/L — SIGNIFICANT CHANGE UP (ref 5–17)
BUN SERPL-MCNC: 24.5 MG/DL — HIGH (ref 8–20)
BUN SERPL-MCNC: 24.5 MG/DL — HIGH (ref 8–20)
BUN SERPL-MCNC: 25.9 MG/DL — HIGH (ref 8–20)
BUN SERPL-MCNC: 25.9 MG/DL — HIGH (ref 8–20)
CALCIUM SERPL-MCNC: 8.6 MG/DL — SIGNIFICANT CHANGE UP (ref 8.4–10.5)
CALCIUM SERPL-MCNC: 8.6 MG/DL — SIGNIFICANT CHANGE UP (ref 8.4–10.5)
CALCIUM SERPL-MCNC: 8.9 MG/DL — SIGNIFICANT CHANGE UP (ref 8.4–10.5)
CALCIUM SERPL-MCNC: 8.9 MG/DL — SIGNIFICANT CHANGE UP (ref 8.4–10.5)
CHLORIDE SERPL-SCNC: 98 MMOL/L — SIGNIFICANT CHANGE UP (ref 96–108)
CO2 SERPL-SCNC: 26 MMOL/L — SIGNIFICANT CHANGE UP (ref 22–29)
CO2 SERPL-SCNC: 26 MMOL/L — SIGNIFICANT CHANGE UP (ref 22–29)
CO2 SERPL-SCNC: 27 MMOL/L — SIGNIFICANT CHANGE UP (ref 22–29)
CO2 SERPL-SCNC: 27 MMOL/L — SIGNIFICANT CHANGE UP (ref 22–29)
CREAT SERPL-MCNC: 1.7 MG/DL — HIGH (ref 0.5–1.3)
CREAT SERPL-MCNC: 1.7 MG/DL — HIGH (ref 0.5–1.3)
CREAT SERPL-MCNC: 1.95 MG/DL — HIGH (ref 0.5–1.3)
CREAT SERPL-MCNC: 1.95 MG/DL — HIGH (ref 0.5–1.3)
EGFR: 39 ML/MIN/1.73M2 — LOW
EGFR: 39 ML/MIN/1.73M2 — LOW
EGFR: 46 ML/MIN/1.73M2 — LOW
EGFR: 46 ML/MIN/1.73M2 — LOW
GLUCOSE SERPL-MCNC: 102 MG/DL — HIGH (ref 70–99)
GLUCOSE SERPL-MCNC: 102 MG/DL — HIGH (ref 70–99)
GLUCOSE SERPL-MCNC: 126 MG/DL — HIGH (ref 70–99)
GLUCOSE SERPL-MCNC: 126 MG/DL — HIGH (ref 70–99)
POTASSIUM SERPL-MCNC: 3.8 MMOL/L — SIGNIFICANT CHANGE UP (ref 3.5–5.3)
POTASSIUM SERPL-MCNC: 3.8 MMOL/L — SIGNIFICANT CHANGE UP (ref 3.5–5.3)
POTASSIUM SERPL-MCNC: 4 MMOL/L — SIGNIFICANT CHANGE UP (ref 3.5–5.3)
POTASSIUM SERPL-MCNC: 4 MMOL/L — SIGNIFICANT CHANGE UP (ref 3.5–5.3)
POTASSIUM SERPL-SCNC: 3.8 MMOL/L — SIGNIFICANT CHANGE UP (ref 3.5–5.3)
POTASSIUM SERPL-SCNC: 3.8 MMOL/L — SIGNIFICANT CHANGE UP (ref 3.5–5.3)
POTASSIUM SERPL-SCNC: 4 MMOL/L — SIGNIFICANT CHANGE UP (ref 3.5–5.3)
POTASSIUM SERPL-SCNC: 4 MMOL/L — SIGNIFICANT CHANGE UP (ref 3.5–5.3)
SODIUM SERPL-SCNC: 135 MMOL/L — SIGNIFICANT CHANGE UP (ref 135–145)
SODIUM SERPL-SCNC: 135 MMOL/L — SIGNIFICANT CHANGE UP (ref 135–145)
SODIUM SERPL-SCNC: 137 MMOL/L — SIGNIFICANT CHANGE UP (ref 135–145)
SODIUM SERPL-SCNC: 137 MMOL/L — SIGNIFICANT CHANGE UP (ref 135–145)

## 2023-11-17 PROCEDURE — 36415 COLL VENOUS BLD VENIPUNCTURE: CPT

## 2023-11-17 PROCEDURE — 85025 COMPLETE CBC W/AUTO DIFF WBC: CPT

## 2023-11-17 PROCEDURE — 96376 TX/PRO/DX INJ SAME DRUG ADON: CPT

## 2023-11-17 PROCEDURE — 83690 ASSAY OF LIPASE: CPT

## 2023-11-17 PROCEDURE — 96375 TX/PRO/DX INJ NEW DRUG ADDON: CPT

## 2023-11-17 PROCEDURE — 96374 THER/PROPH/DIAG INJ IV PUSH: CPT

## 2023-11-17 PROCEDURE — 80053 COMPREHEN METABOLIC PANEL: CPT

## 2023-11-17 PROCEDURE — 80048 BASIC METABOLIC PNL TOTAL CA: CPT

## 2023-11-17 PROCEDURE — 99284 EMERGENCY DEPT VISIT MOD MDM: CPT | Mod: 25

## 2023-11-17 PROCEDURE — 96361 HYDRATE IV INFUSION ADD-ON: CPT

## 2023-11-17 RX ORDER — SODIUM CHLORIDE 9 MG/ML
1000 INJECTION, SOLUTION INTRAVENOUS ONCE
Refills: 0 | Status: COMPLETED | OUTPATIENT
Start: 2023-11-17 | End: 2023-11-17

## 2023-11-17 RX ORDER — DIPHENHYDRAMINE HCL 50 MG
25 CAPSULE ORAL DAILY
Refills: 0 | Status: DISCONTINUED | OUTPATIENT
Start: 2023-11-17 | End: 2023-11-24

## 2023-11-17 RX ORDER — MORPHINE SULFATE 50 MG/1
2 CAPSULE, EXTENDED RELEASE ORAL ONCE
Refills: 0 | Status: DISCONTINUED | OUTPATIENT
Start: 2023-11-17 | End: 2023-11-17

## 2023-11-17 RX ADMIN — SODIUM CHLORIDE 1000 MILLILITER(S): 9 INJECTION, SOLUTION INTRAVENOUS at 02:43

## 2023-11-17 RX ADMIN — ONDANSETRON 4 MILLIGRAM(S): 8 TABLET, FILM COATED ORAL at 00:11

## 2023-11-17 RX ADMIN — MORPHINE SULFATE 2 MILLIGRAM(S): 50 CAPSULE, EXTENDED RELEASE ORAL at 04:33

## 2023-11-17 RX ADMIN — Medication 25 MILLIGRAM(S): at 00:26

## 2023-11-17 RX ADMIN — MORPHINE SULFATE 2 MILLIGRAM(S): 50 CAPSULE, EXTENDED RELEASE ORAL at 04:45

## 2023-11-21 ENCOUNTER — TRANSCRIPTION ENCOUNTER (OUTPATIENT)
Age: 58
End: 2023-11-21

## 2023-11-21 ENCOUNTER — APPOINTMENT (OUTPATIENT)
Dept: PAIN MANAGEMENT | Facility: CLINIC | Age: 58
End: 2023-11-21
Payer: COMMERCIAL

## 2023-11-21 VITALS — BODY MASS INDEX: 29.13 KG/M2 | HEIGHT: 74 IN | WEIGHT: 227 LBS

## 2023-11-21 DIAGNOSIS — M16.0 BILATERAL PRIMARY OSTEOARTHRITIS OF HIP: ICD-10-CM

## 2023-11-21 PROCEDURE — 99213 OFFICE O/P EST LOW 20 MIN: CPT

## 2023-11-21 RX ORDER — TRAMADOL HYDROCHLORIDE 50 MG/1
50 TABLET, COATED ORAL
Qty: 90 | Refills: 0 | Status: ACTIVE | COMMUNITY
Start: 2022-12-01 | End: 1900-01-01

## 2023-11-21 RX ORDER — TIZANIDINE 4 MG/1
4 TABLET ORAL
Qty: 180 | Refills: 2 | Status: ACTIVE | COMMUNITY
Start: 2022-10-18 | End: 1900-01-01

## 2023-11-22 ENCOUNTER — APPOINTMENT (OUTPATIENT)
Dept: ORTHOPEDIC SURGERY | Facility: CLINIC | Age: 58
End: 2023-11-22
Payer: OTHER MISCELLANEOUS

## 2023-11-22 VITALS — HEIGHT: 74 IN | BODY MASS INDEX: 29.13 KG/M2 | WEIGHT: 227 LBS

## 2023-11-22 DIAGNOSIS — M47.812 SPONDYLOSIS W/OUT MYELOPATHY OR RADICULOPATHY, CERVICAL REGION: ICD-10-CM

## 2023-11-22 DIAGNOSIS — Z98.1 ARTHRODESIS STATUS: ICD-10-CM

## 2023-11-22 DIAGNOSIS — M48.02 SPINAL STENOSIS, CERVICAL REGION: ICD-10-CM

## 2023-11-22 DIAGNOSIS — M54.2 CERVICALGIA: ICD-10-CM

## 2023-11-22 DIAGNOSIS — M54.12 RADICULOPATHY, CERVICAL REGION: ICD-10-CM

## 2023-11-22 DIAGNOSIS — M48.062 SPINAL STENOSIS, LUMBAR REGION WITH NEUROGENIC CLAUDICATION: ICD-10-CM

## 2023-11-22 DIAGNOSIS — M40.15 OTHER SECONDARY KYPHOSIS, THORACOLUMBAR REGION: ICD-10-CM

## 2023-11-22 PROCEDURE — 72100 X-RAY EXAM L-S SPINE 2/3 VWS: CPT

## 2023-11-22 PROCEDURE — 99214 OFFICE O/P EST MOD 30 MIN: CPT

## 2023-11-22 PROCEDURE — 72070 X-RAY EXAM THORAC SPINE 2VWS: CPT

## 2023-11-24 ENCOUNTER — OUTPATIENT (OUTPATIENT)
Dept: OUTPATIENT SERVICES | Facility: HOSPITAL | Age: 58
LOS: 1 days | Discharge: HOME | End: 2023-11-24
Payer: COMMERCIAL

## 2023-11-24 VITALS
TEMPERATURE: 98 F | HEART RATE: 72 BPM | SYSTOLIC BLOOD PRESSURE: 105 MMHG | HEIGHT: 74 IN | WEIGHT: 196.43 LBS | RESPIRATION RATE: 18 BRPM | OXYGEN SATURATION: 98 % | DIASTOLIC BLOOD PRESSURE: 68 MMHG

## 2023-11-24 DIAGNOSIS — K21.9 GASTRO-ESOPHAGEAL REFLUX DISEASE WITHOUT ESOPHAGITIS: ICD-10-CM

## 2023-11-24 DIAGNOSIS — I10 ESSENTIAL (PRIMARY) HYPERTENSION: ICD-10-CM

## 2023-11-24 DIAGNOSIS — M16.12 UNILATERAL PRIMARY OSTEOARTHRITIS, LEFT HIP: ICD-10-CM

## 2023-11-24 DIAGNOSIS — D17.9 BENIGN LIPOMATOUS NEOPLASM, UNSPECIFIED: Chronic | ICD-10-CM

## 2023-11-24 DIAGNOSIS — E78.5 HYPERLIPIDEMIA, UNSPECIFIED: ICD-10-CM

## 2023-11-24 DIAGNOSIS — Z90.79 ACQUIRED ABSENCE OF OTHER GENITAL ORGAN(S): Chronic | ICD-10-CM

## 2023-11-24 DIAGNOSIS — Z98.890 OTHER SPECIFIED POSTPROCEDURAL STATES: Chronic | ICD-10-CM

## 2023-11-24 DIAGNOSIS — Z01.818 ENCOUNTER FOR OTHER PREPROCEDURAL EXAMINATION: ICD-10-CM

## 2023-11-24 DIAGNOSIS — Z01.812 ENCOUNTER FOR PREPROCEDURAL LABORATORY EXAMINATION: ICD-10-CM

## 2023-11-24 LAB
A1C WITH ESTIMATED AVERAGE GLUCOSE RESULT: 5.2 % — SIGNIFICANT CHANGE UP (ref 4–5.6)
A1C WITH ESTIMATED AVERAGE GLUCOSE RESULT: 5.2 % — SIGNIFICANT CHANGE UP (ref 4–5.6)
ALBUMIN SERPL ELPH-MCNC: 3.7 G/DL — SIGNIFICANT CHANGE UP (ref 3.3–5)
ALBUMIN SERPL ELPH-MCNC: 3.7 G/DL — SIGNIFICANT CHANGE UP (ref 3.3–5)
ALP SERPL-CCNC: 57 U/L — SIGNIFICANT CHANGE UP (ref 40–120)
ALP SERPL-CCNC: 57 U/L — SIGNIFICANT CHANGE UP (ref 40–120)
ALT FLD-CCNC: 24 U/L — SIGNIFICANT CHANGE UP (ref 12–78)
ALT FLD-CCNC: 24 U/L — SIGNIFICANT CHANGE UP (ref 12–78)
ANION GAP SERPL CALC-SCNC: 4 MMOL/L — LOW (ref 5–17)
ANION GAP SERPL CALC-SCNC: 4 MMOL/L — LOW (ref 5–17)
AST SERPL-CCNC: 14 U/L — LOW (ref 15–37)
AST SERPL-CCNC: 14 U/L — LOW (ref 15–37)
BASOPHILS # BLD AUTO: 0.02 K/UL — SIGNIFICANT CHANGE UP (ref 0–0.2)
BASOPHILS # BLD AUTO: 0.02 K/UL — SIGNIFICANT CHANGE UP (ref 0–0.2)
BASOPHILS NFR BLD AUTO: 0.3 % — SIGNIFICANT CHANGE UP (ref 0–2)
BASOPHILS NFR BLD AUTO: 0.3 % — SIGNIFICANT CHANGE UP (ref 0–2)
BILIRUB SERPL-MCNC: 0.3 MG/DL — SIGNIFICANT CHANGE UP (ref 0.2–1.2)
BILIRUB SERPL-MCNC: 0.3 MG/DL — SIGNIFICANT CHANGE UP (ref 0.2–1.2)
BLD GP AB SCN SERPL QL: SIGNIFICANT CHANGE UP
BLD GP AB SCN SERPL QL: SIGNIFICANT CHANGE UP
BUN SERPL-MCNC: 23 MG/DL — SIGNIFICANT CHANGE UP (ref 7–23)
BUN SERPL-MCNC: 23 MG/DL — SIGNIFICANT CHANGE UP (ref 7–23)
CALCIUM SERPL-MCNC: 9.2 MG/DL — SIGNIFICANT CHANGE UP (ref 8.5–10.1)
CALCIUM SERPL-MCNC: 9.2 MG/DL — SIGNIFICANT CHANGE UP (ref 8.5–10.1)
CHLORIDE SERPL-SCNC: 100 MMOL/L — SIGNIFICANT CHANGE UP (ref 96–108)
CHLORIDE SERPL-SCNC: 100 MMOL/L — SIGNIFICANT CHANGE UP (ref 96–108)
CO2 SERPL-SCNC: 29 MMOL/L — SIGNIFICANT CHANGE UP (ref 22–31)
CO2 SERPL-SCNC: 29 MMOL/L — SIGNIFICANT CHANGE UP (ref 22–31)
CREAT SERPL-MCNC: 1.55 MG/DL — HIGH (ref 0.5–1.3)
CREAT SERPL-MCNC: 1.55 MG/DL — HIGH (ref 0.5–1.3)
EGFR: 52 ML/MIN/1.73M2 — LOW
EGFR: 52 ML/MIN/1.73M2 — LOW
EOSINOPHIL # BLD AUTO: 0.33 K/UL — SIGNIFICANT CHANGE UP (ref 0–0.5)
EOSINOPHIL # BLD AUTO: 0.33 K/UL — SIGNIFICANT CHANGE UP (ref 0–0.5)
EOSINOPHIL NFR BLD AUTO: 4.9 % — SIGNIFICANT CHANGE UP (ref 0–6)
EOSINOPHIL NFR BLD AUTO: 4.9 % — SIGNIFICANT CHANGE UP (ref 0–6)
ESTIMATED AVERAGE GLUCOSE: 103 MG/DL — SIGNIFICANT CHANGE UP (ref 68–114)
ESTIMATED AVERAGE GLUCOSE: 103 MG/DL — SIGNIFICANT CHANGE UP (ref 68–114)
GLUCOSE SERPL-MCNC: 95 MG/DL — SIGNIFICANT CHANGE UP (ref 70–99)
GLUCOSE SERPL-MCNC: 95 MG/DL — SIGNIFICANT CHANGE UP (ref 70–99)
HCT VFR BLD CALC: 38.2 % — LOW (ref 39–50)
HCT VFR BLD CALC: 38.2 % — LOW (ref 39–50)
HGB BLD-MCNC: 12.9 G/DL — LOW (ref 13–17)
HGB BLD-MCNC: 12.9 G/DL — LOW (ref 13–17)
IMM GRANULOCYTES NFR BLD AUTO: 0.3 % — SIGNIFICANT CHANGE UP (ref 0–0.9)
IMM GRANULOCYTES NFR BLD AUTO: 0.3 % — SIGNIFICANT CHANGE UP (ref 0–0.9)
INR BLD: 1.02 RATIO — SIGNIFICANT CHANGE UP (ref 0.85–1.18)
INR BLD: 1.02 RATIO — SIGNIFICANT CHANGE UP (ref 0.85–1.18)
LYMPHOCYTES # BLD AUTO: 1.28 K/UL — SIGNIFICANT CHANGE UP (ref 1–3.3)
LYMPHOCYTES # BLD AUTO: 1.28 K/UL — SIGNIFICANT CHANGE UP (ref 1–3.3)
LYMPHOCYTES # BLD AUTO: 19 % — SIGNIFICANT CHANGE UP (ref 13–44)
LYMPHOCYTES # BLD AUTO: 19 % — SIGNIFICANT CHANGE UP (ref 13–44)
MCHC RBC-ENTMCNC: 30 PG — SIGNIFICANT CHANGE UP (ref 27–34)
MCHC RBC-ENTMCNC: 30 PG — SIGNIFICANT CHANGE UP (ref 27–34)
MCHC RBC-ENTMCNC: 33.8 G/DL — SIGNIFICANT CHANGE UP (ref 32–36)
MCHC RBC-ENTMCNC: 33.8 G/DL — SIGNIFICANT CHANGE UP (ref 32–36)
MCV RBC AUTO: 88.8 FL — SIGNIFICANT CHANGE UP (ref 80–100)
MCV RBC AUTO: 88.8 FL — SIGNIFICANT CHANGE UP (ref 80–100)
MONOCYTES # BLD AUTO: 0.56 K/UL — SIGNIFICANT CHANGE UP (ref 0–0.9)
MONOCYTES # BLD AUTO: 0.56 K/UL — SIGNIFICANT CHANGE UP (ref 0–0.9)
MONOCYTES NFR BLD AUTO: 8.3 % — SIGNIFICANT CHANGE UP (ref 2–14)
MONOCYTES NFR BLD AUTO: 8.3 % — SIGNIFICANT CHANGE UP (ref 2–14)
MRSA PCR RESULT.: SIGNIFICANT CHANGE UP
MRSA PCR RESULT.: SIGNIFICANT CHANGE UP
NEUTROPHILS # BLD AUTO: 4.53 K/UL — SIGNIFICANT CHANGE UP (ref 1.8–7.4)
NEUTROPHILS # BLD AUTO: 4.53 K/UL — SIGNIFICANT CHANGE UP (ref 1.8–7.4)
NEUTROPHILS NFR BLD AUTO: 67.2 % — SIGNIFICANT CHANGE UP (ref 43–77)
NEUTROPHILS NFR BLD AUTO: 67.2 % — SIGNIFICANT CHANGE UP (ref 43–77)
NRBC # BLD: 0 /100 WBCS — SIGNIFICANT CHANGE UP (ref 0–0)
NRBC # BLD: 0 /100 WBCS — SIGNIFICANT CHANGE UP (ref 0–0)
PLATELET # BLD AUTO: 261 K/UL — SIGNIFICANT CHANGE UP (ref 150–400)
PLATELET # BLD AUTO: 261 K/UL — SIGNIFICANT CHANGE UP (ref 150–400)
POTASSIUM SERPL-MCNC: 4.1 MMOL/L — SIGNIFICANT CHANGE UP (ref 3.5–5.3)
POTASSIUM SERPL-MCNC: 4.1 MMOL/L — SIGNIFICANT CHANGE UP (ref 3.5–5.3)
POTASSIUM SERPL-SCNC: 4.1 MMOL/L — SIGNIFICANT CHANGE UP (ref 3.5–5.3)
POTASSIUM SERPL-SCNC: 4.1 MMOL/L — SIGNIFICANT CHANGE UP (ref 3.5–5.3)
PROT SERPL-MCNC: 7.8 GM/DL — SIGNIFICANT CHANGE UP (ref 6–8.3)
PROT SERPL-MCNC: 7.8 GM/DL — SIGNIFICANT CHANGE UP (ref 6–8.3)
PROTHROM AB SERPL-ACNC: 12.1 SEC — SIGNIFICANT CHANGE UP (ref 9.5–13)
PROTHROM AB SERPL-ACNC: 12.1 SEC — SIGNIFICANT CHANGE UP (ref 9.5–13)
RBC # BLD: 4.3 M/UL — SIGNIFICANT CHANGE UP (ref 4.2–5.8)
RBC # BLD: 4.3 M/UL — SIGNIFICANT CHANGE UP (ref 4.2–5.8)
RBC # FLD: 12.7 % — SIGNIFICANT CHANGE UP (ref 10.3–14.5)
RBC # FLD: 12.7 % — SIGNIFICANT CHANGE UP (ref 10.3–14.5)
S AUREUS DNA NOSE QL NAA+PROBE: SIGNIFICANT CHANGE UP
S AUREUS DNA NOSE QL NAA+PROBE: SIGNIFICANT CHANGE UP
SODIUM SERPL-SCNC: 133 MMOL/L — LOW (ref 135–145)
SODIUM SERPL-SCNC: 133 MMOL/L — LOW (ref 135–145)
VIT D25+D1,25 OH+D1,25 PNL SERPL-MCNC: 40 PG/ML — SIGNIFICANT CHANGE UP (ref 19.9–79.3)
VIT D25+D1,25 OH+D1,25 PNL SERPL-MCNC: 40 PG/ML — SIGNIFICANT CHANGE UP (ref 19.9–79.3)
WBC # BLD: 6.74 K/UL — SIGNIFICANT CHANGE UP (ref 3.8–10.5)
WBC # BLD: 6.74 K/UL — SIGNIFICANT CHANGE UP (ref 3.8–10.5)
WBC # FLD AUTO: 6.74 K/UL — SIGNIFICANT CHANGE UP (ref 3.8–10.5)
WBC # FLD AUTO: 6.74 K/UL — SIGNIFICANT CHANGE UP (ref 3.8–10.5)

## 2023-11-24 PROCEDURE — 93010 ELECTROCARDIOGRAM REPORT: CPT

## 2023-11-24 RX ORDER — MONTELUKAST 4 MG/1
1 TABLET, CHEWABLE ORAL
Refills: 0 | DISCHARGE

## 2023-11-24 RX ORDER — POLYETHYLENE GLYCOL 3350 17 G/17G
17 POWDER, FOR SOLUTION ORAL
Qty: 0 | Refills: 0 | DISCHARGE

## 2023-11-24 NOTE — OCCUPATIONAL THERAPY INITIAL EVALUATION ADULT - LIVES WITH, PROFILE
and spouse in a private house with 3 entry steps equipped with bilateral hand rails that cannot be reached simultaneously. Most living amenities are located on one level.  The bathroom has a tub/shower combination, fixed shower and comfort height toilet with elevated seat without hand rails. Pt's toilet has adequate space to fit a commode over it./children/spouse

## 2023-11-24 NOTE — H&P PST ADULT - NEGATIVE ALLERGY TYPES
no outdoor environmental allergies/no indoor environmental allergies/no reactions to medicines/no reactions to animals

## 2023-11-24 NOTE — H&P PST ADULT - REASON FOR ADMISSION
left hip replacement
.  79years female alert mental state (AOX3) received on foot.  -complain of fall down.  [place] to the . the  sent pt to the ED for further evaluation.   -denied chest pain, SOB, fever, dizziness.  Pt is in the chair comfortably at this time. Will continue to monitor and document any changes.

## 2023-11-24 NOTE — H&P PST ADULT - NEUROLOGICAL
details… normal/cranial nerves II-XII intact/sensation intact cranial nerves II-XII intact/sensation intact negative

## 2023-11-24 NOTE — H&P PST ADULT - PROBLEM SELECTOR PLAN 1
Pre-op instructions given. Pt verbalized understanding  Chlorhexidine wash instructions given  Ensure clear instructions given  Pt instructed to hold all NSAIDs + herbal supplements/vitamins 7 days before surgery  T/S ordered STAT before surgery  Pending: lab result  Pending: M/C for abnormal ecg
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
## 2023-11-24 NOTE — OCCUPATIONAL THERAPY INITIAL EVALUATION ADULT - KINESTHESIA, HEAD/NECK, OT EVAL
I have pended the requested refill(s).  Please have one of the other MDs print and sign the controlled substance prescription(s) in my absence.     Julio Guidry MD     within normal limits

## 2023-11-24 NOTE — H&P PST ADULT - MUSCULOSKELETAL
details… lower back/decreased ROM due to pain decreased ROM due to pain/normal gait/strength 5/5 bilateral upper extremities

## 2023-11-24 NOTE — OCCUPATIONAL THERAPY INITIAL EVALUATION ADULT - GENERAL OBSERVATIONS, REHAB EVAL
Chart reviewed. Patient encountered seated in chair in rehab preop room in [facility]. Patient underwent occupational therapy pre-operative consultation to determine current functional ADL limitations in order to provide the right equipment for patient to perform functional ADL post operation.

## 2023-11-24 NOTE — H&P PST ADULT - RESPIRATORY
clear to auscultation bilaterally/no wheezes/no rales clear to auscultation bilaterally/no wheezes/no rales/no rhonchi/no respiratory distress

## 2023-11-24 NOTE — H&P PST ADULT - HISTORY OF PRESENT ILLNESS
58M pmh prostate CA (s/p sx/RT/lupron 2018), HTN, GERD, c/o lower back pain after injury while at work on 7-2-2022 found to have arthrodesis here for PST for scheduled posterior spinal fusion, decompression laminectomy L1-L2 revision and reinsertion of hardware L3-5  This patient denies any fever, cough, sob, flu like symptoms or travel outside of the [place] in the past 30 days  59y/o with medical h/o HTN, HDL, GERD and pmhx prostate CA (s/p sx/RT/lupron 2018), reports OA left hip. c/o left hip pain and presents today for PST for scheduled Left Total Hip Arthroplasty on 12/13/2023. This patient denies any fever, cough, sob, flu like symptoms or travel outside of the [place] in the past 30 days  57y/o with medical h/o HTN, HDL, GERD and pmhx prostate CA (s/p sx/RT/lupron 2018), reports OA left hip. c/o left hip pain and presents today for PST for scheduled Left Total Hip Arthroplasty on 12/13/2023. This patient denies any fever, cough, sob, flu like symptoms or travel outside of the [place] in the past 30 days

## 2023-11-24 NOTE — H&P PST ADULT - NSICDXPASTMEDICALHX_GEN_ALL_CORE_FT
PAST MEDICAL HISTORY:  GERD (gastroesophageal reflux disease)     HTN (hypertension)     Lumbar stenosis     Prostate cancer      PAST MEDICAL HISTORY:  Acute renal failure     GERD (gastroesophageal reflux disease)     HTN (hypertension)     Hyperlipidemia     Lumbar stenosis     Prostate cancer     S/P cervical spinal fusion

## 2023-11-27 PROBLEM — Z98.1 ARTHRODESIS STATUS: Chronic | Status: ACTIVE | Noted: 2023-11-24

## 2023-11-27 PROBLEM — E78.5 HYPERLIPIDEMIA, UNSPECIFIED: Chronic | Status: ACTIVE | Noted: 2023-11-24

## 2023-11-27 RX ORDER — HYDROCODONE BITARTRATE AND ACETAMINOPHEN 5; 325 MG/1; MG/1
5-325 TABLET ORAL 3 TIMES DAILY
Qty: 30 | Refills: 0 | Status: ACTIVE | COMMUNITY
Start: 2022-10-26 | End: 1900-01-01

## 2023-11-28 NOTE — PHYSICAL THERAPY INITIAL EVALUATION ADULT - PHYSICAL ASSIST/NONPHYSICAL ASSIST: SUPINE/SIT, REHAB EVAL
Right vision Loss s/p Left TKA 11/22/23 1 person assist/verbal cues/spinal precaution and proper body mechanics

## 2023-12-12 ENCOUNTER — TRANSCRIPTION ENCOUNTER (OUTPATIENT)
Age: 58
End: 2023-12-12

## 2023-12-12 RX ORDER — SODIUM CHLORIDE 9 MG/ML
3 INJECTION INTRAMUSCULAR; INTRAVENOUS; SUBCUTANEOUS EVERY 8 HOURS
Refills: 0 | Status: DISCONTINUED | OUTPATIENT
Start: 2023-12-13 | End: 2023-12-14

## 2023-12-13 ENCOUNTER — APPOINTMENT (OUTPATIENT)
Dept: ORTHOPEDIC SURGERY | Facility: HOSPITAL | Age: 58
End: 2023-12-13
Payer: COMMERCIAL

## 2023-12-13 ENCOUNTER — RESULT REVIEW (OUTPATIENT)
Age: 58
End: 2023-12-13

## 2023-12-13 ENCOUNTER — TRANSCRIPTION ENCOUNTER (OUTPATIENT)
Age: 58
End: 2023-12-13

## 2023-12-13 ENCOUNTER — INPATIENT (INPATIENT)
Facility: HOSPITAL | Age: 58
LOS: 0 days | Discharge: HOME HEALTH | End: 2023-12-14
Attending: ORTHOPAEDIC SURGERY | Admitting: ORTHOPAEDIC SURGERY
Payer: COMMERCIAL

## 2023-12-13 ENCOUNTER — OUTPATIENT (OUTPATIENT)
Dept: OUTPATIENT SERVICES | Facility: HOSPITAL | Age: 58
LOS: 1 days | Discharge: HOME | End: 2023-12-13

## 2023-12-13 VITALS
RESPIRATION RATE: 17 BRPM | HEIGHT: 74 IN | HEART RATE: 88 BPM | WEIGHT: 184.97 LBS | OXYGEN SATURATION: 99 % | TEMPERATURE: 99 F | SYSTOLIC BLOOD PRESSURE: 111 MMHG | DIASTOLIC BLOOD PRESSURE: 71 MMHG

## 2023-12-13 DIAGNOSIS — D17.9 BENIGN LIPOMATOUS NEOPLASM, UNSPECIFIED: Chronic | ICD-10-CM

## 2023-12-13 DIAGNOSIS — Z98.890 OTHER SPECIFIED POSTPROCEDURAL STATES: Chronic | ICD-10-CM

## 2023-12-13 DIAGNOSIS — Z90.79 ACQUIRED ABSENCE OF OTHER GENITAL ORGAN(S): Chronic | ICD-10-CM

## 2023-12-13 LAB
ANION GAP SERPL CALC-SCNC: 1 MMOL/L — LOW (ref 5–17)
ANION GAP SERPL CALC-SCNC: 1 MMOL/L — LOW (ref 5–17)
BLD GP AB SCN SERPL QL: SIGNIFICANT CHANGE UP
BLD GP AB SCN SERPL QL: SIGNIFICANT CHANGE UP
BUN SERPL-MCNC: 16 MG/DL — SIGNIFICANT CHANGE UP (ref 7–23)
BUN SERPL-MCNC: 16 MG/DL — SIGNIFICANT CHANGE UP (ref 7–23)
CALCIUM SERPL-MCNC: 9 MG/DL — SIGNIFICANT CHANGE UP (ref 8.5–10.1)
CALCIUM SERPL-MCNC: 9 MG/DL — SIGNIFICANT CHANGE UP (ref 8.5–10.1)
CHLORIDE SERPL-SCNC: 108 MMOL/L — SIGNIFICANT CHANGE UP (ref 96–108)
CHLORIDE SERPL-SCNC: 108 MMOL/L — SIGNIFICANT CHANGE UP (ref 96–108)
CO2 SERPL-SCNC: 30 MMOL/L — SIGNIFICANT CHANGE UP (ref 22–31)
CO2 SERPL-SCNC: 30 MMOL/L — SIGNIFICANT CHANGE UP (ref 22–31)
CREAT SERPL-MCNC: 1.21 MG/DL — SIGNIFICANT CHANGE UP (ref 0.5–1.3)
CREAT SERPL-MCNC: 1.21 MG/DL — SIGNIFICANT CHANGE UP (ref 0.5–1.3)
EGFR: 70 ML/MIN/1.73M2 — SIGNIFICANT CHANGE UP
EGFR: 70 ML/MIN/1.73M2 — SIGNIFICANT CHANGE UP
GLUCOSE SERPL-MCNC: 100 MG/DL — HIGH (ref 70–99)
GLUCOSE SERPL-MCNC: 100 MG/DL — HIGH (ref 70–99)
HCT VFR BLD CALC: 34.6 % — LOW (ref 39–50)
HCT VFR BLD CALC: 34.6 % — LOW (ref 39–50)
HGB BLD-MCNC: 12 G/DL — LOW (ref 13–17)
HGB BLD-MCNC: 12 G/DL — LOW (ref 13–17)
MCHC RBC-ENTMCNC: 31 PG — SIGNIFICANT CHANGE UP (ref 27–34)
MCHC RBC-ENTMCNC: 31 PG — SIGNIFICANT CHANGE UP (ref 27–34)
MCHC RBC-ENTMCNC: 34.7 G/DL — SIGNIFICANT CHANGE UP (ref 32–36)
MCHC RBC-ENTMCNC: 34.7 G/DL — SIGNIFICANT CHANGE UP (ref 32–36)
MCV RBC AUTO: 89.4 FL — SIGNIFICANT CHANGE UP (ref 80–100)
MCV RBC AUTO: 89.4 FL — SIGNIFICANT CHANGE UP (ref 80–100)
NRBC # BLD: 0 /100 WBCS — SIGNIFICANT CHANGE UP (ref 0–0)
NRBC # BLD: 0 /100 WBCS — SIGNIFICANT CHANGE UP (ref 0–0)
PLATELET # BLD AUTO: 242 K/UL — SIGNIFICANT CHANGE UP (ref 150–400)
PLATELET # BLD AUTO: 242 K/UL — SIGNIFICANT CHANGE UP (ref 150–400)
POTASSIUM SERPL-MCNC: 5 MMOL/L — SIGNIFICANT CHANGE UP (ref 3.5–5.3)
POTASSIUM SERPL-MCNC: 5 MMOL/L — SIGNIFICANT CHANGE UP (ref 3.5–5.3)
POTASSIUM SERPL-SCNC: 5 MMOL/L — SIGNIFICANT CHANGE UP (ref 3.5–5.3)
POTASSIUM SERPL-SCNC: 5 MMOL/L — SIGNIFICANT CHANGE UP (ref 3.5–5.3)
RBC # BLD: 3.87 M/UL — LOW (ref 4.2–5.8)
RBC # BLD: 3.87 M/UL — LOW (ref 4.2–5.8)
RBC # FLD: 12 % — SIGNIFICANT CHANGE UP (ref 10.3–14.5)
RBC # FLD: 12 % — SIGNIFICANT CHANGE UP (ref 10.3–14.5)
SODIUM SERPL-SCNC: 139 MMOL/L — SIGNIFICANT CHANGE UP (ref 135–145)
SODIUM SERPL-SCNC: 139 MMOL/L — SIGNIFICANT CHANGE UP (ref 135–145)
WBC # BLD: 9.94 K/UL — SIGNIFICANT CHANGE UP (ref 3.8–10.5)
WBC # BLD: 9.94 K/UL — SIGNIFICANT CHANGE UP (ref 3.8–10.5)
WBC # FLD AUTO: 9.94 K/UL — SIGNIFICANT CHANGE UP (ref 3.8–10.5)
WBC # FLD AUTO: 9.94 K/UL — SIGNIFICANT CHANGE UP (ref 3.8–10.5)

## 2023-12-13 PROCEDURE — 20610 DRAIN/INJ JOINT/BURSA W/O US: CPT | Mod: 59,LT

## 2023-12-13 PROCEDURE — 88305 TISSUE EXAM BY PATHOLOGIST: CPT | Mod: 26

## 2023-12-13 PROCEDURE — 73502 X-RAY EXAM HIP UNI 2-3 VIEWS: CPT | Mod: 26

## 2023-12-13 PROCEDURE — 27000 TENOTOMY ADDUCTOR HIP PERQ: CPT | Mod: 59,LT

## 2023-12-13 PROCEDURE — 73501 X-RAY EXAM HIP UNI 1 VIEW: CPT | Mod: 26,LT

## 2023-12-13 PROCEDURE — 88311 DECALCIFY TISSUE: CPT | Mod: 26

## 2023-12-13 PROCEDURE — 27000 TENOTOMY ADDUCTOR HIP PERQ: CPT | Mod: AS,59,LT

## 2023-12-13 PROCEDURE — 27130 TOTAL HIP ARTHROPLASTY: CPT | Mod: AS,LT

## 2023-12-13 PROCEDURE — 27130 TOTAL HIP ARTHROPLASTY: CPT | Mod: LT

## 2023-12-13 DEVICE — IMPLANTABLE DEVICE: Type: IMPLANTABLE DEVICE | Site: LEFT | Status: FUNCTIONAL

## 2023-12-13 DEVICE — HEAD FEM 12 X 14 28MM  PLUS 5 CERAMIC: Type: IMPLANTABLE DEVICE | Site: LEFT | Status: FUNCTIONAL

## 2023-12-13 DEVICE — STEM FEM ACTIS HIGH COLLAR SZ 6: Type: IMPLANTABLE DEVICE | Site: LEFT | Status: FUNCTIONAL

## 2023-12-13 RX ORDER — SODIUM CHLORIDE 9 MG/ML
1000 INJECTION, SOLUTION INTRAVENOUS
Refills: 0 | Status: DISCONTINUED | OUTPATIENT
Start: 2023-12-13 | End: 2023-12-13

## 2023-12-13 RX ORDER — ACETAMINOPHEN 500 MG
650 TABLET ORAL ONCE
Refills: 0 | Status: COMPLETED | OUTPATIENT
Start: 2023-12-13 | End: 2023-12-13

## 2023-12-13 RX ORDER — MAGNESIUM HYDROXIDE 400 MG/1
30 TABLET, CHEWABLE ORAL DAILY
Refills: 0 | Status: DISCONTINUED | OUTPATIENT
Start: 2023-12-13 | End: 2023-12-14

## 2023-12-13 RX ORDER — DEXAMETHASONE 0.5 MG/5ML
10 ELIXIR ORAL ONCE
Refills: 0 | Status: COMPLETED | OUTPATIENT
Start: 2023-12-14 | End: 2023-12-14

## 2023-12-13 RX ORDER — ASPIRIN/CALCIUM CARB/MAGNESIUM 324 MG
81 TABLET ORAL
Refills: 0 | Status: DISCONTINUED | OUTPATIENT
Start: 2023-12-14 | End: 2023-12-14

## 2023-12-13 RX ORDER — OXYCODONE HYDROCHLORIDE 5 MG/1
5 TABLET ORAL EVERY 4 HOURS
Refills: 0 | Status: DISCONTINUED | OUTPATIENT
Start: 2023-12-13 | End: 2023-12-14

## 2023-12-13 RX ORDER — ONDANSETRON 8 MG/1
4 TABLET, FILM COATED ORAL ONCE
Refills: 0 | Status: DISCONTINUED | OUTPATIENT
Start: 2023-12-13 | End: 2023-12-13

## 2023-12-13 RX ORDER — SODIUM CHLORIDE 9 MG/ML
1000 INJECTION, SOLUTION INTRAVENOUS
Refills: 0 | Status: DISCONTINUED | OUTPATIENT
Start: 2023-12-13 | End: 2023-12-14

## 2023-12-13 RX ORDER — CYCLOBENZAPRINE HYDROCHLORIDE 10 MG/1
10 TABLET, FILM COATED ORAL THREE TIMES A DAY
Refills: 0 | Status: DISCONTINUED | OUTPATIENT
Start: 2023-12-13 | End: 2023-12-14

## 2023-12-13 RX ORDER — ACETAMINOPHEN 500 MG
1000 TABLET ORAL ONCE
Refills: 0 | Status: COMPLETED | OUTPATIENT
Start: 2023-12-13 | End: 2023-12-13

## 2023-12-13 RX ORDER — PANTOPRAZOLE SODIUM 20 MG/1
40 TABLET, DELAYED RELEASE ORAL
Refills: 0 | Status: DISCONTINUED | OUTPATIENT
Start: 2023-12-13 | End: 2023-12-14

## 2023-12-13 RX ORDER — PANTOPRAZOLE SODIUM 20 MG/1
1 TABLET, DELAYED RELEASE ORAL
Qty: 0 | Refills: 0 | DISCHARGE

## 2023-12-13 RX ORDER — POLYETHYLENE GLYCOL 3350 17 G/17G
17 POWDER, FOR SOLUTION ORAL AT BEDTIME
Refills: 0 | Status: DISCONTINUED | OUTPATIENT
Start: 2023-12-13 | End: 2023-12-14

## 2023-12-13 RX ORDER — FAMOTIDINE 10 MG/ML
20 INJECTION INTRAVENOUS ONCE
Refills: 0 | Status: COMPLETED | OUTPATIENT
Start: 2023-12-13 | End: 2023-12-13

## 2023-12-13 RX ORDER — LANOLIN ALCOHOL/MO/W.PET/CERES
3 CREAM (GRAM) TOPICAL AT BEDTIME
Refills: 0 | Status: COMPLETED | OUTPATIENT
Start: 2023-12-13 | End: 2024-11-10

## 2023-12-13 RX ORDER — HYDROMORPHONE HYDROCHLORIDE 2 MG/ML
0.5 INJECTION INTRAMUSCULAR; INTRAVENOUS; SUBCUTANEOUS
Refills: 0 | Status: DISCONTINUED | OUTPATIENT
Start: 2023-12-13 | End: 2023-12-14

## 2023-12-13 RX ORDER — OXYBUTYNIN CHLORIDE 5 MG
1 TABLET ORAL
Qty: 0 | Refills: 0 | DISCHARGE

## 2023-12-13 RX ORDER — LISINOPRIL/HYDROCHLOROTHIAZIDE 10-12.5 MG
1 TABLET ORAL
Refills: 0 | DISCHARGE

## 2023-12-13 RX ORDER — OXYCODONE HYDROCHLORIDE 5 MG/1
10 TABLET ORAL
Refills: 0 | Status: DISCONTINUED | OUTPATIENT
Start: 2023-12-13 | End: 2023-12-14

## 2023-12-13 RX ORDER — METOCLOPRAMIDE HCL 10 MG
10 TABLET ORAL ONCE
Refills: 0 | Status: COMPLETED | OUTPATIENT
Start: 2023-12-13 | End: 2023-12-13

## 2023-12-13 RX ORDER — ONDANSETRON 8 MG/1
4 TABLET, FILM COATED ORAL EVERY 6 HOURS
Refills: 0 | Status: DISCONTINUED | OUTPATIENT
Start: 2023-12-13 | End: 2023-12-14

## 2023-12-13 RX ORDER — HYDROMORPHONE HYDROCHLORIDE 2 MG/ML
0.5 INJECTION INTRAMUSCULAR; INTRAVENOUS; SUBCUTANEOUS
Refills: 0 | Status: DISCONTINUED | OUTPATIENT
Start: 2023-12-13 | End: 2023-12-13

## 2023-12-13 RX ORDER — ACETAMINOPHEN 500 MG
1000 TABLET ORAL ONCE
Refills: 0 | Status: DISCONTINUED | OUTPATIENT
Start: 2023-12-13 | End: 2023-12-14

## 2023-12-13 RX ORDER — CEFAZOLIN SODIUM 1 G
2000 VIAL (EA) INJECTION EVERY 8 HOURS
Refills: 0 | Status: COMPLETED | OUTPATIENT
Start: 2023-12-13 | End: 2023-12-13

## 2023-12-13 RX ORDER — LISINOPRIL 2.5 MG/1
20 TABLET ORAL DAILY
Refills: 0 | Status: DISCONTINUED | OUTPATIENT
Start: 2023-12-13 | End: 2023-12-14

## 2023-12-13 RX ORDER — HYDROMORPHONE HYDROCHLORIDE 2 MG/ML
1 INJECTION INTRAMUSCULAR; INTRAVENOUS; SUBCUTANEOUS
Refills: 0 | Status: DISCONTINUED | OUTPATIENT
Start: 2023-12-13 | End: 2023-12-13

## 2023-12-13 RX ORDER — ATORVASTATIN CALCIUM 80 MG/1
40 TABLET, FILM COATED ORAL AT BEDTIME
Refills: 0 | Status: DISCONTINUED | OUTPATIENT
Start: 2023-12-13 | End: 2023-12-14

## 2023-12-13 RX ORDER — HYDROMORPHONE HYDROCHLORIDE 2 MG/ML
0.5 INJECTION INTRAMUSCULAR; INTRAVENOUS; SUBCUTANEOUS
Refills: 0 | Status: COMPLETED | OUTPATIENT
Start: 2023-12-13 | End: 2023-12-20

## 2023-12-13 RX ORDER — OXYBUTYNIN CHLORIDE 5 MG
10 TABLET ORAL DAILY
Refills: 0 | Status: DISCONTINUED | OUTPATIENT
Start: 2023-12-13 | End: 2023-12-14

## 2023-12-13 RX ORDER — OXYCODONE HYDROCHLORIDE 5 MG/1
5 TABLET ORAL
Refills: 0 | Status: DISCONTINUED | OUTPATIENT
Start: 2023-12-13 | End: 2023-12-14

## 2023-12-13 RX ORDER — ROSUVASTATIN CALCIUM 5 MG/1
1 TABLET ORAL
Refills: 0 | DISCHARGE

## 2023-12-13 RX ORDER — ASCORBIC ACID 60 MG
500 TABLET,CHEWABLE ORAL
Refills: 0 | Status: DISCONTINUED | OUTPATIENT
Start: 2023-12-13 | End: 2023-12-14

## 2023-12-13 RX ORDER — ACETAMINOPHEN 500 MG
1000 TABLET ORAL EVERY 8 HOURS
Refills: 0 | Status: DISCONTINUED | OUTPATIENT
Start: 2023-12-13 | End: 2023-12-14

## 2023-12-13 RX ORDER — GABAPENTIN 400 MG/1
100 CAPSULE ORAL THREE TIMES A DAY
Refills: 0 | Status: DISCONTINUED | OUTPATIENT
Start: 2023-12-13 | End: 2023-12-14

## 2023-12-13 RX ORDER — SENNA PLUS 8.6 MG/1
2 TABLET ORAL AT BEDTIME
Refills: 0 | Status: DISCONTINUED | OUTPATIENT
Start: 2023-12-13 | End: 2023-12-14

## 2023-12-13 RX ADMIN — OXYCODONE HYDROCHLORIDE 5 MILLIGRAM(S): 5 TABLET ORAL at 15:01

## 2023-12-13 RX ADMIN — Medication 1 TABLET(S): at 11:46

## 2023-12-13 RX ADMIN — Medication 10 MILLIGRAM(S): at 13:20

## 2023-12-13 RX ADMIN — OXYCODONE HYDROCHLORIDE 10 MILLIGRAM(S): 5 TABLET ORAL at 19:05

## 2023-12-13 RX ADMIN — OXYCODONE HYDROCHLORIDE 10 MILLIGRAM(S): 5 TABLET ORAL at 18:05

## 2023-12-13 RX ADMIN — Medication 100 MILLIGRAM(S): at 15:41

## 2023-12-13 RX ADMIN — Medication 500 MILLIGRAM(S): at 18:05

## 2023-12-13 RX ADMIN — SODIUM CHLORIDE 150 MILLILITER(S): 9 INJECTION, SOLUTION INTRAVENOUS at 22:02

## 2023-12-13 RX ADMIN — Medication 400 MILLIGRAM(S): at 15:01

## 2023-12-13 RX ADMIN — GABAPENTIN 100 MILLIGRAM(S): 400 CAPSULE ORAL at 22:01

## 2023-12-13 RX ADMIN — OXYCODONE HYDROCHLORIDE 10 MILLIGRAM(S): 5 TABLET ORAL at 12:46

## 2023-12-13 RX ADMIN — OXYCODONE HYDROCHLORIDE 5 MILLIGRAM(S): 5 TABLET ORAL at 22:01

## 2023-12-13 RX ADMIN — POLYETHYLENE GLYCOL 3350 17 GRAM(S): 17 POWDER, FOR SOLUTION ORAL at 22:01

## 2023-12-13 RX ADMIN — SENNA PLUS 2 TABLET(S): 8.6 TABLET ORAL at 22:01

## 2023-12-13 RX ADMIN — OXYCODONE HYDROCHLORIDE 10 MILLIGRAM(S): 5 TABLET ORAL at 11:46

## 2023-12-13 RX ADMIN — GABAPENTIN 100 MILLIGRAM(S): 400 CAPSULE ORAL at 15:01

## 2023-12-13 RX ADMIN — ATORVASTATIN CALCIUM 40 MILLIGRAM(S): 80 TABLET, FILM COATED ORAL at 22:01

## 2023-12-13 RX ADMIN — Medication 100 MILLIGRAM(S): at 23:57

## 2023-12-13 RX ADMIN — OXYCODONE HYDROCHLORIDE 5 MILLIGRAM(S): 5 TABLET ORAL at 16:01

## 2023-12-13 RX ADMIN — Medication 650 MILLIGRAM(S): at 07:06

## 2023-12-13 RX ADMIN — SODIUM CHLORIDE 100 MILLILITER(S): 9 INJECTION, SOLUTION INTRAVENOUS at 09:56

## 2023-12-13 RX ADMIN — OXYCODONE HYDROCHLORIDE 5 MILLIGRAM(S): 5 TABLET ORAL at 23:00

## 2023-12-13 RX ADMIN — Medication 1000 MILLIGRAM(S): at 23:00

## 2023-12-13 RX ADMIN — Medication 1000 MILLIGRAM(S): at 22:01

## 2023-12-13 RX ADMIN — OXYCODONE HYDROCHLORIDE 5 MILLIGRAM(S): 5 TABLET ORAL at 20:15

## 2023-12-13 RX ADMIN — OXYCODONE HYDROCHLORIDE 5 MILLIGRAM(S): 5 TABLET ORAL at 19:18

## 2023-12-13 RX ADMIN — ONDANSETRON 4 MILLIGRAM(S): 8 TABLET, FILM COATED ORAL at 11:08

## 2023-12-13 RX ADMIN — FAMOTIDINE 20 MILLIGRAM(S): 10 INJECTION INTRAVENOUS at 07:27

## 2023-12-13 RX ADMIN — Medication 10 MILLIGRAM(S): at 11:46

## 2023-12-13 RX ADMIN — LISINOPRIL 20 MILLIGRAM(S): 2.5 TABLET ORAL at 19:18

## 2023-12-13 RX ADMIN — Medication 1000 MILLIGRAM(S): at 16:01

## 2023-12-13 RX ADMIN — ONDANSETRON 4 MILLIGRAM(S): 8 TABLET, FILM COATED ORAL at 18:05

## 2023-12-13 NOTE — PATIENT PROFILE ADULT - FALL HARM RISK - HARM RISK INTERVENTIONS
Assistance with ambulation/Assistance OOB with selected safe patient handling equipment/Communicate Risk of Fall with Harm to all staff/Discuss with provider need for PT consult/Monitor gait and stability/Provide patient with walking aids - walker, cane, crutches/Reinforce activity limits and safety measures with patient and family/Sit up slowly, dangle for a short time, stand at bedside before walking/Tailored Fall Risk Interventions/Use of alarms - bed, chair and/or voice tab/Visual Cue: Yellow wristband and red socks/Bed in lowest position, wheels locked, appropriate side rails in place/Call bell, personal items and telephone in reach/Instruct patient to call for assistance before getting out of bed or chair/Non-slip footwear when patient is out of bed/[place] to call system/Physically safe environment - no spills, clutter or unnecessary equipment/Purposeful Proactive Rounding/Room/bathroom lighting operational, light cord in reach Assistance with ambulation/Assistance OOB with selected safe patient handling equipment/Communicate Risk of Fall with Harm to all staff/Discuss with provider need for PT consult/Monitor gait and stability/Provide patient with walking aids - walker, cane, crutches/Reinforce activity limits and safety measures with patient and family/Sit up slowly, dangle for a short time, stand at bedside before walking/Tailored Fall Risk Interventions/Use of alarms - bed, chair and/or voice tab/Visual Cue: Yellow wristband and red socks/Bed in lowest position, wheels locked, appropriate side rails in place/Call bell, personal items and telephone in reach/Instruct patient to call for assistance before getting out of bed or chair/Non-slip footwear when patient is out of bed/[place] to call system/Physically safe environment - no spills, clutter or unnecessary equipment/Purposeful Proactive Rounding/Room/bathroom lighting operational, light cord in reach

## 2023-12-13 NOTE — ASU PATIENT PROFILE, ADULT - FALL HARM RISK - RISK INTERVENTIONS
Assistance OOB with selected safe patient handling equipment/Communicate Fall Risk and Risk Factors to all staff, patient, and family/Discuss with provider need for PT consult/Monitor gait and stability/Provide patient with walking aids - walker, cane, crutches/Reinforce activity limits and safety measures with patient and family/Visual Cue: Yellow wristband/Bed in lowest position, wheels locked, appropriate side rails in place/Call bell, personal items and telephone in reach/Instruct patient to call for assistance before getting out of bed or chair/Non-slip footwear when patient is out of bed/[place] to call system/Physically safe environment - no spills, clutter or unnecessary equipment/Purposeful Proactive Rounding/Room/bathroom lighting operational, light cord in reach Assistance OOB with selected safe patient handling equipment/Communicate Fall Risk and Risk Factors to all staff, patient, and family/Discuss with provider need for PT consult/Monitor gait and stability/Provide patient with walking aids - walker, cane, crutches/Reinforce activity limits and safety measures with patient and family/Visual Cue: Yellow wristband/Bed in lowest position, wheels locked, appropriate side rails in place/Call bell, personal items and telephone in reach/Instruct patient to call for assistance before getting out of bed or chair/Non-slip footwear when patient is out of bed/[place] to call system/Physically safe environment - no spills, clutter or unnecessary equipment/Purposeful Proactive Rounding/Room/bathroom lighting operational, light cord in reach

## 2023-12-13 NOTE — ASU PATIENT PROFILE, ADULT - HISTORY OF COVID-19 VACCINATION
TNL REC'D VERBAL ORDER TO MEET WITH PTNT AND OFFER HH ON DC.  PTNT DECLINED    Juan José Ragsdale Yes

## 2023-12-13 NOTE — ASU PATIENT PROFILE, ADULT - NSICDXPASTMEDICALHX_GEN_ALL_CORE_FT
PAST MEDICAL HISTORY:  Acute renal failure     GERD (gastroesophageal reflux disease)     HTN (hypertension)     Hyperlipidemia     Lumbar stenosis     Prostate cancer     S/P cervical spinal fusion

## 2023-12-13 NOTE — DISCHARGE NOTE PROVIDER - CARE PROVIDER_API CALL
Allen Corbett.  Orthopaedic Surgery  [address], [address] [place] [id number]  Phone: [phone]  Fax: [phone]  Follow Up Time:    Allen Corbett.  Orthopaedic Surgery  [address] [facility] [facility], [address] [place] [id number]  Phone: [phone]  Fax: [phone]  Follow Up Time:

## 2023-12-13 NOTE — DISCHARGE NOTE PROVIDER - NSDCMRMEDTOKEN_GEN_ALL_CORE_FT
ascorbic acid 500 mg oral tablet: 1 tab(s) orally 2 times a day  cyclobenzaprine 10 mg oral tablet: 1 tab(s) orally every 8 hours MDD: 3  gabapentin 100 mg oral capsule: 1 cap(s) orally 3 times a day  lisinopril-hydrochlorothiazide 20 mg-25 mg oral tablet: 1 orally once a day  Multiple Vitamins oral tablet: 1 tab(s) orally once a day  oxybutynin 10 mg/24 hr oral tablet, extended release: 1 tab(s) orally once a day  Protonix 40 mg oral delayed release tablet: 1 tab(s) orally once a day  rosuvastatin 10 mg oral capsule: 1 orally once a day   ascorbic acid 500 mg oral tablet: 1 tab(s) orally 2 times a day  Aspirin Enteric Coated 81 mg oral delayed release tablet: 1 tab(s) orally 2 times a day MDD: 2  CeleBREX 200 mg oral capsule: 1 cap(s) orally 2 times a day  gabapentin 100 mg oral capsule: 1 cap(s) orally 3 times a day  lisinopril-hydrochlorothiazide 20 mg-25 mg oral tablet: 1 orally once a day  Multiple Vitamins oral tablet: 1 tab(s) orally once a day  Narcan 4 mg/0.1 mL nasal spray: 4 milligram(s) intranasally once MDD:0.2ML, Repeat as Needed, For Suspected Opiate Overdose  oxybutynin 10 mg/24 hr oral tablet, extended release: 1 tab(s) orally once a day  oxyCODONE 5 mg oral tablet: 1 tab(s) orally every 4 hours as needed for  pain 1 or 2 tabs every 4hrs as Needed for Pain MDD: 12 Tabs  Protonix 40 mg oral delayed release tablet: 1 tab(s) orally once a day  rosuvastatin 10 mg oral capsule: 1 orally once a day  senna leaf extract oral tablet: 2 tab(s) orally once a day (at bedtime)

## 2023-12-13 NOTE — PATIENT PROFILE ADULT - NSPROPTRIGHTSUPPORTPERSON_GEN_A_NUR
[facility]  Emergency Department Resident Note    HPI  67-year-old woman with hypothyroidism presents to the emergency department with hypoxia.  Patient states that for the past 4 to 5 days she has been having multiple episodes of nonbloody nonbilious emesis and nonbloody diarrhea associated with a dry nonproductive cough.  She went to urgent care today and was noted to be hypoxic to 87% on room air and was placed on supplemental oxygen and sent to the emergency department for evaluation.  She endorses tactile fevers and chills. No nasal congestion, sore throat, headache, neck pain, chest pain, shortness of breath, abdominal pain, dysuria/hematuria, urinary frequency, joint pain, rashes, myalgias, anosmia, or loss of taste. No recent travel. No exposure to sick contacts. No diaphoresis, palpitations, dizziness/lightheadedness, or leg pain/swelling. No recent travel, surgery, prolonged immobilization, exogenous estrogen use, cancer history, or history of DVT/PE.  No history of CHF, COPD, or asthma.    PCP: Sharita Russo MD    Medical History:  Past Medical History:   Diagnosis Date    Hypothyroidism     Osteopenia 8/11/2021    Rosacea, acne        Surgical History:  Past Surgical History:   Procedure Laterality Date    Pelvis laparoscopy,dx      Total abdom hysterectomy Bilateral        Family History:  Her family history includes Cancer, Lung in her father; Hypothyroid in her father and sister; Macular degeneration in her mother.    Social History:  She reports that she has never smoked. She has never used smokeless tobacco. She reports that she does not drink alcohol and does not use drugs. She reports never being sexually active.    Review of Systems  All systems reviewed and negative unless noted in HPI.     Physical Exam  ED Triage Vitals   ED Triage Vitals Group      Temp 11/26/23 1031 99.5 °F (37.5 °C)      Heart Rate 11/26/23 1036 (!) 106      Resp 11/26/23 1036 14      BP  11/26/23 1036 125/68      SpO2 11/26/23 1036 91 %      EtCO2 mmHg --       Height 11/26/23 1036 5' 9\" (1.753 m)      Weight 11/26/23 1036 127 lb 10.3 oz (57.9 kg)      Weight Scale Used 11/26/23 1036 Scale in bed      BMI (Calculated) 11/26/23 1036 18.85      IBW/kg (Calculated) 11/26/23 1036 66.2        General:  Alert, coughing throughout exam with nausea and dry heaving.  Skin:  Warm, dry, no rash.    Head:  Normocephalic, atraumatic.    Eye:  Pupils are equal, extraocular movements are intact.    ENT: Dry mucus membranes.   Neck: Supple  Cardiovascular: Tachycardic 110s, regular. No murmur.    Respiratory:  Respirations are non-labored.  Satting well on 2 L NC.  Coarse breath sounds bilaterally.  No crackles or wheezing.  Gastrointestinal:  Soft, nondistended, nontender. No rebound, rigidity, or guarding.   Extremities: No lower extremity edema. No calf tenderness.   Neurological: Alert and oriented to person, place, time, and situation. No facial asymmetry. Moving all extremities. Speech clear.  Psych: Cooperative. Pleasant.      Review of ED Data  Labs Reviewed   NT PROBNP - Abnormal; Notable for the following components:       Result Value    NT-proBNP 780 (*)     All other components within normal limits   COVID/FLU/RSV PANEL - Abnormal; Notable for the following components:    RSV BY PCR Detected (*)     All other components within normal limits   CBC WITH AUTOMATED DIFFERENTIAL (PERFORMABLE ONLY) - Abnormal; Notable for the following components:    WBC 12.3 (*)     Absolute Neutrophils 11.3 (*)     Absolute Lymphocytes 0.5 (*)     All other components within normal limits   COMPREHENSIVE METABOLIC PANEL - Abnormal; Notable for the following components:    Sodium 134 (*)     Glucose 161 (*)     Albumin 3.5 (*)     Globulin 4.5 (*)     A/G Ratio 0.8 (*)     All other components within normal limits   LACTIC ACID VENOUS WITH REFLEX - Abnormal; Notable for the following components:    Lactate, Venous 3.8 (*)      All other components within normal limits   TROPONIN I, HIGH SENSITIVITY - Normal   MAGNESIUM - Normal   LIPASE - Normal   BLOOD GAS, VENOUS - Normal   LACTIC ACID, VENOUS - Normal   CBC WITH DIFFERENTIAL    Narrative:     The following orders were created for panel order CBC with Automated Differential.  Procedure                               Abnormality         Status                     ---------                               -----------         ------                     CBC with Automated Dif...[[id number]]  Abnormal            Final result                 Please view results for these tests on the individual orders.   CBC WITH DIFFERENTIAL    Narrative:     The following orders were created for panel order CBC with Automated Differential.  Procedure                               Abnormality         Status                     ---------                               -----------         ------                     CBC with Automated Dif...[[id number]]                                                   Please view results for these tests on the individual orders.   COMPREHENSIVE METABOLIC PANEL       CTA CHEST PULMONARY EMBOLISM   Final Result   1. No pulmonary embolus identified.   2. Separately, there is multifocal consolidation about the lungs, several   of which demonstrate some nodular features. Findings could relate to   pneumonia, however correlate clinically, as underlying neoplasm is   difficult to exclude. Follow-up CT after appropriate medical therapy is   recommended to document resolution. Mild hilar lymphadenopathy can be   reassessed on follow-up as well.   3. Additional background chronic appearing findings as described above.      Electronically Signed by: SONAM THOMAS M.D.    Signed on: 11/26/2023 2:19 PM    Workstation ID: [id number]      XR CHEST PA AND LATERAL 2 VIEWS   Final Result   1. Hazy medial bibasilar lung opacities, with mild central pulmonary   vascular prominence in the  background.      Electronically Signed by: SONAM THOMAS M.D.    Signed on: 11/26/2023 11:51 AM    Workstation ID: [id number]          Medical Decision Making  67-year-old woman with hypothyroidism presents to the emergency department with hypoxia.      Patient is nontoxic-appearing and in no acute distress, afebrile, tachycardic, hypoxic on room air, satting well on 2 to 3 L NC, no respiratory distress.    Differential includes but is not limited to viral illness versus pneumonia versus congestive heart failure versus less likely PE.    EKG reviewed by myself demonstrates tachycardia, rate 106, diffuse ST depressions, no ST elevations.     Labs reviewed by me demonstrate no anemia, mild leukocytosis, no glucose abnormalities, no electrolyte abnormalities, troponin negative, , normal renal function.  VBG without acidosis or hypercarbia, quad screen positive for RSV.  Lactic acidosis likely secondary to dehydration vs infection.     CXR to my interpretation demonstrates normal heart size, bibasilar opacities.  CTA chest obtained to evaluate for pneumonia and rule out pulmonary embolism, does demonstrate multifocal pneumonia.    Bedside ultrasound demonstrates trace pericardial effusion, grossly normal ejection fraction, no signs of right heart strain, no B-lines.    Lactic acidosis improved after 1 L IV fluids.  Patient received course of antibiotics to cover for potential bacterial pneumonia given elevated lactate and hypoxia.    Based on history and exam, presentation most consistent with RSV infection causing pneumonia and hypoxia. Patient admitted to hospitalist for further management.    Remainder of MDM per ED course below.      ED Course as of 11/26/23 1510   Sun Nov 26, 2023   1231 Sepsis Documentation    12:32 PM    Is Lactate >/= 4 or Hypotension (SBP<90, MAP<65 x2 in 3hrs) Present? NO              Was 30 cc/kg ordered?   No, concern for fluid overload. Give 1L LR.                                                                 Sepsis Reassessment performed @  12:30pmm (time must be after 30cc/kg IVF started if single fluid order or after last IVF bag started if multiple orders for IVF)                                 [DT]   1232 Patient has a lactic acidosis of 3.8.  She is RSV positive.  She likely has a viral pneumonia causing her hypoxia.  We are going to do a CTA of the chest to further differentiate whether she truly does have pneumonia and to rule out pulmonary embolism.  Troponin is normal so low suspicion for myocarditis.  Plan for admission.  We will give her antibiotics to cover community-acquired pneumonia in case she has this in addition to the viral pneumonia.   [DT]   1348 Patient will have CTA chest PE performed and then she will be admitted.  She was reevaluated personally and she has no increased work of breathing at this time she is stable on 3 L oxygen via nasal cannula. [DT]   1349 EKG demonstrates sinus tachycardia rate 106 no STEMI there is some diffuse ST depression [DT]      ED Course User Index  [DT] Jeff Delgado MD       Clinical Impression    ED Diagnosis   1. RSV infection        2. Acute hypoxemic respiratory failure (CMD)        3. Pneumonia due to respiratory syncytial virus (RSV)             Disposition  Admit 11/26/2023  1:38 PM  Telemetry Bed?: No  Admitting Physician: DESTINY KIRKPATRICK [[id number]]  Is this a telephone or verbal order?: This is a telephone order from the admitting physician  Transferring Patient to? Only adjust for transfers [facility] [[id number]]    No follow-up provider specified.    Patient seen and discussed with attending Jeff Delgado MD.    Paulette Kaufman MD  Emergency Medicine Resident Physician, PGY-3  [facility]     Paulette Kaufman MD  Resident  11/26/23 6233     same name as above

## 2023-12-13 NOTE — ASU PATIENT PROFILE, ADULT - NS TRANSFER EYEGLASSES PAIRS
[FreeTextEntry1] : 43F here for f/u of several endocrine issues:\par \par 1) Central hypothyroidism - Check TSH, free T4\par continue for now levothyroxine 75 mcg daily\par \par 2) Pituitary cyst?\par Pituitary labs checked last visit within normal limits, defer further imaging at this time\par Prior elevated IGF-1 with negative OGTT \par no additional workup at this time\par \par 3)multinodular thyroid- thyroid US Jult 2021 stable.\par Schedule follow up ultrasound with Dr. Tolliver one year\par \par 4) Obesity \par -Continue with lifestyle modification\par -She declines obesity medication \par recheck HbA1c\par \par 4) Vit D deficiency - ergocalciferol weekly supplement\par repeat 25OHD\par \par 5) Fatty liver\par calculated Fib-4 today 0.6, low risk\par \par F/u 6 months 1 pair

## 2023-12-13 NOTE — DISCHARGE NOTE PROVIDER - NSDCCPTREATMENT_GEN_ALL_CORE_FT
PRINCIPAL PROCEDURE  Procedure: Total left hip replacement  Findings and Treatment:       SECONDARY PROCEDURE  Procedure: Tenotomy, hip, adductor tendon  Findings and Treatment:

## 2023-12-13 NOTE — PHYSICAL THERAPY INITIAL EVALUATION ADULT - GENERAL OBSERVATIONS, REHAB EVAL
Pt recd supine nad. Reported minimal nausea, agreed to participate in session. THR post packet given. +MANPREET  +abd pillow. Hip precautions reviewed. C/o paresthesiae in L buttock/groin.

## 2023-12-13 NOTE — BRIEF OPERATIVE NOTE - NSICDXBRIEFPROCEDURE_GEN_ALL_CORE_FT
PROCEDURES:  Total left hip replacement 13-Dec-2023 09:32:27  Justin Castillo  Tenotomy, hip, adductor tendon 13-Dec-2023 09:33:19  Justin Castillo

## 2023-12-13 NOTE — DISCHARGE NOTE PROVIDER - HOSPITAL COURSE
58yMale with history of OA presenting for L KRISTEN and L Adductor tenotomy by Dr. Corbett on 12/13/2023 Risk and benefits of surgery were explained to the patient.The patient understood and agreed to proceed with surgery. Patient underwent the procedure with no intraoperative complications. Pt was brought in stable condition to the PACU. Once stable in PACU, pt was brought to the floor. During hospital stay pt was followed by Medicine, physical therapy, Home Care during this admission. Pt had an uneventful hospital course. Pt is stable for discharge to home 58yMale with history of OA presenting for L KRISTEN and L Adductor tenotomy by Dr. Corbett on 12/13/2023 Risk and benefits of surgery were explained to the patient.The patient understood and agreed to proceed with surgery. Patient underwent the procedure with no intraoperative complications. Pt was brought in stable condition to the PACU. Once stable in PACU, pt was brought to the floor. During hospital stay pt was followed by Medicine, physical therapy, Home Care during this admission. Pt had an uneventful hospital course. Pt is stable for discharge to home with Home Care Services and PT

## 2023-12-13 NOTE — PHYSICAL THERAPY INITIAL EVALUATION ADULT - ACTIVE RANGE OF MOTION EXAMINATION, REHAB EVAL
left hip c TH precautions post/bilateral upper extremity Active ROM was WFL (within functional limits)/bilateral  lower extremity Active ROM was WFL (within functional limits)

## 2023-12-13 NOTE — DISCHARGE NOTE PROVIDER - NSDCFUSCHEDAPPT_GEN_ALL_CORE_FT
Allen Corbett [place]  LVS PreAdmits  Scheduled Appointment: 12/13/2023    [facility] Physician [facility]  ONCORTHO [address]  Scheduled Appointment: 01/02/2024    Ginny Leal  [facility] Physician [facility]  ONCPAINMGT [address]  Scheduled Appointment: 01/23/2024    Faisal Pierre  [facility] Physician [facility]  ONCORTHO [address]  Scheduled Appointment: 02/07/2024     Allen Corbett [place]  LVS PreAdmits  Scheduled Appointment: 12/13/2023    [facility] Physician [facility]  ONCORTHO [address]  Scheduled Appointment: 01/02/2024    Ginny Leal  [facility] Physician [facility]  ONCPAINMGT [address] [facility] [address]  Scheduled Appointment: 01/23/2024    Faisal Pierre  [facility] Physician [facility]  ONCORTHO [address] [facility] [address]  Scheduled Appointment: 02/07/2024     [facility]  ONCORTHO [address]  Scheduled Appointment: 01/02/2024    Ginny Leal  [facility]  ON[address]PAINMGT [address] [facility] [address]  Scheduled Appointment: 01/23/2024    Faisal Pierre  [facility]  ONCORTHO [address] [facility] [address]  Scheduled Appointment: 02/07/2024     [facility]  ONCORTHO [address]  Scheduled Appointment: 01/02/2024    Ginny Leal  [facility]  ONCPAINMGT [address] [facility] [address]  Scheduled Appointment: 01/23/2024    Faisal Pierre  [facility]  ONCORTHO [address] [facility] [address]  Scheduled Appointment: 02/07/2024

## 2023-12-13 NOTE — DISCHARGE NOTE PROVIDER - NSDCFUADDAPPT_GEN_ALL_CORE_FT
Follow up with your surgeon in two weeks. Call for appointment.    If you need more pain medications, call your surgeon's office. For medication refills or authorizations call [phone]    Make sure to have a bowel movement by 2 days after surgery. Take stool softners and laxatives as needed.    Call and schedule a follow up appointment with your primary care physician for repeat blood work (CBC and BMP) for post hospital discharge follow-up care.    Call your surgeon if you have increased redness/pain/drainage or fever. Return to ER for shortness of breath/calf tenderness. Follow up with your surgeon in two weeks. Call for appointment.    If you need more pain medications, call your surgeon's office. For medication refills or authorizations call [phone]    Make sure to have a bowel movement by 2 days after surgery. Take stool softners and laxatives as needed.    Call and schedule a follow up appointment with your primary care physician for repeat blood work (CBC and BMP) for post hospital discharge follow-up care.    Call your surgeon if you have increased redness/pain/drainage or fever. Return to ER for shortness of breath/calf tenderness.

## 2023-12-13 NOTE — OCCUPATIONAL THERAPY INITIAL EVALUATION ADULT - GENERAL OBSERVATIONS, REHAB EVAL
Pt encountered semi supine in bed, NAD, all lines intact, pt reported pain 6/10 s/p L KRISTEN, pt agreeable to OT eval, PT Sonja present.

## 2023-12-13 NOTE — CONSULT NOTE ADULT - SUBJECTIVE AND OBJECTIVE BOX
TANISHA PADILLA is a 58y Male s/p LEFT TOTAL HIP ARTHROPLASTY    PAIN MGNT. LEFT TOTAL HIP ARTHROPLASTY      w/ h/o No pertinent past medical history    HTN (hypertension)    GERD (gastroesophageal reflux disease)    Prostate cancer    Lumbar stenosis    Acute renal failure    Acute renal failure    Hyperlipidemia    S/P cervical spinal fusion      denies any chest pain shortness of breath palpitation dizziness lightheadedness nausea vomiting fever or chills    No significant past surgical history    S/P prostatectomy    Lipoma    History of lumbar laminectomy for spinal cord decompression      FH: type 2 diabetes mellitus      SH: doesnot smoke or drink at this time    shellfish (Other)  No Known Drug Allergies    acetaminophen     Tablet .. 1000 milliGRAM(s) Oral every 8 hours  ascorbic acid 500 milliGRAM(s) Oral two times a day  atorvastatin 40 milliGRAM(s) Oral at bedtime  ceFAZolin   IVPB 2000 milliGRAM(s) IV Intermittent every 8 hours  cyclobenzaprine 10 milliGRAM(s) Oral three times a day PRN  gabapentin 100 milliGRAM(s) Oral three times a day  hydrochlorothiazide 25 milliGRAM(s) Oral daily  HYDROmorphone  Injectable 0.5 milliGRAM(s) IV Push every 3 hours PRN  lactated ringers. 1000 milliLiter(s) IV Continuous <Continuous>  lisinopril 20 milliGRAM(s) Oral daily  magnesium hydroxide Suspension 30 milliLiter(s) Oral daily PRN  multivitamin 1 Tablet(s) Oral daily  ondansetron Injectable 4 milliGRAM(s) IV Push every 6 hours PRN  oxybutynin 10 milliGRAM(s) Oral daily  oxyCODONE    IR 5 milliGRAM(s) Oral every 3 hours PRN  oxyCODONE    IR 5 milliGRAM(s) Oral every 4 hours  oxyCODONE    IR 10 milliGRAM(s) Oral every 3 hours PRN  pantoprazole    Tablet 40 milliGRAM(s) Oral before breakfast  polyethylene glycol 3350 17 Gram(s) Oral at bedtime  senna 2 Tablet(s) Oral at bedtime  sodium chloride 0.9% lock flush 3 milliLiter(s) IV Push every 8 hours    T(C): 37 (12-13-23 @ 17:28), Max: 37.3 (12-13-23 @ 06:47)  HR: 88 (12-13-23 @ 17:59) (66 - 97)  BP: 156/97 (12-13-23 @ 17:59) (111/71 - 161/92)  RR: 17 (12-13-23 @ 17:59) (16 - 18)  SpO2: 99% (12-13-23 @ 17:59) (99% - 100%)  HEENT unremarkable  neck no JVD or bruit  heart normal S1 S2 RRR no gallops or rubs  chest clear to auscultation  abd sof nontender non distended +bs  ext no calf tenderness    A/P   DVT PX  pain control  bowel regimen   wound care as per ortho  GI PX  antiemetics prn  incentive spirometer

## 2023-12-13 NOTE — DISCHARGE NOTE PROVIDER - NSDCFUADDINST_GEN_ALL_CORE_FT
Keep MANPREET Dressing Clean, Dry and Intact. May shower with MANPREET Dressing. Please do not scrub, soak, peel or pick at the MANPREET dressing. No creams, lotions, or oils over dressing. May shower and let water run over dressing, no baths. Pat dry once out of shower. Dressing to be removed in 7 days. If dressing is saturated from border to border - may remove and replace with clean dry dressing.    Shower instructions for MANPREET Dressing: Turn battery pack off. Twist OFF battery pack before entering shower. Once done with showering. Pat dressing dry. Reconnect and twist ON battery pack after you are dry. Then turn battery pack on.     Hip replacement precautions: Abduction pillow. Elevate the leg (while keeping hip precautions) as often as possible to help control swelling.    Per Dr. Corbett: may advance from walker as tolerated per discretion of physical therapist.

## 2023-12-13 NOTE — OCCUPATIONAL THERAPY INITIAL EVALUATION ADULT - PHYSICAL ASSIST/NONPHYSICAL ASSIST: STAND/SIT, REHAB EVAL
Pt's wife called to let you know the pt had his EKG done and his pacer is followed by Dr. Leong.  She is looking to add Memantine to his medications.    verbal cues

## 2023-12-13 NOTE — OCCUPATIONAL THERAPY INITIAL EVALUATION ADULT - ADDITIONAL COMMENTS
Lives With:: children; spouse; and spouse in a private house with 3 entry steps equipped with bilateral hand rails that cannot be reached simultaneously. Most living amenities are located on one level.  The bathroom has a tub/shower combination, fixed shower and comfort height toilet with elevated seat without hand rails. Pt reported he has rolling walker & 3:1 commode at home.

## 2023-12-14 ENCOUNTER — TRANSCRIPTION ENCOUNTER (OUTPATIENT)
Age: 58
End: 2023-12-14

## 2023-12-14 VITALS
DIASTOLIC BLOOD PRESSURE: 64 MMHG | TEMPERATURE: 98 F | OXYGEN SATURATION: 100 % | HEART RATE: 91 BPM | SYSTOLIC BLOOD PRESSURE: 104 MMHG | RESPIRATION RATE: 18 BRPM

## 2023-12-14 LAB
ANION GAP SERPL CALC-SCNC: 3 MMOL/L — LOW (ref 5–17)
ANION GAP SERPL CALC-SCNC: 3 MMOL/L — LOW (ref 5–17)
BUN SERPL-MCNC: 15 MG/DL — SIGNIFICANT CHANGE UP (ref 7–23)
BUN SERPL-MCNC: 15 MG/DL — SIGNIFICANT CHANGE UP (ref 7–23)
CALCIUM SERPL-MCNC: 8.3 MG/DL — LOW (ref 8.5–10.1)
CALCIUM SERPL-MCNC: 8.3 MG/DL — LOW (ref 8.5–10.1)
CHLORIDE SERPL-SCNC: 104 MMOL/L — SIGNIFICANT CHANGE UP (ref 96–108)
CHLORIDE SERPL-SCNC: 104 MMOL/L — SIGNIFICANT CHANGE UP (ref 96–108)
CO2 SERPL-SCNC: 28 MMOL/L — SIGNIFICANT CHANGE UP (ref 22–31)
CO2 SERPL-SCNC: 28 MMOL/L — SIGNIFICANT CHANGE UP (ref 22–31)
CREAT SERPL-MCNC: 1.19 MG/DL — SIGNIFICANT CHANGE UP (ref 0.5–1.3)
CREAT SERPL-MCNC: 1.19 MG/DL — SIGNIFICANT CHANGE UP (ref 0.5–1.3)
EGFR: 71 ML/MIN/1.73M2 — SIGNIFICANT CHANGE UP
EGFR: 71 ML/MIN/1.73M2 — SIGNIFICANT CHANGE UP
GLUCOSE SERPL-MCNC: 106 MG/DL — HIGH (ref 70–99)
GLUCOSE SERPL-MCNC: 106 MG/DL — HIGH (ref 70–99)
HCT VFR BLD CALC: 30.3 % — LOW (ref 39–50)
HCT VFR BLD CALC: 30.3 % — LOW (ref 39–50)
HGB BLD-MCNC: 10.3 G/DL — LOW (ref 13–17)
HGB BLD-MCNC: 10.3 G/DL — LOW (ref 13–17)
MCHC RBC-ENTMCNC: 30.5 PG — SIGNIFICANT CHANGE UP (ref 27–34)
MCHC RBC-ENTMCNC: 30.5 PG — SIGNIFICANT CHANGE UP (ref 27–34)
MCHC RBC-ENTMCNC: 34 G/DL — SIGNIFICANT CHANGE UP (ref 32–36)
MCHC RBC-ENTMCNC: 34 G/DL — SIGNIFICANT CHANGE UP (ref 32–36)
MCV RBC AUTO: 89.6 FL — SIGNIFICANT CHANGE UP (ref 80–100)
MCV RBC AUTO: 89.6 FL — SIGNIFICANT CHANGE UP (ref 80–100)
NRBC # BLD: 0 /100 WBCS — SIGNIFICANT CHANGE UP (ref 0–0)
NRBC # BLD: 0 /100 WBCS — SIGNIFICANT CHANGE UP (ref 0–0)
PLATELET # BLD AUTO: 186 K/UL — SIGNIFICANT CHANGE UP (ref 150–400)
PLATELET # BLD AUTO: 186 K/UL — SIGNIFICANT CHANGE UP (ref 150–400)
POTASSIUM SERPL-MCNC: 3.4 MMOL/L — LOW (ref 3.5–5.3)
POTASSIUM SERPL-MCNC: 3.4 MMOL/L — LOW (ref 3.5–5.3)
POTASSIUM SERPL-SCNC: 3.4 MMOL/L — LOW (ref 3.5–5.3)
POTASSIUM SERPL-SCNC: 3.4 MMOL/L — LOW (ref 3.5–5.3)
RBC # BLD: 3.38 M/UL — LOW (ref 4.2–5.8)
RBC # BLD: 3.38 M/UL — LOW (ref 4.2–5.8)
RBC # FLD: 12.1 % — SIGNIFICANT CHANGE UP (ref 10.3–14.5)
RBC # FLD: 12.1 % — SIGNIFICANT CHANGE UP (ref 10.3–14.5)
SODIUM SERPL-SCNC: 135 MMOL/L — SIGNIFICANT CHANGE UP (ref 135–145)
SODIUM SERPL-SCNC: 135 MMOL/L — SIGNIFICANT CHANGE UP (ref 135–145)
WBC # BLD: 7.41 K/UL — SIGNIFICANT CHANGE UP (ref 3.8–10.5)
WBC # BLD: 7.41 K/UL — SIGNIFICANT CHANGE UP (ref 3.8–10.5)
WBC # FLD AUTO: 7.41 K/UL — SIGNIFICANT CHANGE UP (ref 3.8–10.5)
WBC # FLD AUTO: 7.41 K/UL — SIGNIFICANT CHANGE UP (ref 3.8–10.5)

## 2023-12-14 PROCEDURE — 99231 SBSQ HOSP IP/OBS SF/LOW 25: CPT | Mod: 24

## 2023-12-14 RX ORDER — ASPIRIN/CALCIUM CARB/MAGNESIUM 324 MG
1 TABLET ORAL
Qty: 60 | Refills: 0
Start: 2023-12-14 | End: 2024-01-12

## 2023-12-14 RX ORDER — SENNA PLUS 8.6 MG/1
2 TABLET ORAL
Qty: 0 | Refills: 0 | DISCHARGE
Start: 2023-12-14

## 2023-12-14 RX ORDER — OXYCODONE HYDROCHLORIDE 5 MG/1
1 TABLET ORAL
Qty: 30 | Refills: 0
Start: 2023-12-14

## 2023-12-14 RX ORDER — OXYBUTYNIN CHLORIDE 5 MG
5 TABLET ORAL
Refills: 0 | Status: DISCONTINUED | OUTPATIENT
Start: 2023-12-14 | End: 2023-12-14

## 2023-12-14 RX ORDER — CELECOXIB 200 MG/1
1 CAPSULE ORAL
Qty: 60 | Refills: 0
Start: 2023-12-14 | End: 2024-01-12

## 2023-12-14 RX ORDER — NALOXONE HYDROCHLORIDE 4 MG/.1ML
4 SPRAY NASAL
Qty: 1 | Refills: 0
Start: 2023-12-14 | End: 2023-12-14

## 2023-12-14 RX ORDER — LANOLIN ALCOHOL/MO/W.PET/CERES
3 CREAM (GRAM) TOPICAL AT BEDTIME
Refills: 0 | Status: DISCONTINUED | OUTPATIENT
Start: 2023-12-14 | End: 2023-12-14

## 2023-12-14 RX ORDER — POTASSIUM CHLORIDE 20 MEQ
20 PACKET (EA) ORAL
Refills: 0 | Status: COMPLETED | OUTPATIENT
Start: 2023-12-14 | End: 2023-12-14

## 2023-12-14 RX ADMIN — LISINOPRIL 20 MILLIGRAM(S): 2.5 TABLET ORAL at 05:12

## 2023-12-14 RX ADMIN — Medication 500 MILLIGRAM(S): at 05:12

## 2023-12-14 RX ADMIN — OXYCODONE HYDROCHLORIDE 10 MILLIGRAM(S): 5 TABLET ORAL at 11:20

## 2023-12-14 RX ADMIN — Medication 1000 MILLIGRAM(S): at 14:45

## 2023-12-14 RX ADMIN — OXYCODONE HYDROCHLORIDE 5 MILLIGRAM(S): 5 TABLET ORAL at 06:11

## 2023-12-14 RX ADMIN — OXYCODONE HYDROCHLORIDE 10 MILLIGRAM(S): 5 TABLET ORAL at 01:00

## 2023-12-14 RX ADMIN — GABAPENTIN 100 MILLIGRAM(S): 400 CAPSULE ORAL at 14:46

## 2023-12-14 RX ADMIN — Medication 81 MILLIGRAM(S): at 05:13

## 2023-12-14 RX ADMIN — Medication 20 MILLIEQUIVALENT(S): at 09:15

## 2023-12-14 RX ADMIN — GABAPENTIN 100 MILLIGRAM(S): 400 CAPSULE ORAL at 05:12

## 2023-12-14 RX ADMIN — Medication 5 MILLIGRAM(S): at 15:11

## 2023-12-14 RX ADMIN — Medication 102 MILLIGRAM(S): at 05:13

## 2023-12-14 RX ADMIN — Medication 1000 MILLIGRAM(S): at 15:45

## 2023-12-14 RX ADMIN — OXYCODONE HYDROCHLORIDE 5 MILLIGRAM(S): 5 TABLET ORAL at 05:13

## 2023-12-14 RX ADMIN — Medication 1 TABLET(S): at 11:26

## 2023-12-14 RX ADMIN — OXYCODONE HYDROCHLORIDE 5 MILLIGRAM(S): 5 TABLET ORAL at 06:12

## 2023-12-14 RX ADMIN — Medication 3 MILLIGRAM(S): at 00:10

## 2023-12-14 RX ADMIN — Medication 20 MILLIEQUIVALENT(S): at 10:20

## 2023-12-14 RX ADMIN — SODIUM CHLORIDE 150 MILLILITER(S): 9 INJECTION, SOLUTION INTRAVENOUS at 05:13

## 2023-12-14 RX ADMIN — Medication 1000 MILLIGRAM(S): at 05:12

## 2023-12-14 RX ADMIN — OXYCODONE HYDROCHLORIDE 10 MILLIGRAM(S): 5 TABLET ORAL at 00:03

## 2023-12-14 RX ADMIN — Medication 1000 MILLIGRAM(S): at 06:11

## 2023-12-14 RX ADMIN — OXYCODONE HYDROCHLORIDE 10 MILLIGRAM(S): 5 TABLET ORAL at 10:20

## 2023-12-14 RX ADMIN — PANTOPRAZOLE SODIUM 40 MILLIGRAM(S): 20 TABLET, DELAYED RELEASE ORAL at 06:07

## 2023-12-14 NOTE — DISCHARGE NOTE NURSING/CASE MANAGEMENT/SOCIAL WORK - NSDCPEFALRISK_GEN_ALL_CORE
For information on Fall & Injury Prevention, visit: [email or web address]/[email or web address]-[email or web address]-health-[email or web address]-mobility OR  [email or web address]://www.[facility]/news/fall-prevention-tips-[email or web address]-injury OR  https://www.cdc.gov/steadi/patient.html For information on Fall & Injury Prevention, visit: [email or web address]://www.[facility]/[email or web address]-protects-[email or web address]-maintains-[email or web address]-mobility OR  https://www.[facility]/news/fall-prevention-tips-to-avoid-injury OR  https://www.cdc.gov/steadi/patient.html

## 2023-12-14 NOTE — DISCHARGE NOTE NURSING/CASE MANAGEMENT/SOCIAL WORK - NSDCFUADDAPPT_GEN_ALL_CORE_FT
Follow up with your surgeon in two weeks. Call for appointment.    If you need more pain medications, call your surgeon's office. For medication refills or authorizations call [phone]    Make sure to have a bowel movement by 2 days after surgery. Take stool softners and laxatives as needed.    Call and schedule a follow up appointment with your primary care physician for repeat blood work (CBC and BMP) for post hospital discharge follow-up care.    Call your surgeon if you have increased redness/pain/drainage or fever. Return to ER for shortness of breath/calf tenderness. Follow up with your surgeon in two weeks. Call for appointment.    If you need more pain medications, call your surgeon's office. For medication refills or authorizations call [phone]    Make sure to have a bowel movement by 2 days after surgery. Take stool softners and laxatives as needed.    Call and schedule a follow up appointment with your primary care physician for repeat blood work (CBC and BMP) for post hospital discharge follow-up care.    Call your surgeon if you have increased redness/pain/drainage or fever. Return to ER for shortness of breath/calf tenderness.

## 2023-12-14 NOTE — DISCHARGE NOTE NURSING/CASE MANAGEMENT/SOCIAL WORK - PATIENT PORTAL LINK FT
You can access the FollowMyHealth Patient Portal offered by [facility] by registering at the following website: [email or web address]. By joining [facility]’s FollowMyHealth portal, you will also be able to view your health information using other applications (apps) compatible with our system. You can access the FollowMyHealth Patient Portal offered by [facility] by registering at the following website: [email or web address]. By joining [employer]’s FollowMyHealth portal, you will also be able to view your health information using other applications (apps) compatible with our system.

## 2023-12-14 NOTE — PROGRESS NOTE ADULT - SUBJECTIVE AND OBJECTIVE BOX
58yMale s/p L KRISTEN POD#1. Pt seen and examined by Dr. Corbett this am. Pt  in NAD. Pain controlled. Pt denies any new complaints. Pt denies CP/SOB/N/V/D/numbness/tingling/bowel or bladder dysfunction. (+) voids (+)tolerating PO Diet    PE:   LLE: dressing c/d/i. +ROM ankle/toes. Calf: soft, compressible and nontender. DP/PT 2+ NVI.                           10.3   7.41  )-----------( 186      ( 14 Dec 2023 06:25 )             30.3       12-14    135  |  104  |  15  ----------------------------<  106<H>  3.4<L>   |  28  |  1.19    Ca    8.3<L>      14 Dec 2023 06:25          A/P: 58yMale s/p L KRISTEN POD#1  hypokalemia-replaced.  Pain control prn  +Total hip precautions  PT: WBAT  DVT ppx: SCDs and ASA BID  Wound care, Isometric exercises, incentive spirometry   Discharge: planning Home  All the above discussed and understood by pt   
TANISHA PADILLA is a 58y Male s/p LEFT TOTAL HIP ARTHROPLASTY    PAIN MGNT. LEFT TOTAL HIP ARTHROPLASTY        denies any chest pain shortness of breath palpitation dizziness lightheadedness nausea vomiting fever or chills    T(C): 36.9 (12-14-23 @ 09:28), Max: 37.2 (12-13-23 @ 13:28)  HR: 86 (12-14-23 @ 09:28) (73 - 88)  BP: 117/69 (12-14-23 @ 09:28) (113/69 - 156/97)  RR: 18 (12-14-23 @ 09:28) (16 - 18)  SpO2: 100% (12-14-23 @ 09:28) (99% - 100%)  no jvd/bruit  s1 s2 rrr  cta  s/nt/nd  no calf tend                        10.3   7.41  )-----------( 186      ( 14 Dec 2023 06:25 )             30.3   12-14    135  |  104  |  15  ----------------------------<  106<H>  3.4<L>   |  28  |  1.19    Ca    8.3<L>      14 Dec 2023 06:25        cont dvt px  pain control  bowel regimen  antiemetics  incentive spirometer
Patient is s/p L KRISTEN POD#0  Pt tolerated procedure well without any intra-op complications.   Pt doing well at this time. Pain is controlled.   Denies CP/SOB/Dizziness/N/V/D/HA.     Vital Signs Last 24 Hrs  T(C): 37.2 (13 Dec 2023 13:28), Max: 37.3 (13 Dec 2023 06:47)  T(F): 98.9 (13 Dec 2023 13:28), Max: 99.1 (13 Dec 2023 06:47)  HR: 85 (13 Dec 2023 13:28) (66 - 97)  BP: 144/85 (13 Dec 2023 13:28) (111/71 - 161/92)  BP(mean): --  RR: 16 (13 Dec 2023 13:28) (16 - 18)  SpO2: 100% (13 Dec 2023 13:28) (99% - 100%)    Parameters below as of 13 Dec 2023 13:28  Patient On (Oxygen Delivery Method): room air        PE:  General: A&Ox3 NAD  LLE: Dressing C/D/I. Groin dressing for skin abrasion. abduction pillow in place. Motor intact + EHL/FHL/TA/GS. Sensation is grossly intact. Extremity warm. Compartments soft, compressible, no calf tenderness. DP 2+   RLE: Motor intact + EHL/FHL/TA/GS. Sensation is grossly intact. Extremity warm. Compartments soft, compressible, no calf tenderness. DP 2+     Labs:                          12.0   9.94  )-----------( 242      ( 13 Dec 2023 09:47 )             34.6       12-13    139  |  108  |  16  ----------------------------<  100<H>  5.0   |  30  |  1.21    Ca    9.0      13 Dec 2023 09:47        A/P: Patient is a 58y y/o Male s/p L KRISTEN, POD #0  -wound care, isometric exercises, GI motility, new medications, hospital course reviewed with pt  -Pain control/analgesia  -Inc spirometry reviewed and counseled  -DVT ppx with venodynes and ASA 81 BID  -F/U AM Labs  -PT/OT/WBAT, Posterior hip precautions,   -Antibiotic post op  -Medical consult  -Discharge planning

## 2023-12-15 LAB
SURGICAL PATHOLOGY STUDY: SIGNIFICANT CHANGE UP
SURGICAL PATHOLOGY STUDY: SIGNIFICANT CHANGE UP

## 2023-12-18 DIAGNOSIS — E87.6 HYPOKALEMIA: ICD-10-CM

## 2023-12-18 DIAGNOSIS — M16.12 UNILATERAL PRIMARY OSTEOARTHRITIS, LEFT HIP: ICD-10-CM

## 2023-12-18 DIAGNOSIS — E78.5 HYPERLIPIDEMIA, UNSPECIFIED: ICD-10-CM

## 2023-12-18 DIAGNOSIS — I10 ESSENTIAL (PRIMARY) HYPERTENSION: ICD-10-CM

## 2023-12-18 DIAGNOSIS — M62.452: ICD-10-CM

## 2023-12-18 DIAGNOSIS — K21.9 GASTRO-ESOPHAGEAL REFLUX DISEASE WITHOUT ESOPHAGITIS: ICD-10-CM

## 2023-12-20 DIAGNOSIS — M24.552 CONTRACTURE, LEFT HIP: ICD-10-CM

## 2023-12-20 DIAGNOSIS — M16.12 UNILATERAL PRIMARY OSTEOARTHRITIS, LEFT HIP: ICD-10-CM

## 2023-12-20 DIAGNOSIS — Z91.013 ALLERGY TO SEAFOOD: ICD-10-CM

## 2023-12-21 RX ORDER — OXYCODONE 5 MG/1
5 TABLET ORAL EVERY 4 HOURS
Qty: 42 | Refills: 0 | Status: ACTIVE | COMMUNITY
Start: 2023-12-21 | End: 1900-01-01

## 2024-01-02 ENCOUNTER — RESULT CHARGE (OUTPATIENT)
Age: 59
End: 2024-01-02

## 2024-01-02 ENCOUNTER — APPOINTMENT (OUTPATIENT)
Dept: ORTHOPEDIC SURGERY | Facility: CLINIC | Age: 59
End: 2024-01-02
Payer: COMMERCIAL

## 2024-01-02 PROCEDURE — 99024 POSTOP FOLLOW-UP VISIT: CPT

## 2024-01-02 PROCEDURE — 73503 X-RAY EXAM HIP UNI 4/> VIEWS: CPT | Mod: LT

## 2024-01-02 NOTE — PHYSICAL EXAM
[AP] : anteroposterior [Lateral] : lateral [Components well fixed, in good position] : Components well fixed, in good position [de-identified] : Left hip: Inc c/d/i.  Min swelling.  NVI.  Cane.

## 2024-01-02 NOTE — DISCUSSION/SUMMARY
[de-identified] : The patient is doing well at this time. The patient will be started on a course of physical therapy. I recommended that the patient works on range of motion at home and was shown how to do this. I encouraged the patient to increase ambulation. The patient can continue to take Tylenol for occasional discomfort. The patient was advised not to do any dental work for the first three months following the surgery. We will see the patient  back for a follow-up for a repeat evaluation. The patient  will call or return earlier for any questions or concerns.  Signs and symptoms of infection reviewed and patient advised to call immediately for redness, fevers, and/or chills.

## 2024-01-02 NOTE — ASSESSMENT
[FreeTextEntry1] : Previous doc: 9/19/23: Advanced bilateral hip OA on OCOA XR 8/23/23. Discussed anti-inflammatories, PT/HEP, IA hip CSI, and KRISTEN. He has tried multiple forms of non-op treatment that are no longer providing sufficient relief. Discussed KRISTEN, r/b/a, and preop/postop periods in detail. He has chronic renal disease that was incidentally found after his lumbar fusion- states his numbers are normal at this point. Would recommend repeating bloodwork at home after surgery. He is well informed and would like to proceed with L KRISTEN dual mobility - bimentum due to his hx of spinal fusion.  1/2/24: 2 weeks postop doing well, cont PT.

## 2024-01-02 NOTE — HISTORY OF PRESENT ILLNESS
[Dull/Aching] : dull/aching [Throbbing] : throbbing [] : Post Surgical Visit: yes [de-identified] : 1/2/24: 2 weeks s/p left KRISTEN  min pain.  No fevers/chills.  Completed home PT.  Previous doc: 9/19/23: 57yo M with longstanding bilateral L>R hip/groin pain for many years that has been gradually worsening over the past few months. Admits to increasing pain and difficulty with prolonged walking/standing, startup, and stairs. He has tried anti-inflammatories, PT/HEP, and IA hip CSI that are no longer providing sufficient relief. Most recent IA hip CSI was with Dr. Leal ~3 weeks ago. He admits to episode of kidney failure after he was undergoing treatment for his prostate cancer.  [de-identified] : 12/13/23  [de-identified] : L KRISTEN

## 2024-01-23 ENCOUNTER — APPOINTMENT (OUTPATIENT)
Dept: PAIN MANAGEMENT | Facility: CLINIC | Age: 59
End: 2024-01-23

## 2024-02-06 ENCOUNTER — NON-APPOINTMENT (OUTPATIENT)
Age: 59
End: 2024-02-06

## 2024-02-07 ENCOUNTER — APPOINTMENT (OUTPATIENT)
Dept: ORTHOPEDIC SURGERY | Facility: CLINIC | Age: 59
End: 2024-02-07
Payer: OTHER MISCELLANEOUS

## 2024-02-07 PROCEDURE — 99214 OFFICE O/P EST MOD 30 MIN: CPT

## 2024-02-07 NOTE — WORK
[Total (100%)] : total (100%) [Can return to work without limitations on ______] : can return to work without limitations on [unfilled] [FreeTextEntry1] : joey

## 2024-02-07 NOTE — HISTORY OF PRESENT ILLNESS
[Lower back] : lower back [Work related] : work related [Sudden] : sudden [8] : 8 [7] : 7 [Localized] : localized [Radiating] : radiating [Sharp] : sharp [Constant] : constant [Household chores] : household chores [Meds] : meds [Walking] : walking [Stairs] : stairs [] : yes [Not working due to injury] : Work status: not working due to injury [de-identified] : WC DOI 7/2/22 11/11/2022: Pt here with complaint of 9 days right upper extremity radiculitis. Pt denies recent hx of trauma. Pt states over the past 9 days he has noted progressive right arm pain to the region of the right hand. Pain seems to be alleviated with arm elevation. There is no hx of associated weakness. Pt had recent C5-6-7 ACDF performed by Dr. Pierre this past August. Pt denies associated gait disturbance or bb dysfunction.  11/23/22: here for fu of the neck and the lower back 2/2 the WC case from 7/2 and for review of the cervical MRI - overall doing better in regards to the neck with the steroids having some numbness in the right arm - the pain in the back and legs remains the worst - using cane - medication necessary to move around  MRi C spine - post op acdf C5-7  1/4/23: Here for fu on the low back - continued severe pain to the low back; radiating into the buttocks with tingling sensation into both legs; There is difficulty sleeping. He has been taking the hydrocodone for the pain which controls some of his symptoms. He also takes gabapentin - he saw pain management and is considering a SCS.  xrays today: L spine - progressive kyphosis at L1-2 and L2-3 with lumbar kyphosis - old surgery at L3-5 is healed and fused - hardware in good position AP PELVIS - B hip severe hip OA  2/1/23: here for fu - plan at last was requesting surgery for the lumbar - he was in hosptial twice recently for abdominal pain - his surgery not approved at this point  had temp relief with LESI  3/15/23: Here for fu - plan at last was surgery and he is scheduled for the fusion and decompression. is set up for surgery at the end of next month Has seen Dr Leal   4/05/2023: Here for fu on the low back; is set up for surgery  symptoms remains in the lower back is working at this point  5/10/23: Here for fu - now about 3 weeks since the surgery - was in the hosptial for vomit and hypovolemic shock  wound has been dry  xrays today: l spine - implants in good position   06/07/23 follow up workers comp lower spine doing well using bone stim  his neck is doing well - not having much pain at this point  xrays today: l spine - implants in good position   07/12/23 follow up workers comp lower spine. Pain continues to improve. Denies pain/N/T in BLEs. Denies b/b dysfunction. l spine - implants in good position  xrays today: l spine - implants in good position T spine - implants in good positoin  08/23/23 follow up workers comp lower spine dcont pt, doing well, having stomach pain WAS IN THE er FOR ABDOMINAL ISSUES AND THEN cant take the medicatin  xrays today: T spine - hardware in good position L spine - handware in good position ap pelvis - severe hip OA B  10/11/23: Here for fu on the lower back: increasing pain to the left side of the back radiating down the leg after physical exam maneuver. Will be getting left KRISTEN with Dr Corbett in 12/2023. No new onset weakness.  T-spine: implants in place L-spine: implants in place AP pelvis: B/L hip OA  11/22/23 follow up workers comp lower spine no changes having left hip pain  back is stiff  pain in the both hips  radiating around the hips neck remains sore  PT has been helpful   xrays today: T-spine: implants in place L-spine: implants in place  2/7/24: Here for fu - recovering from left hip replacement - pain in the back remains - is considering return to work  [FreeTextEntry3] : 07/02/22 [FreeTextEntry7] : left leg  [de-identified] : tramadol

## 2024-02-07 NOTE — DISCUSSION/SUMMARY
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

## 2024-02-08 ENCOUNTER — APPOINTMENT (OUTPATIENT)
Dept: PAIN MANAGEMENT | Facility: CLINIC | Age: 59
End: 2024-02-08
Payer: OTHER MISCELLANEOUS

## 2024-02-08 VITALS — WEIGHT: 226 LBS | HEIGHT: 74 IN | BODY MASS INDEX: 29 KG/M2

## 2024-02-08 PROCEDURE — 20552 NJX 1/MLT TRIGGER POINT 1/2: CPT

## 2024-02-08 PROCEDURE — 99213 OFFICE O/P EST LOW 20 MIN: CPT | Mod: 25

## 2024-02-08 PROCEDURE — J3490M: CUSTOM

## 2024-02-08 NOTE — WORK
[Was the competent medical cause of the injury] : was the competent medical cause of the injury [Are consistent with the injury] : are consistent with the injury [Consistent with my objective findings] : consistent with my objective findings [Total (100%)] : total (100%) [Does not reveal pre-existing condition(s) that may affect treatment/prognosis] : does not reveal pre-existing condition(s) that may affect treatment/prognosis [FreeTextEntry1] : fair

## 2024-02-08 NOTE — HISTORY OF PRESENT ILLNESS
[Lower back] : lower back [Work related] : work related [10] : 10 [7] : 7 [Dull/Aching] : dull/aching [Radiating] : radiating [Sharp] : sharp [Intermittent] : intermittent [Sleep] : sleep [Rest] : rest [Meds] : meds [Physical therapy] : physical therapy [Injection therapy] : injection therapy [Walking] : walking [Bending forward] : bending forward [Stairs] : stairs [Disabled] : Work status: disabled [3] : 3 [FreeTextEntry1] : 2/8/24: Had left KRISTEN with Dr. Corbett in December.  His pain is described as stiffness in the lower back.  has muscle spasms in the back. He feels so much better after back and hip surgery.   11/21/2023: follow up today.  Hip pain is much worse.  Will be having KRISTEN in on left on December 13.  Had gastritis from too much Advil.    09/19/2023: follow up today. pain across the lower back today. Pain is numb/dull. Interested in TPI today to b/l lumbar paraspinals.   07/18/2023: follow up today.  Feeling improvement after surgeries.  Will give TPI.    5/30/23- fu for B/L hip injections 5/16 - 95% relief .  Had surgery and complications.  Feeling better after surgery.    04/3/23: follow up today. Has surgery scheduled for 4/18/23 with Dr. Pierre.  Would wait at least 2 weeks to do hip injections. Taking Tizanidine PRN.   02/22/23: follow up today after caudal injection 1/19/23 with 50% relief.  Will give TPI   2/8/23: follow up today> he has now been authorized for lumbar surgery. The radicular pain has improved with the injections. Most of his pain is in the hips. He has numerous cases (WC, NF etc)  Pain over the bilateral trochanteric bursa.  Pain intermittent to the b/l groins. had xrays in January which were reviewed. Had severe OA in both hips.   12/21/22- follow up today.  Pain is in low back and radiates down both legs.  Injections help but wear off quick. 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  Epidurals not giving him the duration of relief we need. having numbness in the lateral thighs. now left worse than right.   10/18/2022: follow up today for caudal on 10/6/22.  Had 60% relief from injection for 3 days.  pain has returned.  Dr. Pierre recommends extension of fusion L1-L2.    09/06/2022: follow up today.  Has been having pain in low back.  Would like TPI.  Saw Dr. Pierre for low back pain that radiates to right hip.  He recommends fusion of L1-L2.    07/19/2022: follow up today.  Has been having worsening pain in low back.  Would like TPI today.  Will be having neck surgery on August 4 04/25/2022: follow up today after b/l L4/5 TFESI on 3/11/22 he reports an overall 80% improvement.   3/28/22: follow up today. Has pain in the left hip and starting to get pain in the right hip. Pain in the left lateral leg as well.  10/18/21- F/u after left hip injection 9/27. Had 90% relief from hip injection. Had surgery with Dr. Pierre L3/4 L4/5 L5/S1 (2 years ago)  9/1/21: follow up today. Patient feels better. Would like repeat hip injection. Will give TPI to neck.  5/4/21- Patient feeling better after surgery. Still has left hip pain. 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 The last hip injection helped 60% so he would benefit from repeat hip injection.  2/9/21: follow up today after left hip injection on 1/29/21 pt reports near complete relief of his pain following. the pinching pain is now gone.  1/20/21- Patient had surgery in back in September. Doing better. Would like TPI in neck. also Dr. Pierre recommended right hip injection due to pain and arthritis in hip.  9/8/20 - follow up today after LESI 8/17/20. Patient had no relief and is now going for surgery on 9/10.  3/9/20 - Patient presents for FUV after L5-S1 LESI on 2/24/20. Reports 80% improvement.  2/3/20 - Patient complains of lower back pain that radiates down right and left leg. Patient had a LESI L5-S1   11/11/19 with relief. Patient has been indicated for surgery by Dr. Pierre. Cannot proceed at this time given financial considerations. Still undergoing Lupron therapy. [] : no [FreeTextEntry3] : 7/2/22 [FreeTextEntry6] : numbness, stiffness  [FreeTextEntry7] : rt hip [FreeTextEntry9] : Stretching

## 2024-02-08 NOTE — PROCEDURE
[Trigger point 1-2 muscle groups] : trigger point 1-2 muscle groups [Right] : of the right [Lumbar paraspinal muscle] : lumbar paraspinal muscle [Pain] : pain [Alcohol] : alcohol

## 2024-02-08 NOTE — HISTORY OF PRESENT ILLNESS
[Lower back] : lower back [Work related] : work related [10] : 10 [7] : 7 [Dull/Aching] : dull/aching [Radiating] : radiating [Sharp] : sharp [Intermittent] : intermittent [Sleep] : sleep [Rest] : rest [Meds] : meds [Physical therapy] : physical therapy [Injection therapy] : injection therapy [Walking] : walking [Bending forward] : bending forward [Stairs] : stairs [Disabled] : Work status: disabled [3] : 3 [FreeTextEntry1] : 2/8/24: Had left KRISTEN with Dr. Corbett in December.  His pain is described as stiffness in the lower back.  has muscle spasms in the back. He feels so much better after back and hip surgery.   11/21/2023: follow up today.  Hip pain is much worse.  Will be having KRISTEN in on left on December 13.  Had gastritis from too much Advil.    09/19/2023: follow up today. pain across the lower back today. Pain is numb/dull. Interested in TPI today to b/l lumbar paraspinals.   07/18/2023: follow up today.  Feeling improvement after surgeries.  Will give TPI.    5/30/23- fu for B/L hip injections 5/16 - 95% relief .  Had surgery and complications.  Feeling better after surgery.    04/3/23: follow up today. Has surgery scheduled for 4/18/23 with Dr. Pierre.  Would wait at least 2 weeks to do hip injections. Taking Tizanidine PRN.   02/22/23: follow up today after caudal injection 1/19/23 with 50% relief.  Will give TPI   2/8/23: follow up today> he has now been authorized for lumbar surgery. The radicular pain has improved with the injections. Most of his pain is in the hips. He has numerous cases (WC, NF etc)  Pain over the bilateral trochanteric bursa.  Pain intermittent to the b/l groins. had xrays in January which were reviewed. Had severe OA in both hips.   12/21/22- follow up today.  Pain is in low back and radiates down both legs.  Injections help but wear off quick. 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  Epidurals not giving him the duration of relief we need. having numbness in the lateral thighs. now left worse than right.   10/18/2022: follow up today for caudal on 10/6/22.  Had 60% relief from injection for 3 days.  pain has returned.  Dr. Pierre recommends extension of fusion L1-L2.    09/06/2022: follow up today.  Has been having pain in low back.  Would like TPI.  Saw Dr. Pierre for low back pain that radiates to right hip.  He recommends fusion of L1-L2.    07/19/2022: follow up today.  Has been having worsening pain in low back.  Would like TPI today.  Will be having neck surgery on August 4 04/25/2022: follow up today after b/l L4/5 TFESI on 3/11/22 he reports an overall 80% improvement.   3/28/22: follow up today. Has pain in the left hip and starting to get pain in the right hip. Pain in the left lateral leg as well.  10/18/21- F/u after left hip injection 9/27. Had 90% relief from hip injection. Had surgery with Dr. Pierre L3/4 L4/5 L5/S1 (2 years ago)  9/1/21: follow up today. Patient feels better. Would like repeat hip injection. Will give TPI to neck.  5/4/21- Patient feeling better after surgery. Still has left hip pain. 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 The last hip injection helped 60% so he would benefit from repeat hip injection.  2/9/21: follow up today after left hip injection on 1/29/21 pt reports near complete relief of his pain following. the pinching pain is now gone.  1/20/21- Patient had surgery in back in September. Doing better. Would like TPI in neck. also Dr. Pierre recommended right hip injection due to pain and arthritis in hip.  9/8/20 - follow up today after LESI 8/17/20. Patient had no relief and is now going for surgery on 9/10.  3/9/20 - Patient presents for FUV after L5-S1 LESI on 2/24/20. Reports 80% improvement.  2/3/20 - Patient complains of lower back pain that radiates down right and left leg. Patient had a LESI L5-S1   11/11/19 with relief. Patient has been indicated for surgery by Dr. Pierre. Cannot proceed at this time given financial considerations. Still undergoing Lupron therapy. [] : no [FreeTextEntry3] : 7/2/22 [FreeTextEntry6] : numbness, stiffness  [FreeTextEntry7] : rt hip [FreeTextEntry9] : Stretching

## 2024-02-08 NOTE — ASSESSMENT
[FreeTextEntry1] : After discussing various treatment options with the patient including but not limited to oral medications, physical therapy, exercise, modalities as well as interventional spinal injections, we have decided with the following plan:  1) The risks, benefits, contents and alternatives to injection were explained in full to the patient.  Risks outlined include but are not limited to infection, sepsis, bleeding, scarring, skin discoloration, temporary increase in pain, syncopal episode, failure to resolve symptoms, allergic reaction, flare reaction, permanent white skin discoloration, symptom recurrence, and elevation of blood sugar in diabetics.  Patient understood the risks.  All questions were answered.  After discussion of options, patient requested an injection.  Oral informed consent was obtained and sterile prep was done of the injection site.  Sterile technique was used to introduce the mixture. The mixture consisted of 2 cc 1% lidocaine, 2cc 0.25% marcaine, and 20mg of kenalog.  Patient tolerated the procedure well.  Patient advised to ice the injection site this evening.  Signs and symptoms of infection reviewed and patient advised to call immediately for redness, fevers, and/or chills.

## 2024-02-16 ENCOUNTER — APPOINTMENT (OUTPATIENT)
Dept: ORTHOPEDIC SURGERY | Facility: CLINIC | Age: 59
End: 2024-02-16
Payer: COMMERCIAL

## 2024-02-16 VITALS — BODY MASS INDEX: 29 KG/M2 | WEIGHT: 226 LBS | HEIGHT: 74 IN

## 2024-02-16 DIAGNOSIS — Z96.642 PRESENCE OF LEFT ARTIFICIAL HIP JOINT: ICD-10-CM

## 2024-02-16 PROCEDURE — 73503 X-RAY EXAM HIP UNI 4/> VIEWS: CPT | Mod: RT

## 2024-02-16 PROCEDURE — 99024 POSTOP FOLLOW-UP VISIT: CPT

## 2024-02-16 NOTE — PHYSICAL EXAM
[AP] : anteroposterior [Lateral] : lateral [Components well fixed, in good position] : Components well fixed, in good position [de-identified] : Left hip: Inc c/d/i.  Min swelling.  NVI.  Cane.

## 2024-02-16 NOTE — ASSESSMENT
[FreeTextEntry1] : Previous doc: 9/19/23: Advanced bilateral hip OA on OCOA XR 8/23/23. Discussed anti-inflammatories, PT/HEP, IA hip CSI, and KRISTEN. He has tried multiple forms of non-op treatment that are no longer providing sufficient relief. Discussed KRITSEN, r/b/a, and preop/postop periods in detail. He has chronic renal disease that was incidentally found after his lumbar fusion- states his numbers are normal at this point. Would recommend repeating bloodwork at home after surgery. He is well informed and would like to proceed with L KRISTEN dual mobility - bimentum due to his hx of spinal fusion. 1/2/24: 2 weeks postop doing well, cont PT.  2/16/24: 9 weeks s/p L KRISTEN 12/13/23. Doing very well. Will plan for R KRISTEN. f/up in 3 months

## 2024-02-16 NOTE — DISCUSSION/SUMMARY
[de-identified] : The natural progression of Osteoarthritis was explained to the patient.  We discussed the possible treatment options from conservative to operative.  These included NSAIDS, Glucosamine and Chondroitin sulfate, and Physical Therapy as well different types of injections.  We also discussed that at some point they may progress to needing a KRISTEN.  Information and pamphlets were given when appropriate.   Patient Complains of pain in Hip with a level that often reaches greater than a 8/10. The Pain has been progressively worsening of his/her treatment course. The pain has interfered with their ADLs and worsens with weight bearing. On exam pain worsens with ROM passive and active and I measured a limited ROM.   X-rays were reviewed with the patient, and they show joint space narrowing, subchondral sclerosis, osteophyte formation, and subchondral cysts.   After a period of more than 12 weeks physical therapy or exercise program done with me or another treating physician, they have continued pain. The patient has failed a trial of NSAID medication or pain relievers if they were unable to tolerate NSAID medications. After a long discussion with the patient both the patient and I have decided we have exhausted all forms of less radical treatments, and they would like to proceed with Total Hip Replacement.   We discussed my findings and the natural history of their condition.  We talked about the details of the proposed surgery and the recovery.  We discussed the material risks, possible benefits and alternatives to surgery.  The risks include but are not limited to infection, bleeding and possible need for blood transfusion, fracture, bowel blockage, bladder retention or infection, need for reoperation, stiffness and/or limited range of motion, possible damage to nerves and blood vessels, failure of fixation of components, risk of deep vein thromboses and pulmonary embolism, wound healing problems, dislocation, and possible leg length discrepancy.  Although incredibly rare, we also discussed the risks of a cardiac event, stroke and even death during, or following, the surgery.  We discussed the type of implants the patient will be receiving and the type of fixation that will be used, as well as whether a robot or computer navigation aide will be used.  The patient understands they will need medical clearance and will attend a preoperative joint education class.  We also discussed the type of anesthesia they will receive, and the risks associated with hospital or rehab length of stay, obesity, diabetes and smoking.  Progress note completed by Felicia Mena PA-C.

## 2024-02-16 NOTE — REVIEW OF SYSTEMS
You will need to make an appointment with your primary care doctor for surgical clearance. Please contact our office with your pre operative appointment date so we can than set up a surgery date. Please contact central scheduling at [phone] to make a postop visit with Dr. Shilpa Pal for 10-12 days after surgery. Reminders: If you are on any blood thinner or Aspirin regiments please discuss with your Primary Care Provider to determine whether you should discontinue 5-7 days prior to surgery.         PLEASE DO NOT EAT OR DRINK ANYTHING AFTER MIDNIGHT THE DAY BEFORE SURGERY [Joint Pain] : joint pain

## 2024-02-16 NOTE — HISTORY OF PRESENT ILLNESS
[2] : 2 [Dull/Aching] : dull/aching [Occasional] : occasional [de-identified] : 2/16/24: 9 weeks s/p L KRISTEN. Doing very well. States he is planning for R IA hip CSI with Dr. Leal  Previous doc: 9/19/23: 59yo M with longstanding bilateral L>R hip/groin pain for many years that has been gradually worsening over the past few months. Admits to increasing pain and difficulty with prolonged walking/standing, startup, and stairs. He has tried anti-inflammatories, PT/HEP, and IA hip CSI that are no longer providing sufficient relief. Most recent IA hip CSI was with Dr. Lael ~3 weeks ago. He admits to episode of kidney failure after he was undergoing treatment for his prostate cancer.  1/2/24: 2 weeks s/p left KRISTEN  min pain.  No fevers/chills.  Completed home PT. [] : no [de-identified] : N/A

## 2024-02-29 NOTE — PATIENT PROFILE ADULT - NSPRONUTRITIONRISK_GEN_A_NUR
Please review this patient's chart, contact patient if appropriate, document including specific date and/or timeframe in which the patient is to be seen, labs, imaging, and any other specialty scheduling instructions.  .    Communication History   User: NAHEED JOSEPH Date/time: 2/29/24 1:54 PM   Comment: 2nd call-LVM to schedule cpe   Context: Randa Recall Report Outcome: Left Message   Phone number: [phone] Phone Type: Mobile   Comm. type: Telephone Call type: Outgoing   Contact: VEDA RASMUSSEN Relation to patient: Father      User: TINO HEAD Date/time: 2/12/24 11:37 AM   Comment: 1st call-lvm to schedule   Context: Randa Recall Report Outcome: Left Message   Phone number: [phone] Phone Type: Mobile   Comm. type: Telephone Call type: Outgoing   Contact: VEDA RASMUSSEN Relation to patient: Father        No indicators present

## 2024-03-06 ENCOUNTER — APPOINTMENT (OUTPATIENT)
Dept: PAIN MANAGEMENT | Facility: CLINIC | Age: 59
End: 2024-03-06
Payer: COMMERCIAL

## 2024-03-06 VITALS — WEIGHT: 225 LBS | HEIGHT: 74 IN | BODY MASS INDEX: 28.88 KG/M2

## 2024-03-06 PROCEDURE — 99213 OFFICE O/P EST LOW 20 MIN: CPT

## 2024-03-06 RX ORDER — GABAPENTIN 300 MG/1
300 CAPSULE ORAL 3 TIMES DAILY
Qty: 90 | Refills: 2 | Status: ACTIVE | COMMUNITY
Start: 2022-12-21 | End: 1900-01-01

## 2024-03-06 NOTE — ASSESSMENT
[FreeTextEntry1] : After discussing various treatment options with the patient including but not limited to oral medications, physical therapy, exercise, modalities as well as interventional spinal injections, we have decided with the following plan:  1) The risks, benefits, contents and alternatives to injection were explained in full to the patient.  Risks outlined include but are not limited to infection, sepsis, bleeding, scarring, skin discoloration, temporary increase in pain, syncopal episode, failure to resolve symptoms, allergic reaction, flare reaction, permanent white skin discoloration, symptom recurrence, and elevation of blood sugar in diabetics.  Patient understood the risks.  All questions were answered.  After discussion of options, patient requested an injection.  Oral informed consent was obtained and sterile prep was done of the injection site.  Sterile technique was used to introduce the mixture. The mixture consisted of 2 cc 1% lidocaine, 2cc 0.25% marcaine, and 20mg of kenalog.  Patient tolerated the procedure well.  Patient advised to ice the injection site this evening.  Signs and symptoms of infection reviewed and patient advised to call immediately for redness, fevers, and/or chills.   right hip injection

## 2024-03-06 NOTE — HISTORY OF PRESENT ILLNESS
[FreeTextEntry1] : 2/8/24: Had left KRISTEN with Dr. Corbett in December.  His pain is described as stiffness in the lower back.  has muscle spasms in the back. He feels so much better after back and hip surgery. He still has right sided hip pain. Patient is on gabapentin 300 mg TID, tizanidine and tramadol occasionally.  Would like to schedule right hip injection.    11/21/2023: follow up today.  Hip pain is much worse.  Will be having KRISTEN in on left on December 13.  Had gastritis from too much Advil.    09/19/2023: follow up today. pain across the lower back today. Pain is numb/dull. Interested in TPI today to b/l lumbar paraspinals.   07/18/2023: follow up today.  Feeling improvement after surgeries.  Will give TPI.    5/30/23- fu for B/L hip injections 5/16 - 95% relief .  Had surgery and complications.  Feeling better after surgery.    04/3/23: follow up today. Has surgery scheduled for 4/18/23 with Dr. Pierre.  Would wait at least 2 weeks to do hip injections. Taking Tizanidine PRN.   02/22/23: follow up today after caudal injection 1/19/23 with 50% relief.  Will give TPI   2/8/23: follow up today> he has now been authorized for lumbar surgery. The radicular pain has improved with the injections. Most of his pain is in the hips. He has numerous cases (WC, NF etc)  Pain over the bilateral trochanteric bursa.  Pain intermittent to the b/l groins. had xrays in January which were reviewed. Had severe OA in both hips.   12/21/22- follow up today.  Pain is in low back and radiates down both legs.  Injections help but wear off quick. 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  Epidurals not giving him the duration of relief we need. having numbness in the lateral thighs. now left worse than right.   10/18/2022: follow up today for caudal on 10/6/22.  Had 60% relief from injection for 3 days.  pain has returned.  Dr. Pierre recommends extension of fusion L1-L2.    09/06/2022: follow up today.  Has been having pain in low back.  Would like TPI.  Saw Dr. Pierre for low back pain that radiates to right hip.  He recommends fusion of L1-L2.    07/19/2022: follow up today.  Has been having worsening pain in low back.  Would like TPI today.  Will be having neck surgery on August 4 04/25/2022: follow up today after b/l L4/5 TFESI on 3/11/22 he reports an overall 80% improvement.   3/28/22: follow up today. Has pain in the left hip and starting to get pain in the right hip. Pain in the left lateral leg as well.  10/18/21- F/u after left hip injection 9/27. Had 90% relief from hip injection. Had surgery with Dr. Pierre L3/4 L4/5 L5/S1 (2 years ago)  9/1/21: follow up today. Patient feels better. Would like repeat hip injection. Will give TPI to neck.  5/4/21- Patient feeling better after surgery. Still has left hip pain. 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 The last hip injection helped 60% so he would benefit from repeat hip injection.  2/9/21: follow up today after left hip injection on 1/29/21 pt reports near complete relief of his pain following. the pinching pain is now gone.  1/20/21- Patient had surgery in back in September. Doing better. Would like TPI in neck. also Dr. Pierre recommended right hip injection due to pain and arthritis in hip.  9/8/20 - follow up today after LESI 8/17/20. Patient had no relief and is now going for surgery on 9/10.  3/9/20 - Patient presents for FUV after L5-S1 LESI on 2/24/20. Reports 80% improvement.  2/3/20 - Patient complains of lower back pain that radiates down right and left leg. Patient had a LESI L5-S1   11/11/19 with relief. Patient has been indicated for surgery by Dr. Pierre. Cannot proceed at this time given financial considerations. Still undergoing Lupron therapy. [Lower back] : lower back [Work related] : work related [6] : 6 [5] : 5 [Dull/Aching] : dull/aching [Radiating] : radiating [Intermittent] : intermittent [Sleep] : sleep [Rest] : rest [Meds] : meds [Physical therapy] : physical therapy [Injection therapy] : injection therapy [Disabled] : Work status: disabled [] : yes [FreeTextEntry3] : 7/2/22 [FreeTextEntry6] : numbness, stiffness  [FreeTextEntry7] : rt hip [FreeTextEntry9] : Stretching  [de-identified] : Specific movement

## 2024-03-11 ENCOUNTER — APPOINTMENT (OUTPATIENT)
Dept: PAIN MANAGEMENT | Facility: CLINIC | Age: 59
End: 2024-03-11

## 2024-03-19 ENCOUNTER — APPOINTMENT (OUTPATIENT)
Dept: PAIN MANAGEMENT | Facility: CLINIC | Age: 59
End: 2024-03-19
Payer: COMMERCIAL

## 2024-03-19 PROCEDURE — J3490M: CUSTOM

## 2024-03-19 PROCEDURE — 73525 CONTRAST X-RAY OF HIP: CPT | Mod: 26,59

## 2024-03-19 PROCEDURE — 27093 INJECTION FOR HIP X-RAY: CPT | Mod: RT

## 2024-03-19 NOTE — PROCEDURE
[FreeTextEntry3] : Date of Service: 03/19/2024   Account: [id number]   Patient: TANISHA PADILLA   YOB: 1965   Age: 59 year     Surgeon: Ginny Leal M.D.   Pre-Operative Diagnosis: Unilateral Primary Osteoarthritis , Right Hip (M16.11)   Post Operative Diagnosis: Unilateral Primary Osteoarthritis , Right Hip (M16.11)   Procedure: Right Hip arthrogram and steroid injection under fluoroscopic  guidance                                                          This procedure was carried out using fluoroscopic guidance.  The risks and benefits of the procedure were discussed extensively with the patient.  The consent of the patient was obtained and the following procedure was performed.   The patient was placed in the supine position with right hip flexed and externally rotated 25 degrees.  The area of the right groin was prepped and draped in a sterile fashion.  The fluoroscopic image intensifier was then positioned so that the right hip appeared in view, and the midline intertrochanteric region was identified and marked. The skin and subcutaneous structures were then anesthetized using 1 cc of 1% lidocaine.  A 22 gauge spinal needle was then inserted and directed into this right hip intra-capsular region.  After negative aspiration for heme,  3 cc of Omnipaque was injected and appeared to fill the joint  margins.   Right hip arthrogram showed no intravascular flow, and good spread around the femoral head and to the acetabulum. An injectate of 3cc 0.25% marcaine plus 80 mg of Kenalog was then injected into the right hip space.   The needle was then removed and pressure was applied.   The patient was instructed to apply ice over the injection site for twenty minutes every two hours for the next 24 to 48 hours.  The patient was also instructed to contact me immediately if there were any problems.   Ginny Leal M.D.

## 2024-04-03 ENCOUNTER — APPOINTMENT (OUTPATIENT)
Dept: PAIN MANAGEMENT | Facility: CLINIC | Age: 59
End: 2024-04-03

## 2024-04-17 ENCOUNTER — APPOINTMENT (OUTPATIENT)
Dept: PAIN MANAGEMENT | Facility: CLINIC | Age: 59
End: 2024-04-17
Payer: COMMERCIAL

## 2024-04-17 VITALS — HEIGHT: 74 IN | BODY MASS INDEX: 29.26 KG/M2 | WEIGHT: 228 LBS

## 2024-04-17 DIAGNOSIS — M54.16 RADICULOPATHY, LUMBAR REGION: ICD-10-CM

## 2024-04-17 PROCEDURE — 99214 OFFICE O/P EST MOD 30 MIN: CPT | Mod: 25

## 2024-04-17 PROCEDURE — 96372 THER/PROPH/DIAG INJ SC/IM: CPT

## 2024-04-17 NOTE — ASSESSMENT
[FreeTextEntry1] : After discussing various treatment options with the patient including but not limited to oral medications, physical therapy, exercise, modalities as well as interventional spinal injections, we have decided with the following plan:  1) Toradol injection today  2) f/u with Dr. Corbett regarding surgery for the right hip as the left is doing well.

## 2024-04-17 NOTE — HISTORY OF PRESENT ILLNESS
[Lower back] : lower back [Work related] : work related [7] : 7 [5] : 5 [Dull/Aching] : dull/aching [Radiating] : radiating [Intermittent] : intermittent [Sleep] : sleep [Rest] : rest [Meds] : meds [Physical therapy] : physical therapy [Injection therapy] : injection therapy [Disabled] : Work status: disabled [FreeTextEntry1] : 4/17/2024- f/u today after right hip injection on 3/19/24. He had relief the day of the injection. Pain was slowly getting better. Had a nuisance type pain.   He had a slip off a step at work this morning and he had increased pain in the right groin and medial and lateral legs.   2/8/24: Had left KRISTEN with Dr. Corbett in December.  His pain is described as stiffness in the lower back.  has muscle spasms in the back. He feels so much better after back and hip surgery. He still has right sided hip pain. Patient is on gabapentin 300 mg TID, tizanidine and tramadol occasionally.  Would like to schedule right hip injection.    11/21/2023: follow up today.  Hip pain is much worse.  Will be having KRISTEN in on left on December 13.  Had gastritis from too much Advil.    09/19/2023: follow up today. pain across the lower back today. Pain is numb/dull. Interested in TPI today to b/l lumbar paraspinals.   07/18/2023: follow up today.  Feeling improvement after surgeries.  Will give TPI.    5/30/23- fu for B/L hip injections 5/16 - 95% relief .  Had surgery and complications.  Feeling better after surgery.    04/3/23: follow up today. Has surgery scheduled for 4/18/23 with Dr. Pierre.  Would wait at least 2 weeks to do hip injections. Taking Tizanidine PRN.   02/22/23: follow up today after caudal injection 1/19/23 with 50% relief.  Will give TPI   2/8/23: follow up today> he has now been authorized for lumbar surgery. The radicular pain has improved with the injections. Most of his pain is in the hips. He has numerous cases (WC, NF etc)  Pain over the bilateral trochanteric bursa.  Pain intermittent to the b/l groins. had xrays in January which were reviewed. Had severe OA in both hips.   12/21/22- follow up today.  Pain is in low back and radiates down both legs.  Injections help but wear off quick. 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  Epidurals not giving him the duration of relief we need. having numbness in the lateral thighs. now left worse than right.   10/18/2022: follow up today for caudal on 10/6/22.  Had 60% relief from injection for 3 days.  pain has returned.  Dr. Pierre recommends extension of fusion L1-L2.    09/06/2022: follow up today.  Has been having pain in low back.  Would like TPI.  Saw Dr. Pierre for low back pain that radiates to right hip.  He recommends fusion of L1-L2.    07/19/2022: follow up today.  Has been having worsening pain in low back.  Would like TPI today.  Will be having neck surgery on August 4 04/25/2022: follow up today after b/l L4/5 TFESI on 3/11/22 he reports an overall 80% improvement.   3/28/22: follow up today. Has pain in the left hip and starting to get pain in the right hip. Pain in the left lateral leg as well.  10/18/21- F/u after left hip injection 9/27. Had 90% relief from hip injection. Had surgery with Dr. Pierre L3/4 L4/5 L5/S1 (2 years ago)  9/1/21: follow up today. Patient feels better. Would like repeat hip injection. Will give TPI to neck.  5/4/21- Patient feeling better after surgery. Still has left hip pain. 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 The last hip injection helped 60% so he would benefit from repeat hip injection.  2/9/21: follow up today after left hip injection on 1/29/21 pt reports near complete relief of his pain following. the pinching pain is now gone.  1/20/21- Patient had surgery in back in September. Doing better. Would like TPI in neck. also Dr. Pierre recommended right hip injection due to pain and arthritis in hip.  9/8/20 - follow up today after LESI 8/17/20. Patient had no relief and is now going for surgery on 9/10.  3/9/20 - Patient presents for FUV after L5-S1 LESI on 2/24/20. Reports 80% improvement.  2/3/20 - Patient complains of lower back pain that radiates down right and left leg. Patient had a LESI L5-S1   11/11/19 with relief. Patient has been indicated for surgery by Dr. Pierre. Cannot proceed at this time given financial considerations. Still undergoing Lupron therapy. [] : no [FreeTextEntry3] : 7/2/22 [FreeTextEntry6] : numbness, stiffness  [FreeTextEntry7] : rt hip [FreeTextEntry9] : Stretching  [de-identified] : Specific movement

## 2024-04-17 NOTE — PHYSICAL EXAM
[Right] : right hip [5___] : adduction 5[unfilled]/5 [] : groin pain with internal rotation [de-identified] : external rotation 20 degrees [TWNoteComboBox6] : internal rotation 0 degrees

## 2024-04-17 NOTE — PROCEDURE
[Therapeutic Injection] : therapeutic injection [Right] : of the right [___ cc    1%] : Lidocaine ~Vcc of 1%  [___ cc    30mg] : Ketorolac (Toradol) ~Vcc of 30 mg

## 2024-04-22 NOTE — ASSESSMENT
[FreeTextEntry1] : After discussing various treatment options with the patient including but not limited to oral medications, physical therapy, exercise, modalities as well as interventional spinal injections, we have decided with the following plan:  1) The risks, benefits, contents and alternatives to injection were explained in full to the patient.  Risks outlined include but are not limited to infection, sepsis, bleeding, scarring, skin discoloration, temporary increase in pain, syncopal episode, failure to resolve symptoms, allergic reaction, flare reaction, permanent white skin discoloration, symptom recurrence, and elevation of blood sugar in diabetics.  Patient understood the risks.  All questions were answered.  After discussion of options, patient requested an injection.  Oral informed consent was obtained and sterile prep was done of the injection site.  Sterile technique was used to introduce the mixture. The mixture consisted of 2 cc 1% lidocaine, 2cc 0.25% marcaine, and 20mg of kenalog.  Patient tolerated the procedure well.  Patient advised to ice the injection site this evening.  Signs and symptoms of infection reviewed and patient advised to call immediately for redness, fevers, and/or chills.   right hip injection [FreeTextEntry1] : 52 y.o. M w/ h/o chronic neck pain radiating into left occiput returns to office for f/u s/p left sided cervical paravertebral muscle TPIs (10/4/22).  Pt. reports relief from left temporal HA s/p TPIs but still has significant left sided neck pain which limits his cervical ROM.  Still is unable to lie supine with his head on pillow.  Still denies pain radiating down arms or N/T/B in hands.  No reported problems w/ gait/balance or B/B control.  States gabapentin 300 mg; 2 caps po qhs is largely ineffective.

## 2024-04-22 NOTE — HISTORY OF PRESENT ILLNESS
[Lower back] : lower back [Work related] : work related [6] : 6 [5] : 5 [Dull/Aching] : dull/aching [Radiating] : radiating [Intermittent] : intermittent [Sleep] : sleep [Rest] : rest [Meds] : meds [Physical therapy] : physical therapy [Injection therapy] : injection therapy [Disabled] : Work status: disabled [FreeTextEntry1] : 2/8/24: Had left KRISTEN with Dr. Corbett in December.  His pain is described as stiffness in the lower back.  has muscle spasms in the back. He feels so much better after back and hip surgery. He still has right sided hip pain. Patient is on gabapentin 300 mg TID, tizanidine and tramadol occasionally.  Would like to schedule right hip injection.    11/21/2023: follow up today.  Hip pain is much worse.  Will be having KRISTEN in on left on December 13.  Had gastritis from too much Advil.    09/19/2023: follow up today. pain across the lower back today. Pain is numb/dull. Interested in TPI today to b/l lumbar paraspinals.   07/18/2023: follow up today.  Feeling improvement after surgeries.  Will give TPI.    5/30/23- fu for B/L hip injections 5/16 - 95% relief .  Had surgery and complications.  Feeling better after surgery.    04/3/23: follow up today. Has surgery scheduled for 4/18/23 with Dr. Pierre.  Would wait at least 2 weeks to do hip injections. Taking Tizanidine PRN.   02/22/23: follow up today after caudal injection 1/19/23 with 50% relief.  Will give TPI   2/8/23: follow up today> he has now been authorized for lumbar surgery. The radicular pain has improved with the injections. Most of his pain is in the hips. He has numerous cases (WC, NF etc)  Pain over the bilateral trochanteric bursa.  Pain intermittent to the b/l groins. had xrays in January which were reviewed. Had severe OA in both hips.   12/21/22- follow up today.  Pain is in low back and radiates down both legs.  Injections help but wear off quick. 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  Epidurals not giving him the duration of relief we need. having numbness in the lateral thighs. now left worse than right.   10/18/2022: follow up today for caudal on 10/6/22.  Had 60% relief from injection for 3 days.  pain has returned.  Dr. Pierre recommends extension of fusion L1-L2.    09/06/2022: follow up today.  Has been having pain in low back.  Would like TPI.  Saw Dr. Pierre for low back pain that radiates to right hip.  He recommends fusion of L1-L2.    07/19/2022: follow up today.  Has been having worsening pain in low back.  Would like TPI today.  Will be having neck surgery on August 4 04/25/2022: follow up today after b/l L4/5 TFESI on 3/11/22 he reports an overall 80% improvement.   3/28/22: follow up today. Has pain in the left hip and starting to get pain in the right hip. Pain in the left lateral leg as well.  10/18/21- F/u after left hip injection 9/27. Had 90% relief from hip injection. Had surgery with Dr. Pierre L3/4 L4/5 L5/S1 (2 years ago)  9/1/21: follow up today. Patient feels better. Would like repeat hip injection. Will give TPI to neck.  5/4/21- Patient feeling better after surgery. Still has left hip pain. 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 The last hip injection helped 60% so he would benefit from repeat hip injection.  2/9/21: follow up today after left hip injection on 1/29/21 pt reports near complete relief of his pain following. the pinching pain is now gone.  1/20/21- Patient had surgery in back in September. Doing better. Would like TPI in neck. also Dr. Pierre recommended right hip injection due to pain and arthritis in hip.  9/8/20 - follow up today after LESI 8/17/20. Patient had no relief and is now going for surgery on 9/10.  3/9/20 - Patient presents for FUV after L5-S1 LESI on 2/24/20. Reports 80% improvement.  2/3/20 - Patient complains of lower back pain that radiates down right and left leg. Patient had a LESI L5-S1   11/11/19 with relief. Patient has been indicated for surgery by Dr. Pierre. Cannot proceed at this time given financial considerations. Still undergoing Lupron therapy. [] : no [FreeTextEntry3] : 7/2/22 [FreeTextEntry6] : numbness, stiffness  [FreeTextEntry7] : rt hip [FreeTextEntry9] : Stretching  [de-identified] : Specific movement

## 2024-04-22 NOTE — HISTORY OF PRESENT ILLNESS
[Lower back] : lower back [Work related] : work related [6] : 6 [5] : 5 [Dull/Aching] : dull/aching [Radiating] : radiating [Intermittent] : intermittent [Sleep] : sleep [Rest] : rest [Meds] : meds [Physical therapy] : physical therapy [Injection therapy] : injection therapy [Disabled] : Work status: disabled [FreeTextEntry1] : 2/8/24: Had left KRISTEN with Dr. Corbett in December.  His pain is described as stiffness in the lower back.  has muscle spasms in the back. He feels so much better after back and hip surgery. He still has right sided hip pain. Patient is on gabapentin 300 mg TID, tizanidine and tramadol occasionally.  Would like to schedule right hip injection.    11/21/2023: follow up today.  Hip pain is much worse.  Will be having KRISTEN in on left on December 13.  Had gastritis from too much Advil.    09/19/2023: follow up today. pain across the lower back today. Pain is numb/dull. Interested in TPI today to b/l lumbar paraspinals.   07/18/2023: follow up today.  Feeling improvement after surgeries.  Will give TPI.    5/30/23- fu for B/L hip injections 5/16 - 95% relief .  Had surgery and complications.  Feeling better after surgery.    04/3/23: follow up today. Has surgery scheduled for 4/18/23 with Dr. Pierre.  Would wait at least 2 weeks to do hip injections. Taking Tizanidine PRN.   02/22/23: follow up today after caudal injection 1/19/23 with 50% relief.  Will give TPI   2/8/23: follow up today> he has now been authorized for lumbar surgery. The radicular pain has improved with the injections. Most of his pain is in the hips. He has numerous cases (WC, NF etc)  Pain over the bilateral trochanteric bursa.  Pain intermittent to the b/l groins. had xrays in January which were reviewed. Had severe OA in both hips.   12/21/22- follow up today.  Pain is in low back and radiates down both legs.  Injections help but wear off quick. 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  Epidurals not giving him the duration of relief we need. having numbness in the lateral thighs. now left worse than right.   10/18/2022: follow up today for caudal on 10/6/22.  Had 60% relief from injection for 3 days.  pain has returned.  Dr. Pierre recommends extension of fusion L1-L2.    09/06/2022: follow up today.  Has been having pain in low back.  Would like TPI.  Saw Dr. Pierre for low back pain that radiates to right hip.  He recommends fusion of L1-L2.    07/19/2022: follow up today.  Has been having worsening pain in low back.  Would like TPI today.  Will be having neck surgery on August 4 04/25/2022: follow up today after b/l L4/5 TFESI on 3/11/22 he reports an overall 80% improvement.   3/28/22: follow up today. Has pain in the left hip and starting to get pain in the right hip. Pain in the left lateral leg as well.  10/18/21- F/u after left hip injection 9/27. Had 90% relief from hip injection. Had surgery with Dr. Pierre L3/4 L4/5 L5/S1 (2 years ago)  9/1/21: follow up today. Patient feels better. Would like repeat hip injection. Will give TPI to neck.  5/4/21- Patient feeling better after surgery. Still has left hip pain. 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 The last hip injection helped 60% so he would benefit from repeat hip injection.  2/9/21: follow up today after left hip injection on 1/29/21 pt reports near complete relief of his pain following. the pinching pain is now gone.  1/20/21- Patient had surgery in back in September. Doing better. Would like TPI in neck. also Dr. Pierre recommended right hip injection due to pain and arthritis in hip.  9/8/20 - follow up today after LESI 8/17/20. Patient had no relief and is now going for surgery on 9/10.  3/9/20 - Patient presents for FUV after L5-S1 LESI on 2/24/20. Reports 80% improvement.  2/3/20 - Patient complains of lower back pain that radiates down right and left leg. Patient had a LESI L5-S1   11/11/19 with relief. Patient has been indicated for surgery by Dr. Pierre. Cannot proceed at this time given financial considerations. Still undergoing Lupron therapy. [] : no [FreeTextEntry3] : 7/2/22 [FreeTextEntry6] : numbness, stiffness  [FreeTextEntry7] : rt hip [FreeTextEntry9] : Stretching  [de-identified] : Specific movement

## 2024-05-08 ENCOUNTER — APPOINTMENT (OUTPATIENT)
Dept: ORTHOPEDIC SURGERY | Facility: CLINIC | Age: 59
End: 2024-05-08
Payer: OTHER MISCELLANEOUS

## 2024-05-08 VITALS — HEIGHT: 74 IN | WEIGHT: 201 LBS | BODY MASS INDEX: 25.8 KG/M2

## 2024-05-08 DIAGNOSIS — M51.26 OTHER INTERVERTEBRAL DISC DISPLACEMENT, LUMBAR REGION: ICD-10-CM

## 2024-05-08 DIAGNOSIS — M43.16 OTHER INTERVERTEBRAL DISC DEGENERATION, LUMBAR REGION: ICD-10-CM

## 2024-05-08 DIAGNOSIS — M51.36 OTHER INTERVERTEBRAL DISC DEGENERATION, LUMBAR REGION: ICD-10-CM

## 2024-05-08 PROCEDURE — 72100 X-RAY EXAM L-S SPINE 2/3 VWS: CPT

## 2024-05-08 PROCEDURE — 99214 OFFICE O/P EST MOD 30 MIN: CPT

## 2024-05-08 PROCEDURE — 72170 X-RAY EXAM OF PELVIS: CPT

## 2024-05-08 NOTE — HISTORY OF PRESENT ILLNESS
[Lower back] : lower back [Work related] : work related [Sudden] : sudden [6] : 6 [2] : 2 [Localized] : localized [Radiating] : radiating [Sharp] : sharp [Intermittent] : intermittent [Household chores] : household chores [Meds] : meds [Walking] : walking [Stairs] : stairs [] : yes [Not working due to injury] : Work status: not working due to injury [de-identified] : WC DOI 7/2/22 11/11/2022: Pt here with complaint of 9 days right upper extremity radiculitis. Pt denies recent hx of trauma. Pt states over the past 9 days he has noted progressive right arm pain to the region of the right hand. Pain seems to be alleviated with arm elevation. There is no hx of associated weakness. Pt had recent C5-6-7 ACDF performed by Dr. Pierre this past August. Pt denies associated gait disturbance or bb dysfunction.  11/23/22: here for fu of the neck and the lower back 2/2 the WC case from 7/2 and for review of the cervical MRI - overall doing better in regards to the neck with the steroids having some numbness in the right arm - the pain in the back and legs remains the worst - using cane - medication necessary to move around  MRi C spine - post op acdf C5-7  1/4/23: Here for fu on the low back - continued severe pain to the low back; radiating into the buttocks with tingling sensation into both legs; There is difficulty sleeping. He has been taking the hydrocodone for the pain which controls some of his symptoms. He also takes gabapentin - he saw pain management and is considering a SCS.  xrays today: L spine - progressive kyphosis at L1-2 and L2-3 with lumbar kyphosis - old surgery at L3-5 is healed and fused - hardware in good position AP PELVIS - B hip severe hip OA  2/1/23: here for fu - plan at last was requesting surgery for the lumbar - he was in hosptial twice recently for abdominal pain - his surgery not approved at this point  had temp relief with LESI  3/15/23: Here for fu - plan at last was surgery and he is scheduled for the fusion and decompression. is set up for surgery at the end of next month Has seen Dr Leal   4/05/2023: Here for fu on the low back; is set up for surgery  symptoms remains in the lower back is working at this point  5/10/23: Here for fu - now about 3 weeks since the surgery - was in the hosptial for vomit and hypovolemic shock  wound has been dry  xrays today: l spine - implants in good position   06/07/23 follow up workers comp lower spine doing well using bone stim  his neck is doing well - not having much pain at this point  xrays today: l spine - implants in good position   07/12/23 follow up workers comp lower spine. Pain continues to improve. Denies pain/N/T in BLEs. Denies b/b dysfunction. l spine - implants in good position  xrays today: l spine - implants in good position T spine - implants in good positoin  08/23/23 follow up workers comp lower spine dcont pt, doing well, having stomach pain WAS IN THE er FOR ABDOMINAL ISSUES AND THEN cant take the medicatin  xrays today: T spine - hardware in good position L spine - handware in good position ap pelvis - severe hip OA B  10/11/23: Here for fu on the lower back: increasing pain to the left side of the back radiating down the leg after physical exam maneuver. Will be getting left KRISTEN with Dr Corbett in 12/2023. No new onset weakness.  T-spine: implants in place L-spine: implants in place AP pelvis: B/L hip OA  11/22/23 follow up workers comp lower spine no changes having left hip pain  back is stiff  pain in the both hips  radiating around the hips neck remains sore  PT has been helpful   xrays today: T-spine: implants in place L-spine: implants in place  2/7/24: Here for fu - recovering from left hip replacement - pain in the back remains - is considering return to work   5.8.24 Patient here for follow up. he states he still has soreness and stiffness.  HAS been recovering from the left KRISTEN  HAS tried to work but its hurting quite a lot having a really hard time  pain worse with the work related activity   xrays today: L spine - hardware in good position  AP PELVIS - LEFT kristen, SEVERE RIGHT kristen  [FreeTextEntry3] : 07/02/22 [FreeTextEntry7] : left leg  [de-identified] : tramadol

## 2024-05-08 NOTE — DISCUSSION/SUMMARY
[Medication Risks Reviewed] : Medication risks reviewed [de-identified] : reviewed the case and the imaging  - improving slowly and recovering from hip surgery now  discussed hes going to need the right hip replaced as well with time (thats not part of the WC case) hes working currently but having great difficultly not sure how long he will be able to continue in that capacity   fu 3 months

## 2024-05-08 NOTE — WORK
[Can return to work without limitations on ______] : can return to work without limitations on [unfilled] [Partial] : partial [FreeTextEntry1] : joey

## 2024-05-14 ENCOUNTER — RESULT CHARGE (OUTPATIENT)
Age: 59
End: 2024-05-14

## 2024-05-14 ENCOUNTER — APPOINTMENT (OUTPATIENT)
Dept: ORTHOPEDIC SURGERY | Facility: CLINIC | Age: 59
End: 2024-05-14
Payer: OTHER MISCELLANEOUS

## 2024-05-14 VITALS — WEIGHT: 201 LBS | BODY MASS INDEX: 25.8 KG/M2 | HEIGHT: 74 IN

## 2024-05-14 PROCEDURE — 99215 OFFICE O/P EST HI 40 MIN: CPT | Mod: ACP

## 2024-05-14 RX ORDER — MELOXICAM 15 MG/1
15 TABLET ORAL DAILY
Qty: 30 | Refills: 1 | Status: ACTIVE | COMMUNITY
Start: 2024-05-14 | End: 1900-01-01

## 2024-05-14 NOTE — HISTORY OF PRESENT ILLNESS
[Work related] : work related [de-identified] : WC 4/25/24. Known to me for left KRISTEN under private insurance Dec 2023 doing well.  Mechanical fall out of truck at work 3 weeks ago, groin and lateral pain since then, tsarted immediately.  Prior to this had known OA in right hip but had minimal self limiting pain well managed with home exercises.  Had inj by pain management in March 2024 that gave significant relief until this injury. [] : no [FreeTextEntry3] : 04/17/24 [FreeTextEntry5] : Here for evaluation on the RIGHT hip. Stated he slipped and fell on 04/17/24. has n/t down the leg. Pain is groin and posterior.   Has seen Chelle for consuelo hip stated he had oa.

## 2024-05-14 NOTE — ASSESSMENT
[FreeTextEntry1] : 5/14/24: Severe OA right hip, exacerbated by trauma at work.  Prior to this had minimal pain and was functioning well as .  He now has severe pain.  Has been on NSAIDs without relief and previously had hip inj prior to this injury which helped him.  Discussed options and due to severity of pain he wishes to proceed with right KRISTEN.  He is currently working full duty.  Will use bimentum dual mobility due to h/o spine fusion.

## 2024-05-14 NOTE — WORK
[Partial] : partial [FreeTextEntry1] : guarded [FreeTextEntry2] : underlying OA right hip previously asymptomatic.

## 2024-05-14 NOTE — DISCUSSION/SUMMARY
[de-identified] : The natural progression of Osteoarthritis was explained to the patient.  We discussed the possible treatment options from conservative to operative.  These included NSAIDS, Glucosamine and Chondrotin sulfate, and Physical Therapy as well different types of injections.  We also discussed that at some point they may progress to needed a KRISTEN.  Information and pamphlets were given when appropriate.  Patient Complains of pain in Hip with a level that often reaches greater than a 8/10. The Pain has been progressively worsening of his/her treatment coarse. The pain has interfered with their ADLs and worsens with weight bearing. On exam pain worsens with ROM passive and active and I measured a limited ROM.  X-rays were reviewed with the patient and they show joint space narrowing, subchondral sclerosis, osteophyte formation, and subchondral cysts. After a period of more than 12 weeks physical therapy or exercise program done with me or another treating physician they have continued pain. The patient has failed a trial of NSAID medication or pain relieves if they were unable to tolerate NSAID medications. After a long discussion with the patient both the patient and I have decided we have exhausted all forms of less radical treatments and they would like to proceed with Total Hip Replacement.   We discussed my findings and the natural history of their condition.  We talked about the details of the proposed surgery and the recovery.  We discussed the material risks, possible benefits and alternatives to surgery.  The risks include but are not limited to infection, bleeding and possible need for blood transfusion, fracture, bowel blockage, bladder retention or infection, need for reoperation, stiffness and/or limited range of motion, possible damage to nerves and blood vessels, failure of fixation of components, risk of deep vein thromboses and pulmonary embolism, wound healing problems, dislocation, and possible leg length discrepancy.  Although incredibly rare, we also discussed the risks of a cardiac event, stroke and even death during, or following, the surgery.  We discussed the type of implants the patient will be receiving and the type of fixation that will be used, as well as whether a robot or computer navigation aide will be used.  The patient understands they will need medical clearance and will attend a preoperative joint education class.  We also discussed the type of anesthesia they will receive, and the risks associated with hospital or rehab length of stay, obesity, diabetes and smoking.

## 2024-05-14 NOTE — PHYSICAL EXAM
[AP] : anteroposterior [Lateral] : lateral [Right] : right hip with pelvis [Severe arthritis (Tonnis Grade 3)] : Severe arthritis (Tonnis Grade 3) [de-identified] : Right hip: No swelling.  Groing and greater troch tenderness.  Groin pain with IR.  NVI.  Antalgic gait without assistance.

## 2024-05-17 ENCOUNTER — APPOINTMENT (OUTPATIENT)
Dept: ORTHOPEDIC SURGERY | Facility: CLINIC | Age: 59
End: 2024-05-17

## 2024-05-26 ENCOUNTER — NON-APPOINTMENT (OUTPATIENT)
Age: 59
End: 2024-05-26

## 2024-05-26 ASSESSMENT — HOOS JR
BENDING TO THE FLOOR TO PICK UP OBJECT: MODERATE
WALKING ON UNEVEN SURFACE: MODERATE
HOOS JR RAW SCORE: 9
GOING UP OR DOWN STAIRS: MODERATE
RISING FROM SITTING: MODERATE
LYING IN BED (TURNING OVER, MAINTAINING HIP POSITION): MILD

## 2024-06-11 ENCOUNTER — APPOINTMENT (OUTPATIENT)
Dept: ORTHOPEDIC SURGERY | Facility: CLINIC | Age: 59
End: 2024-06-11
Payer: OTHER MISCELLANEOUS

## 2024-06-11 VITALS — HEIGHT: 74 IN | BODY MASS INDEX: 25.8 KG/M2 | WEIGHT: 201 LBS

## 2024-06-11 DIAGNOSIS — M16.11 UNILATERAL PRIMARY OSTEOARTHRITIS, RIGHT HIP: ICD-10-CM

## 2024-06-11 PROCEDURE — 99213 OFFICE O/P EST LOW 20 MIN: CPT

## 2024-06-11 RX ORDER — MELOXICAM 15 MG/1
15 TABLET ORAL DAILY
Qty: 30 | Refills: 1 | Status: ACTIVE | COMMUNITY
Start: 2024-06-11 | End: 1900-01-01

## 2024-06-11 NOTE — HISTORY OF PRESENT ILLNESS
[Work related] : work related [Full time] : Work status: full time [de-identified] : 6/11/24: Cont pain, mobic is helping a little.  Cont pain daily and having difficulty wiht walking and sitting.  WC 4/25/24. Known to me for left KRISTEN under private insurance Dec 2023 doing well.  Mechanical fall out of truck at work 3 weeks ago, groin and lateral pain since then, tsarted immediately.  Prior to this had known OA in right hip but had minimal self limiting pain well managed with home exercises.  Had inj by pain management in March 2024 that gave significant relief until this injury. [] : no [FreeTextEntry3] : 04/17/24 [FreeTextEntry5] : Here for follow up on the RIGHT hip. Stated he slipped and fell on 04/17/24. has n/t down the leg. Pain is groin and posterior.  Reports that he is doing about the same since the last visit.

## 2024-06-11 NOTE — DISCUSSION/SUMMARY
[de-identified] : The natural progression of Osteoarthritis was explained to the patient.  We discussed the possible treatment options from conservative to operative.  These included NSAIDS, Glucosamine and Chondrotin sulfate, and Physical Therapy as well different types of injections.  We also discussed that at some point they may progress to needed a KRISTEN.  Information and pamphlets were given when appropriate.  Patient Complains of pain in Hip with a level that often reaches greater than a 8/10. The Pain has been progressively worsening of his/her treatment coarse. The pain has interfered with their ADLs and worsens with weight bearing. On exam pain worsens with ROM passive and active and I measured a limited ROM.  X-rays were reviewed with the patient and they show joint space narrowing, subchondral sclerosis, osteophyte formation, and subchondral cysts. After a period of more than 12 weeks physical therapy or exercise program done with me or another treating physician they have continued pain. The patient has failed a trial of NSAID medication or pain relieves if they were unable to tolerate NSAID medications. After a long discussion with the patient both the patient and I have decided we have exhausted all forms of less radical treatments and they would like to proceed with Total Hip Replacement.   We discussed my findings and the natural history of their condition.  We talked about the details of the proposed surgery and the recovery.  We discussed the material risks, possible benefits and alternatives to surgery.  The risks include but are not limited to infection, bleeding and possible need for blood transfusion, fracture, bowel blockage, bladder retention or infection, need for reoperation, stiffness and/or limited range of motion, possible damage to nerves and blood vessels, failure of fixation of components, risk of deep vein thromboses and pulmonary embolism, wound healing problems, dislocation, and possible leg length discrepancy.  Although incredibly rare, we also discussed the risks of a cardiac event, stroke and even death during, or following, the surgery.  We discussed the type of implants the patient will be receiving and the type of fixation that will be used, as well as whether a robot or computer navigation aide will be used.  The patient understands they will need medical clearance and will attend a preoperative joint education class.  We also discussed the type of anesthesia they will receive, and the risks associated with hospital or rehab length of stay, obesity, diabetes and smoking.

## 2024-06-11 NOTE — ASSESSMENT
[FreeTextEntry1] : Previous doc: 5/14/24: Severe OA right hip, exacerbated by trauma at work.  Prior to this had minimal pain and was functioning well as .  He now has severe pain.  Has been on NSAIDs without relief and previously had hip inj prior to this injury which helped him.  Discussed options and due to severity of pain he wishes to proceed with right KRISTEN.  He is currently working full duty.  Will use bimentum dual mobility due to h/o spine fusion.  6/11/24: Cont pain in right hip, KRISTEN was denied.  Despite having underlying right hip OA, patient was functioning well with self limiting pain prior to his injury sustained at work and now has severe pain affecting ADLs.  Only treatment option for this is right KRISTEN.  Patient will discuss options with his .

## 2024-06-11 NOTE — PHYSICAL EXAM
[de-identified] : Right hip: No swelling.  Groing and greater troch tenderness.  Groin pain with IR.  NVI.  Antalgic gait without assistance.

## 2024-07-15 ENCOUNTER — APPOINTMENT (OUTPATIENT)
Dept: ORTHOPEDIC SURGERY | Facility: CLINIC | Age: 59
End: 2024-07-15
Payer: COMMERCIAL

## 2024-07-15 VITALS — HEIGHT: 74 IN | WEIGHT: 201 LBS | BODY MASS INDEX: 25.8 KG/M2

## 2024-07-15 DIAGNOSIS — Z96.642 PRESENCE OF LEFT ARTIFICIAL HIP JOINT: ICD-10-CM

## 2024-07-15 PROCEDURE — 99214 OFFICE O/P EST MOD 30 MIN: CPT

## 2024-07-23 ENCOUNTER — APPOINTMENT (OUTPATIENT)
Dept: ORTHOPEDIC SURGERY | Facility: CLINIC | Age: 59
End: 2024-07-23
Payer: OTHER MISCELLANEOUS

## 2024-07-23 VITALS — BODY MASS INDEX: 25.8 KG/M2 | WEIGHT: 201 LBS | HEIGHT: 74 IN

## 2024-07-23 DIAGNOSIS — M16.11 UNILATERAL PRIMARY OSTEOARTHRITIS, RIGHT HIP: ICD-10-CM

## 2024-07-23 PROCEDURE — 99214 OFFICE O/P EST MOD 30 MIN: CPT

## 2024-07-23 NOTE — HISTORY OF PRESENT ILLNESS
[Work related] : work related [9] : 9 [7] : 7 [Dull/Aching] : dull/aching [Radiating] : radiating [] : yes [de-identified] : 7/23/24: Cont pain, his job changed insurance carrier, was told by  surgery needs to be resubmitted.  Mobic is helping a little.  WC 4/25/24. Known to me for left KRISTEN under private insurance Dec 2023 doing well. Mechanical fall out of truck at work 3 weeks ago, groin and lateral pain since then, tsarted immediately. Prior to this had known OA in right hip but had minimal self limiting pain well managed with home exercises. Had inj by pain management in March 2024 that gave significant relief until this injury. 6/11/24: Cont pain, mobic is helping a little. Cont pain daily and having difficulty wiht walking and sitting. [FreeTextEntry5] : pain worse, pain radiating into the groin and into the glute and down the leg  [de-identified] : HEP

## 2024-07-23 NOTE — PHYSICAL EXAM
[de-identified] : Right hip: No swelling.  Groing and greater troch tenderness.  Groin pain with IR.  NVI.  Antalgic gait without assistance.

## 2024-07-23 NOTE — DISCUSSION/SUMMARY
[de-identified] : The natural progression of Osteoarthritis was explained to the patient.  We discussed the possible treatment options from conservative to operative.  These included NSAIDS, Glucosamine and Chondrotin sulfate, and Physical Therapy as well different types of injections.  We also discussed that at some point they may progress to needed a KRISTEN.  Information and pamphlets were given when appropriate.  Patient Complains of pain in Hip with a level that often reaches greater than a 8/10. The Pain has been progressively worsening of his/her treatment coarse. The pain has interfered with their ADLs and worsens with weight bearing. On exam pain worsens with ROM passive and active and I measured a limited ROM.  X-rays were reviewed with the patient and they show joint space narrowing, subchondral sclerosis, osteophyte formation, and subchondral cysts. After a period of more than 12 weeks physical therapy or exercise program done with me or another treating physician they have continued pain. The patient has failed a trial of NSAID medication or pain relieves if they were unable to tolerate NSAID medications. After a long discussion with the patient both the patient and I have decided we have exhausted all forms of less radical treatments and they would like to proceed with Total Hip Replacement.   We discussed my findings and the natural history of their condition.  We talked about the details of the proposed surgery and the recovery.  We discussed the material risks, possible benefits and alternatives to surgery.  The risks include but are not limited to infection, bleeding and possible need for blood transfusion, fracture, bowel blockage, bladder retention or infection, need for reoperation, stiffness and/or limited range of motion, possible damage to nerves and blood vessels, failure of fixation of components, risk of deep vein thromboses and pulmonary embolism, wound healing problems, dislocation, and possible leg length discrepancy.  Although incredibly rare, we also discussed the risks of a cardiac event, stroke and even death during, or following, the surgery.  We discussed the type of implants the patient will be receiving and the type of fixation that will be used, as well as whether a robot or computer navigation aide will be used.  The patient understands they will need medical clearance and will attend a preoperative joint education class.  We also discussed the type of anesthesia they will receive, and the risks associated with hospital or rehab length of stay, obesity, diabetes and smoking.  I saw the patient under the supervision of Dr. Corbett and followed his plan of care.

## 2024-07-23 NOTE — ASSESSMENT
[FreeTextEntry1] : Previous doc: 5/14/24: Severe OA right hip, exacerbated by trauma at work. Prior to this had minimal pain and was functioning well as . He now has severe pain. Has been on NSAIDs without relief and previously had hip inj prior to this injury which helped him. Discussed options and due to severity of pain he wishes to proceed with right KRISTEN. He is currently working full duty. Will use bimentum dual mobility due to h/o spine fusion. 6/11/24: Cont pain in right hip, KRISTEN was denied. Despite having underlying right hip OA, patient was functioning well with self limiting pain prior to his injury sustained at work and now has severe pain affecting ADLs. Only treatment option for this is right KRISTEN. Patient will discuss options with his .  7/23/24: Cont pain daily affecting his activity - cont mobic prn while we await KRISTEN auth.  Will resubmit auth request as his insurance carrier has changed.

## 2024-08-07 ENCOUNTER — APPOINTMENT (OUTPATIENT)
Dept: ORTHOPEDIC SURGERY | Facility: CLINIC | Age: 59
End: 2024-08-07

## 2024-08-07 PROCEDURE — 99214 OFFICE O/P EST MOD 30 MIN: CPT

## 2024-08-09 NOTE — HISTORY OF PRESENT ILLNESS
[Lower back] : lower back [Work related] : work related [Sudden] : sudden [2] : 2 [Localized] : localized [Radiating] : radiating [Sharp] : sharp [Intermittent] : intermittent [Household chores] : household chores [Meds] : meds [Walking] : walking [Stairs] : stairs [] : yes [Not working due to injury] : Work status: not working due to injury [4] : 4 [de-identified] : WC DOI 7/2/22 11/11/2022: Pt here with complaint of 9 days right upper extremity radiculitis. Pt denies recent hx of trauma. Pt states over the past 9 days he has noted progressive right arm pain to the region of the right hand. Pain seems to be alleviated with arm elevation. There is no hx of associated weakness. Pt had recent C5-6-7 ACDF performed by Dr. Pierre this past August. Pt denies associated gait disturbance or bb dysfunction.  11/23/22: here for fu of the neck and the lower back 2/2 the WC case from 7/2 and for review of the cervical MRI - overall doing better in regards to the neck with the steroids having some numbness in the right arm - the pain in the back and legs remains the worst - using cane - medication necessary to move around  MRi C spine - post op acdf C5-7  1/4/23: Here for fu on the low back - continued severe pain to the low back; radiating into the buttocks with tingling sensation into both legs; There is difficulty sleeping. He has been taking the hydrocodone for the pain which controls some of his symptoms. He also takes gabapentin - he saw pain management and is considering a SCS.  xrays today: L spine - progressive kyphosis at L1-2 and L2-3 with lumbar kyphosis - old surgery at L3-5 is healed and fused - hardware in good position AP PELVIS - B hip severe hip OA  2/1/23: here for fu - plan at last was requesting surgery for the lumbar - he was in hosptial twice recently for abdominal pain - his surgery not approved at this point  had temp relief with LESI  3/15/23: Here for fu - plan at last was surgery and he is scheduled for the fusion and decompression. is set up for surgery at the end of next month Has seen Dr Leal   4/05/2023: Here for fu on the low back; is set up for surgery  symptoms remains in the lower back is working at this point  5/10/23: Here for fu - now about 3 weeks since the surgery - was in the hosptial for vomit and hypovolemic shock  wound has been dry  xrays today: l spine - implants in good position   06/07/23 follow up workers comp lower spine doing well using bone stim  his neck is doing well - not having much pain at this point  xrays today: l spine - implants in good position   07/12/23 follow up workers comp lower spine. Pain continues to improve. Denies pain/N/T in BLEs. Denies b/b dysfunction. l spine - implants in good position  xrays today: l spine - implants in good position T spine - implants in good positoin  08/23/23 follow up workers comp lower spine dcont pt, doing well, having stomach pain WAS IN THE er FOR ABDOMINAL ISSUES AND THEN cant take the medicatin  xrays today: T spine - hardware in good position L spine - handware in good position ap pelvis - severe hip OA B  10/11/23: Here for fu on the lower back: increasing pain to the left side of the back radiating down the leg after physical exam maneuver. Will be getting left KRISTEN with Dr Corbett in 12/2023. No new onset weakness.  T-spine: implants in place L-spine: implants in place AP pelvis: B/L hip OA  11/22/23 follow up workers comp lower spine no changes having left hip pain  back is stiff  pain in the both hips  radiating around the hips neck remains sore  PT has been helpful   xrays today: T-spine: implants in place L-spine: implants in place  2/7/24: Here for fu - recovering from left hip replacement - pain in the back remains - is considering return to work   5.8.24 Patient here for follow up. he states he still has soreness and stiffness.  HAS been recovering from the left KRISTEN  HAS tried to work but its hurting quite a lot having a really hard time  pain worse with the work related activity   xrays today: L spine - hardware in good position  AP PELVIS - LEFT kristen, SEVERE RIGHT kristen   8/7/24: Patient here for f/u on lower back. Low back is still sore, but is getting stronger. Able to exercise more since last visit. He has intermittent exacerbations of pain with tightness in the back of both legs at times.  He follows pain management and takes the gabapentin.  To be getting the sling procedure for incontinence in 11/2024 with urology  [FreeTextEntry3] : 07/02/22 [FreeTextEntry7] : left leg  [de-identified] : tramadol

## 2024-08-09 NOTE — DISCUSSION/SUMMARY
[de-identified] : reviewed the case  doing well since the left KRISTEN, awaiting for the hearing for the right KRISTEN  urology follow-up hes working currently in a sedentary position Will requesting auth for PT to aide in core strengthening and ROM  fu 3 months

## 2024-08-09 NOTE — WORK
[Partial] : partial [Can return to work without limitations on ______] : can return to work without limitations on [unfilled] [FreeTextEntry1] : joey

## 2024-08-09 NOTE — DISCUSSION/SUMMARY
[de-identified] : reviewed the case  doing well since the left KRISTEN, awaiting for the hearing for the right KRISTEN  urology follow-up hes working currently in a sedentary position Will requesting auth for PT to aide in core strengthening and ROM  fu 3 months

## 2024-08-09 NOTE — HISTORY OF PRESENT ILLNESS
[Lower back] : lower back [Work related] : work related [Sudden] : sudden [2] : 2 [Localized] : localized [Radiating] : radiating [Sharp] : sharp [Intermittent] : intermittent [Household chores] : household chores [Meds] : meds [Walking] : walking [Stairs] : stairs [] : yes [Not working due to injury] : Work status: not working due to injury [4] : 4 [de-identified] : WC DOI 7/2/22 11/11/2022: Pt here with complaint of 9 days right upper extremity radiculitis. Pt denies recent hx of trauma. Pt states over the past 9 days he has noted progressive right arm pain to the region of the right hand. Pain seems to be alleviated with arm elevation. There is no hx of associated weakness. Pt had recent C5-6-7 ACDF performed by Dr. Pierre this past August. Pt denies associated gait disturbance or bb dysfunction.  11/23/22: here for fu of the neck and the lower back 2/2 the WC case from 7/2 and for review of the cervical MRI - overall doing better in regards to the neck with the steroids having some numbness in the right arm - the pain in the back and legs remains the worst - using cane - medication necessary to move around  MRi C spine - post op acdf C5-7  1/4/23: Here for fu on the low back - continued severe pain to the low back; radiating into the buttocks with tingling sensation into both legs; There is difficulty sleeping. He has been taking the hydrocodone for the pain which controls some of his symptoms. He also takes gabapentin - he saw pain management and is considering a SCS.  xrays today: L spine - progressive kyphosis at L1-2 and L2-3 with lumbar kyphosis - old surgery at L3-5 is healed and fused - hardware in good position AP PELVIS - B hip severe hip OA  2/1/23: here for fu - plan at last was requesting surgery for the lumbar - he was in hosptial twice recently for abdominal pain - his surgery not approved at this point  had temp relief with LESI  3/15/23: Here for fu - plan at last was surgery and he is scheduled for the fusion and decompression. is set up for surgery at the end of next month Has seen Dr Leal   4/05/2023: Here for fu on the low back; is set up for surgery  symptoms remains in the lower back is working at this point  5/10/23: Here for fu - now about 3 weeks since the surgery - was in the hosptial for vomit and hypovolemic shock  wound has been dry  xrays today: l spine - implants in good position   06/07/23 follow up workers comp lower spine doing well using bone stim  his neck is doing well - not having much pain at this point  xrays today: l spine - implants in good position   07/12/23 follow up workers comp lower spine. Pain continues to improve. Denies pain/N/T in BLEs. Denies b/b dysfunction. l spine - implants in good position  xrays today: l spine - implants in good position T spine - implants in good positoin  08/23/23 follow up workers comp lower spine dcont pt, doing well, having stomach pain WAS IN THE er FOR ABDOMINAL ISSUES AND THEN cant take the medicatin  xrays today: T spine - hardware in good position L spine - handware in good position ap pelvis - severe hip OA B  10/11/23: Here for fu on the lower back: increasing pain to the left side of the back radiating down the leg after physical exam maneuver. Will be getting left KRISTEN with Dr Corbett in 12/2023. No new onset weakness.  T-spine: implants in place L-spine: implants in place AP pelvis: B/L hip OA  11/22/23 follow up workers comp lower spine no changes having left hip pain  back is stiff  pain in the both hips  radiating around the hips neck remains sore  PT has been helpful   xrays today: T-spine: implants in place L-spine: implants in place  2/7/24: Here for fu - recovering from left hip replacement - pain in the back remains - is considering return to work   5.8.24 Patient here for follow up. he states he still has soreness and stiffness.  HAS been recovering from the left KRISTEN  HAS tried to work but its hurting quite a lot having a really hard time  pain worse with the work related activity   xrays today: L spine - hardware in good position  AP PELVIS - LEFT kristen, SEVERE RIGHT kristen   8/7/24: Patient here for f/u on lower back. Low back is still sore, but is getting stronger. Able to exercise more since last visit. He has intermittent exacerbations of pain with tightness in the back of both legs at times.  He follows pain management and takes the gabapentin.  To be getting the sling procedure for incontinence in 11/2024 with urology  [FreeTextEntry3] : 07/02/22 [FreeTextEntry7] : left leg  [de-identified] : tramadol

## 2024-08-13 ENCOUNTER — APPOINTMENT (OUTPATIENT)
Dept: ORTHOPEDIC SURGERY | Facility: CLINIC | Age: 59
End: 2024-08-13
Payer: OTHER MISCELLANEOUS

## 2024-08-13 DIAGNOSIS — M16.11 UNILATERAL PRIMARY OSTEOARTHRITIS, RIGHT HIP: ICD-10-CM

## 2024-08-13 PROCEDURE — 99213 OFFICE O/P EST LOW 20 MIN: CPT

## 2024-08-13 NOTE — HISTORY OF PRESENT ILLNESS
[Work related] : work related [8] : 8 [7] : 7 [Dull/Aching] : dull/aching [Frequent] : frequent [Meds] : meds [de-identified] : 8/13/24: Severe OA right hip, exacerbated by trauma at work. Pain worsening laterally and in the groin. Still waiting on auth for surgery. Pt states he has a hearing 8/28/24. Currently working light duty.  WC 4/25/24. Known to me for left KRISTEN under private insurance Dec 2023 doing well. Mechanical fall out of truck at work 3 weeks ago, groin and lateral pain since then, started immediately. Prior to this had known OA in right hip but had minimal self limiting pain well managed with home exercises. Had inj by pain management in March 2024 that gave significant relief until this injury. 6/11/24: Cont pain, mobic is helping a little. Cont pain daily and having difficulty wiht walking and sitting. 7/23/24: Cont pain, his job changed insurance carrier, was told by  surgery needs to be resubmitted.  Mobic is helping a little. [FreeTextEntry1] : right hip  [FreeTextEntry3] : 04/25/24 [FreeTextEntry5] : TANISHA states pain is persistent, better with Meloxicam.

## 2024-08-13 NOTE — DISCUSSION/SUMMARY
[de-identified] : The patient was advised of the diagnosis.  The natural history of the pathology was explained in full to the patient in layman's terms. All questions were answered.  The risks and benefits of surgical and non-surgical treatment alternatives were explained in full to the patient.  The natural progression of Osteoarthritis was explained to the patient.  We discussed the possible treatment options from conservative to operative.  These included NSAIDS, Glucosamine and Chondrotin sulfate, and Physical Therapy as well different types of injections.  We also discussed that at some point they may progress to needed a KRISTEN.  Information and pamphlets were given when approprate.  Entered by Ana Rosa Martínez acting as a scribe.

## 2024-08-13 NOTE — PHYSICAL EXAM
[de-identified] : Right hip: No swelling.  Groing and greater troch tenderness.  Groin pain with IR.  NVI.  Antalgic gait without assistance.

## 2024-08-13 NOTE — ASSESSMENT
[FreeTextEntry1] : Previous doc: 5/14/24: Severe OA right hip, exacerbated by trauma at work. Prior to this had minimal pain and was functioning well as . He now has severe pain. Has been on NSAIDs without relief and previously had hip inj prior to this injury which helped him. Discussed options and due to severity of pain he wishes to proceed with right KRISTEN. He is currently working full duty. Will use bimentum dual mobility due to h/o spine fusion. 6/11/24: Cont pain in right hip, KRISTEN was denied. Despite having underlying right hip OA, patient was functioning well with self limiting pain prior to his injury sustained at work and now has severe pain affecting ADLs. Only treatment option for this is right KRISTEN. Patient will discuss options with his . 7/23/24: Cont pain daily affecting his activity - cont mobic prn while we await KRISTEN auth.  Will resubmit auth request as his insurance carrier has changed.  8/13/24: His hip pain continues to worsen as surgery is delayed. He has a hearing coming up later this month regarding this case. F/up 1 month.

## 2024-09-06 ENCOUNTER — APPOINTMENT (OUTPATIENT)
Dept: DERMATOLOGY | Facility: CLINIC | Age: 59
End: 2024-09-06

## 2024-09-10 ENCOUNTER — APPOINTMENT (OUTPATIENT)
Dept: ORTHOPEDIC SURGERY | Facility: CLINIC | Age: 59
End: 2024-09-10

## 2024-09-24 ENCOUNTER — APPOINTMENT (OUTPATIENT)
Dept: ORTHOPEDIC SURGERY | Facility: CLINIC | Age: 59
End: 2024-09-24

## 2024-10-09 ENCOUNTER — APPOINTMENT (OUTPATIENT)
Dept: ORTHOPEDIC SURGERY | Facility: CLINIC | Age: 59
End: 2024-10-09
Payer: OTHER MISCELLANEOUS

## 2024-10-09 VITALS — HEIGHT: 74 IN | WEIGHT: 201 LBS | BODY MASS INDEX: 25.8 KG/M2

## 2024-10-09 DIAGNOSIS — M79.10 MYALGIA, UNSPECIFIED SITE: ICD-10-CM

## 2024-10-09 DIAGNOSIS — M51.369: ICD-10-CM

## 2024-10-09 DIAGNOSIS — Z98.1 ARTHRODESIS STATUS: ICD-10-CM

## 2024-10-09 DIAGNOSIS — M43.16: ICD-10-CM

## 2024-10-09 PROCEDURE — 72100 X-RAY EXAM L-S SPINE 2/3 VWS: CPT

## 2024-10-09 PROCEDURE — 20552 NJX 1/MLT TRIGGER POINT 1/2: CPT

## 2024-10-09 PROCEDURE — 99214 OFFICE O/P EST MOD 30 MIN: CPT | Mod: 25

## 2024-10-09 PROCEDURE — J3490M: CUSTOM

## 2024-10-18 ENCOUNTER — APPOINTMENT (OUTPATIENT)
Dept: ORTHOPEDIC SURGERY | Facility: CLINIC | Age: 59
End: 2024-10-18
Payer: OTHER MISCELLANEOUS

## 2024-10-18 ENCOUNTER — NON-APPOINTMENT (OUTPATIENT)
Age: 59
End: 2024-10-18

## 2024-10-18 VITALS — BODY MASS INDEX: 25.8 KG/M2 | WEIGHT: 201 LBS | HEIGHT: 74 IN

## 2024-10-18 DIAGNOSIS — M16.11 UNILATERAL PRIMARY OSTEOARTHRITIS, RIGHT HIP: ICD-10-CM

## 2024-10-18 PROCEDURE — 99214 OFFICE O/P EST MOD 30 MIN: CPT

## 2024-10-18 PROCEDURE — 73503 X-RAY EXAM HIP UNI 4/> VIEWS: CPT | Mod: RT

## 2024-10-18 RX ORDER — DICLOFENAC SODIUM AND MISOPROSTOL 75; 200 MG/1; UG/1
75-0.2 TABLET, DELAYED RELEASE ORAL
Qty: 60 | Refills: 0 | Status: ACTIVE | COMMUNITY
Start: 2024-10-18 | End: 1900-01-01

## 2024-10-21 ENCOUNTER — OUTPATIENT (OUTPATIENT)
Dept: OUTPATIENT SERVICES | Facility: HOSPITAL | Age: 59
LOS: 1 days | End: 2024-10-21
Payer: COMMERCIAL

## 2024-10-21 VITALS
WEIGHT: 218.04 LBS | RESPIRATION RATE: 18 BRPM | DIASTOLIC BLOOD PRESSURE: 78 MMHG | HEART RATE: 77 BPM | SYSTOLIC BLOOD PRESSURE: 114 MMHG | TEMPERATURE: 98 F | OXYGEN SATURATION: 97 % | HEIGHT: 74 IN

## 2024-10-21 DIAGNOSIS — Z98.1 ARTHRODESIS STATUS: Chronic | ICD-10-CM

## 2024-10-21 DIAGNOSIS — Z98.890 OTHER SPECIFIED POSTPROCEDURAL STATES: Chronic | ICD-10-CM

## 2024-10-21 DIAGNOSIS — Z01.818 ENCOUNTER FOR OTHER PREPROCEDURAL EXAMINATION: ICD-10-CM

## 2024-10-21 DIAGNOSIS — Z96.642 PRESENCE OF LEFT ARTIFICIAL HIP JOINT: Chronic | ICD-10-CM

## 2024-10-21 DIAGNOSIS — D17.9 BENIGN LIPOMATOUS NEOPLASM, UNSPECIFIED: Chronic | ICD-10-CM

## 2024-10-21 DIAGNOSIS — N39.3 STRESS INCONTINENCE (FEMALE) (MALE): ICD-10-CM

## 2024-10-21 DIAGNOSIS — Z90.79 ACQUIRED ABSENCE OF OTHER GENITAL ORGAN(S): Chronic | ICD-10-CM

## 2024-10-21 PROCEDURE — 87086 URINE CULTURE/COLONY COUNT: CPT

## 2024-10-21 PROCEDURE — G0463: CPT

## 2024-10-21 PROCEDURE — 80048 BASIC METABOLIC PNL TOTAL CA: CPT

## 2024-10-21 PROCEDURE — 85027 COMPLETE CBC AUTOMATED: CPT

## 2024-10-21 RX ORDER — LIDOCAINE HCL 60 MG/3 ML
0.2 SYRINGE (ML) INJECTION ONCE
Refills: 0 | Status: DISCONTINUED | OUTPATIENT
Start: 2024-11-12 | End: 2024-11-26

## 2024-10-21 NOTE — H&P PST ADULT - NSICDXPASTMEDICALHX_GEN_ALL_CORE_FT
PAST MEDICAL HISTORY:  Acute renal failure     GERD (gastroesophageal reflux disease)     History of urinary incontinence     HTN (hypertension)     Hyperlipidemia     BELKIS (iron deficiency anemia)     Lumbar stenosis     Prostate cancer     S/P cervical spinal fusion

## 2024-10-21 NOTE — H&P PST ADULT - NSICDXPASTSURGICALHX_GEN_ALL_CORE_FT
PAST SURGICAL HISTORY:  History of fusion of cervical spine     History of left hip replacement     History of lumbar laminectomy for spinal cord decompression     Lipoma neck and left arm    S/P ORIF (open reduction internal fixation) fracture     S/P prostatectomy with radiation

## 2024-10-21 NOTE — H&P PST ADULT - HISTORY OF PRESENT ILLNESS
58y/o with medical h/o HTN, HDL, GERD and pmhx prostate CA (s/p sx/RT/lupron  2018),Had Laminectomies x2 2020 and 4/2023 which was complicated with Delirium and organ failure ICU 12 days. Pt then Left Total Hip Arthroplasty on 12/13/2023. Pt states over time after prostate surgery he is unable to hold to his urine. Now coming in for male sling on 11/12/2024. Pt denies dysuria hematuria fever.

## 2024-10-22 ENCOUNTER — APPOINTMENT (OUTPATIENT)
Dept: PAIN MANAGEMENT | Facility: CLINIC | Age: 59
End: 2024-10-22

## 2024-11-01 NOTE — ASU PREOP CHECKLIST - BP NONINVASIVE SYSTOLIC (MM HG)
Hx MI 30 yrs ago. Possibly secondary to diet pills she was on at the time. (Dexatrim)  - ASA daily, statin   103

## 2024-11-06 PROBLEM — Z87.898 PERSONAL HISTORY OF OTHER SPECIFIED CONDITIONS: Chronic | Status: ACTIVE | Noted: 2024-10-21

## 2024-11-06 PROBLEM — D50.9 IRON DEFICIENCY ANEMIA, UNSPECIFIED: Chronic | Status: ACTIVE | Noted: 2024-10-21

## 2024-11-11 ENCOUNTER — APPOINTMENT (OUTPATIENT)
Dept: ORTHOPEDIC SURGERY | Facility: CLINIC | Age: 59
End: 2024-11-11
Payer: OTHER MISCELLANEOUS

## 2024-11-11 DIAGNOSIS — M16.11 UNILATERAL PRIMARY OSTEOARTHRITIS, RIGHT HIP: ICD-10-CM

## 2024-11-11 PROCEDURE — 99213 OFFICE O/P EST LOW 20 MIN: CPT

## 2024-11-12 ENCOUNTER — TRANSCRIPTION ENCOUNTER (OUTPATIENT)
Age: 59
End: 2024-11-12

## 2024-11-12 ENCOUNTER — OUTPATIENT (OUTPATIENT)
Dept: OUTPATIENT SERVICES | Facility: HOSPITAL | Age: 59
LOS: 1 days | End: 2024-11-12
Payer: COMMERCIAL

## 2024-11-12 VITALS
DIASTOLIC BLOOD PRESSURE: 78 MMHG | RESPIRATION RATE: 14 BRPM | HEART RATE: 69 BPM | SYSTOLIC BLOOD PRESSURE: 121 MMHG | OXYGEN SATURATION: 99 %

## 2024-11-12 VITALS
HEIGHT: 74 IN | DIASTOLIC BLOOD PRESSURE: 70 MMHG | HEART RATE: 72 BPM | WEIGHT: 218.04 LBS | SYSTOLIC BLOOD PRESSURE: 104 MMHG | OXYGEN SATURATION: 97 % | TEMPERATURE: 97 F | RESPIRATION RATE: 16 BRPM

## 2024-11-12 DIAGNOSIS — Z98.890 OTHER SPECIFIED POSTPROCEDURAL STATES: Chronic | ICD-10-CM

## 2024-11-12 DIAGNOSIS — N39.3 STRESS INCONTINENCE (FEMALE) (MALE): ICD-10-CM

## 2024-11-12 DIAGNOSIS — Z98.1 ARTHRODESIS STATUS: Chronic | ICD-10-CM

## 2024-11-12 DIAGNOSIS — Z90.79 ACQUIRED ABSENCE OF OTHER GENITAL ORGAN(S): Chronic | ICD-10-CM

## 2024-11-12 DIAGNOSIS — Z96.642 PRESENCE OF LEFT ARTIFICIAL HIP JOINT: Chronic | ICD-10-CM

## 2024-11-12 DIAGNOSIS — D17.9 BENIGN LIPOMATOUS NEOPLASM, UNSPECIFIED: Chronic | ICD-10-CM

## 2024-11-12 PROCEDURE — C9399: CPT

## 2024-11-12 PROCEDURE — 53440 MALE SLING PROCEDURE: CPT

## 2024-11-12 PROCEDURE — C1771: CPT

## 2024-11-12 DEVICE — SLING MALE ADVANCE XP: Type: IMPLANTABLE DEVICE | Status: FUNCTIONAL

## 2024-11-12 RX ORDER — ONDANSETRON HYDROCHLORIDE 2 MG/ML
4 INJECTION, SOLUTION INTRAMUSCULAR; INTRAVENOUS ONCE
Refills: 0 | Status: DISCONTINUED | OUTPATIENT
Start: 2024-11-12 | End: 2024-11-26

## 2024-11-12 RX ORDER — CEFAZOLIN SODIUM 1 G
2000 VIAL (EA) INJECTION ONCE
Refills: 0 | Status: COMPLETED | OUTPATIENT
Start: 2024-11-12 | End: 2024-11-12

## 2024-11-12 RX ORDER — FENTANYL CITRAT/DEXTROSE 5%/PF 1250MCG/50
25 PATIENT CONTROLLED ANALGESIA SYRINGE INTRAVENOUS
Refills: 0 | Status: DISCONTINUED | OUTPATIENT
Start: 2024-11-12 | End: 2024-11-12

## 2024-11-12 RX ADMIN — Medication 100 MILLILITER(S): at 09:26

## 2024-11-12 NOTE — ASU DISCHARGE PLAN (ADULT/PEDIATRIC) - FINANCIAL ASSISTANCE
[facility] provides services at a reduced cost to those who are determined to be eligible through [facility]’s financial assistance program. Information regarding [facility]’s financial assistance program can be found by going to [email or web address] or by calling [phone].

## 2024-11-12 NOTE — BRIEF OPERATIVE NOTE - FIRST ASSIST NAME
1/9/2023    Steven Caraballo MD  [address]  [address] [place] [address]    RE: Shaji Pompa       Dear Colleague,     I had the pleasure of seeing Shaji Pompa in the [facility].    HEART CARE CONSULTATON NOTE        Assessment/Recommendations   Assessment:   1.  Chronic congestive heart failure with reduced ejection fraction.  Ischemic cardiomyopathy. LVEF: 35%. NYHA II (stable exertional dyspnea).  2.  Coronary Artery disease with occluded mid right coronary artery.  Collaterals from the left and proximal RCA.  No anginal symptoms  3.  Severe coronary calcification on CT.   4.  Paroxysmal A. fib with rapid ventricular response (resolved).  Occurred in August 2022 in the setting of COVID-19 infection. Sinus today.   5.  Ischemic cardiomyopathy  6.  Pulmonary embolism, bilateral  7.  DVT right leg   8.  Severe anemia, improved, 2/2 MPD.     Hemoglobin   Date Value Ref Range Status   08/24/2022 10.2 (L) 13.3 - 17.7 g/dL Final   9.  Thrombocytopenia, improved.   Platelet Count   Date Value Ref Range Status   08/24/2022 281 150 - 450 10e3/uL Final   10.  Myeloproliferative disorder, followed at [facility]      Plan:   1.  Lasix 10 mg daily  2.  Losartan 12.5 mg daily, home BP stable (reviewed).   3.  Continue atorvastatin to 80 mg daily  4.  Warfarin for DVT, INR 2-3.    5.  Aspirin 81 mg for coronary artery disease  6.  Continue metoprolol 25 mg XL daily.    Patient plans to be in [place] over winter starting in February 2023-April 2023.      History of Present Illness/Subjective    HPI: Patient is a 78-year-old male with ischemic cardiomyopathy, heart failure with reduced ejection fraction, coronary artery disease, hypertension, hyperlipidemia presents to [facility] in follow-up.     Patient was hospitalized in August 2022,related to decompensated systolic heart failure, A. fib with rapid ventricular response, hypotension requiring pressors, acute COVID-19  infection and acute NSTEMI. Echocardiogram performed during the hospitalization demonstrated reduced left ventricular ejection fraction without EF of 35% which is slightly lower than previous.    Since last clinical evaluation he has been doing quite well.  He still has mild lower extremity edema which is unchanged.  No significant change in mild dyspnea with exertion.  No recent hospitalizations.  Denies any anginal chest pain.  Had a good holiday season.  Plans to drive to [place] in early February.  No lightheadedness or syncope.  No orthopnea or PND symptoms.  Reviewed medications in detail with the patient.  He has proper refills.    Esophageal symptoms have improved with treatment by Dr. Caraballo.    Last note from Dr. Andrade reviewed from 12/6/2022.  Seen for COPD and esophageal issues.       Echo reviewed from 8/17/22  The rhythm was rapid atrial fibrillation.  Left ventricular function is decreased. The ejection fraction is 30-35%  (moderately reduced).  There is moderate global hypokinesia of the left ventricle.  The left atrium is mildly dilated.  The study was technically difficult. No hemodynamically significant valvular  abnormalities on 2D or color flow imaging.    Coronary Angiogram: 4/26/22  Left Main   Mid LM to Dist LM lesion is 25% stenosed. The lesion is calcified.   Left Anterior Descending   Prox LAD to Mid LAD lesion is 20% stenosed. The lesion is calcified.   Ramus Intermedius   The vessel is large.   Left Circumflex   Mid Cx lesion is 30% stenosed. The lesion is calcified.   First Obtuse Marginal Branch   The vessel is small.   Second Obtuse Marginal Branch   The vessel is small.   Right Coronary Artery   Prox RCA lesion is 90% stenosed.   Prox RCA to Dist RCA lesion is 100% stenosed.   Acute Marginal Branch   Collaterals   Acute Mrg filled by collaterals from RV Branch.      Right Posterior Descending Artery   Collaterals   RPDA filled by collaterals from 2nd Sept.      Right  Posterior Atrioventricular Artery   Collaterals   RPAV filled by collaterals from Dist Cx          Physical Examination  Review of Systems   VITALS: /62 (BP Location: Right arm, Patient Position: Sitting, Cuff Size: Adult Regular)   Pulse 76   Resp 16   Wt 90.3 kg (199 lb)   SpO2 95%   BMI 32.12 kg/m    BMI: Body mass index is 32.12 kg/m .  Wt Readings from Last 3 Encounters:   09/19/22 89.2 kg (196 lb 9.6 oz)   08/24/22 91.6 kg (202 lb)   08/19/22 92.9 kg (204 lb 14.4 oz)       Wt Readings from Last 5 Encounters:   01/09/23 90.3 kg (199 lb)   09/19/22 89.2 kg (196 lb 9.6 oz)   08/24/22 91.6 kg (202 lb)   08/19/22 92.9 kg (204 lb 14.4 oz)   06/02/22 89.4 kg (197 lb 1.6 oz)       General Appearance:   no distress, normal body habitus, in bed.    ENT/Mouth: membranes moist, no oral lesions or bleeding gums.      EYES:  no scleral icterus, normal conjunctivae   Neck: no carotid bruits or thyromegaly   Chest/Lungs:    No wheezing.  Clear bilaterally.   Cardiovascular:   Regular. Normal first and second heart sounds with no murmurs, rubs, or gallops; the carotid, radial and posterior tibial pulses are intact, Jugular venous pressure normal, mild bilateral lower extremity   Abdomen:  no  tenderness; bowel sounds are present   Extremities: no cyanosis or clubbing   Skin: no xanthelasma, warm.    Neurologic: normal  bilateral, no tremors     Psychiatric: alert and oriented x3, calm     Review Of Systems  Skin: negative  Eyes: negative  Ears/Nose/Throat: negative  Respiratory: Mild dyspnea.   Cardiovascular: Mild exertional dyspnea.  No anginal chest pain.  Gastrointestinal: negative  Genitourinary: negative  Musculoskeletal: negative  Neurologic: negative  Psychiatric: negative  Hematologic/Lymphatic/Immunologic: negative  Endocrine: negative          Lab Results    Chemistry/lipid CBC Cardiac Enzymes/BNP/TSH/INR   Recent Labs   Lab Test 05/03/21  1557   CHOL 110   HDL 35*   LDL 52   TRIG 115     Recent Labs    Lab Test 05/03/21  1557 08/13/19  0926   LDL 52 111     Recent Labs   Lab Test 04/23/22  0441      POTASSIUM 3.8   CHLORIDE 106   CO2 27      BUN 22   CR 0.76   GFRESTIMATED >90   GLENDY 7.7*     Lab Results   Component Value Date    WBC 5.9 05/01/2022     Lab Results   Component Value Date    RBC 2.40 05/01/2022     Lab Results   Component Value Date    HGB 8.6 05/01/2022     Lab Results   Component Value Date    HCT 27.9 05/01/2022     No components found for: MCT  Lab Results   Component Value Date     05/01/2022     Lab Results   Component Value Date    MCH 35.8 05/01/2022     Lab Results   Component Value Date    MCHC 30.8 05/01/2022     Lab Results   Component Value Date    RDW  05/01/2022      Comment:      Dimorphic Population - Unable to Calculate     Lab Results   Component Value Date     05/01/2022          Recent Labs     Recent Labs   Lab Test 04/23/22  0441 04/22/22  0558 04/21/22  0502   HGB 7.0* 7.5* 7.3*    Recent Labs   Lab Test 04/20/22  0405 04/19/22  2143 04/19/22  1441   TROPONINI 0.09 0.11 0.10     Recent Labs   Lab Test 04/19/22  1441 04/01/22  1208   BNP 1,221* 379*     No results for input(s): TSH in the last 41186 hours.  Recent Labs   Lab Test 04/23/22  0441 04/21/22  0502 04/20/22  0405   INR 1.97* 2.82* 2.67*        Medical History  Surgical History Family History Social History   Past Medical History:   Diagnosis Date     Cerebral infarction (H)      COPD (chronic obstructive pulmonary disease) (H)      HLD (hyperlipidemia)      Hypertension      Myeloproliferative disease (H)      Past Surgical History:   Procedure Laterality Date     CV CORONARY ANGIOGRAM N/A 4/26/2022    Procedure: CV CORONARY ANGIOGRAM;  Surgeon: Audrey Holder MD;  Location: [facility] CV     CV LEFT HEART CATH N/A 4/26/2022    Procedure: Left Heart Catheterization;  Surgeon: Audrey Holder MD;  Location: [facility] CV     OPEN REDUCTION INTERNAL FIXATION FOOT Right 2010      OTHER SURGICAL HISTORY  2001    Lip lesion removal     PICC TRIPLE LUMEN PLACEMENT  2022          TONSILLECTOMY & ADENOIDECTOMY       Family History   Problem Relation Age of Onset     Alcoholism Mother         Social History     Socioeconomic History     Marital status:      Spouse name: Not on file     Number of children: Not on file     Years of education: Not on file     Highest education level: Not on file   Occupational History     Not on file   Tobacco Use     Smoking status: Former     Packs/day: 1.00     Types: Cigarettes     Start date: 1965     Quit date: 2012     Years since quittin.0     Smokeless tobacco: Never   Vaping Use     Vaping Use: Never used   Substance and Sexual Activity     Alcohol use: Yes     Alcohol/week: 19.0 standard drinks     Drug use: Never     Sexual activity: Not on file   Other Topics Concern     Not on file   Social History Narrative     Not on file     Social Determinants of Health     Financial Resource Strain: Not on file   Food Insecurity: Not on file   Transportation Needs: Not on file   Physical Activity: Not on file   Stress: Not on file   Social Connections: Not on file   Intimate Partner Violence: Not on file   Housing Stability: Not on file         Medications  Allergies   Current Outpatient Medications   Medication Sig Dispense Refill     albuterol (PROAIR HFA;PROVENTIL HFA;VENTOLIN HFA) 90 mcg/actuation inhaler Inhale 2 puffs into the lungs every 6 hours as needed for shortness of breath / dyspnea       aspirin (ASA) 81 MG EC tablet Take 1 tablet (81 mg) by mouth daily Start tomorrow morning. 90 tablet 3     atorvastatin (LIPITOR) 80 MG tablet Take 1 tablet (80 mg) by mouth every morning 90 tablet 1     furosemide (LASIX) 20 MG tablet Take 0.5 tablets (10 mg) by mouth daily 45 tablet 3     hydroxyurea (HYDREA) 500 MG capsule Take 2-3 capsules (1,000-1,500 mg) by mouth See Admin Instructions 1500 mg daily- Mon, Wed, & Fri.  1000 mg daily -  "Tue, Thurs, Sat, & Sun       losartan (COZAAR) 25 MG tablet Take 0.5 tablets (12.5 mg) by mouth daily 45 tablet 3     metoprolol succinate ER (TOPROL-XL) 25 MG 24 hr tablet Take 25 mg by mouth daily       nitroGLYcerin (NITROSTAT) 0.4 MG sublingual tablet One tablet under the tongue every 5 minutes if needed for chest pain. May repeat every 5 minutes for a maximum of 3 doses in 15 minutes\" 25 tablet 3     tiotropium (SPIRIVA) 18 mcg inhalation capsule [TIOTROPIUM (SPIRIVA) 18 MCG INHALATION CAPSULE] Place 18 mcg into inhaler and inhale daily.       warfarin ANTICOAGULANT (COUMADIN) 1 MG tablet Take 3 tablets (3 mg) by mouth daily        No Known Allergies      Sp Larson DO      Thank you for allowing me to participate in the care of your patient.      Sincerely,     Sp Larson DO     [facility]  cc:   Sp Larson DO  [address] [place] [address]  " Betty Conde (Resident)

## 2024-11-12 NOTE — ASU DISCHARGE PLAN (ADULT/PEDIATRIC) - ASU DC SPECIAL INSTRUCTIONSFT
POST-OPERATIVE INSTRUCTIONS:  Following surgery, you will be either discharged home. You willl remove the catheter morning after surgery       Immediately following surgery, the perineal incision is closed with absorbable sutures underneath the skin followed by biological glue on the skin.  The sutures will dissolve over time and do not need to be removed.       You may experience mild perineal itching.  This is normal and is the result of tissue healing. You can also expect bruising and swelling of the perineum and scrotum following surgery, this can last for days to weeks.  Ice packs can be applied for 24 hours after surgery to reduce pain & swelling if necessary (do not keep ice packs on for more than 20 minutes at a time). The amount of swelling is variable.  Pain in this area will vary and may last for up to several weeks.    There are no specific dietary requirements.  Drink plenty of fluids.  Alcohol should not be taken with pain medication.  Urinate every 3-4 hours during the daytime following surgery once your catheter has been removed.    Constipation: This is a common problem after surgery. It is caused by decreased activity, changes in diet, and use of narcotic pain relievers. Eating plenty of fruit and drinking fluids may avoid this problem. A stool softener such as Colace may be purchased over the counter and should be used twice daily to avoid hard stools    Smoking will increase coughing, cause more discomfort, and can interfere with healing.  Please do not smoke or quit smoking if possible.        DISCHARGE MEDICATIONS:  You will be discharged home with the prescriptions that Dr. Baltazar already sent to the pharmacy    WHEN TO CALL YOUR DOCTOR:  •	If you experience a temperature above 101°F or shaking chills.  Other signs of a urine infection include burning during urination, blood in the urine or foul odor when passing urine.    •	Difficulty urinating or feeling like you cannot adequately empty your bladder once your catheter has been removed.     •	Swelling, redness or discharge of the incision sites.     •	Pain that is not controlled by pain medications.     •	Persistent nausea, vomiting or diarrhea.    ACTIVITIES AFTER SURGERY:  You may resume normal activities such as walking, lifting (no more than 10 pounds) and walking up stairs.    You may shower after 48 hours. Do not tub bathe until your doctor or nurse says it is OK.    You should refrain from high impact exercise (running, jumping, aerobics) for 3-6 weeks following surgery, depending on your doctor’s instructions.    Do not drive a car while you are taking narcotic pain medication. Before you drive, you need to know if your abdomen and leg muscles can react well. Have someone with you until you feel you have healed and you can drive safely.    In general use your common sense and do what you feel up to doing. If you feel tired, take it easy. If you feel more energetic resume normal activities.    Call Dr. Baltazar's office to make an appointment

## 2024-11-12 NOTE — ASU PATIENT PROFILE, ADULT - FALL HARM RISK - UNIVERSAL INTERVENTIONS
Bed in lowest position, wheels locked, appropriate side rails in place/Call bell, personal items and telephone in reach/Instruct patient to call for assistance before getting out of bed or chair/Non-slip footwear when patient is out of bed/[place] to call system/Physically safe environment - no spills, clutter or unnecessary equipment/Purposeful Proactive Rounding/Room/bathroom lighting operational, light cord in reach

## 2024-11-12 NOTE — ASU DISCHARGE PLAN (ADULT/PEDIATRIC) - NURSING INSTRUCTIONS
OK to take Tylenol/Acetaminophen at 3'30pm _ for pain and every 6 hours after as needed. OK to take Motrin/Ibuprofen  for pain and every 6 hours after as needed.

## 2024-11-15 ENCOUNTER — OUTPATIENT (OUTPATIENT)
Dept: OUTPATIENT SERVICES | Facility: HOSPITAL | Age: 59
LOS: 1 days | Discharge: HOME | End: 2024-11-15
Payer: OTHER MISCELLANEOUS

## 2024-11-15 VITALS
WEIGHT: 225.53 LBS | OXYGEN SATURATION: 97 % | RESPIRATION RATE: 18 BRPM | SYSTOLIC BLOOD PRESSURE: 125 MMHG | HEART RATE: 71 BPM | HEIGHT: 74 IN | DIASTOLIC BLOOD PRESSURE: 79 MMHG | TEMPERATURE: 98 F

## 2024-11-15 DIAGNOSIS — J45.909 UNSPECIFIED ASTHMA, UNCOMPLICATED: ICD-10-CM

## 2024-11-15 DIAGNOSIS — Z98.890 OTHER SPECIFIED POSTPROCEDURAL STATES: Chronic | ICD-10-CM

## 2024-11-15 DIAGNOSIS — Z90.79 ACQUIRED ABSENCE OF OTHER GENITAL ORGAN(S): Chronic | ICD-10-CM

## 2024-11-15 DIAGNOSIS — M16.11 UNILATERAL PRIMARY OSTEOARTHRITIS, RIGHT HIP: ICD-10-CM

## 2024-11-15 DIAGNOSIS — Z98.1 ARTHRODESIS STATUS: Chronic | ICD-10-CM

## 2024-11-15 DIAGNOSIS — Z96.642 PRESENCE OF LEFT ARTIFICIAL HIP JOINT: Chronic | ICD-10-CM

## 2024-11-15 DIAGNOSIS — I10 ESSENTIAL (PRIMARY) HYPERTENSION: ICD-10-CM

## 2024-11-15 DIAGNOSIS — D17.9 BENIGN LIPOMATOUS NEOPLASM, UNSPECIFIED: Chronic | ICD-10-CM

## 2024-11-15 DIAGNOSIS — Z01.818 ENCOUNTER FOR OTHER PREPROCEDURAL EXAMINATION: ICD-10-CM

## 2024-11-15 LAB
ALBUMIN SERPL ELPH-MCNC: 3.4 G/DL — SIGNIFICANT CHANGE UP (ref 3.3–5)
ALP SERPL-CCNC: 50 U/L — SIGNIFICANT CHANGE UP (ref 40–120)
ALT FLD-CCNC: 33 U/L — SIGNIFICANT CHANGE UP (ref 12–78)
ANION GAP SERPL CALC-SCNC: 5 MMOL/L — SIGNIFICANT CHANGE UP (ref 5–17)
AST SERPL-CCNC: 29 U/L — SIGNIFICANT CHANGE UP (ref 15–37)
BASOPHILS # BLD AUTO: 0.04 K/UL — SIGNIFICANT CHANGE UP (ref 0–0.2)
BASOPHILS NFR BLD AUTO: 0.4 % — SIGNIFICANT CHANGE UP (ref 0–2)
BILIRUB SERPL-MCNC: 0.6 MG/DL — SIGNIFICANT CHANGE UP (ref 0.2–1.2)
BLD GP AB SCN SERPL QL: SIGNIFICANT CHANGE UP
BUN SERPL-MCNC: 24 MG/DL — HIGH (ref 7–23)
CALCIUM SERPL-MCNC: 8.9 MG/DL — SIGNIFICANT CHANGE UP (ref 8.5–10.1)
CHLORIDE SERPL-SCNC: 103 MMOL/L — SIGNIFICANT CHANGE UP (ref 96–108)
CO2 SERPL-SCNC: 29 MMOL/L — SIGNIFICANT CHANGE UP (ref 22–31)
CREAT SERPL-MCNC: 1.36 MG/DL — HIGH (ref 0.5–1.3)
EGFR: 60 ML/MIN/1.73M2 — SIGNIFICANT CHANGE UP
EOSINOPHIL # BLD AUTO: 0.54 K/UL — HIGH (ref 0–0.5)
EOSINOPHIL NFR BLD AUTO: 5.9 % — SIGNIFICANT CHANGE UP (ref 0–6)
GLUCOSE SERPL-MCNC: 85 MG/DL — SIGNIFICANT CHANGE UP (ref 70–99)
HCT VFR BLD CALC: 43.1 % — SIGNIFICANT CHANGE UP (ref 39–50)
HGB BLD-MCNC: 15 G/DL — SIGNIFICANT CHANGE UP (ref 13–17)
IMM GRANULOCYTES NFR BLD AUTO: 0.5 % — SIGNIFICANT CHANGE UP (ref 0–0.9)
INR BLD: 1.03 RATIO — SIGNIFICANT CHANGE UP (ref 0.85–1.16)
LYMPHOCYTES # BLD AUTO: 1.51 K/UL — SIGNIFICANT CHANGE UP (ref 1–3.3)
LYMPHOCYTES # BLD AUTO: 16.6 % — SIGNIFICANT CHANGE UP (ref 13–44)
MCHC RBC-ENTMCNC: 32 PG — SIGNIFICANT CHANGE UP (ref 27–34)
MCHC RBC-ENTMCNC: 34.8 G/DL — SIGNIFICANT CHANGE UP (ref 32–36)
MCV RBC AUTO: 91.9 FL — SIGNIFICANT CHANGE UP (ref 80–100)
MONOCYTES # BLD AUTO: 0.8 K/UL — SIGNIFICANT CHANGE UP (ref 0–0.9)
MONOCYTES NFR BLD AUTO: 8.8 % — SIGNIFICANT CHANGE UP (ref 2–14)
MRSA PCR RESULT.: SIGNIFICANT CHANGE UP
NEUTROPHILS # BLD AUTO: 6.16 K/UL — SIGNIFICANT CHANGE UP (ref 1.8–7.4)
NEUTROPHILS NFR BLD AUTO: 67.8 % — SIGNIFICANT CHANGE UP (ref 43–77)
NRBC # BLD: 0 /100 WBCS — SIGNIFICANT CHANGE UP (ref 0–0)
PLATELET # BLD AUTO: 215 K/UL — SIGNIFICANT CHANGE UP (ref 150–400)
POTASSIUM SERPL-MCNC: 4 MMOL/L — SIGNIFICANT CHANGE UP (ref 3.5–5.3)
POTASSIUM SERPL-SCNC: 4 MMOL/L — SIGNIFICANT CHANGE UP (ref 3.5–5.3)
PROT SERPL-MCNC: 7.2 GM/DL — SIGNIFICANT CHANGE UP (ref 6–8.3)
PROTHROM AB SERPL-ACNC: 11.9 SEC — SIGNIFICANT CHANGE UP (ref 9.9–13.4)
RBC # BLD: 4.69 M/UL — SIGNIFICANT CHANGE UP (ref 4.2–5.8)
RBC # FLD: 12.8 % — SIGNIFICANT CHANGE UP (ref 10.3–14.5)
S AUREUS DNA NOSE QL NAA+PROBE: SIGNIFICANT CHANGE UP
SODIUM SERPL-SCNC: 137 MMOL/L — SIGNIFICANT CHANGE UP (ref 135–145)
VIT D25+D1,25 OH+D1,25 PNL SERPL-MCNC: 41.6 PG/ML — SIGNIFICANT CHANGE UP (ref 19.9–79.3)
WBC # BLD: 9.1 K/UL — SIGNIFICANT CHANGE UP (ref 3.8–10.5)
WBC # FLD AUTO: 9.1 K/UL — SIGNIFICANT CHANGE UP (ref 3.8–10.5)

## 2024-11-15 PROCEDURE — 93010 ELECTROCARDIOGRAM REPORT: CPT

## 2024-11-15 RX ORDER — SUCRALFATE 1 G/10ML
1 SUSPENSION ORAL
Refills: 0 | DISCHARGE

## 2024-11-15 RX ORDER — CYCLOBENZAPRINE HYDROCHLORIDE 30 MG/1
1 CAPSULE, EXTENDED RELEASE ORAL
Refills: 0 | DISCHARGE

## 2024-11-15 RX ORDER — SODIUM CHLORIDE 9 MG/ML
3 INJECTION, SOLUTION INTRAMUSCULAR; INTRAVENOUS; SUBCUTANEOUS EVERY 8 HOURS
Refills: 0 | Status: DISCONTINUED | OUTPATIENT
Start: 2024-12-04 | End: 2024-12-05

## 2024-11-15 RX ORDER — MELOXICAM 7.5 MG
1 TABLET ORAL
Refills: 0 | DISCHARGE

## 2024-11-15 RX ORDER — MONTELUKAST SODIUM 10 MG/1
1 TABLET, FILM COATED ORAL
Refills: 0 | DISCHARGE

## 2024-11-15 RX ORDER — CHOLECALCIFEROL (VITAMIN D3) 625 MCG
1 CAPSULE ORAL
Refills: 0 | DISCHARGE

## 2024-11-15 RX ORDER — TIZANIDINE 2 MG/1
1 TABLET ORAL
Refills: 0 | DISCHARGE

## 2024-11-15 RX ORDER — CALCIUM CARBONATE 400(1000)
1 TABLET,CHEWABLE ORAL
Refills: 0 | DISCHARGE

## 2024-11-15 NOTE — H&P PST ADULT - PROBLEM SELECTOR PLAN 2
right tital hip arthroplasty  labs - cbc,pt/ptt,bmp,t&s,nose cx,ekg  M/C required  preop 3 day hibiclens instruction reviewed and given .instructed on if  nose cx positive use mupuricin 5 days and checklist given  take routine meds DOS with sips of water. avoid NSAID and OTC supplements. verbalized understanding  information on proper nutrition , increase protein and better food choices provided in packet  ensure clear given  Anesthesiologist to review PST labs, EKG, required clearances and optimization for surgery.

## 2024-11-15 NOTE — H&P PST ADULT - HISTORY OF PRESENT ILLNESS
........58y/o with medical h/o HTN, HDL, GERD and pmhx prostate CA (s/p sx/RT/lupron  2018),Had Laminectomies x2 2020 and 4/2023 which was complicated with Delirium and organ failure ICU 12 days. Pt then Left Total Hip Arthroplasty on 12/13/2023. Pt states over time after prostate surgery he is unable to hold to his urine. Now coming in for male sling on 11/12/2024. Pt denies dysuria hematuria fever. 59M pmh HTN, HDL, GERD, prostate CA (s/p sx/RT/lupron  2018), here for PST for scheduled right total hip arthroplasty with Dr. Corbett on 12-

## 2024-11-15 NOTE — OCCUPATIONAL THERAPY INITIAL EVALUATION ADULT - ADDITIONAL COMMENTS
Pt lives with family (Who can assist post op) in a private house with 3 steps to enter with bilateral handrails (far apart). Once inside, the pt bedroom and bathroom is on that floor when entering. The pt bathroom has a tub/shower combination, fixed shower head, standard toilet seat and no grab bars. The pt ambulates with no device unless in a lot of pain in which the pt will use a cane. The pt owns a rolling walker. The pt pain at rest is a 7/10 and a 10/10 with movement. The pt manages the pain with rest, Meloxicam and Diclofenac. The pt has no recent outpatient PT, no recent falls and has no buckling of the knees. The pt wears glasses for reading, R handed, drives and has no hearing impairments.

## 2024-11-15 NOTE — OCCUPATIONAL THERAPY INITIAL EVALUATION ADULT - PERTINENT HX OF CURRENT PROBLEM, REHAB EVAL
R hip OA which impacts pts ability to perform functional tasks/transfers and mobility. Pt I s R hip OA which impacts pts ability to perform functional tasks/transfers and mobility. Pt is scheduled for R THR posterior approach on 12/4/24.

## 2024-11-15 NOTE — OCCUPATIONAL THERAPY INITIAL EVALUATION ADULT - NSOTDMEREC_GEN_A_CORE
The patient has mobility limitations that increase their falls risk and impair their endurance. At times, they are limited to staying in one room due to fluctuating pain levels & balance deficits related to post operative weakness. The patient will require a commode to attend safely to their toileting needs.

## 2024-11-15 NOTE — H&P PST ADULT - ASSESSMENT
59M pmh HTN, HDL, GERD, prostate CA (s/p sx/RT/lupron  2018), here for PST for scheduled right total hip arthroplasty with Dr. Corbett on 2024  CAPRINI SCORE    AGE RELATED RISK FACTORS                                                             [ x] Age 41-60 years                                            (1 Point)  [ ] Age: 61-74 years                                           (2 Points)                 [ ] Age= 75 years                                                (3 Points)             DISEASE RELATED RISK FACTORS                                                       [ ] Edema in the lower extremities                 (1 Point)                     [ ] Varicose veins                                               (1 Point)                                 [x ] BMI > 25 Kg/m2                                            (1 Point)                                  [ ] Serious infection (ie PNA)                            (1 Point)                     [ ] Lung disease ( COPD, Emphysema)            (1 Point)                                                                          [ ] Acute myocardial infarction                         (1 Point)                  [ ] Congestive heart failure (in the previous month)  (1 Point)         [ ] Inflammatory bowel disease                            (1 Point)                  [ ] Central venous access, PICC or Port               (2 points)       (within the last month)                                                                [ ] Stroke (in the previous month)                        (5 Points)    [ ] Previous or present malignancy                       (2 points)                                                                                                                                                         HEMATOLOGY RELATED FACTORS                                                         [ ] Prior episodes of VTE                                     (3 Points)                     [ ] Positive family history for VTE                      (3 Points)                  [ ] Prothrombin 12126 A                                     (3 Points)                     [ ] Factor V Leiden                                                (3 Points)                        [ ] Lupus anticoagulants                                      (3 Points)                                                           [ ] Anticardiolipin antibodies                              (3 Points)                                                       [ ] High homocysteine in the blood                   (3 Points)                                             [ ] Other congenital or acquired thrombophilia      (3 Points)                                                [ ] Heparin induced thrombocytopenia                  (3 Points)                                        MOBILITY RELATED FACTORS  [ ] Bed rest                                                         (1 Point)  [ ] Plaster cast                                                    (2 points)  [ ] Bed bound for more than 72 hours           (2 Points)    GENDER SPECIFIC FACTORS  [ ] Pregnancy or had a baby within the last month   (1 Point)  [ ] Post-partum < 6 weeks                                   (1 Point)  [ ] Hormonal therapy  or oral contraception   (1 Point)  [ ] History of pregnancy complications              (1 point)  [ ] Unexplained or recurrent              (1 Point)    OTHER RISK FACTORS                                           (1 Point)  [ ] BMI >40, smoking, diabetes requiring insulin, chemotherapy  blood transfusions and length of surgery over 2 hours    SURGERY RELATED RISK FACTORS  [ ]  Section within the last month     (1 Point)  [ ] Minor surgery                                                  (1 Point)  [ ] Arthroscopic surgery                                       (2 Points)  [ ] Planned major surgery lasting more            (2 Points)      than 45 minutes     [x ] Elective hip or knee joint replacement       (5 points)       surgery                                                TRAUMA RELATED RISK FACTORS  [ ] Fracture of the hip, pelvis, or leg                       (5 Points)  [ ] Spinal cord injury resulting in paralysis             (5 points)       (in the previous month)    [ ] Paralysis  (less than 1 month)                             (5 Points)  [ ] Multiple Trauma within 1 month                        (5 Points)    Total Score [  7      ]    Caprini Score 0-2: Low Risk, NO VTE prophylaxis required for most patients, encourage ambulation  Caprini Score 3-6: Moderate Risk , pharmacologic VTE prophylaxis is indicated for most patients (in the absence of contraindications)  Caprini Score Greater than or =7: High risk, pharmocologic VTE prophylaxis indicated for most patients (in the absence of contraindications)

## 2024-11-16 LAB
A1C WITH ESTIMATED AVERAGE GLUCOSE RESULT: 5.7 % — HIGH (ref 4–5.6)
ESTIMATED AVERAGE GLUCOSE: 117 MG/DL — HIGH (ref 68–114)
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
## 2024-11-29 ENCOUNTER — NON-APPOINTMENT (OUTPATIENT)
Age: 59
End: 2024-11-29

## 2024-12-04 ENCOUNTER — RESULT REVIEW (OUTPATIENT)
Age: 59
End: 2024-12-04

## 2024-12-04 ENCOUNTER — APPOINTMENT (OUTPATIENT)
Dept: ORTHOPEDIC SURGERY | Facility: HOSPITAL | Age: 59
End: 2024-12-04

## 2024-12-04 ENCOUNTER — INPATIENT (INPATIENT)
Facility: HOSPITAL | Age: 59
LOS: 0 days | Discharge: HOME HEALTH | End: 2024-12-05
Attending: ORTHOPAEDIC SURGERY | Admitting: ORTHOPAEDIC SURGERY
Payer: OTHER MISCELLANEOUS

## 2024-12-04 ENCOUNTER — TRANSCRIPTION ENCOUNTER (OUTPATIENT)
Age: 59
End: 2024-12-04

## 2024-12-04 VITALS
RESPIRATION RATE: 18 BRPM | SYSTOLIC BLOOD PRESSURE: 124 MMHG | WEIGHT: 222.01 LBS | HEIGHT: 74 IN | TEMPERATURE: 98 F | DIASTOLIC BLOOD PRESSURE: 74 MMHG | OXYGEN SATURATION: 98 % | HEART RATE: 86 BPM

## 2024-12-04 DIAGNOSIS — Z98.1 ARTHRODESIS STATUS: Chronic | ICD-10-CM

## 2024-12-04 DIAGNOSIS — Z96.642 PRESENCE OF LEFT ARTIFICIAL HIP JOINT: Chronic | ICD-10-CM

## 2024-12-04 DIAGNOSIS — Z98.890 OTHER SPECIFIED POSTPROCEDURAL STATES: Chronic | ICD-10-CM

## 2024-12-04 DIAGNOSIS — Z90.79 ACQUIRED ABSENCE OF OTHER GENITAL ORGAN(S): Chronic | ICD-10-CM

## 2024-12-04 DIAGNOSIS — D17.9 BENIGN LIPOMATOUS NEOPLASM, UNSPECIFIED: Chronic | ICD-10-CM

## 2024-12-04 LAB
ANION GAP SERPL CALC-SCNC: 5 MMOL/L — SIGNIFICANT CHANGE UP (ref 5–17)
BLD GP AB SCN SERPL QL: SIGNIFICANT CHANGE UP
BUN SERPL-MCNC: 23 MG/DL — SIGNIFICANT CHANGE UP (ref 7–23)
CALCIUM SERPL-MCNC: 8.4 MG/DL — LOW (ref 8.5–10.1)
CHLORIDE SERPL-SCNC: 110 MMOL/L — HIGH (ref 96–108)
CO2 SERPL-SCNC: 24 MMOL/L — SIGNIFICANT CHANGE UP (ref 22–31)
CREAT SERPL-MCNC: 1.3 MG/DL — SIGNIFICANT CHANGE UP (ref 0.5–1.3)
EGFR: 64 ML/MIN/1.73M2 — SIGNIFICANT CHANGE UP
GLUCOSE BLDC GLUCOMTR-MCNC: 70 MG/DL — SIGNIFICANT CHANGE UP (ref 70–99)
GLUCOSE SERPL-MCNC: 101 MG/DL — HIGH (ref 70–99)
HCT VFR BLD CALC: 40.5 % — SIGNIFICANT CHANGE UP (ref 39–50)
HGB BLD-MCNC: 14 G/DL — SIGNIFICANT CHANGE UP (ref 13–17)
MCHC RBC-ENTMCNC: 32 PG — SIGNIFICANT CHANGE UP (ref 27–34)
MCHC RBC-ENTMCNC: 34.6 G/DL — SIGNIFICANT CHANGE UP (ref 32–36)
MCV RBC AUTO: 92.5 FL — SIGNIFICANT CHANGE UP (ref 80–100)
NRBC # BLD: 0 /100 WBCS — SIGNIFICANT CHANGE UP (ref 0–0)
PLATELET # BLD AUTO: 206 K/UL — SIGNIFICANT CHANGE UP (ref 150–400)
POTASSIUM SERPL-MCNC: 4.4 MMOL/L — SIGNIFICANT CHANGE UP (ref 3.5–5.3)
POTASSIUM SERPL-SCNC: 4.4 MMOL/L — SIGNIFICANT CHANGE UP (ref 3.5–5.3)
RBC # BLD: 4.38 M/UL — SIGNIFICANT CHANGE UP (ref 4.2–5.8)
RBC # FLD: 12.8 % — SIGNIFICANT CHANGE UP (ref 10.3–14.5)
SODIUM SERPL-SCNC: 139 MMOL/L — SIGNIFICANT CHANGE UP (ref 135–145)
WBC # BLD: 12.44 K/UL — HIGH (ref 3.8–10.5)
WBC # FLD AUTO: 12.44 K/UL — HIGH (ref 3.8–10.5)

## 2024-12-04 PROCEDURE — 27130 TOTAL HIP ARTHROPLASTY: CPT | Mod: RT

## 2024-12-04 PROCEDURE — 72170 X-RAY EXAM OF PELVIS: CPT | Mod: 26

## 2024-12-04 PROCEDURE — 73501 X-RAY EXAM HIP UNI 1 VIEW: CPT | Mod: 26,RT

## 2024-12-04 DEVICE — IMPLANTABLE DEVICE: Type: IMPLANTABLE DEVICE | Site: RIGHT | Status: FUNCTIONAL

## 2024-12-04 DEVICE — HEAD FEM 12 X 14 28MM  PLUS 5 CERAMIC: Type: IMPLANTABLE DEVICE | Site: RIGHT | Status: FUNCTIONAL

## 2024-12-04 DEVICE — STEM FEM ACTIS HIGH COLLAR SZ 6: Type: IMPLANTABLE DEVICE | Site: RIGHT | Status: FUNCTIONAL

## 2024-12-04 RX ORDER — OXYBUTYNIN CHLORIDE 5 MG
5 TABLET ORAL
Refills: 0 | Status: DISCONTINUED | OUTPATIENT
Start: 2024-12-04 | End: 2024-12-05

## 2024-12-04 RX ORDER — OXYCODONE HYDROCHLORIDE 30 MG/1
10 TABLET ORAL
Refills: 0 | Status: DISCONTINUED | OUTPATIENT
Start: 2024-12-04 | End: 2024-12-05

## 2024-12-04 RX ORDER — MONTELUKAST SODIUM 10 MG/1
10 TABLET ORAL DAILY
Refills: 0 | Status: DISCONTINUED | OUTPATIENT
Start: 2024-12-04 | End: 2024-12-04

## 2024-12-04 RX ORDER — ACETAMINOPHEN 500MG 500 MG/1
1000 TABLET, COATED ORAL ONCE
Refills: 0 | Status: COMPLETED | OUTPATIENT
Start: 2024-12-04 | End: 2024-12-04

## 2024-12-04 RX ORDER — ROSUVASTATIN CALCIUM 5 MG/1
10 TABLET, FILM COATED ORAL AT BEDTIME
Refills: 0 | Status: DISCONTINUED | OUTPATIENT
Start: 2024-12-04 | End: 2024-12-05

## 2024-12-04 RX ORDER — POLYETHYLENE GLYCOL 3350 17 G/17G
17 POWDER, FOR SOLUTION ORAL AT BEDTIME
Refills: 0 | Status: DISCONTINUED | OUTPATIENT
Start: 2024-12-04 | End: 2024-12-05

## 2024-12-04 RX ORDER — HYDROMORPHONE HYDROCHLORIDE 2 MG/1
0.5 TABLET ORAL
Refills: 0 | Status: DISCONTINUED | OUTPATIENT
Start: 2024-12-04 | End: 2024-12-05

## 2024-12-04 RX ORDER — MONTELUKAST SODIUM 10 MG/1
10 TABLET ORAL DAILY
Refills: 0 | Status: DISCONTINUED | OUTPATIENT
Start: 2024-12-04 | End: 2024-12-05

## 2024-12-04 RX ORDER — CYANOCOBALAMIN/FOLIC AC/VIT B6 1-2.2-25MG
1 TABLET ORAL DAILY
Refills: 0 | Status: DISCONTINUED | OUTPATIENT
Start: 2024-12-04 | End: 2024-12-05

## 2024-12-04 RX ORDER — BENZOCAINE, MENTHOL 15; 3.6 MG/1; MG/1
1 LOZENGE ORAL EVERY 4 HOURS
Refills: 0 | Status: DISCONTINUED | OUTPATIENT
Start: 2024-12-04 | End: 2024-12-05

## 2024-12-04 RX ORDER — OXYCODONE HYDROCHLORIDE 30 MG/1
15 TABLET ORAL EVERY 4 HOURS
Refills: 0 | Status: DISCONTINUED | OUTPATIENT
Start: 2024-12-04 | End: 2024-12-05

## 2024-12-04 RX ORDER — ACETAMINOPHEN, DIPHENHYDRAMINE HCL, PHENYLEPHRINE HCL 325; 25; 5 MG/1; MG/1; MG/1
3 TABLET ORAL AT BEDTIME
Refills: 0 | Status: DISCONTINUED | OUTPATIENT
Start: 2024-12-04 | End: 2024-12-05

## 2024-12-04 RX ORDER — ACETAMINOPHEN 500MG 500 MG/1
650 TABLET, COATED ORAL ONCE
Refills: 0 | Status: COMPLETED | OUTPATIENT
Start: 2024-12-04 | End: 2024-12-04

## 2024-12-04 RX ORDER — SUCRALFATE 1 G/1
1 TABLET ORAL
Refills: 0 | Status: DISCONTINUED | OUTPATIENT
Start: 2024-12-04 | End: 2024-12-05

## 2024-12-04 RX ORDER — ONDANSETRON HYDROCHLORIDE 4 MG/1
4 TABLET, FILM COATED ORAL EVERY 6 HOURS
Refills: 0 | Status: DISCONTINUED | OUTPATIENT
Start: 2024-12-04 | End: 2024-12-05

## 2024-12-04 RX ORDER — LISINOPRIL 20 MG/1
20 TABLET ORAL DAILY
Refills: 0 | Status: DISCONTINUED | OUTPATIENT
Start: 2024-12-04 | End: 2024-12-04

## 2024-12-04 RX ORDER — OXYBUTYNIN CHLORIDE 5 MG
10 TABLET ORAL DAILY
Refills: 0 | Status: DISCONTINUED | OUTPATIENT
Start: 2024-12-04 | End: 2024-12-04

## 2024-12-04 RX ORDER — OXYCODONE HYDROCHLORIDE 30 MG/1
5 TABLET ORAL
Refills: 0 | Status: DISCONTINUED | OUTPATIENT
Start: 2024-12-04 | End: 2024-12-05

## 2024-12-04 RX ORDER — ONDANSETRON HYDROCHLORIDE 4 MG/1
4 TABLET, FILM COATED ORAL ONCE
Refills: 0 | Status: DISCONTINUED | OUTPATIENT
Start: 2024-12-04 | End: 2024-12-04

## 2024-12-04 RX ORDER — SODIUM CHLORIDE 9 MG/ML
1000 INJECTION, SOLUTION INTRAMUSCULAR; INTRAVENOUS; SUBCUTANEOUS
Refills: 0 | Status: DISCONTINUED | OUTPATIENT
Start: 2024-12-04 | End: 2024-12-05

## 2024-12-04 RX ORDER — MAGNESIUM, ALUMINUM HYDROXIDE 200-225/5
30 SUSPENSION, ORAL (FINAL DOSE FORM) ORAL EVERY 4 HOURS
Refills: 0 | Status: DISCONTINUED | OUTPATIENT
Start: 2024-12-04 | End: 2024-12-05

## 2024-12-04 RX ORDER — SODIUM CHLORIDE 9 MG/ML
500 INJECTION, SOLUTION INTRAMUSCULAR; INTRAVENOUS; SUBCUTANEOUS ONCE
Refills: 0 | Status: COMPLETED | OUTPATIENT
Start: 2024-12-04 | End: 2024-12-04

## 2024-12-04 RX ORDER — INFLUENZA VIRUS VACCINE 15; 15; 15; 15 UG/.5ML; UG/.5ML; UG/.5ML; UG/.5ML
0.5 SUSPENSION INTRAMUSCULAR ONCE
Refills: 0 | Status: COMPLETED | OUTPATIENT
Start: 2024-12-04 | End: 2024-12-04

## 2024-12-04 RX ORDER — DEXAMETHASONE 1.5 MG/1
10 TABLET ORAL ONCE
Refills: 0 | Status: COMPLETED | OUTPATIENT
Start: 2024-12-05 | End: 2024-12-05

## 2024-12-04 RX ORDER — ROSUVASTATIN CALCIUM 5 MG/1
10 TABLET, FILM COATED ORAL AT BEDTIME
Refills: 0 | Status: DISCONTINUED | OUTPATIENT
Start: 2024-12-04 | End: 2024-12-04

## 2024-12-04 RX ORDER — HYDROMORPHONE HYDROCHLORIDE 2 MG/1
0.5 TABLET ORAL
Refills: 0 | Status: DISCONTINUED | OUTPATIENT
Start: 2024-12-04 | End: 2024-12-04

## 2024-12-04 RX ORDER — GABAPENTIN 300 MG/1
1 CAPSULE ORAL
Refills: 0 | DISCHARGE

## 2024-12-04 RX ORDER — LISINOPRIL 20 MG/1
20 TABLET ORAL DAILY
Refills: 0 | Status: DISCONTINUED | OUTPATIENT
Start: 2024-12-04 | End: 2024-12-05

## 2024-12-04 RX ORDER — ACETAMINOPHEN 500MG 500 MG/1
1000 TABLET, COATED ORAL ONCE
Refills: 0 | Status: DISCONTINUED | OUTPATIENT
Start: 2024-12-04 | End: 2024-12-05

## 2024-12-04 RX ORDER — CEFAZOLIN SODIUM 10 G
2000 VIAL (EA) INJECTION EVERY 8 HOURS
Refills: 0 | Status: COMPLETED | OUTPATIENT
Start: 2024-12-05 | End: 2024-12-05

## 2024-12-04 RX ORDER — SENNOSIDES 8.6 MG
2 TABLET ORAL AT BEDTIME
Refills: 0 | Status: DISCONTINUED | OUTPATIENT
Start: 2024-12-04 | End: 2024-12-05

## 2024-12-04 RX ORDER — SUCRALFATE 1 G/1
1 TABLET ORAL
Refills: 0 | Status: DISCONTINUED | OUTPATIENT
Start: 2024-12-04 | End: 2024-12-04

## 2024-12-04 RX ORDER — ACETAMINOPHEN 500MG 500 MG/1
1000 TABLET, COATED ORAL EVERY 8 HOURS
Refills: 0 | Status: DISCONTINUED | OUTPATIENT
Start: 2024-12-04 | End: 2024-12-05

## 2024-12-04 RX ORDER — PANTOPRAZOLE SODIUM 40 MG/1
40 TABLET, DELAYED RELEASE ORAL
Refills: 0 | Status: DISCONTINUED | OUTPATIENT
Start: 2024-12-04 | End: 2024-12-05

## 2024-12-04 RX ADMIN — ACETAMINOPHEN 500MG 1000 MILLIGRAM(S): 500 TABLET, COATED ORAL at 21:39

## 2024-12-04 RX ADMIN — OXYCODONE HYDROCHLORIDE 15 MILLIGRAM(S): 30 TABLET ORAL at 20:37

## 2024-12-04 RX ADMIN — ACETAMINOPHEN, DIPHENHYDRAMINE HCL, PHENYLEPHRINE HCL 3 MILLIGRAM(S): 325; 25; 5 TABLET ORAL at 21:20

## 2024-12-04 RX ADMIN — OXYCODONE HYDROCHLORIDE 15 MILLIGRAM(S): 30 TABLET ORAL at 21:37

## 2024-12-04 RX ADMIN — ROSUVASTATIN CALCIUM 10 MILLIGRAM(S): 5 TABLET, FILM COATED ORAL at 21:24

## 2024-12-04 RX ADMIN — ACETAMINOPHEN 500MG 400 MILLIGRAM(S): 500 TABLET, COATED ORAL at 20:39

## 2024-12-04 RX ADMIN — SODIUM CHLORIDE 500 MILLILITER(S): 9 INJECTION, SOLUTION INTRAMUSCULAR; INTRAVENOUS; SUBCUTANEOUS at 18:40

## 2024-12-04 RX ADMIN — POLYETHYLENE GLYCOL 3350 17 GRAM(S): 17 POWDER, FOR SOLUTION ORAL at 21:19

## 2024-12-04 RX ADMIN — SODIUM CHLORIDE 3 MILLILITER(S): 9 INJECTION, SOLUTION INTRAMUSCULAR; INTRAVENOUS; SUBCUTANEOUS at 22:00

## 2024-12-04 RX ADMIN — ACETAMINOPHEN 500MG 650 MILLIGRAM(S): 500 TABLET, COATED ORAL at 15:33

## 2024-12-04 RX ADMIN — Medication 2 TABLET(S): at 21:20

## 2024-12-04 NOTE — DISCHARGE NOTE PROVIDER - CARE PROVIDER_API CALL
Allen Corbett.  Orthopaedic Surgery  [address] [facility], [address] [place] [id number]  Phone: [phone]  Fax: [phone]  Follow Up Time:

## 2024-12-04 NOTE — DISCHARGE NOTE PROVIDER - NSDCFUADDINST_GEN_ALL_CORE_FT
Keep Prineo Dressing Clean, Dry and Intact. May shower with Prineo Dressing. Please do not scrub, soak, peel or pick at the prineo dressing. No creams, lotions, or oils over dressing. May shower and let water run over incision, no baths. Pat dry once out of shower. Dressing to be removed in office at follow up visit in 2 weeks. There are no staples or stitches that need to be removed.  If you notice any redness, irritation, or itching around the prineo dressing call the surgeon's office    Per Dr. Corbett: may advance from walker as tolerated per discretion of physical therapist.

## 2024-12-04 NOTE — PATIENT PROFILE ADULT - FALL HARM RISK - HARM RISK INTERVENTIONS

## 2024-12-04 NOTE — DISCHARGE NOTE PROVIDER - NSDCMRMEDTOKEN_GEN_ALL_CORE_FT
diclofenac sodium 75 mg oral delayed release tablet: 1 tab(s) orally  Fluticasone:   gabapentin 300 mg oral capsule: 1 cap(s) orally 2 times a day  lisinopril-hydrochlorothiazide 20 mg-25 mg oral tablet: 1 orally once a day  montelukast 10 mg oral tablet: 1 tab(s) orally once a day  oxybutynin 10 mg/24 hr oral tablet, extended release: 1 tab(s) orally once a day  oxycodone-acetaminophen 10 mg-325 mg oral tablet: 1 tab(s) orally as needed for  severe pain  pantoprazole 40 mg oral delayed release tablet: 1 tab(s) orally once a day  rosuvastatin 10 mg oral capsule: 1 orally once a day  sucralfate 1 g oral tablet: 1 tab(s) orally once a day  tiZANidine 4 mg oral tablet: 1 tab(s) orally once a day  Ventolin 90 mcg/inh inhalation aerosol: inhaled prn   acetaminophen 500 mg oral tablet: 2 tab(s) orally every 8 hours  Aspirin Low Dose 81 mg oral delayed release tablet: 1 tab(s) orally 2 times a day MDD: 2  CeleBREX 200 mg oral capsule: 1 cap(s) orally once a day MDD: 1  Fluticasone: 1 puff(s) nasal once a day  gabapentin 300 mg oral capsule: 1 cap(s) orally 2 times a day  montelukast 10 mg oral tablet: 1 tab(s) orally once a day  Narcan 4 mg/0.1 mL nasal spray: 4 milligram(s) intranasally once , repeat as necessary.   As needed. For suspected opiate overdose   Follow instructions on packet MDD: 0.2 ml  oxybutynin 10 mg/24 hr oral tablet, extended release: 1 tab(s) orally once a day  oxyCODONE 10 mg oral tablet: 1 tab(s) orally every 4 hours as needed for  severe pain take 1/2 tab for moderate pain MDD: 6  pantoprazole 40 mg oral delayed release tablet: 1 tab(s) orally once a day (before a meal) MDD: 1  polyethylene glycol 3350 oral powder for reconstitution: 17 gram(s) orally once a day (at bedtime)  rosuvastatin 10 mg oral capsule: 1 orally once a day  senna leaf extract oral tablet: 2 tab(s) orally once a day (at bedtime)  sucralfate 1 g oral tablet: 1 tab(s) orally once a day  tiZANidine 4 mg oral tablet: 1 tab(s) orally once a day  Ventolin 90 mcg/inh inhalation aerosol: 1 puff(s) inhaled prn

## 2024-12-04 NOTE — DISCHARGE NOTE PROVIDER - NSDCFUSCHEDAPPT_GEN_ALL_CORE_FT
Allen Corbett  [facility]  [address] [facility]  Scheduled Appointment: 12/16/2024    Faisal Pierre  [facility]  [address] [facility]  Scheduled Appointment: 01/08/2025

## 2024-12-04 NOTE — DISCHARGE NOTE PROVIDER - NSDCFUADDAPPT_GEN_ALL_CORE_FT
Follow up with your surgeon in two weeks. Call for appointment.    If you need more pain medications, call your surgeon's office. For medication refills or authorizations call [phone]    Make sure to have a bowel movement by 2 days after surgery. Take stool softners and laxatives as needed.    Call and schedule a follow up appointment with your primary care physician for repeat blood work (CBC and BMP) for post hospital discharge follow-up care.    Call your surgeon if you have increased redness/pain/drainage or fever. Return to ER for shortness of breath/calf tenderness.

## 2024-12-04 NOTE — PROGRESS NOTE ADULT - SUBJECTIVE AND OBJECTIVE BOX
Ortho Progress Note         TANISHA PADILLA is a 59yMale who is S/p R KRISTEN (12/4/24).  Patient reports feeling well, pain is well controlled and denies CP/SOB, Dizziness, N/V.  Denies is any new numbness or weakness.  Has no new complaints.     T(C): 36.4 (12-04-24 @ 19:15), Max: 36.7 (12-04-24 @ 18:30)  HR: 67 (12-04-24 @ 19:30) (67 - 86)  BP: 121/81 (12-04-24 @ 19:30) (114/74 - 124/79)  RR: 16 (12-04-24 @ 19:30) (12 - 20)  SpO2: 100% (12-04-24 @ 19:30) (98% - 100%)    LABS:                          14.0   12.44 )-----------( 206      ( 04 Dec 2024 16:44 )             40.5       12-04    139  |  110[H]  |  23  ----------------------------<  101[H]  4.4   |  24  |  1.30    Ca    8.4[L]      04 Dec 2024 16:44                          General Appearance:  Patient is examined at bedside.  Appears to have pain well-controlled.  In no apparent distress.      R Hip was Examined.      Dressing is C/D/I  EHL/FHL/GS/AT are intact.   SILT L2-S1-still some residual numbness from the spinal  Calves are Soft,  Compartments easily compressable, NTTP Bilaterally.   DP/PT Pulses are palpable, 2/2 Bilaterally     TANISHA PADILLA is a 59yMale S/p R KRISTEN (12/4/24)    DVT Proph: ASA  Continue with PT/OT  Incentive Spriometery  Pain Control  RLE WBAT  D/W Dr Corbett, Agrees with the plan.   Dispo:  Pending PT Eval.

## 2024-12-04 NOTE — PATIENT PROFILE ADULT - PRO INTERPRETER NEED 2
[place] EMERGENCY DEPARTMENT ENCOUNTER:    [facility] [place] [facility]  [address]  [place] [address]  [phone]    CHIEF COMPLAINT:    Chief Complaint   Patient presents with   • Abdominal Pain       HPI:    This is a 63 year old male who presented to the ED with the history/complaint of having his initial diverticulitis bout roughly 10 years ago and that was the only time he received imaging.  Since he has had roughly 10 episodes of diverticulitis that he could manage conservatively, with conservative diet and avoiding antibiotics.  For the last 2 days he has had left lower quadrant stabbing pain that is been worsening with no clear alleviating or exacerbating factors.  He has been having fevers persistently during this time.  The level of pain as well as persistence of fevers is abnormal for him.  He denies dysuria, hematuria, melena, hematochezia, chest pain, shortness of breath, headaches or any other acute symptoms.  No other radiation to his pain.  He saw a primary care doctor today, and he did take and initial dose of antibiotics for presumed diverticulitis with ciprofloxacin and Flagyl.      ALLERGIES:    ALLERGIES:   Allergen Reactions   • Ace Inhibitors Cough   • Sulfa Antibiotics RASH     Penile sores.    • Erythromycin Base GI UPSET   • Penicillins GI UPSET       CURRENT MEDICATIONS:    No current facility-administered medications for this encounter.     Current Outpatient Medications   Medication Sig Dispense Refill   • metroNIDAZOLE (FLAGYL) 500 MG tablet Take 1 tablet by mouth 3 times daily for 7 days. 21 tablet 0   • ciprofloxacin (Cipro) 500 MG tablet Take 1 tablet by mouth 2 times daily for 7 days. 14 tablet 0   • simvastatin (ZOCOR) 80 MG tablet Take 0.5 tablets by mouth daily. 90 tablet 3   • fluticasone (FLONASE) 50 MCG/ACT nasal spray Spray 2 sprays in each nostril daily. 3 Bottle 3   • metformin (GLUCOPHAGE) 1000 MG tablet Take 1 tablet by mouth  daily (with breakfast). 90 tablet 3   • losartan-hydrochlorothiazide (HYZAAR) 50-12.5 MG per tablet Take 1 tablet by mouth daily. 90 tablet 3   • CINNAMON PO      • Cetirizine HCl (ZYRTEC PO)      • Probiotic Product (PROBIOTIC PO)      • Multiple Vitamins-Minerals (MULTI FOR HIM 50+) Tab 1 po daily 30 tablet    • GLUCOSAMINE CHONDRO COMPLEX 500-400 MG PO TABS 750mg , 2 pe rdya  100 0       PROBLEM LIST:  No   Patient Active Problem List   Diagnosis   • Family hx of melanoma   • Family history of colon cancer   • Diverticulosis of large intestine without diverticulitis   • Hemorrhoids   • Fatty liver   • Obesity (BMI 30.0-34.9)   • Type 2 diabetes mellitus with microalbuminuria (CMS/HCC)   • Tracheal stenosis   • Lung nodule < 6cm on CT   • Sleep apnea, AHI 24, min sat 70%, CPAP auto 5-15, median 7.7 2020   • Essential hypertension   • Mixed hyperlipidemia   • Chronic rhinitis        PAST MEDICAL HISTORY:    Past Medical History:   Diagnosis Date   • Abnormal CXR 01/2019    declined CT   • Bell's palsy 3/00   • Calculus of kidney 9/00   • Carpal tunnel syndrome     mod bilateral    • Controlled type 2 diabetes with renal manifestation    • Diverticulosis 10/11/2019    per colonoscopy   • Esophageal reflux    • Essential (primary) hypertension    • Essential hypertension 10/30/2020   • Hemorrhoids 10/11/2019    per colonoscopy   • Herpes zoster without mention of complication 2/86    R arm   • [place] (hard of hearing)     bilateral ha   • Lung nodule < 6cm on CT 11/2019    4 mm repeat 12 months   • Mixed hyperlipidemia 10/30/2020   • Obesity, unspecified 12/31/2008   • Other and unspecified hyperlipidemia    • Otosclerosis    • Peptic ulcer, unspecified site, unspecified as acute or chronic, without mention of hemorrhage, perforation, or obstruction     PUD   • Proteinuria 12/31/2008   • Tracheal stenosis 2019    CXR/CT   • Tracheal stenosis    • Type II or unspecified type diabetes mellitus without mention of  complication, not stated as uncontrolled 2011   • Unspecified disorder of liver     fatty liver on us.    • Unspecified vitamin D deficiency        SURGICAL HISTORY:    Past Surgical History:   Procedure Laterality Date   • Carpal tunnel release  09    Rt CTR   • Carpal tunnel release  09    Lt CTR - [place]   • Colonoscopy remove lesions by snare  10/11/2019    Colon 3yrs,adenoma & hyperplastic polyps,Diverticulosis,hemorrhoids,fam hx,.    • Cystoscopy,ureteroscopy,lithotripsy      R sided ureteroscopy with stone removal followed by L sided lithrotripsy, Dr. Barton   • Esophagogastroduodenoscopy transoral flex diag      Dr. Mcgrath, dyspepsia   • Exploration of middle ear  04    Dr. Brown has metal in middle ear,    • Incise finger tendon sheath  09    Rt index - [place]   • Middle ear surgery proc unlisted      exploratory L ear   • Remove tonsils/adenoids,<12 y/o      T & A   • Sinus surgery proc unlisted      Dr. Brown, endoscopic sinus surgery, nasal septal reconstruction   • Stapedectomy  04    left ear,    • Trigger finger release Right 2019     right long finger trigger finger release, Dr Ramirez       SOCIAL HISTORY:    Social History     Tobacco Use   • Smoking status: Former Smoker     Packs/day: 1.00     Years: 34.00     Pack years: 34.00     Quit date: 1993     Years since quittin.9   • Smokeless tobacco: Former User   Vaping Use   • Vaping Use: never used   Substance Use Topics   • Alcohol use: Yes     Alcohol/week: 14.0 standard drinks     Types: 14 Standard drinks or equivalent per week   • Drug use: No       FAMILY HISTORY:    Family History   Problem Relation Age of Onset   • Hypertension Father         dad now with copd as well, age 79. CHRIS   • Heart Father         heart disease   • Gastrointestinal Father         ulcers   • Cancer Mother         melanoma now age 78, /breast cancer at age 56   • Thyroid  Mother         throidectomy at age 10   • Gastrointestinal Brother         ulcer disease   • Patient is unaware of any medical problems Brother    • Cancer, Colon Sister    • Patient is unaware of any medical problems Son    • Patient is unaware of any medical problems Son        REVIEW OF SYSTEMS    Constitutional:  See HPI.   Eyes:  Denies change in visual acuity.   HENT:  Denies nasal congestion or sore throat.   Respiratory:  Denies cough or shortness of breath.   Cardiovascular:  Denies chest pain or edema.   GI:  See HPI.   :  Denies dysuria, frequency, urgency or hematuria.    Musculoskeletal:  Denies trauma, falls.   Integument:  Denies rash or lesion.   Neurologic:  Denies weakness, numbness.    PHYSICAL EXAM    ED Triage Vitals [11/29/21 1847]   BP (!) 192/107   Heart Rate (!) 108   Resp 18   Temp 100 °F (37.8 °C)   SpO2 99 %       Constitutional:  Alert, cooperative, conversive although feels unwell.   Integument:  No rash or lesion.   HEENT:  Sclerae and conjunctivae are normal bilaterally.   Neck:  Trachea is midline.  C-Spine:  FROM.  No posterior midline tenderness, paraspinal tenderness or spasm.   Respiratory:  CTA.  No rales, rhonchi or wheezes.  Cardiovascular:  RRR without rub.  No edema.    Abdomen:  Non distended, positive left lower quadrant tenderness with guarding voluntary, no hepatosplenomegaly.   Musculoskeletal:  Upper and lower ext nontender bilaterally, normal ROM, no bony step abnormalities.    Back:  Normal alignment.  No CVA or midline bony tenderness.    Neurologic:  Cranial nerve II-XII grossly intact, no focal weakness or numbness.    RADIOLOGY AND LAB RESULTS:  No results found for this visit on 11/29/21.  CT ABDOMEN PELVIS W CONTRAST    (Results Pending)       Vitals:    11/29/21 1847   BP: (!) 192/107   Patient Position: Sitting   Pulse: (!) 108   Resp: 18   Temp: 100 °F (37.8 °C)   TempSrc: Oral   SpO2: 99%   Weight: 98.9 kg (218 lb)   Height: 5' 7\" (1.702 m)       Medications - No data to display          ED COURSE & MEDICAL DECISION MAKING:   Patient presenting with abdominal pain concerning for diverticulitis.  This does seem to be a more severe flare and he is very reliable historian.  He will be given IV fluids, Zofran, morphine and will obtain CT abdomen pelvis.    I endorsed the patient to Dr. Martino who will follow up on CT imaging.        DIAGNOSIS:  Abdominal pain           Mike Ramos MD  11/29/21 2009     English

## 2024-12-04 NOTE — DISCHARGE NOTE PROVIDER - HOSPITAL COURSE
59yMale with history of OA presenting for R KRISTEN by Dr. Corbett on 12/4/2024. Risk and benefits of surgery were explained to the patient. The patient understood and agreed to proceed with surgery. Patient underwent the procedure with no intraoperative complications. Pt was brought in stable condition to the PACU. Once stable in PACU, pt was brought to the floor. During hospital stay pt was followed by Medicine, physical therapy, Home Care during this admission. Pt had an uneventful hospital course. Pt is stable for discharge to home. 	59yMale with history of OA presenting for R KRISTEN by Dr. Corbett on 12/4/2024. Risk and benefits of surgery were explained to the patient. The patient understood and agreed to proceed with surgery. Patient underwent the procedure with no intraoperative complications. Pt was brought in stable condition to the PACU. Once stable in PACU, pt was brought to the floor. During hospital stay pt was followed by Medicine, physical therapy, Home Care during this admission. Pt had an uneventful hospital course. Pt is stable for discharge to home on POD#1.

## 2024-12-04 NOTE — CONSULT NOTE ADULT - PROVIDER SPECIALTY LIST ADULT
Mom states pt has some white spots on upper mouth wondering if thrush.  Please advise
Spoke to Mother  Mother is nursing  Mother has noticed itchiness and irritation on her nipple-has been using Lanolin cream  Mother has also had some sharper pains on her left side after nursing and pumping  Mother has also noticed a few white spots on the
Internal Medicine

## 2024-12-05 ENCOUNTER — TRANSCRIPTION ENCOUNTER (OUTPATIENT)
Age: 59
End: 2024-12-05

## 2024-12-05 VITALS
OXYGEN SATURATION: 98 % | DIASTOLIC BLOOD PRESSURE: 68 MMHG | RESPIRATION RATE: 18 BRPM | TEMPERATURE: 98 F | SYSTOLIC BLOOD PRESSURE: 126 MMHG | HEART RATE: 78 BPM

## 2024-12-05 LAB
ANION GAP SERPL CALC-SCNC: 6 MMOL/L — SIGNIFICANT CHANGE UP (ref 5–17)
BUN SERPL-MCNC: 21 MG/DL — SIGNIFICANT CHANGE UP (ref 7–23)
CALCIUM SERPL-MCNC: 8.1 MG/DL — LOW (ref 8.5–10.1)
CHLORIDE SERPL-SCNC: 105 MMOL/L — SIGNIFICANT CHANGE UP (ref 96–108)
CO2 SERPL-SCNC: 26 MMOL/L — SIGNIFICANT CHANGE UP (ref 22–31)
CREAT SERPL-MCNC: 1.32 MG/DL — HIGH (ref 0.5–1.3)
EGFR: 63 ML/MIN/1.73M2 — SIGNIFICANT CHANGE UP
GLUCOSE SERPL-MCNC: 113 MG/DL — HIGH (ref 70–99)
HCT VFR BLD CALC: 37.8 % — LOW (ref 39–50)
HGB BLD-MCNC: 13.2 G/DL — SIGNIFICANT CHANGE UP (ref 13–17)
MCHC RBC-ENTMCNC: 32.2 PG — SIGNIFICANT CHANGE UP (ref 27–34)
MCHC RBC-ENTMCNC: 34.9 G/DL — SIGNIFICANT CHANGE UP (ref 32–36)
MCV RBC AUTO: 92.2 FL — SIGNIFICANT CHANGE UP (ref 80–100)
NRBC # BLD: 0 /100 WBCS — SIGNIFICANT CHANGE UP (ref 0–0)
PLATELET # BLD AUTO: 206 K/UL — SIGNIFICANT CHANGE UP (ref 150–400)
POTASSIUM SERPL-MCNC: 4.3 MMOL/L — SIGNIFICANT CHANGE UP (ref 3.5–5.3)
POTASSIUM SERPL-SCNC: 4.3 MMOL/L — SIGNIFICANT CHANGE UP (ref 3.5–5.3)
RBC # BLD: 4.1 M/UL — LOW (ref 4.2–5.8)
RBC # FLD: 12.7 % — SIGNIFICANT CHANGE UP (ref 10.3–14.5)
SODIUM SERPL-SCNC: 137 MMOL/L — SIGNIFICANT CHANGE UP (ref 135–145)
WBC # BLD: 11.64 K/UL — HIGH (ref 3.8–10.5)
WBC # FLD AUTO: 11.64 K/UL — HIGH (ref 3.8–10.5)

## 2024-12-05 RX ORDER — OXYCODONE HYDROCHLORIDE 30 MG/1
1 TABLET ORAL
Qty: 42 | Refills: 0
Start: 2024-12-05 | End: 2024-12-11

## 2024-12-05 RX ORDER — OXYCODONE AND ACETAMINOPHEN 7.5; 325 MG/1; MG/1
1 TABLET ORAL
Refills: 0 | DISCHARGE

## 2024-12-05 RX ORDER — NALOXONE HCL 0.4 MG/ML
4 AMPUL (ML) INJECTION
Qty: 1 | Refills: 0
Start: 2024-12-05 | End: 2024-12-05

## 2024-12-05 RX ORDER — OXYCODONE HYDROCHLORIDE 30 MG/1
10 TABLET ORAL
Refills: 0 | Status: DISCONTINUED | OUTPATIENT
Start: 2024-12-05 | End: 2024-12-05

## 2024-12-05 RX ORDER — POLYETHYLENE GLYCOL 3350 17 G/17G
17 POWDER, FOR SOLUTION ORAL
Qty: 0 | Refills: 0 | DISCHARGE
Start: 2024-12-05

## 2024-12-05 RX ORDER — ALBUTEROL 90 MCG
1 AEROSOL (GRAM) INHALATION
Qty: 0 | Refills: 0 | DISCHARGE

## 2024-12-05 RX ORDER — DICLOFENAC SODIUM 75 MG
1 TABLET, DELAYED RELEASE (ENTERIC COATED) ORAL
Refills: 0 | DISCHARGE

## 2024-12-05 RX ORDER — PANTOPRAZOLE SODIUM 40 MG/1
1 TABLET, DELAYED RELEASE ORAL
Qty: 30 | Refills: 0
Start: 2024-12-05 | End: 2025-01-03

## 2024-12-05 RX ORDER — OXYCODONE HYDROCHLORIDE 30 MG/1
15 TABLET ORAL EVERY 4 HOURS
Refills: 0 | Status: DISCONTINUED | OUTPATIENT
Start: 2024-12-05 | End: 2024-12-05

## 2024-12-05 RX ORDER — FLUTICASONE PROPIONAT,MICRONIZ 100 %
1 POWDER (GRAM) MISCELLANEOUS
Qty: 0 | Refills: 0 | DISCHARGE

## 2024-12-05 RX ORDER — SENNOSIDES 8.6 MG
2 TABLET ORAL
Qty: 0 | Refills: 0 | DISCHARGE
Start: 2024-12-05

## 2024-12-05 RX ORDER — OXYCODONE HYDROCHLORIDE 30 MG/1
5 TABLET ORAL
Refills: 0 | Status: DISCONTINUED | OUTPATIENT
Start: 2024-12-05 | End: 2024-12-05

## 2024-12-05 RX ORDER — PANTOPRAZOLE SODIUM 40 MG/1
1 TABLET, DELAYED RELEASE ORAL
Refills: 0 | DISCHARGE

## 2024-12-05 RX ORDER — CELECOXIB 200 MG/1
1 CAPSULE ORAL
Qty: 14 | Refills: 0
Start: 2024-12-05 | End: 2024-12-18

## 2024-12-05 RX ORDER — ACETAMINOPHEN 500MG 500 MG/1
2 TABLET, COATED ORAL
Qty: 0 | Refills: 0 | DISCHARGE
Start: 2024-12-05

## 2024-12-05 RX ADMIN — ACETAMINOPHEN 500MG 1000 MILLIGRAM(S): 500 TABLET, COATED ORAL at 07:11

## 2024-12-05 RX ADMIN — OXYCODONE HYDROCHLORIDE 10 MILLIGRAM(S): 30 TABLET ORAL at 01:55

## 2024-12-05 RX ADMIN — MONTELUKAST SODIUM 10 MILLIGRAM(S): 10 TABLET ORAL at 11:38

## 2024-12-05 RX ADMIN — ACETAMINOPHEN 500MG 1000 MILLIGRAM(S): 500 TABLET, COATED ORAL at 13:41

## 2024-12-05 RX ADMIN — Medication 5 MILLIGRAM(S): at 06:16

## 2024-12-05 RX ADMIN — OXYCODONE HYDROCHLORIDE 10 MILLIGRAM(S): 30 TABLET ORAL at 06:14

## 2024-12-05 RX ADMIN — OXYCODONE HYDROCHLORIDE 10 MILLIGRAM(S): 30 TABLET ORAL at 12:38

## 2024-12-05 RX ADMIN — OXYCODONE HYDROCHLORIDE 10 MILLIGRAM(S): 30 TABLET ORAL at 07:11

## 2024-12-05 RX ADMIN — PANTOPRAZOLE SODIUM 40 MILLIGRAM(S): 40 TABLET, DELAYED RELEASE ORAL at 06:27

## 2024-12-05 RX ADMIN — SODIUM CHLORIDE 120 MILLILITER(S): 9 INJECTION, SOLUTION INTRAMUSCULAR; INTRAVENOUS; SUBCUTANEOUS at 01:04

## 2024-12-05 RX ADMIN — DEXAMETHASONE 102 MILLIGRAM(S): 1.5 TABLET ORAL at 06:17

## 2024-12-05 RX ADMIN — Medication 81 MILLIGRAM(S): at 06:15

## 2024-12-05 RX ADMIN — OXYCODONE HYDROCHLORIDE 10 MILLIGRAM(S): 30 TABLET ORAL at 00:55

## 2024-12-05 RX ADMIN — SODIUM CHLORIDE 3 MILLILITER(S): 9 INJECTION, SOLUTION INTRAMUSCULAR; INTRAVENOUS; SUBCUTANEOUS at 07:11

## 2024-12-05 RX ADMIN — Medication 100 MILLIGRAM(S): at 00:50

## 2024-12-05 RX ADMIN — BENZOCAINE, MENTHOL 1 LOZENGE: 15; 3.6 LOZENGE ORAL at 14:04

## 2024-12-05 RX ADMIN — ACETAMINOPHEN 500MG 1000 MILLIGRAM(S): 500 TABLET, COATED ORAL at 06:15

## 2024-12-05 RX ADMIN — BENZOCAINE, MENTHOL 1 LOZENGE: 15; 3.6 LOZENGE ORAL at 01:03

## 2024-12-05 RX ADMIN — SODIUM CHLORIDE 120 MILLILITER(S): 9 INJECTION, SOLUTION INTRAMUSCULAR; INTRAVENOUS; SUBCUTANEOUS at 06:19

## 2024-12-05 RX ADMIN — ACETAMINOPHEN 500MG 1000 MILLIGRAM(S): 500 TABLET, COATED ORAL at 14:30

## 2024-12-05 RX ADMIN — OXYCODONE HYDROCHLORIDE 10 MILLIGRAM(S): 30 TABLET ORAL at 11:38

## 2024-12-05 RX ADMIN — SUCRALFATE 1 GRAM(S): 1 TABLET ORAL at 06:17

## 2024-12-05 RX ADMIN — LISINOPRIL 20 MILLIGRAM(S): 20 TABLET ORAL at 06:15

## 2024-12-05 RX ADMIN — Medication 100 MILLIGRAM(S): at 08:07

## 2024-12-05 RX ADMIN — Medication 1 TABLET(S): at 11:38

## 2024-12-05 NOTE — PHYSICAL THERAPY INITIAL EVALUATION ADULT - STRENGTHENING, PT EVAL
Patient will improve strength in R hip to 5/5 in 4 weeks to improve overall functional mobility including gait, transfers, bed mobility and decrease risk of falls.

## 2024-12-05 NOTE — DISCHARGE NOTE NURSING/CASE MANAGEMENT/SOCIAL WORK - NSPROMEDSBROUGHTTOHOSP_GEN_A_NUR
SUBJECTIVE:     Chief Complaint   Patient presents with    Right Knee - Pain       History of Present Illness:  Sharita Noriega is a 69 y.o. year old female here with a history of constant right knee pain which started about 2 weeks ago.  There is not a history of trauma.  The pain is located in the medial, anterior aspect of the knee.  The pain is described as achy, 10/10.  There is not radiation.  There is not catching or locking.  The pain is worse with activity, bending the knee.  She is able to bear weight.  Associated symptoms include effusion.  There is not numbness or tingling of the lower extremity.  Previous treatments include heat, rest and ibuprofen OTC which have provided minimal relief.  There is not a history of previous injury or surgery to the knee.  The patient does not use an assistive device.    Review of patient's allergies indicates:  No Known Allergies      Current Outpatient Medications   Medication Sig Dispense Refill    acetaminophen (TYLENOL) 500 MG tablet Take 2 tablets (1,000 mg total) by mouth every 6 (six) hours as needed for Pain. 30 tablet 0    acetaminophen-codeine 300-30mg (TYLENOL #3) 300-30 mg Tab TK  1 TO 2 TS  PO Q  6  H PRF PAIN FOR  UP TO 10  DAYS. MAX AMOUNT OF 8 TS PER DAY  0    albuterol (PROVENTIL/VENTOLIN HFA) 90 mcg/actuation inhaler INHALE 2 PUFFS BY MOUTH INTO THE LUNGS EVERY 6 HOURS AS NEEDED FOR WHEEZING OR SHORTNESS OF BREATH 54 g 1    azithromycin (Z-PARAG) 250 MG tablet Take 2 tablets by mouth on day 1; Take 1 tablet by mouth on days 2-5 6 tablet 0    clotrimazole (LOTRIMIN) 1 % cream Apply to affected area 2 times daily 15 g 0    diclofenac sodium (VOLTAREN) 1 % Gel Apply 2 g topically 4 (four) times daily as needed (Apply to painful area up to 4 times a day as needed for pain). Apply to painful area 4 times a day as needed for pain 1 Tube 0    etodolac (LODINE) 400 MG tablet Take 1 tablet (400 mg total) by mouth every 8 (eight) hours as needed. 60  tablet 0    fluticasone propionate (FLONASE) 50 mcg/actuation nasal spray 2 sprays (100 mcg total) by Each Nare route once daily. 16 g 2    fluticasone-salmeterol diskus inhaler 100-50 mcg Inhale 1 puff into the lungs 2 (two) times daily. 60 each 3    furosemide (LASIX) 20 MG tablet TAKE 1 TABLET(20 MG) BY MOUTH DAILY AS NEEDED 90 tablet 3    HYDROcodone-acetaminophen (NORCO) 5-325 mg per tablet TK 1 T PO TID  0    levocetirizine (XYZAL) 5 MG tablet TAKE 1 TABLET(5 MG) BY MOUTH EVERY EVENING 90 tablet 0    losartan (COZAAR) 50 MG tablet TAKE 1 TABLET(50 MG) BY MOUTH EVERY DAY. Need appt with Dr. Garcia for future refills. 90 tablet 3    nystatin (MYCOSTATIN) cream Apply topically 2 (two) times daily. 30 g 0    OMEPRAZOLE ORAL Take by mouth.      pantoprazole (PROTONIX) 20 MG tablet Take 1 tablet (20 mg total) by mouth once daily. 90 tablet 3    promethazine-dextromethorphan (PROMETHAZINE-DM) 6.25-15 mg/5 mL Syrp Take 5 mLs by mouth 3 (three) times daily as needed. 118 mL 0    simvastatin (ZOCOR) 40 MG tablet TAKE 1 TABLET(40 MG) BY MOUTH EVERY EVENING 90 tablet 3    tiotropium (SPIRIVA WITH HANDIHALER) 18 mcg inhalation capsule INHALE 1PUFF BY MOUTH EVERY DAY 30 capsule 2    traMADol (ULTRAM) 50 mg tablet Take 50 mg by mouth 3 (three) times daily.  0     No current facility-administered medications for this visit.        Past Medical History:   Diagnosis Date    Colon polyp     COPD (chronic obstructive pulmonary disease)     Diabetes mellitus type II, controlled 2012    diet controlled    GERD (gastroesophageal reflux disease)     Hyperlipidemia     Hypertension     Hypovitaminosis D 10/2013    controlled with supplement    Stroke     TIA 2007, 2009       Past Surgical History:   Procedure Laterality Date    BREAST CYST ASPIRATION      COLONOSCOPY N/A 6/30/2016    Procedure: COLONOSCOPY;  Surgeon: Dimitri Cabrera MD;  [facility];  Service: Endoscopy;  Laterality: N/A;     "HYSTERECTOMY      TUBAL LIGATION         Vital Signs (Most Recent)  Vitals:    09/24/20 1103   BP: 122/62   Pulse: 90   Resp: 18           Review of Systems:  ROS:  Constitutional: no fever or chills  Eyes: no visual changes  ENT: no nasal congestion or sore throat  Respiratory: no cough or shortness of breath  Cardiovascular: no chest pain or palpitations  Gastrointestinal: no nausea or vomiting, tolerating diet  Genitourinary: no hematuria or dysuria  Integument/Breast: no rash or pruritis  Hematologic/Lymphatic: no easy bruising or lymphadenopathy  Musculoskeletal: no arthralgias or myalgias  Neurological: no seizures or tremors  Behavioral/Psych: no auditory or visual hallucinations  Endocrine: no heat or cold intolerance      OBJECTIVE:     PHYSICAL EXAM:  Height: 5' 7" (170.2 cm) Weight: 79 kg (174 lb 2.6 oz)   General: Well developed, well nourished, in no acute distress.  Neurological: Mood & affect are normal.  HEENT: NCAT, sclera nonicteric   Lungs: Respirations are equal and unlabored.   CV: 2+ bilateral upper and lower extremity pulses.   Skin: Intact throughout with no rashes, erythema, or lesions  Extremities: No LE edema, no erythema or warmth of the skin in either lower extremity.    right  Knee Exam:  Knee Range of Motion: 0-110   Effusion: mild  Condition of skin: intact and no erythema or warmth  Location of tenderness:Medial joint line and Lateral joint line   Strength: 5 of 5 quadriceps strength and 5 of 5 hamstring strength  Stability:  stable to testing  Varus /Valgus stress:  normal  Tere:  positive    Hip Examination:  full painless range of motion, without tenderness    IMAGING:    X-rays of the right knee, personally reviewed by me, demonstrate mild degenerative changes.  No fracture or dislocation.    ASSESSMENT/PLAN:   69 y.o. year old female with right knee osteoarthritis    Plan: We discussed with the patient at length all the different treatment options available for the knee " including anti-inflammatories, acetaminophen, rest, ice, knee strengthening exercise, occasional cortisone injections for temporary relief, Viscosupplimentation injections, and knee arthroplasty.     - She was given right knee CSI today.  Recommend rest, ice, activity modification  - Continue NSAIDS prn pain  - Follow up in 2-3 weeks if no improvement- standing x-rays at follow up         no

## 2024-12-05 NOTE — PHYSICAL THERAPY INITIAL EVALUATION ADULT - ADDITIONAL COMMENTS
As per pre-op and reviewed with patient POD #0: Pt lives with family (Who can assist post op) in a private house with 3 steps to enter with bilateral handrails (far apart). Once inside, the pt bedroom and bathroom is on that floor when entering. The pt bathroom has a tub/shower combination, fixed shower head, standard toilet seat and no grab bars.  The pt has no recent outpatient PT, no recent falls and has no buckling of the knees. The pt wears glasses for reading, R handed, drives and has no hearing impairments.

## 2024-12-05 NOTE — OCCUPATIONAL THERAPY INITIAL EVALUATION ADULT - ADDITIONAL COMMENTS
Pt confirmed preop: Pt lives with family (Who can assist post op) in a private house with 3 steps to enter with bilateral handrails (far apart). Once inside, the pt bedroom and bathroom is on that floor when entering. The pt bathroom has a tub/shower combination, fixed shower head, standard toilet seat and no grab bars. The pt ambulates with no device unless in a lot of pain in which the pt will use a cane. The pt owns a rolling walker. The pt pain at rest is a 7/10 and a 10/10 with movement. The pt manages the pain with rest, Meloxicam and Diclofenac. The pt has no recent outpatient PT, no recent falls and has no buckling of the knees. The pt wears glasses for reading, R handed, drives and has no hearing impairments.    Note: Pt reports having a 3/1 commode and a rolling walker at home.

## 2024-12-05 NOTE — DISCHARGE NOTE NURSING/CASE MANAGEMENT/SOCIAL WORK - PATIENT PORTAL LINK FT
You can access the FollowMyHealth Patient Portal offered by [facility] by registering at the following website: [email or web address]. By joining [employer]’s FollowMyHealth portal, you will also be able to view your health information using other applications (apps) compatible with our system.

## 2024-12-05 NOTE — DISCHARGE NOTE NURSING/CASE MANAGEMENT/SOCIAL WORK - NSDCPEFALRISK_GEN_ALL_CORE
For information on Fall & Injury Prevention, visit: [email or web address] OR  [email or web address] OR  https://www.cdc.gov/steadi/patient.html

## 2024-12-05 NOTE — DISCHARGE NOTE NURSING/CASE MANAGEMENT/SOCIAL WORK - FINANCIAL ASSISTANCE
[facility] provides services at a reduced cost to those who are determined to be eligible through [facility]’s financial assistance program. Information regarding [facility]’s financial assistance program can be found by going to [email or web address] or by calling [phone].

## 2024-12-05 NOTE — OCCUPATIONAL THERAPY INITIAL EVALUATION ADULT - PERTINENT HX OF CURRENT PROBLEM, REHAB EVAL
R hip OA which impacts pts ability to perform functional tasks/transfers and mobility. Pt is s/p R THR posterior approach POD 1.

## 2024-12-05 NOTE — PHYSICAL THERAPY INITIAL EVALUATION ADULT - GAIT TRAINING, PT EVAL
Patient will ambulate 500 feet with rolling walker independently for community ambulation in 2-3 days. Patient will ascend/descend 2 steps with R rail up/L rail down sidestepping independently in 2-3 days to safely navigate home environment.

## 2024-12-05 NOTE — PROGRESS NOTE ADULT - SUBJECTIVE AND OBJECTIVE BOX
TANISHA PADILLA is a 59y Male s/p RIGHT TOTAL HIP ARTHROPLASTY        denies any chest pain shortness of breath palpitation dizziness lightheadedness nausea vomiting fever or chills    T(C): 36.6 (12-05-24 @ 08:50), Max: 36.8 (12-04-24 @ 21:50)  HR: 75 (12-05-24 @ 10:35) (67 - 86)  BP: 136/76 (12-05-24 @ 10:35) (114/74 - 136/76)  RR: 17 (12-05-24 @ 08:50) (12 - 20)  SpO2: 98% (12-05-24 @ 10:35) (97% - 100%)  no jvd/bruit  s1 s2 rrr  cta  s/nt/nd  no calf tend                        13.2   11.64 )-----------( 206      ( 05 Dec 2024 05:45 )             37.8   12-05    137  |  105  |  21  ----------------------------<  113[H]  4.3   |  26  |  1.32[H]    Ca    8.1[L]      05 Dec 2024 05:45        cont dvt px  pain control  bowel regimen  antiemetics  incentive spirometer

## 2024-12-05 NOTE — DISCHARGE NOTE NURSING/CASE MANAGEMENT/SOCIAL WORK - NSDCFUADDAPPT_GEN_ALL_CORE_FT
Follow up with your surgeon in two weeks. Call for appointment.    If you need more pain medications, call your surgeon's office. For medication refills or authorizations call [phone]    Make sure to have a bowel movement by 2 days after surgery. Take stool softners and laxatives as needed.    Call and schedule a follow up appointment with your primary care physician for repeat blood work (CBC and BMP) for post hospital discharge follow-up care.    Call your surgeon if you have increased redness/pain/drainage or fever. Return to ER for shortness of breath/calf tenderness.

## 2024-12-05 NOTE — PROGRESS NOTE ADULT - SUBJECTIVE AND OBJECTIVE BOX
Patient is seen and examined at bedside. Denies CP/SOB/Dizziness/N/V/D/HA. Pain is controlled.     Vital Signs Last 24 Hrs  T(C): 36.6 (05 Dec 2024 08:50), Max: 36.8 (04 Dec 2024 21:50)  T(F): 97.8 (05 Dec 2024 08:50), Max: 98.3 (04 Dec 2024 21:50)  HR: 75 (05 Dec 2024 10:35) (67 - 86)  BP: 136/76 (05 Dec 2024 10:35) (114/74 - 136/76)  BP(mean): --  RR: 17 (05 Dec 2024 08:50) (12 - 20)  SpO2: 98% (05 Dec 2024 10:35) (97% - 100%)    Parameters below as of 05 Dec 2024 10:35  Patient On (Oxygen Delivery Method): room air        GEN: NAD  ABD: soft, NT/ND; no rebound or guarding;  Neurologic: AAOx3; CNS grossly intact; no focal deficits  RLE: Prineo dressing C/D/I.  Motor intact + EHL/FHL/TA/GS. Sensation is grossly intact.  Extremities warm. . Compartments soft, compressible. No calf tenderness. DP 2+.  LLE:  Motor intact + EHL/FHL/TA/GS. Sensation is grossly intact. Extremities warm. Compartments soft, compressible. No calf tenderness.. DP 2+.    Labs:                          13.2   11.64 )-----------( 206      ( 05 Dec 2024 05:45 )             37.8       12-05    137  |  105  |  21  ----------------------------<  113[H]  4.3   |  26  |  1.32[H]    Ca    8.1[L]      05 Dec 2024 05:45        A/P: Patient is a 59y y/o Male s/p right KRISTEN, POD # 1  -wound care, isometric exercises, GI motility, new medications, hip precautions,  hospital course and discharge planning reviewed with pt  -Pain control/analgesia reviewed   -Pt requires a rolling walker for MRADL's at home   -Inc spirometry reviewed and counseled  -Venodynes/foot pumps  -CKD - no WILMA, stable. Celebrex 200mg daily X 2 weeks to prevent HO (pt is high risk) Pt advised to drink copious water to keep kidneys well hydrated.   -PT/OT/WBAT  -Antibiotic per SCIPS  -Anticoagulation: asa 81mg BID  -Medical consult reviewed   -DC plan for home with home care today  -Pt seen in am with DR Corbett

## 2024-12-05 NOTE — PHYSICAL THERAPY INITIAL EVALUATION ADULT - ACTIVE RANGE OF MOTION EXAMINATION, REHAB EVAL
R hip within posterior precautions/bilateral upper extremity Active ROM was WFL (within functional limits)/bilateral  lower extremity Active ROM was WFL (within functional limits)/deficits as listed below

## 2024-12-05 NOTE — OCCUPATIONAL THERAPY INITIAL EVALUATION ADULT - GENERAL OBSERVATIONS, REHAB EVAL
Pt was encountered OOB in chair; NAD, S/P R THR posterior approach POD 1, RLE WBAT, R hip dressing clean, dry and intact, alert, verbalized name and , cooperative, followed commands; pt c/o pain in R hip which impacts pts ability to perform functional tasks/transfers and mobility. PT Latanya present.

## 2024-12-10 LAB — SURGICAL PATHOLOGY STUDY: SIGNIFICANT CHANGE UP

## 2024-12-16 ENCOUNTER — APPOINTMENT (OUTPATIENT)
Dept: ORTHOPEDIC SURGERY | Facility: CLINIC | Age: 59
End: 2024-12-16
Payer: OTHER MISCELLANEOUS

## 2024-12-16 VITALS — BODY MASS INDEX: 25.8 KG/M2 | HEIGHT: 74 IN | WEIGHT: 201 LBS

## 2024-12-16 DIAGNOSIS — M16.11 UNILATERAL PRIMARY OSTEOARTHRITIS, RIGHT HIP: ICD-10-CM

## 2024-12-16 DIAGNOSIS — Z85.46 PERSONAL HISTORY OF MALIGNANT NEOPLASM OF PROSTATE: ICD-10-CM

## 2024-12-16 DIAGNOSIS — E78.5 HYPERLIPIDEMIA, UNSPECIFIED: ICD-10-CM

## 2024-12-16 DIAGNOSIS — K21.9 GASTRO-ESOPHAGEAL REFLUX DISEASE WITHOUT ESOPHAGITIS: ICD-10-CM

## 2024-12-16 DIAGNOSIS — Z96.641 PRESENCE OF RIGHT ARTIFICIAL HIP JOINT: ICD-10-CM

## 2024-12-16 DIAGNOSIS — Z98.1 ARTHRODESIS STATUS: ICD-10-CM

## 2024-12-16 DIAGNOSIS — J45.909 UNSPECIFIED ASTHMA, UNCOMPLICATED: ICD-10-CM

## 2024-12-16 DIAGNOSIS — I10 ESSENTIAL (PRIMARY) HYPERTENSION: ICD-10-CM

## 2024-12-16 DIAGNOSIS — Z96.642 PRESENCE OF LEFT ARTIFICIAL HIP JOINT: ICD-10-CM

## 2024-12-16 PROCEDURE — 99024 POSTOP FOLLOW-UP VISIT: CPT

## 2024-12-16 PROCEDURE — 73503 X-RAY EXAM HIP UNI 4/> VIEWS: CPT | Mod: RT

## 2025-01-08 ENCOUNTER — APPOINTMENT (OUTPATIENT)
Dept: ORTHOPEDIC SURGERY | Facility: CLINIC | Age: 60
End: 2025-01-08
Payer: OTHER MISCELLANEOUS

## 2025-01-08 DIAGNOSIS — M43.16: ICD-10-CM

## 2025-01-08 DIAGNOSIS — M54.16 RADICULOPATHY, LUMBAR REGION: ICD-10-CM

## 2025-01-08 DIAGNOSIS — M51.369: ICD-10-CM

## 2025-01-08 DIAGNOSIS — M51.26 OTHER INTERVERTEBRAL DISC DISPLACEMENT, LUMBAR REGION: ICD-10-CM

## 2025-01-08 PROCEDURE — 72170 X-RAY EXAM OF PELVIS: CPT

## 2025-01-08 PROCEDURE — 99245 OFF/OP CONSLTJ NEW/EST HI 55: CPT

## 2025-01-08 PROCEDURE — 72100 X-RAY EXAM L-S SPINE 2/3 VWS: CPT

## 2025-01-27 ENCOUNTER — APPOINTMENT (OUTPATIENT)
Dept: ORTHOPEDIC SURGERY | Facility: CLINIC | Age: 60
End: 2025-01-27
Payer: OTHER MISCELLANEOUS

## 2025-01-27 VITALS — HEIGHT: 74 IN | BODY MASS INDEX: 25.8 KG/M2 | WEIGHT: 201 LBS

## 2025-01-27 DIAGNOSIS — M16.11 UNILATERAL PRIMARY OSTEOARTHRITIS, RIGHT HIP: ICD-10-CM

## 2025-01-27 DIAGNOSIS — Z96.641 PRESENCE OF RIGHT ARTIFICIAL HIP JOINT: ICD-10-CM

## 2025-01-27 PROCEDURE — 99024 POSTOP FOLLOW-UP VISIT: CPT

## 2025-01-27 PROCEDURE — 73503 X-RAY EXAM HIP UNI 4/> VIEWS: CPT | Mod: RT

## 2025-02-02 NOTE — OCCUPATIONAL THERAPY INITIAL EVALUATION ADULT - UPPER BODY DRESSING, PREVIOUS LEVEL OF FUNCTION, OT EVAL
4 Eyes Skin Assessment     NAME:  Lamin Connors  YOB: 1956  MEDICAL RECORD NUMBER:  [id number]    [id number]    independent

## 2025-02-03 ENCOUNTER — NON-APPOINTMENT (OUTPATIENT)
Age: 60
End: 2025-02-03

## 2025-02-17 ENCOUNTER — NON-APPOINTMENT (OUTPATIENT)
Age: 60
End: 2025-02-17

## 2025-02-24 ENCOUNTER — APPOINTMENT (OUTPATIENT)
Dept: ORTHOPEDIC SURGERY | Facility: CLINIC | Age: 60
End: 2025-02-24
Payer: OTHER MISCELLANEOUS

## 2025-02-24 VITALS — HEIGHT: 74 IN | BODY MASS INDEX: 25.8 KG/M2 | WEIGHT: 201 LBS

## 2025-02-24 DIAGNOSIS — Z96.641 PRESENCE OF RIGHT ARTIFICIAL HIP JOINT: ICD-10-CM

## 2025-02-24 PROCEDURE — 99024 POSTOP FOLLOW-UP VISIT: CPT

## 2025-03-04 ENCOUNTER — NON-APPOINTMENT (OUTPATIENT)
Age: 60
End: 2025-03-04

## 2025-03-12 ENCOUNTER — APPOINTMENT (OUTPATIENT)
Dept: ORTHOPEDIC SURGERY | Facility: CLINIC | Age: 60
End: 2025-03-12
Payer: OTHER MISCELLANEOUS

## 2025-03-12 DIAGNOSIS — M43.16: ICD-10-CM

## 2025-03-12 DIAGNOSIS — M51.26 OTHER INTERVERTEBRAL DISC DISPLACEMENT, LUMBAR REGION: ICD-10-CM

## 2025-03-12 DIAGNOSIS — M51.369: ICD-10-CM

## 2025-03-12 DIAGNOSIS — M54.16 RADICULOPATHY, LUMBAR REGION: ICD-10-CM

## 2025-03-12 DIAGNOSIS — Z98.1 ARTHRODESIS STATUS: ICD-10-CM

## 2025-03-12 PROCEDURE — 99214 OFFICE O/P EST MOD 30 MIN: CPT

## 2025-03-24 ENCOUNTER — APPOINTMENT (OUTPATIENT)
Dept: ORTHOPEDIC SURGERY | Facility: CLINIC | Age: 60
End: 2025-03-24
Payer: OTHER MISCELLANEOUS

## 2025-03-24 DIAGNOSIS — Z96.641 PRESENCE OF RIGHT ARTIFICIAL HIP JOINT: ICD-10-CM

## 2025-03-24 DIAGNOSIS — M16.11 UNILATERAL PRIMARY OSTEOARTHRITIS, RIGHT HIP: ICD-10-CM

## 2025-03-24 PROCEDURE — 99214 OFFICE O/P EST MOD 30 MIN: CPT

## 2025-04-29 ENCOUNTER — APPOINTMENT (OUTPATIENT)
Dept: ORTHOPEDIC SURGERY | Facility: CLINIC | Age: 60
End: 2025-04-29
Payer: OTHER MISCELLANEOUS

## 2025-04-29 PROCEDURE — 99075 MEDICAL TESTIMONY: CPT

## 2025-05-12 ENCOUNTER — APPOINTMENT (OUTPATIENT)
Dept: ORTHOPEDIC SURGERY | Facility: CLINIC | Age: 60
End: 2025-05-12

## 2025-05-13 ENCOUNTER — APPOINTMENT (OUTPATIENT)
Dept: ORTHOPEDIC SURGERY | Facility: CLINIC | Age: 60
End: 2025-05-13
Payer: OTHER MISCELLANEOUS

## 2025-05-13 DIAGNOSIS — Z96.641 PRESENCE OF RIGHT ARTIFICIAL HIP JOINT: ICD-10-CM

## 2025-05-13 PROCEDURE — 99213 OFFICE O/P EST LOW 20 MIN: CPT

## 2025-05-21 ENCOUNTER — APPOINTMENT (OUTPATIENT)
Dept: ORTHOPEDIC SURGERY | Facility: CLINIC | Age: 60
End: 2025-05-21

## 2025-05-21 DIAGNOSIS — Z98.1 ARTHRODESIS STATUS: ICD-10-CM

## 2025-05-21 PROCEDURE — 72100 X-RAY EXAM L-S SPINE 2/3 VWS: CPT

## 2025-05-21 PROCEDURE — 72070 X-RAY EXAM THORAC SPINE 2VWS: CPT

## 2025-05-21 PROCEDURE — 72170 X-RAY EXAM OF PELVIS: CPT

## 2025-05-21 PROCEDURE — 99214 OFFICE O/P EST MOD 30 MIN: CPT

## 2025-07-03 ENCOUNTER — APPOINTMENT (OUTPATIENT)
Dept: ORTHOPEDIC SURGERY | Facility: CLINIC | Age: 60
End: 2025-07-03
Payer: OTHER MISCELLANEOUS

## 2025-07-03 ENCOUNTER — NON-APPOINTMENT (OUTPATIENT)
Age: 60
End: 2025-07-03

## 2025-07-03 VITALS — WEIGHT: 201 LBS | HEIGHT: 74 IN | BODY MASS INDEX: 25.8 KG/M2

## 2025-07-03 PROCEDURE — 72080 X-RAY EXAM THORACOLMB 2/> VW: CPT

## 2025-07-03 PROCEDURE — 99214 OFFICE O/P EST MOD 30 MIN: CPT

## 2025-07-03 RX ORDER — METHYLPREDNISOLONE 4 MG/1
4 TABLET ORAL
Qty: 1 | Refills: 0 | Status: ACTIVE | COMMUNITY
Start: 2025-07-03 | End: 1900-01-01

## 2025-07-15 ENCOUNTER — APPOINTMENT (OUTPATIENT)
Dept: ORTHOPEDIC SURGERY | Facility: CLINIC | Age: 60
End: 2025-07-15

## 2025-07-19 ENCOUNTER — APPOINTMENT (OUTPATIENT)
Dept: MRI IMAGING | Facility: CLINIC | Age: 60
End: 2025-07-19
Payer: OTHER MISCELLANEOUS

## 2025-07-19 PROCEDURE — 72146 MRI CHEST SPINE W/O DYE: CPT

## 2025-07-23 ENCOUNTER — APPOINTMENT (OUTPATIENT)
Dept: ORTHOPEDIC SURGERY | Facility: CLINIC | Age: 60
End: 2025-07-23

## 2025-08-06 ENCOUNTER — APPOINTMENT (OUTPATIENT)
Dept: ORTHOPEDIC SURGERY | Facility: CLINIC | Age: 60
End: 2025-08-06

## 2025-08-12 ENCOUNTER — APPOINTMENT (OUTPATIENT)
Dept: ORTHOPEDIC SURGERY | Facility: CLINIC | Age: 60
End: 2025-08-12
Payer: OTHER MISCELLANEOUS

## 2025-08-12 VITALS — HEIGHT: 74 IN | WEIGHT: 201 LBS | BODY MASS INDEX: 25.8 KG/M2

## 2025-08-12 DIAGNOSIS — Z96.641 PRESENCE OF RIGHT ARTIFICIAL HIP JOINT: ICD-10-CM

## 2025-08-12 PROCEDURE — 99214 OFFICE O/P EST MOD 30 MIN: CPT

## 2025-08-13 ENCOUNTER — NON-APPOINTMENT (OUTPATIENT)
Age: 60
End: 2025-08-13

## 2025-08-13 ENCOUNTER — APPOINTMENT (OUTPATIENT)
Dept: ORTHOPEDIC SURGERY | Facility: CLINIC | Age: 60
End: 2025-08-13
Payer: OTHER MISCELLANEOUS

## 2025-08-13 DIAGNOSIS — M51.26 OTHER INTERVERTEBRAL DISC DISPLACEMENT, LUMBAR REGION: ICD-10-CM

## 2025-08-13 DIAGNOSIS — S23.9XXA SPRAIN OF UNSPECIFIED PARTS OF THORAX, INITIAL ENCOUNTER: ICD-10-CM

## 2025-08-13 DIAGNOSIS — M54.16 RADICULOPATHY, LUMBAR REGION: ICD-10-CM

## 2025-08-13 DIAGNOSIS — M47.814 SPONDYLOSIS W/OUT MYELOPATHY OR RADICULOPATHY, THORACIC REGION: ICD-10-CM

## 2025-08-13 PROCEDURE — 99214 OFFICE O/P EST MOD 30 MIN: CPT | Mod: 25

## 2025-08-13 PROCEDURE — J3490M: CUSTOM

## 2025-08-13 PROCEDURE — 20552 NJX 1/MLT TRIGGER POINT 1/2: CPT

## 2025-08-19 ENCOUNTER — APPOINTMENT (OUTPATIENT)
Dept: ORTHOPEDIC SURGERY | Facility: CLINIC | Age: 60
End: 2025-08-19
Payer: COMMERCIAL

## 2025-08-19 DIAGNOSIS — Z96.642 PRESENCE OF LEFT ARTIFICIAL HIP JOINT: ICD-10-CM

## 2025-08-19 PROCEDURE — 73503 X-RAY EXAM HIP UNI 4/> VIEWS: CPT | Mod: LT

## 2025-08-19 PROCEDURE — 99213 OFFICE O/P EST LOW 20 MIN: CPT

## (undated) DEVICE — SOL IRR BAG NS 0.9% 3000ML

## (undated) DEVICE — DRAPE C ARM 41X140"

## (undated) DEVICE — SUT STRATAFIX SYMMETRIC PDS PLUS 1 18" CTX VIOLET

## (undated) DEVICE — GLV 6.5 PROTEXIS (WHITE)

## (undated) DEVICE — DRAPE MAYO STAND 30"

## (undated) DEVICE — TUBING TRUWAVE PRESSURE MALE/FEMALE 72"

## (undated) DEVICE — NDL SPINAL 18G X 3.5" (PINK)

## (undated) DEVICE — SUT PROLENE 3-0 36" SH

## (undated) DEVICE — WARMING BLANKET UPPER ADULT

## (undated) DEVICE — SUT VICRYL 0 18" CT-1 UNDYED (POP-OFF)

## (undated) DEVICE — FRA-ESU BOVIE FORCE TRIAD T6D04548DX: Type: DURABLE MEDICAL EQUIPMENT

## (undated) DEVICE — SUT ETHIBOND 5 4-30" V-37

## (undated) DEVICE — DRAPE 1/2 SHEET 40X57"

## (undated) DEVICE — VENODYNE/SCD SLEEVE CALF MEDIUM

## (undated) DEVICE — MIDAS REX LEGEND MATCH HEAD FLUTED LG BORE 3.0MM X 14CM

## (undated) DEVICE — FRAZIER SUCTION TIP 12FR

## (undated) DEVICE — MEDICATION LABELS W MARKER

## (undated) DEVICE — PACK POSTERIOR SPINAL FUSION

## (undated) DEVICE — SUT STRATAFIX SPIRAL MONOCRYL PLUS 4-0 45CM PS-2 UNDYED

## (undated) DEVICE — DRSG DERMABOND PRINEO 22CM

## (undated) DEVICE — SUT NYLON 3-0 18" PS-2

## (undated) DEVICE — BLADE SCALPEL SAFETYLOCK #15

## (undated) DEVICE — SYR SLIP 10CC

## (undated) DEVICE — SUT VICRYL 1 36" CT-1 UNDYED

## (undated) DEVICE — PACK MINOR

## (undated) DEVICE — SUT PDS II 2-0 27" CT-1

## (undated) DEVICE — DRAPE INSTRUMENT POUCH 6.75" X 11"

## (undated) DEVICE — MISONIX BONESCALPEL IRRIGATION TUBE SET

## (undated) DEVICE — GLV 7 PROTEXIS (WHITE)

## (undated) DEVICE — POSITIONER FOAM BUMP FLAT TOP 10X6X4" LRG

## (undated) DEVICE — DRSG AQUACEL 3.5 X 14"

## (undated) DEVICE — PACK BASIC

## (undated) DEVICE — SYR ASEPTO

## (undated) DEVICE — PREP SCRUB BRUSH W CHG 4%

## (undated) DEVICE — DRAIN JACKSON PRATT 3 SPRING RESERVOIR W 10FR PVC DRAIN

## (undated) DEVICE — MASK PROC EAR LOOP

## (undated) DEVICE — GLV 8 PROTEXIS (BLUE)

## (undated) DEVICE — CERVICAL COLLAR ZIMMER PHILA MED

## (undated) DEVICE — PACK CERVICAL FUSION

## (undated) DEVICE — LONE STAR RETRACTOR RING 32.5CM X 18.3CM DISP

## (undated) DEVICE — DRAPE LAVH 124" X 30" X125"

## (undated) DEVICE — DENTURE CUP PINK

## (undated) DEVICE — GLV 7 PROTEXIS (BLUE)

## (undated) DEVICE — PREP DURAPREP 6CC

## (undated) DEVICE — SUT STRATAFIX SPIRAL PDS PLUS 2-0 45CM CT-1

## (undated) DEVICE — LONE STAR ELASTIC STAY HOOK 12MM BLUNT

## (undated) DEVICE — SOL IRR POUR NS 0.9% 500ML

## (undated) DEVICE — TUBING IV EXTENSION MACRO W CLAVE 7"

## (undated) DEVICE — STAPLER SKIN VISI-STAT 35 WIDE

## (undated) DEVICE — DRSG EYE PAD OVAL STERILE

## (undated) DEVICE — TUBING BIPOLAR IRRIGATOR AND CORD SET

## (undated) DEVICE — VENODYNE/SCD SLEEVE CALF LARGE

## (undated) DEVICE — NDL HYPO SAFE 22G X 1.5" (BLACK)

## (undated) DEVICE — FORCEP RADIAL JAW 4 W NDL 2.4MM 2.8MM 240CM ORANGE DISP

## (undated) DEVICE — DRSG DERMABOND 0.7ML

## (undated) DEVICE — DRSG STERISTRIPS 0.5 X 4"

## (undated) DEVICE — TUBING TUR 2 PRONG

## (undated) DEVICE — SUT MONOCRYL 4-0 27" PS-2 UNDYED

## (undated) DEVICE — PACK TOTAL HIP

## (undated) DEVICE — SOL IRR BAG H2O 1000ML

## (undated) DEVICE — SYR LUER LOK 10CC

## (undated) DEVICE — Device

## (undated) DEVICE — WARMING BLANKET LOWER ADULT

## (undated) DEVICE — HOOD T5 PEELAWAY

## (undated) DEVICE — DRSG BIOPATCH DISK W CHG 1" W 4.0MM HOLE

## (undated) DEVICE — FRA-ESU BOVIE FORCE FX F3F27669A: Type: DURABLE MEDICAL EQUIPMENT

## (undated) DEVICE — GLV 7.5 PROTEXIS (WHITE)

## (undated) DEVICE — NDL HYPO REGULAR BEVEL 25G X 1.5" (BLUE)

## (undated) DEVICE — SAW BLADE STRYKER RECIPROCATING 77.6X0.77X11.2MM

## (undated) DEVICE — SUCTION TIP KAMVAC MINI

## (undated) DEVICE — DRAPE SHOWER CURTAIN ISOLATION

## (undated) DEVICE — CATH IV SAFE BC 22G X 1" (BLUE)

## (undated) DEVICE — STRYKER BONE MILL BLADE FINE 3.2MM

## (undated) DEVICE — NDL SPINAL 20G X 3.5" (YELLOW)

## (undated) DEVICE — GOWN TRIMAX LG

## (undated) DEVICE — UNDERPAD LINEN SAVER 23 X 36"

## (undated) DEVICE — SUT SILK 2-0 18" PS

## (undated) DEVICE — FRA-ESU BOVIE FORCE TRIAD T7J19745DX: Type: DURABLE MEDICAL EQUIPMENT

## (undated) DEVICE — SUT PDS II 0 36" CT-1

## (undated) DEVICE — GOWN IMPERV BREATHABLE XL

## (undated) DEVICE — SYR LUER LOK 20CC

## (undated) DEVICE — DRSG CURITY GAUZE SPONGE 4 X 4" 12-PLY NON-STERILE

## (undated) DEVICE — BIPOLAR FORCEP HENSLER BAYONET 10" X 1MM (YELLOW)

## (undated) DEVICE — DRAPE SURGICAL #1010

## (undated) DEVICE — ELCTR STRYKER NEPTUNE SMOKE EVACUATION PENCIL (GREEN)

## (undated) DEVICE — DRAPE 3/4 SHEET W REINFORCEMENT 56X77"

## (undated) DEVICE — DRSG 4 X 4" 4PLY STERILE

## (undated) DEVICE — TUBING ALARIS PUMP MODULE NON-DEHP

## (undated) DEVICE — PACK TOTAL JOINT

## (undated) DEVICE — LONE STAR ELASTIC STAY HOOK 5MM SHARP

## (undated) DEVICE — SUT HEWSON RETRIEVER

## (undated) DEVICE — DRSG 2X2

## (undated) DEVICE — DRILL BIT NUVASIVE ARCHON 2.5X10MM

## (undated) DEVICE — SUT MONOCRYL 2-0 27" SH UNDYED

## (undated) DEVICE — FRAZIER SUCTION TIP 10FR

## (undated) DEVICE — MISONIX BONESCALPEL BLUNT BLADE & TUBESET 20MM

## (undated) DEVICE — ELCTR BOVIE PENCIL SMOKE EVACUATION

## (undated) DEVICE — ELCTR BOVIE TIP BLADE INSULATED 2.75" EDGE WITH SAFETY

## (undated) DEVICE — SYR LUER SLIP TIP 50CC

## (undated) DEVICE — WARMING BLANKET FULL ADULT

## (undated) DEVICE — SUT SOFSILK 2-0 18" C-23

## (undated) DEVICE — SOL IRR POUR NS 0.9% 1000ML

## (undated) DEVICE — VISITEC 4X4

## (undated) DEVICE — SPECIMEN CONTAINER 100ML

## (undated) DEVICE — DRAPE TOWEL BLUE 17" X 24"

## (undated) DEVICE — SUT ETHILON 2-0 18" FS

## (undated) DEVICE — DRSG XEROFORM 5 X 9"

## (undated) DEVICE — PREP CHLORAPREP HI-LITE ORANGE 26ML

## (undated) DEVICE — SPONGE PEANUT AUTO COUNT

## (undated) DEVICE — DRAIN JACKSON PRATT 7MM FLAT FULL NO TROCAR

## (undated) DEVICE — GLV 8.5 PROTEXIS (WHITE)

## (undated) DEVICE — DRAPE STERI-DRAPE INCISE 19X17"

## (undated) DEVICE — ACMI SELF-SEALING SEAL UP TO 7FR

## (undated) DEVICE — DRAPE UNDER BUTTOCKS W SCREEN

## (undated) DEVICE — SUT SILK 2-0 12-18"

## (undated) DEVICE — SUT SILK 2-0 30" SH

## (undated) DEVICE — SUT VICRYL 0 36" CT-1 UNDYED

## (undated) DEVICE — SUT POLYSORB 3-0 30" V-20 UNDYED

## (undated) DEVICE — SUT MONOCRYL 3-0 18" PS-2 UNDYED

## (undated) DEVICE — SYR IV FLUSH SALINE 10ML 30/TY

## (undated) DEVICE — FORCEP BIOPSY ENDOSCOPIC 2.8MM DISP

## (undated) DEVICE — MIDAS REX LEGEND LUBRICANT DIFFUSER CARTRIDGE

## (undated) DEVICE — DRAIN JACKSON PRATT 10MM FLAT 3/4 NO TROCAR

## (undated) DEVICE — SOL IRR POUR H2O 250ML

## (undated) DEVICE — SOL BAG NS 0.9% 1000ML

## (undated) DEVICE — BITE BLOCK ADULT 20 X 27MM (GREEN)

## (undated) DEVICE — POSITIONER FOAM EGG CRATE ULNAR 2PCS (PINK)

## (undated) DEVICE — BIPOLAR FORCEP HENSLER BAYONET 8" X 1MM (YELLOW)

## (undated) DEVICE — PACK IV START WITH CHG

## (undated) DEVICE — TAPE SILK 2"

## (undated) DEVICE — GLV 8 PROTEXIS (WHITE)

## (undated) DEVICE — GLV 8.5 PROTEXIS ORTHO (BROWN)

## (undated) DEVICE — GOWN IMPERV XL

## (undated) DEVICE — SOL IRR BAG NS 0.9% 1000ML

## (undated) DEVICE — FOLEY TRAY 16FR LF URINE METER SURESTEP

## (undated) DEVICE — DRSG TEGADERM 4X4.75"

## (undated) DEVICE — ELCTR AQUAMANTYS BIPOLAR SEALER 6.0

## (undated) DEVICE — TUBING SUCTION 20FT

## (undated) DEVICE — SUT VICRYL 2-0 18" CP-2 UNDYED (POP-OFF)

## (undated) DEVICE — SUT VICRYL 1 18" CT-1 UNDYED (POP-OFF)

## (undated) DEVICE — SENSOR O2 FINGER ADULT

## (undated) DEVICE — WOUND IRR IRRISEPT W 0.5 CHG

## (undated) DEVICE — TAPE SILK 3"